# Patient Record
Sex: FEMALE | Race: WHITE | NOT HISPANIC OR LATINO | Employment: FULL TIME | URBAN - METROPOLITAN AREA
[De-identification: names, ages, dates, MRNs, and addresses within clinical notes are randomized per-mention and may not be internally consistent; named-entity substitution may affect disease eponyms.]

---

## 2017-03-22 PROCEDURE — 88304 TISSUE EXAM BY PATHOLOGIST: CPT | Performed by: DERMATOLOGY

## 2017-03-22 PROCEDURE — 88305 TISSUE EXAM BY PATHOLOGIST: CPT | Performed by: DERMATOLOGY

## 2017-03-23 ENCOUNTER — LAB REQUISITION (OUTPATIENT)
Dept: LAB | Facility: HOSPITAL | Age: 57
End: 2017-03-23
Payer: COMMERCIAL

## 2017-03-23 DIAGNOSIS — D48.5 NEOPLASM OF UNCERTAIN BEHAVIOR OF SKIN: ICD-10-CM

## 2017-03-23 DIAGNOSIS — M71.372 OTHER BURSAL CYST, LEFT ANKLE AND FOOT: ICD-10-CM

## 2017-03-30 ENCOUNTER — LAB REQUISITION (OUTPATIENT)
Dept: LAB | Facility: HOSPITAL | Age: 57
End: 2017-03-30
Payer: COMMERCIAL

## 2017-03-30 DIAGNOSIS — C44.329 SQUAMOUS CELL CARCINOMA OF SKIN OF OTHER PARTS OF FACE: ICD-10-CM

## 2017-03-30 PROCEDURE — 88305 TISSUE EXAM BY PATHOLOGIST: CPT | Performed by: DERMATOLOGY

## 2017-03-30 PROCEDURE — 88312 SPECIAL STAINS GROUP 1: CPT | Performed by: DERMATOLOGY

## 2017-04-19 ENCOUNTER — ALLSCRIPTS OFFICE VISIT (OUTPATIENT)
Dept: OTHER | Facility: OTHER | Age: 57
End: 2017-04-19

## 2017-04-19 DIAGNOSIS — E01.0 IODINE-DEFICIENCY RELATED DIFFUSE GOITER: ICD-10-CM

## 2017-04-19 DIAGNOSIS — Z12.31 ENCOUNTER FOR SCREENING MAMMOGRAM FOR MALIGNANT NEOPLASM OF BREAST: ICD-10-CM

## 2017-07-06 ENCOUNTER — APPOINTMENT (OUTPATIENT)
Dept: LAB | Facility: CLINIC | Age: 57
End: 2017-07-06
Payer: COMMERCIAL

## 2017-07-06 ENCOUNTER — TRANSCRIBE ORDERS (OUTPATIENT)
Dept: LAB | Facility: CLINIC | Age: 57
End: 2017-07-06

## 2017-07-06 ENCOUNTER — LAB CONVERSION - ENCOUNTER (OUTPATIENT)
Dept: OTHER | Facility: OTHER | Age: 57
End: 2017-07-06

## 2017-07-06 DIAGNOSIS — Z00.8 HEALTH EXAMINATION IN POPULATION SURVEY: ICD-10-CM

## 2017-07-06 DIAGNOSIS — E78.5 OTHER AND UNSPECIFIED HYPERLIPIDEMIA: Primary | ICD-10-CM

## 2017-07-06 DIAGNOSIS — E78.5 OTHER AND UNSPECIFIED HYPERLIPIDEMIA: ICD-10-CM

## 2017-07-06 DIAGNOSIS — I10 ESSENTIAL (PRIMARY) HYPERTENSION: ICD-10-CM

## 2017-07-06 LAB
ANION GAP SERPL CALCULATED.3IONS-SCNC: 5 MMOL/L (ref 4–13)
BUN SERPL-MCNC: 18 MG/DL (ref 5–25)
CALCIUM SERPL-MCNC: 10.1 MG/DL (ref 8.3–10.1)
CHLORIDE SERPL-SCNC: 102 MMOL/L (ref 100–108)
CHOLEST SERPL-MCNC: 115 MG/DL (ref 50–200)
CO2 SERPL-SCNC: 30 MMOL/L (ref 21–32)
CREAT SERPL-MCNC: 0.81 MG/DL (ref 0.6–1.3)
CREAT UR-MCNC: 80.8 MG/DL
EST. AVERAGE GLUCOSE BLD GHB EST-MCNC: 128 MG/DL
GFR SERPL CREATININE-BSD FRML MDRD: >60 ML/MIN/1.73SQ M
GLUCOSE P FAST SERPL-MCNC: 106 MG/DL (ref 65–99)
HBA1C MFR BLD: 6.1 % (ref 4.2–6.3)
HDLC SERPL-MCNC: 53 MG/DL (ref 40–60)
LDLC SERPL CALC-MCNC: 45 MG/DL (ref 0–100)
MICROALBUMIN UR-MCNC: <5 MG/L (ref 0–20)
MICROALBUMIN/CREAT 24H UR: <6 MG/G CREATININE (ref 0–30)
POTASSIUM SERPL-SCNC: 3.2 MMOL/L (ref 3.5–5.3)
SODIUM SERPL-SCNC: 137 MMOL/L (ref 136–145)
TRIGL SERPL-MCNC: 84 MG/DL

## 2017-07-06 PROCEDURE — 36415 COLL VENOUS BLD VENIPUNCTURE: CPT

## 2017-07-06 PROCEDURE — 82570 ASSAY OF URINE CREATININE: CPT

## 2017-07-06 PROCEDURE — 82043 UR ALBUMIN QUANTITATIVE: CPT

## 2017-07-06 PROCEDURE — 80048 BASIC METABOLIC PNL TOTAL CA: CPT

## 2017-07-06 PROCEDURE — 83036 HEMOGLOBIN GLYCOSYLATED A1C: CPT

## 2017-07-06 PROCEDURE — 80061 LIPID PANEL: CPT

## 2017-07-24 DIAGNOSIS — I10 ESSENTIAL (PRIMARY) HYPERTENSION: ICD-10-CM

## 2017-09-11 ENCOUNTER — APPOINTMENT (OUTPATIENT)
Dept: LAB | Facility: CLINIC | Age: 57
End: 2017-09-11
Payer: COMMERCIAL

## 2017-09-11 ENCOUNTER — TRANSCRIBE ORDERS (OUTPATIENT)
Dept: LAB | Facility: CLINIC | Age: 57
End: 2017-09-11

## 2017-09-11 DIAGNOSIS — I10 ESSENTIAL (PRIMARY) HYPERTENSION: ICD-10-CM

## 2017-09-11 LAB
ANION GAP SERPL CALCULATED.3IONS-SCNC: 8 MMOL/L (ref 4–13)
BUN SERPL-MCNC: 14 MG/DL (ref 5–25)
CALCIUM SERPL-MCNC: 9.3 MG/DL (ref 8.3–10.1)
CHLORIDE SERPL-SCNC: 104 MMOL/L (ref 100–108)
CO2 SERPL-SCNC: 26 MMOL/L (ref 21–32)
CREAT SERPL-MCNC: 0.81 MG/DL (ref 0.6–1.3)
GFR SERPL CREATININE-BSD FRML MDRD: 82 ML/MIN/1.73SQ M
GLUCOSE P FAST SERPL-MCNC: 104 MG/DL (ref 65–99)
POTASSIUM SERPL-SCNC: 3.9 MMOL/L (ref 3.5–5.3)
SODIUM SERPL-SCNC: 138 MMOL/L (ref 136–145)

## 2017-09-11 PROCEDURE — 36415 COLL VENOUS BLD VENIPUNCTURE: CPT

## 2017-09-11 PROCEDURE — 80048 BASIC METABOLIC PNL TOTAL CA: CPT

## 2017-09-13 ENCOUNTER — ALLSCRIPTS OFFICE VISIT (OUTPATIENT)
Dept: OTHER | Facility: OTHER | Age: 57
End: 2017-09-13

## 2017-10-01 ENCOUNTER — OFFICE VISIT (OUTPATIENT)
Dept: URGENT CARE | Facility: MEDICAL CENTER | Age: 57
End: 2017-10-01
Payer: COMMERCIAL

## 2017-10-01 PROCEDURE — S9088 SERVICES PROVIDED IN URGENT: HCPCS

## 2017-10-01 PROCEDURE — 99213 OFFICE O/P EST LOW 20 MIN: CPT

## 2017-10-07 NOTE — PROGRESS NOTES
Assessment  1  Skin rash (782 1) (R21)    Plan  Skin rash    · Cephalexin 500 MG Oral Capsule (Keflex); TAKE 1 CAPSULE 4 TIMES DAILY UNTIL  GONE   · PredniSONE 10 MG Oral Tablet; TAKE 4 TABLETS DAILY FOR 2 DAYS,3 TABLETS  DAILY FOR 2 DAYS, 2 TABLETS DAILY FOR 2 DAYS AND 1 TABLET DAILY FOR 2  DAYS, THEN STOP    Discussion/Summary  Discussion Summary:   1  Rash is consistent with poison ivy dermatitis  I am concerned about the possibility of early infection given redness and warmth on exam therefore I am treating with keflexprednisone as directed with foodBenadryl as directed for itchingup with PCP within 1 week if there is no improvement  to the emergency room with worsening symptoms, worsening rash, fever, increased redness or any new concerns  Medication Side Effects Reviewed: Possible side effects of new medications were reviewed with the patient/guardian today  Understands and agrees with treatment plan: The treatment plan was reviewed with the patient/guardian  The patient/guardian understands and agrees with the treatment plan      Chief Complaint  1  Rash  Chief Complaint Free Text Note Form: Pt with complaints of red, itchy rash on chin for 4 days  Applying caladryl without improvement  History of Present Illness  HPI: The patient is a 62-year-old female who presents today for evaluation of a rash to the left side of her face that started on Wednesday  The patient states that the rash is itchy  She states that it now seems as though it is spreading down the left side of her neck  She has tried applying Caladryl which does offer her some relief for a short amount of time  Patient states that she has been scratching at the rash in has noticed some drainage  She states that she was outside working in the y[place] earlier this week however she is unsure if she came into contact with poison ivy  [facility] Pract[facility] Required Assessment:   Pain Assessment   the patient states they do not have pain   (on a scale of 0 to 10, the patient rates the pain at 0 )    Prefered Language is  english  Primary Language is  english  Review of Systems  Focused-Female:   Constitutional: no fever-and-no chills  Cardiovascular: no chest pain  Respiratory: no shortness of breath  Integumentary: rash-and-itching  ROS Reviewed:   ROS reviewed  Active Problems  1  Accelerated hypertension (401 0) (I10)   2  Cellulitis of chin (682 0) (L03 211)   3  Cellulitis of foot (682 7) (L03 119)   4  Cerebral infarction, unspecified (434 91) (I63 9)   5  Colonoscopy (Fiberoptic) Screening   6  Edema (782 3) (R60 9)   7  Encounter for screening mammogram for breast cancer (V76 12) (Z12 31)   8  Enlarged thyroid (240 9) (E04 9)   9  Essential hypertension (401 9) (I10)   10  Fatigue (780 79) (R53 83)   11  Hyperlipidemia, unspecified (272 4) (E78 5)   12  Lacunar stroke (434 91) (I63 9)   13  Microscopic hematuria (599 72) (R31 29)   14  Pain in ankle joint (719 47) (M25 579)   15  Scalp lesion (709 9) (L98 9)   16  Simple goiter (240 0) (E04 0)    Past Medical History  1  History of Diverticulosis (562 10) (K57 90)   2  History of hemorrhoids (V13 89) (Z87 19)   3  History of hypertension (V12 59) (Z86 79)   4  History of stroke (V12 54) (Z86 73)  Active Problems And Past Medical History Reviewed: The active problems and past medical history were reviewed and updated today  Family History  Mother    1  Family history of Diagnosis unknown  Father    2  Family history of hypertension (V17 49) (Z82 49)   3  Family history of Parkinsonism (V17 2) (Z82 0)  Sister    4  Family history of hypertension (V17 49) (Z82 49)  Family History    5  Family history of cerebrovascular accident (CVA) (V17 1) (Z82 3)   6  Family history of glaucoma (V19 11) (Z83 511)  Family History Reviewed: The family history was reviewed and updated today         Social History   · Always uses seat belt   · Daily caffeine consumption, 4-5 servings a day · Never a smoker   · No drug use   · Social alcohol use (Z78 9)  Social History Reviewed: The social history was reviewed and updated today  The social history was reviewed and is unchanged  Surgical History  1  History of Complete Colonoscopy   2  History of Nose Surgery   3  History of Removal Of Lesion  Surgical History Reviewed: The surgical history was reviewed and updated today  Current Meds   1  Atorvastatin Calcium 40 MG Oral Tablet; TAKE 1 TABLET DAILY  Requested for:   27QJY8885; Last DL:77JXB3251 Ordered   2  Clopidogrel Bisulfate 75 MG Oral Tablet; TAKE 1 TABLET DAILY; Therapy: 16UPK0546 to (Evaluate:28Xxj6009)  Requested for: 08QID7627; Last   Rx:59Zlp0454 Ordered   3  Co Q-10 200 MG Oral Capsule; Daily Recorded   4  D 2000 2000 UNIT Oral Tablet; Take 1 daily Recorded   5  Edarbyclor 40-12 5 MG Oral Tablet; One tablet daily; Therapy: 20WHL7965 to (Evaluate:58Vvs1924)  Requested for: 92Uym3905; Last   Rx:62Wgn2154 Ordered   6  Folic Acid 1 MG Oral Tablet; TAKE 1 TABLET DAILY  Requested for: 34HEO8009; Last   MR:91XOF9526 Ordered   7  Metoprolol Succinate ER 25 MG Oral Tablet Extended Release 24 Hour; Take 1 tablet   twice daily; Therapy: 17Csu5495 to (Evaluate:60Xin8937)  Requested for: 74Oji1701; Last   Rx:09Ogi8748 Ordered   8  Potassium 99 MG Oral Tablet; TAKE 1 TABLET DAILY; Therapy: (Recorded:82Jkr4704) to Recorded   9  Vitamin C 500 MG Oral Capsule; One daily Recorded  Medication List Reviewed: The medication list was reviewed and updated today  Allergies  1  Codeine Derivatives    Vitals  Signs   Recorded: 45LSD9316 02:29PM   Temperature: 97 6 F  Heart Rate: 64  Respiration: 20  Systolic: 478  Diastolic: 72  O2 Saturation: 98  Pain Scale: 0    Physical Exam    Constitutional   General appearance: No acute distress, well appearing and well nourished  Eyes   Conjunctiva and lids: No swelling, erythema or discharge      Pupils and irises: Equal, round and reactive to light     Ears, Nose, Mouth, and Throat   Oropharynx: Normal with no erythema, edema, exudate or lesions  Pulmonary   Respiratory effort: No increased work of breathing or signs of respiratory distress  Auscultation of lungs: Clear to auscultation  Cardiovascular   Auscultation of heart: Normal rate and rhythm, normal S1 and S2, without murmurs  Lymphatic   Palpation of lymph nodes in neck: No lymphadenopathy  Skin   Skin and subcutaneous tissue: Abnormal  -erythematous rash and edema to the left side of the face near the mandible  There are some vesicular lesions with dried weeping  There are some erythematous lesions on the left side of the patient's neck  There is warmth upon palpation of the rash  Neurologic   Sensation: No sensory loss         Signatures   Electronically signed by : Emilee Schneider [facility]; Oct  1 2017  3:08PM EST                       (Author)    Electronically signed by : JENARO Ling ; Oct  6 2017  9:30PM EST                       (Co-author)

## 2017-10-20 NOTE — PROGRESS NOTES
Assessment  1  Accelerated hypertension (401 0) (I10)    Plan  Accelerated hypertension    · Metoprolol Tartrate 25 MG Oral Tablet   Rx By: Tuhsar Cruz; Dispense: 30 Days ; #:60 Tablet; Refill: 0;For: Accelerated hypertension; VEDA = N; Sent To: [address]; Last Updated By: Nelda Khan; 9/13/2017 9:55:41 AM   · Metoprolol Succinate ER 25 MG Oral Tablet Extended Release 24 Hour (Toprol XL); Take 1 tablet twice daily   Rx By: Nelda Khan; Dispense: 90 Days ; #:180 Tablet Extended Release 24 Hour; Refill: 3;For: Accelerated hypertension; VEDA = N; Verified Transmission to [facility]ita Verg[address]; Last Updated By: System, [employer]; 9/13/2017 9:57:28 AM   · From  Edarbyclor 40-12 5 MG Oral Tablet Take 1 tablet daily To Edarbyclor  40-12 5 MG Oral Tablet One tablet daily   Rx By: Nelda Khan; Dispense: 30 Days ; #:30 Tablet; Refill: 6;For: Accelerated hypertension; VEDA = N; Print Rx; Msg to Pharmacy: Patient needs appointment with doctor for more refills   · (1) [place]; Status:Active; Requested for:05Mar2018; Perform:[facility]; WTC:76RUQ2470;FMMVBCR; For:Accelerated hypertension; Ordered By:Bk Burnette;   · (1) MICROALBUMIN CREATININE RATIO, RANDOM URINE; Status:Active; Requested  for:05Mar2018; Perform:[facility]; OXW:08SIX7405;ZLJKMLQ; For:Accelerated hypertension; Ordered By:Bk Burnette;   · Follow-up visit in 6 months Evaluation and Treatment  Follow-up  Status: Hold For -  Scheduling  Requested for: 99Rpq4552   Ordered; For: Accelerated hypertension; Ordered By: Nelda Khan Performed:  Due: 01HFK0739    Discussion/Summary    Accelerated hypertension  her blood pressure significant improved, recently now low with associated dizziness and lightheadedness  At this point time I would like to continue with her Edarbychlor, however will reduce her metoprolol to 25 mg daily, changed to Toprol-XL   If her blood pressure still low within the next several weeks can try to cut this in half  Goal for blood pressure would be systolically between 135 and 130 especially in lieu of her CVA history  If her blood pressure still low or becomes uncontrolled she's give the office a call for further recommendations  Otherwise I like to see her back the office approximately 6 months with repeat laboratory studies including repeat microalbumin / creatinine ratio  The patient was counseled regarding instructions for management,-- impressions  Possible side effects of new medications were reviewed with the patient/guardian today  Reason For Visit  Accelerated hypertension      History of Present Illness  With the pleasure of seeing Edith Shields for nephrology follow-up secondary to previous diagnosis of accelerated hypertension  been doing reasonably well  Denies any recent hospitalizations or illnesses  blood pressure although has been low, associated dizziness lightheadedness  Recorded readings in the low 100s  She denies any falls or syncopal episodes  No active chest pain shortness of breath or lower extremity swelling  Review of Systems    Constitutional: fatigue, but-- no fever  Gastrointestinal: no abdominal pain,-- no nausea,-- no diarrhea-- and-- no vomiting  Respiratory: no shortness of breath  Cardiovascular: no chest pain-- and-- no lower extremity edema  Musculoskeletal: no joint pain  Neurological: lightheadedness-- and-- dizziness  Genitourinary: no dysuria  Current Meds   1  Atorvastatin Calcium 40 MG Oral Tablet; TAKE 1 TABLET DAILY  Requested for:   13FBF1781; Last NX:60YFE1282 Ordered   2  Clopidogrel Bisulfate 75 MG Oral Tablet; TAKE 1 TABLET DAILY; Therapy: 07IUL0449 to (Evaluate:30Oct2017)  Requested for: 69YXF6231; Last   Rx:10Nkh1719 Ordered   3  Co Q-10 200 MG Oral Capsule; Daily Recorded   4  D 2000 2000 UNIT Oral Tablet; Take 1 daily Recorded   5  Edarbyclor 40-12 5 MG Oral Tablet; Take 1 tablet daily;    Therapy: 58UAJ0965 to (Anusha Luke)  Requested for: 45Xwo8733; Last   Rx:30Gtq0454 Ordered   6  Folic Acid 1 MG Oral Tablet; TAKE 1 TABLET DAILY  Requested for: 27HMS6472; Last   TS:72FVF8687 Ordered   7  Metoprolol Tartrate 25 MG Oral Tablet; TAKE 1 TABLET TWICE DAILY  Requested for:   20HGC7073; Last Rx:98Cpb4782 Ordered   8  Potassium 99 MG Oral Tablet; TAKE 1 TABLET DAILY; Therapy: (Recorded:20Sep2016) to Recorded   9  Vitamin C 500 MG Oral Capsule; One daily Recorded    The medication list was reviewed and updated today  Allergies  1  Codeine Derivatives    Vitals  Vital Signs    Recorded: 13Sep2017 09:38AM   Heart Rate 64   Respiration 16   Systolic 799, LUE, Sitting   Diastolic 68, LUE, Sitting   Height 5 ft 4 in   Weight 181 lb 6 08 oz   BMI Calculated 31 13   BSA Calculated 1 89     Physical Exam    Constitutional: General appearance: No acute distress, well appearing and well nourished  JVD:  No JVD present  Pulmonary: Respiratory effort: No increased work of breathing or signs of respiratory distress  -- Auscultation of lungs: Clear to auscultation  Cardiovascular: Auscultation of heart: Normal rate and rhythm, normal S1 and S2, without murmurs  Abdomen: Non-tender, no masses  Extremities: No cyanosis, clubbing or edema  Rash: No rash present     Neurologic: Non Focal      Psychiatric: Orientation to person, place, and time: Normal  -- and-- Mood and affect: Normal        Results/Data  (1) BASIC METABOLIC PROFILE 92MSZ0962 07:20AM Penny Patel Order Number: [id number]     Test Name Result Flag Reference   SODIUM 138 mmol/L  136-145   POTASSIUM 3 9 mmol/L  3 5-5 3   CHLORIDE 104 mmol/L  100-108   CARBON DIOXIDE 26 mmol/L  21-32   ANION GAP (CALC) 8 mmol/L  4-13   BLOOD UREA NITROGEN 14 mg/dL  5-25   CREATININE 0 81 mg/dL  0 60-1 30   Standardized to IDMS reference method   CALCIUM 9 3 mg/dL  8 3-10 1   eGFR 82 ml/min/1 73sq m     [facility] Disease Education Program recommendations are as follows:  GFR calculation is accurate only with a steady state creatinine  Chronic Kidney disease less than 60 ml/min/1 73 sq  meters  Kidney failure less than 15 ml/min/1 73 sq  meters  GLUCOSE FASTING 104 mg/dL H 65-99   Specimen collection should occur prior to Sulfasalazine administration due to the potential for falsely depressed results  Specimen collection should occur prior to Sulfapyridine administration due to the potential for falsely elevated results         Future Appointments    Date/Time Provider Specialty Site   09/19/2017 02:30 PM Wenceslao Rodriguez MD Neurology [facility]     Signatures   Electronically signed by : Chema Wilson DO; Sep 13 2017  9:58AM EST                       (Author)

## 2017-11-09 ENCOUNTER — GENERIC CONVERSION - ENCOUNTER (OUTPATIENT)
Dept: OTHER | Facility: OTHER | Age: 57
End: 2017-11-09

## 2017-11-09 DIAGNOSIS — Z12.31 ENCOUNTER FOR SCREENING MAMMOGRAM FOR MALIGNANT NEOPLASM OF BREAST: ICD-10-CM

## 2017-12-11 DIAGNOSIS — I63.9 CEREBRAL INFARCTION (HCC): ICD-10-CM

## 2017-12-11 DIAGNOSIS — I10 ESSENTIAL (PRIMARY) HYPERTENSION: ICD-10-CM

## 2017-12-11 DIAGNOSIS — R31.29 OTHER MICROSCOPIC HEMATURIA: ICD-10-CM

## 2017-12-11 DIAGNOSIS — B37.2 CANDIDIASIS OF SKIN AND NAIL: ICD-10-CM

## 2017-12-11 DIAGNOSIS — R73.03 PREDIABETES: ICD-10-CM

## 2017-12-11 DIAGNOSIS — E78.5 HYPERLIPIDEMIA: ICD-10-CM

## 2017-12-28 ENCOUNTER — ALLSCRIPTS OFFICE VISIT (OUTPATIENT)
Dept: OTHER | Facility: OTHER | Age: 57
End: 2017-12-28

## 2018-01-04 NOTE — PROGRESS NOTES
Assessment   1  Intertriginous candidiasis (112 3) (B37 2)   2  Prediabetes (790 29) (R73 03)    Plan   Intertriginous candidiasis    · Nystatin 691414 UNIT/GM External Cream; APPLY  AND RUB  IN A THIN FILM TO    AFFECTED AREAS TWICE DAILY  (AM AND PM)  Intertriginous candidiasis, Prediabetes    · (1) CBC/PLT/DIFF; Status:Active; Requested for:82Pjr9788;    · (1) COMPREHENSIVE METABOLIC PANEL; Status:Active; Requested for:05Yoe9114;    · (1) HEMOGLOBIN A1C; Status:Active; Requested for:71Uot6135;   Prediabetes    · A diet low in sugar may help your condition ; Status:Complete;   Done: 35JZS4928    01:30PM   · We want to put you on the DASH diet for 2000 calories ; Status:Complete;   Done:    89IVZ7657 01:30PM    Discussion/Summary      F/u after testing  The patient was counseled regarding diagnostic results,-- instructions for management,-- risk factor reductions,-- patient and family education,-- impressions  Possible side effects of new medications were reviewed with the patient/guardian today  The treatment plan was reviewed with the patient/guardian  The patient/guardian understands and agrees with the treatment plan      Chief Complaint   Pt has c/o rash under both boobs x 3 days  PT stated it is very itchy  No change in soap or lotion  History of Present Illness   HPI: started 2 days ago, tried mupirocin ointment that she had at home, stopped the itch, but otherwise not any better little red bumps if it's hot and I get sweaty, but never like this note-has prediabetes- last labs was BMP in september, and HbA1c in july- 6 1% - and pt states, all I've been doing is eating cookies and candy      Review of Systems        Constitutional: No fever, no chills, feels well, no tiredness, no recent weight gain or loss  Gastrointestinal: no complaints of abdominal pain, no constipation, no nausea or diarrhea, no vomiting, no bloody stools        Genitourinary: no complaints of dysuria, no incontinence, no pelvic pain, no dysmenorrhea, no vaginal discharge or abnormal vaginal bleeding  Integumentary: as noted in HPI  Other Symptoms: endo per HPI, denies polyuria or polydipsia  Active Problems   1  Accelerated hypertension (401 0) (I10)   2  Cerebral infarction, unspecified (434 91) (I63 9)   3  Change in mole (216 9) (L81 9)   4  Colonoscopy (Fiberoptic) Screening   5  Edema (782 3) (R60 9)   6  Encounter for gynecological examination (V72 31) (Z01 419)   7  Encounter for screening mammogram for breast cancer (V76 12) (Z12 31)   8  Enlarged thyroid (240 9) (E04 9)   9  Essential hypertension (401 9) (I10)   10  Fatigue (780 79) (R53 83)   11  Hyperlipidemia, unspecified (272 4) (E78 5)   12  Lacunar stroke (434 91) (I63 9)   13  Microscopic hematuria (599 72) (R31 29)   14  Pain in ankle joint (719 47) (M25 579)   15  Prediabetes (790 29) (R73 03)   16  Scalp lesion (709 9) (L98 9)   17  Simple goiter (240 0) (E04 0)   18  Skin rash (782 1) (R21)    Past Medical History   1  History of Cellulitis of chin (682 0) (L03 211)   2  History of Cellulitis of foot (682 7) (L03 119)   3  History of Diverticulosis (562 10) (K57 90)   4  History of hemorrhoids (V13 89) (Z87 19)   5  History of hypertension (V12 59) (Z86 79)   6  History of stroke (V12 54) (Z86 73)  Active Problems And Past Medical History Reviewed: The active problems and past medical history were reviewed and updated today  Family History   Mother    1  Family history of Diagnosis unknown  Father    2  Family history of hypertension (V17 49) (Z82 49)   3  Family history of Parkinsonism (V17 2) (Z82 0)  Sister    4  Family history of hypertension (V17 49) (Z82 49)  Family History    5  Family history of cerebrovascular accident (CVA) (V17 1) (Z82 3)   6  Family history of glaucoma (V19 11) (Z83 511)  Family History Reviewed: The family history was reviewed and updated today         Social History    · Always uses seat belt   · Daily caffeine consumption, 4-5 servings a day   · Never a smoker   · No drug use   · Social alcohol use (Z78 9)  The social history was reviewed and updated today  The social history was reviewed and is unchanged  Surgical History   1  History of Complete Colonoscopy   2  History of Nose Surgery   3  History of Removal Of Lesion  Surgical History Reviewed: The surgical history was reviewed and updated today  Current Meds    1  Atorvastatin Calcium 40 MG Oral Tablet; TAKE 1 TABLET DAILY  Requested for:     39EVI1596; Last Rx:01Nov2017 Ordered   2  Clopidogrel Bisulfate 75 MG Oral Tablet; TAKE 1 TABLET DAILY; Therapy: 67RUO8987 to (Evaluate:18Wyn3337)  Requested for: 66DZH9544; Last     Rx:01Nov2017 Ordered   3  Co Q-10 200 MG Oral Capsule; Daily Recorded   4  D 2000 2000 UNIT TABS; Take 1 daily Recorded   5  Edarbyclor 40-12 5 MG Oral Tablet; One tablet daily; Therapy: 97IMN7934 to (Evaluate:16Inw0935)  Requested for: 32Hyw9446; Last     Rx:75Hip4288 Ordered   6  Folic Acid 1 MG Oral Tablet; TAKE 1 TABLET DAILY  Requested for: 63EEP9441; Last     Rx:01Nov2017 Ordered   7  Metoprolol Succinate ER 25 MG Oral Tablet Extended Release 24 Hour; Take 1 tablet     twice daily; Therapy: 33Dqm7856 to (Evaluate:53Eok1596)  Requested for: 70Ool8751; Last     Rx:23Wdi6132 Ordered   8  Potassium 99 MG Oral Tablet; TAKE 1 TABLET DAILY; Therapy: (Recorded:80Heu0009) to Recorded   9  Vitamin C 500 MG Oral Capsule; One daily Recorded     The medication list was reviewed and updated today  Allergies   1  Codeine Derivatives    Vitals    Recorded: 97Rgw3903 04:21PM   Temperature 97 7 F   Heart Rate 90   Respiration 16   Systolic 855   Diastolic 82   Height 5 ft 4 in   Weight 187 lb 2 oz   BMI Calculated 32 12   BSA Calculated 1 9   O2 Saturation 99     Physical Exam        Constitutional      General appearance: No acute distress, well appearing and well nourished  -- except BMI        Eyes      Conjunctiva and lids: No swelling, erythema or discharge  Pupils and irises: Equal, round and reactive to light  Pulmonary      Respiratory effort: No increased work of breathing or signs of respiratory distress  Skin      Skin and subcutaneous tissue: Normal without rashes or lesions         Examination of the skin for lesions: Abnormal   Multiple, red papule(s)  in intertriginous areas and on the chest  Red patch(es)  in intertriginous areas and on the chest       Psychiatric      Mood and affect: Normal           Future Appointments      Date/Time Provider Specialty Site   05/08/2018 04:00 PM Zachary Frederick [facility]  64      Signatures    Electronically signed by : Khalida Solorio DO; [place]  3 2018  1:31PM EST                       (Author)

## 2018-01-10 NOTE — RESULT NOTES
Verified Results  (1) CBC/PLT/DIFF 29Apr2016 07:25AM Gulshan Ramos Order Number: [id number]    TW Order Number: [id number]     Test Name Result Flag Reference   WBC COUNT 8 59 Thousand/uL  4 31-10 16   RBC COUNT 4 61 Million/uL  3 81-5 12   HEMOGLOBIN 14 0 g/dL  11 5-15 4   HEMATOCRIT 41 3 %  34 8-46  1   MCV 90 fL  82-98   MCH 30 4 pg  26 8-34 3   MCHC 33 9 g/dL  31 4-37 4   RDW 12 6 %  11 6-15 1   MPV 10 4 fL  8 9-12 7   PLATELET COUNT 939 Thousands/uL  149-390   nRBC AUTOMATED 0 /100 WBCs     NEUTROPHILS RELATIVE PERCENT 56 %  43-75   LYMPHOCYTES RELATIVE PERCENT 35 %  14-44   MONOCYTES RELATIVE PERCENT 6 %  4-12   EOSINOPHILS RELATIVE PERCENT 3 %  0-6   BASOPHILS RELATIVE PERCENT 0 %  0-1   NEUTROPHILS ABSOLUTE COUNT 4 72 Thousands/µL  1 85-7 62   LYMPHOCYTES ABSOLUTE COUNT 2 99 Thousands/µL  0 60-4 47   MONOCYTES ABSOLUTE COUNT 0 55 Thousand/µL  0 17-1 22   EOSINOPHILS ABSOLUTE COUNT 0 29 Thousand/µL  0 00-0 61   BASOPHILS ABSOLUTE COUNT 0 02 Thousands/µL  0 00-0 10     (1) COMPREHENSIVE METABOLIC PANEL [id number] 92:63HL Sammy Sat    Order Number: [id number]    TW Order Number: [id number] Order Number: [id number] Order Number: [id number] Order Number: [id number]  National Kidney Disease Education Program recommendations are as follows:  GFR calculation is accurate only with a steady state creatinine  Chronic Kidney disease less than 60 ml/min/1 73 sq  meters  Kidney failure less than 15 ml/min/1 73 sq  meters  Test Name Result Flag Reference   GLUCOSE,RANDM 96 mg/dL     If the patient is fasting, the ADA then defines impaired fasting glucose as > 100 mg/dL and diabetes as > or equal to 123 mg/dL     SODIUM 140 mmol/L  136-145   POTASSIUM 3 9 mmol/L  3 5-5 3   CHLORIDE 107 mmol/L  100-108   CARBON DIOXIDE 26 mmol/L  21-32   ANION GAP (CALC) 7 mmol/L  4-13   BLOOD UREA NITROGEN 20 mg/dL  5-25   CREATININE 0 64 mg/dL  0 60-1 30   Standardized to IDMS reference method   CALCIUM 8 9 mg/dL  8 3-10 1   BILI, TOTAL 0 40 mg/dL  0 20-1 00   ALK PHOSPHATAS 96 U/L     ALT (SGPT) 38 U/L  12-78   AST(SGOT) 14 U/L  5-45   ALBUMIN 3 7 g/dL  3 5-5 0   TOTAL PROTEIN 6 7 g/dL  6 4-8 2   eGFR Non-African American      >60 0 ml/min/1 73sq m     (1) LIPID PANEL, FASTING 29Apr2016 07:25AM Sushma Hernandez    Order Number: [id number]    TW Order Number: [id number] Order Number: [id number] Order Number: [id number] Order Number: [id number]  Triglyceride:         Normal              <150 mg/dl       Borderline High    150-199 mg/dl       High               200-499 mg/dl       Very High          >499 mg/dl  Cholesterol:         Desirable        <200 mg/dl      Borderline High  200-239 mg/dl      High             >239 mg/dl  HDL Cholesterol:        High    >59 mg/dL      Low     <41 mg/dL  LDL CALCULATED:    This screening LDL is a calculated result  It does not have the accuracy of the Direct Measured LDL in the monitoring of patients with hyperlipidemia and/or statin therapy  Direct Measure LDL (NMB543) must be ordered separately in these patients  Test Name Result Flag Reference   CHOLESTEROL 114 mg/dL     HDL,DIRECT 52 mg/dL  40-60   Specimen collection should occur prior to Metamizole administration due to the potential for falsely depressed results  LDL CHOLESTEROL CALCULATED 42 mg/dL  0-100   TRIGLYCERIDES 100 mg/dL  <=150   Specimen collection should occur prior to N-Acetylcysteine or Metamizole administration due to the potential for falsely depressed results       (1) TSH 29Apr2016 07:25AM Nayeli Faith Order Number: [id number]    TW Order Number: [id number] Order Number: [id number] Order Number: [id number] Order Number: [id number]        The recommended reference ranges for TSH during pregnancy are as follows:  First trimester 0 1 to 2 5 uIU/mL  Second trimester  0 2 to 3 0 uIU/mL  Third trimester 0 3 to 3 0 uIU/m     Test Name Result Flag Reference   TSH 1 640 uIU/mL  0 358-3 740     (1) URINALYSIS w URINE C/S REFLEX (will reflex a microscopy if leukocytes, occult blood, or nitrites are not within normal limits) 29Apr2016 07:25AM Melania Sham Order Number: [id number]     Order Number: [id number]     Test Name Result Flag Reference   COLOR Yellow     CLARITY Clear     PH UA 6 0  4 5-8 0   LEUKOCYTE ESTERASE UA Negative  Negative   NITRITE UA Negative  Negative   PROTEIN UA Negative mg/dl  Negative   GLUCOSE UA Negative mg/dl  Negative   KETONES UA Negative mg/dl  Negative   UROBILINOGEN UA 0 2 E U /dl  0 2, 1 0 E U /dl   BILIRUBIN UA Negative  Negative   BLOOD UA Negative  Negative   SPECIFIC GRAVITY UA 1 022  1 003-1 030     (1) HEMOGLOBIN A1C 29Apr2016 07:25AM Grisel Moon    Order Number: [id number]      5 7-6 4% impaired fasting glucose  >=6 5% diagnosis of diabetes    Falsely low levels are seen in conditions linked to short RBC life span-  hemolytic anemia, and splenomegaly  Falsely elevated levels are seen in situations where there is an increased production of RBC- receipt of erythropoietin or blood transfusions  Adopted from ADA-Clinical Practice Recommendations     Test Name Result Flag Reference   HEMOGLOBIN A1C 5 7 % H 4 0-5 6   EST  AVG   GLUCOSE 117 mg/dl       (1) FOLATE 29Apr2016 07:25AM Melania Sham Order Number: [id number]     Order Number: [id number] Order Number: [id number] Order Number: [id number] Order Number: [id number]     Test Name Result Flag Reference   FOLATE >20 0 ng/mL H 3 1-17 5     (1) THYROXINE 29Apr2016 07:25AM Melania Sham Order Number: [id number]    Performed at:  [address] [place] [address]  [id number]  : Eduardo Guillermo MD, Phone:  [phone]     Test Name Result Flag Reference   T4 TOTAL 7 5 ug/dL  4 5 - 12 0

## 2018-01-12 NOTE — PROGRESS NOTES
Assessment    1  Essential hypertension (401 9) (I10)   2  Scalp lesion (709 9) (L98 9)    Plan  Essential hypertension    · CloNIDine HCl - 0 1 MG Oral Tablet (Catapres)   · CloNIDine HCl - 0 2 MG Oral Tablet; take one tablet by mouth twice daily     Return in one month for blood pressure check     Chief Complaint  Patient presents for an exam to recheck her blood pressure  History of Present Illness  Here for BP check  She is only taking Catapres once daily  She complains of some crusty lesions on the top of her head that may be related to to hair color applications  They have been present for 5-6 months  She also has a dorsal lump on toe five of the right foot  The patient presents for follow-up of essential hypertension  The patient states she has been stable with her blood pressure control since the last visit  She has no comorbid illnesses  She has no significant interval events  Symptoms: The patient is currently asymptomatic  Associated symptoms include no headache, no focal neurologic deficits and no memory loss  Home monitoring: The patient is not checking blood pressure at home  Medications: the patient is adherent with her medication regimen  She denies medication side effects  Medication(s): a diuretic, a beta blocking agent, a calcium channel blocker, an angiotensin receptor blocker and Catapres  Disease Management: the patient is not doing well with her blood pressure goals  Giovanny Terrazas presents with complaints of skin lesions  Associated symptoms include no chills, no fever, no joint pain, no bleeding and no shortness of breath  The patient presents with complaints of gradual onset of constant episodes of moderate bilateral scalp multiple skin lesions, described as crusted, itchy, raised and brown  Episodes started about 6 months ago  Her symptoms are possibly caused by chemical exposure  Symptoms are unchanged        Review of Systems    Constitutional: not feeling poorly and not feeling tired  ENT: no nosebleeds  Cardiovascular: no chest pain and no palpitations  Respiratory: no cough  Gastrointestinal: no abdominal pain and no diarrhea  Musculoskeletal: no arthralgias and no joint stiffness  Integumentary: itching and skin lesion, but as noted in HPI and no skin wound  Neurological: no headache and no dizziness  Active Problems    1  Accelerated essential hypertension (401 0) (I10)   2  Cellulitis of foot (682 7) (L03 119)   3  Colonoscopy (Fiberoptic) Screening   4  Edema (782 3) (R60 9)   5  Encounter for screening mammogram for breast cancer (V76 12) (Z12 31)   6  Essential hypertension (401 9) (I10)   7  Fatigue (780 79) (R53 83)   8  Hyperlipemia (272 4) (E78 5)   9  Lacunar stroke (434 91) (I63 9)   10  Microscopic hematuria (599 72) (R31 2)   11  Pain in ankle joint (719 47) (M25 579)   12  Simple goiter (240 0) (E04 0)   13  Stroke syndrome (434 91) (I63 9)    Past Medical History    1  History of Diverticulosis (562 10) (K57 90)   2  History of hemorrhoids (V13 89) (Z87 19)   3  History of hypertension (V12 59) (Z86 79)   4  History of stroke (V12 54) (Z86 73)    The active problems and past medical history were reviewed and updated today  Surgical History    1  History of Complete Colonoscopy   2  History of Nose Surgery    The surgical history was reviewed and updated today  Family History    1  Family history of Diagnosis unknown    2  Family history of hypertension (V17 49) (Z82 49)   3  Family history of Parkinsonism (V17 2) (Z82 0)    4  Family history of hypertension (V17 49) (Z82 49)    5  Family history of cerebrovascular accident (CVA) (V17 1) (Z82 3)   6  Family history of glaucoma (V19 11) (Z83 511)    The family history was reviewed and updated today  Social History    · Never a smoker   · No drug use   · Social alcohol use (F10 99)  The social history was reviewed and updated today   The social history was reviewed and is unchanged  Current Meds   1  Atorvastatin Calcium 40 MG Oral Tablet; TAKE 1 TABLET DAILY  Requested for:   73VEQ3117; Last Rx:95Xgv6796 Ordered   2  CloNIDine HCl - 0 1 MG Oral Tablet; TAKE 1 TABLET TWICE DAILY; Therapy: 46Gxi8551 to (Evaluate:21Mar2016)  Requested for: 43Gff1559; Last   Rx:25Cdy7060 Ordered   3  Clopidogrel Bisulfate 75 MG Oral Tablet; TAKE 1 TABLET DAILY  Requested for:   11UWL7057; Last Rx:03Eoe1697 Ordered   4  CoQ10 100 MG Oral Capsule; TAKE 1 CAPSULE DAILY; Therapy: (UQAJYYUC:65PUM0792) to Recorded   5  Folic Acid 1 MG Oral Tablet; TAKE 1 TABLET DAILY  Requested for: 24YBW4008; Last   Rx:27Cyb0221 Ordered   6  Losartan Potassium-HCTZ 100-25 MG Oral Tablet; TAKE 1 TABLET DAILY  Requested   for: 27Sbm1417; Last Rx:48Rrh8122 Ordered   7  Metoprolol Tartrate 25 MG Oral Tablet; TAKE 1 TABLET TWICE DAILY  Requested for:   60RZL2516; Last Rx:87Zwq1829 Ordered   8  Vitamin C CAPS; Therapy: (Recorded:72Toy1649) to Recorded   9  Vitamin D TABS; Therapy: (Recorded:67Nmd5141) to Recorded    The medication list was reviewed and updated today  Allergies    1  Codeine Derivatives    Vitals  Vital Signs [Data Includes: Current Encounter]    Recorded: 69XMB8456 04:00PM   Temperature 98 1 F   Heart Rate 71   Systolic 606   Diastolic 94   Height 5 ft 4 in   Weight 176 lb 8 0 oz   BMI Calculated 30 3   BSA Calculated 1 86   O2 Saturation 98     Physical Exam    Constitutional   General appearance: No acute distress, well appearing and well nourished  Head and Face   Head and face: Abnormal   Skin: scalp lesions 2, brown crust(s) top of head  Described as adherent, non-malodorous, non-exudative and non-tender  Eyes   Conjunctiva and lids: No swelling, erythema or discharge  Cardiovascular   Auscultation of heart: Normal rate and rhythm, normal S1 and S2, no murmurs  Carotid pulses: 2+ bilaterally      Examination of extremities for edema and/or varicosities: Normal     Abdomen Abdomen: Non-tender, no masses  Liver and spleen: No hepatomegaly or splenomegaly  Lymphatic   Palpation of lymph nodes in neck: No lymphadenopathy  Skin   Skin and subcutaneous tissue: Abnormal   2 crusty brown irregularly is as seen on photo  1 skin colored nodule  Examination of the skin for lesions: Abnormal   See picture  Examination for skin lesions: Abnormal   Race cystic-like lesion on the top of the little toe on the left        Results/Data         Top of head         Future Appointments    Date/Time Provider Specialty Site   08/16/2016 02:00 PM [facility]   02/29/2016 04:00 PM Rafa Fine DO Family Medicine [id number]     Signatures   Electronically signed by : Rubin Patel DO; Jan 26 2016  5:10PM EST                       (Author)

## 2018-01-12 NOTE — RESULT NOTES
Verified Results  (1) URINALYSIS w URINE C/S REFLEX (will reflex a microscopy if leukocytes, occult blood, or nitrites are not within normal limits) 14XOC5723 02:44PM Danika Stephanie     Test Name Result Flag Reference   CLINICAL REPORT (Report)     Test:        Urine culture  Specimen Source:  Urine, Clean Catch  Specimen Type:   Urine  Specimen Date:   1/14/2016 7:59 AM  Result Date:    1/15/2016 2:44 PM  Result Status:   Final result  Resulting Lab:   [address] [address]            Tel: [phone]                 CULTURE                                       ------------------                                   >100,000 cfu/ml Mixed Contaminants X6

## 2018-01-12 NOTE — RESULT NOTES
Verified Results  (1) BASIC METABOLIC PROFILE 24GUQ1630 09:39AM Alley Gut Order Number: [id number]  TW Order Number: [id number]     Test Name Result Flag Reference   GLUCOSE,RANDM 108 mg/dL     If the patient is fasting, the ADA then defines impaired fasting glucose as > 100 mg/dL and diabetes as > or equal to 123 mg/dL  SODIUM 140 mmol/L  136-145   POTASSIUM 3 7 mmol/L  3 5-5 3   CHLORIDE 106 mmol/L  100-108   CARBON DIOXIDE 30 mmol/L  21-32   ANION GAP (CALC) 4 mmol/L  4-13   BLOOD UREA NITROGEN 18 mg/dL  5-25   CREATININE 0 78 mg/dL  0 60-1 30   Standardized to IDMS reference method   CALCIUM 9 4 mg/dL  8 3-10 1   eGFR Non-African American      >60 0 ml/min/1 73sq m   [facility] Disease Education Program recommendations are as follows:  GFR calculation is accurate only with a steady state creatinine  Chronic Kidney disease less than 60 ml/min/1 73 sq  meters  Kidney failure less than 15 ml/min/1 73 sq  meters         Plan  Essential hypertension    · From  CloNIDine HCl - 0 2 MG Oral Tablet take one tablet by mouth twice  daily To CloNIDine HCl - 0 1 MG Oral Tablet (Catapres) TAKE 1 TABLET TWICE DAILY

## 2018-01-12 NOTE — MISCELLANEOUS
Message   Recorded as Task   Date: 05/16/2016 01:41 PM, Created By: Mendy Holloway   Task Name: Miscellaneous   Assigned To: Mendy Holloway   Regarding Patient: Cody Alexander, Status: Active   CommentZebedee Villafana - 16 May 2016 1:41 PM    TASK CREATED  Caller: [phone] [email or web address] ; Other  Insurance company denied Edarbichloyr stating the patient had to try the Junius Nilesh and the [place] first  Should we supply samples? Bk Burnette - 16 May 2016 3:49 PM    TASK REPLIED TO: Previously Assigned To Bk Burnette  yes please       Patient is aware of above  We scheduled her July follow up and will give more samples at that time  AG      Active Problems   1  Accelerated hypertension (401 0) (I10)  2  Cellulitis of foot (682 7) (L03 119)  3  Cerebral infarction, unspecified (434 91) (I63 9)  4  Colonoscopy (Fiberoptic) Screening  5  Edema (782 3) (R60 9)  6  Encounter for screening mammogram for breast cancer (V76 12) (Z12 31)  7  Essential hypertension (401 9) (I10)  8  Fatigue (780 79) (R53 83)  9  Hyperlipemia (272 4) (E78 5)  10  Lacunar stroke (434 91) (I63 9)  11  Microscopic hematuria (599 72) (R31 2)  12  Pain in ankle joint (719 47) (M25 579)  13  Scalp lesion (709 9) (L98 9)  14  Simple goiter (240 0) (E04 0)    Current Meds  1  Atorvastatin Calcium 40 MG Oral Tablet; TAKE 1 TABLET DAILY  Requested for:   36LKE8253; Last Rx:29Mar2016 Ordered  2  CloNIDine HCl - 0 2 MG Oral Tablet; take one tablet by mouth twice daily; Therapy: 96HDX1037 to (Evaluate:08Xau7298)  Requested for: 28Apr2016; Last   Rx:28Apr2016 Ordered  3  Clopidogrel Bisulfate 75 MG Oral Tablet; TAKE 1 TABLET DAILY  Requested for:   18BOJ7576; Last Rx:23Owg7364 Ordered  4  CoQ10 100 MG Oral Capsule; TAKE 1 CAPSULE DAILY; Therapy: (TICDFPLR:70AWS5388) to Recorded  5  Edarbyclor 40-25 MG Oral Tablet; Take 1 tablet daily; Therapy: 24FDG8071 to (Oleta Necessary)  Requested for: 09BOO0177; Last   Rx:39Xgd6409 Ordered  6   Folic Acid 1 MG Oral Tablet; TAKE 1 TABLET DAILY  Requested for: 74CQU7731; Last   Rx:29Mar2016 Ordered  7  Metoprolol Tartrate 25 MG Oral Tablet; TAKE 1 TABLET TWICE DAILY  Requested for:   56RYI2008; Last Rx:29Mar2016 Ordered  8  Vitamin C CAPS; Therapy: (Recorded:16Sep2014) to Recorded  9  Vitamin D TABS; Therapy: (Recorded:19Oct2015) to Recorded    Allergies   1   Codeine Derivatives    Signatures   Electronically signed by : Qasim Cisse DO; May 17 2016 12:35PM EST                       (Author)

## 2018-01-14 VITALS
OXYGEN SATURATION: 99 % | SYSTOLIC BLOOD PRESSURE: 124 MMHG | WEIGHT: 192 LBS | DIASTOLIC BLOOD PRESSURE: 84 MMHG | HEIGHT: 64 IN | TEMPERATURE: 98.5 F | HEART RATE: 73 BPM | BODY MASS INDEX: 32.78 KG/M2

## 2018-01-14 VITALS
HEIGHT: 64 IN | BODY MASS INDEX: 30.96 KG/M2 | SYSTOLIC BLOOD PRESSURE: 102 MMHG | WEIGHT: 181.38 LBS | HEART RATE: 64 BPM | RESPIRATION RATE: 16 BRPM | DIASTOLIC BLOOD PRESSURE: 68 MMHG

## 2018-01-14 NOTE — RESULT NOTES
Verified Results  (1) BASIC METABOLIC PROFILE 68YTY4530 07:56AM Wandy Mendez     Test Name Result Flag Reference   SODIUM 137 mmol/L  136-145   POTASSIUM 3 2 mmol/L L 3 5-5 3   CHLORIDE 102 mmol/L  100-108   CARBON DIOXIDE 30 mmol/L  21-32   ANION GAP (CALC) 5 mmol/L  4-13   BLOOD UREA NITROGEN 18 mg/dL  5-25   CREATININE 0 81 mg/dL  0 60-1 30   Standardized to IDMS reference method   CALCIUM 10 1 mg/dL  8 3-10 1   eGFR Non-African American      >60 0 ml/min/1 73sq m   [facility] Disease Education Program recommendations are as follows:  GFR calculation is accurate only with a steady state creatinine  Chronic Kidney disease less than 60 ml/min/1 73 sq  meters  Kidney failure less than 15 ml/min/1 73 sq  meters  GLUCOSE FASTING 106 mg/dL H 65-99     (1) MICROALBUMIN CREATININE RATIO, RANDOM URINE 94ZBM7003 07:10AM Wandy Mendez     Test Name Result Flag Reference   MICROALBUMIN/ CREAT R <6 mg/g creatinine  0-30   MICROALBUMIN,URINE <5 0 mg/L  0 0-20 0   CREATININE URINE 80 8 mg/dL         Plan  Accelerated hypertension    · Edarbyclor 40-12 5 MG Oral Tablet; Take 1 tablet daily   · (1) BASIC METABOLIC PROFILE; Status:Active;  Requested for:26Mrn8454;

## 2018-01-15 NOTE — RESULT NOTES
Verified Results  (1) URINALYSIS w URINE C/S REFLEX (will reflex a microscopy if leukocytes, occult blood, or nitrites are not within normal limits) 93JAK6986 02:30PM Ho Garibay     Test Name Result Flag Reference   COLOR Yellow     CLARITY Cloudy     PH UA 6 0  4 5-8 0   LEUKOCYTE ESTERASE UA Moderate A Negative   NITRITE UA Negative  Negative   PROTEIN UA Negative mg/dl  Negative   GLUCOSE UA Negative mg/dl  Negative   KETONES UA Negative mg/dl  Negative   UROBILINOGEN UA 0 2 E U /dl  0 2, 1 0 E U /dl   BILIRUBIN UA Negative  Negative   BLOOD UA Negative  Negative   SPECIFIC GRAVITY UA 1 022  1 003-1 030   BACTERIA Occasional /hpf  None Seen, Occasional   EPITHELIAL CELLS Moderate /hpf A None Seen, Occasional   RBC UA None Seen /hpf  None Seen, 2-4, 0-1, 1-2   WBC UA 4-10 /hpf A None Seen, 2-4

## 2018-01-15 NOTE — RESULT NOTES
Verified Results  VAS RENAL ARTERY COMPLETE BILATERAL 20Jun2016 06:46AM Ila Moura Order Number: [id number]     Test Name Result Flag Reference   VAS RENAL ARTERY COMPLETE BILATERAL (Report)     [facility] REPORT   CLINICAL:   Indications: Bilateral Renal Artery Occlusive disease [I70 1]  Uncontrolled Hypertension for the past 2 years  Currently on Meds that   according to the patient seem to be working  Risk Factors   The patient has history of hypertension and hyperlipidemia  She has no history   of diabetes  FINDINGS:      Unilateral   Impression PSV (cm/s) EDV (cm/s)  RI    Sup-Leonela Ao            132             Celiac     >70%        540     144       Prox  SMA             213     29       Px Inf-Harrison Ao           102     14 0 86    Ds Inf-Harrison Ao           98                Right     Impression PSV (cm/s) EDV (cm/s)  RAR  RI Kidney (cm)    Prox Renal            156     67 1 18              Mid Renal             180     68 1 37              Dist Renal            105     42 0 80              Celiac Artery           29     14    0 53           Kidney     Normal                        11 34       Left      PSV (cm/s) EDV (cm/s)  RAR  RI Kidney (cm)    Prox Renal      133     50 1 01              Mid Renal       146     63 1 11              Celiac Artery     41     18    0 57           Kidney                          11 80             CONCLUSION:   Impression   The abdominal aorta is widely patent and normal caliber  RIGHT RENAL:   No evidence of significant arterial occlusive disease in the main renal artery  Patent renal vein   Adequate parenchymal flow noted with a renovascular resistive index of 0 53  The Kidney measures 11 34cm  LEFT RENAL:   No evidence of significant arterial occlusive disease in the main renal artery  Patent renal vein   Adequate parenchymal flow noted with a renovascular resistive index of 0 57  The Kidney measures 11 8cm     MESENTERIC:   >70% stenosis identified in the Celiac artery  Superior mesenteric artery is   patent        SIGNATURE:   Electronically Signed by: Cheli Menon MD on 2016-06-20 10:14:31 PM

## 2018-01-18 NOTE — MISCELLANEOUS
Message  Return to work or school:   Giovanny Terrazas is under my professional care  She was seen in my office on 7/27/16   She is able to return to work on  7/27/16      Jaxson Shepard was seen in our office and is being treated for a wound on her chin  She is able to work, the wound just has to be covered          Signatures   Electronically signed by : Erick Graf, ; Jul 27 2016  5:49PM EST                       (Author)

## 2018-01-22 VITALS
HEIGHT: 64 IN | OXYGEN SATURATION: 98 % | BODY MASS INDEX: 31.76 KG/M2 | HEART RATE: 78 BPM | SYSTOLIC BLOOD PRESSURE: 120 MMHG | TEMPERATURE: 97.5 F | WEIGHT: 186 LBS | DIASTOLIC BLOOD PRESSURE: 26 MMHG | RESPIRATION RATE: 18 BRPM

## 2018-01-22 VITALS
HEIGHT: 64 IN | RESPIRATION RATE: 16 BRPM | SYSTOLIC BLOOD PRESSURE: 120 MMHG | OXYGEN SATURATION: 99 % | HEART RATE: 90 BPM | DIASTOLIC BLOOD PRESSURE: 82 MMHG | BODY MASS INDEX: 31.95 KG/M2 | WEIGHT: 187.13 LBS | TEMPERATURE: 97.7 F

## 2018-01-23 NOTE — PROGRESS NOTES
Assessment    1  Cellulitis of chin (682 0) (L03 211)    Plan  Cellulitis of chin    · Amoxicillin-Pot Clavulanate 500-125 MG Oral Tablet (Augmentin); TAKE 1 TABLET  Every twelve hours with breakfast and dinner   · Mupirocin 2 % External Ointment; APPLY A SMALL AMOUNT 3 TIMES DAILY AS  DIRECTED   · (1) WOUND CULTURE; Status:Active; Requested for:[id number];     Return in one week or when necessary  Discussion/Summary    The patient will return to the office if she is not improved in 7-10 days  I've authorized that she may return to work as long as she continues using the [address] [place] [address] [employer] and covers the wound  Possible side effects of new medications were reviewed with the patient/guardian today  The treatment plan was reviewed with the patient/guardian  The patient/guardian understands and agrees with the treatment plan      Chief Complaint  Pt presents today with c/o itchy, swollen, red lump on chin  History of Present Illness  HPI: The rash on her left lateral chin It is started a few little bumps 6 days ago  Three days ago the lesion enlarged, started to drain fluid and became pruritic  She denies any fever chills or night sweats  No one in the household has been sick with a respiratory illness or sore throat  Cellulitis: The patient is being seen for an initial evaluation of cellulitis  Symptoms:  swelling and tenderness  The patient is currently experiencing symptoms  Symptoms are located on the left face  The patient describes the pain as itching  Onset was gradual 6 day(s) ago  Onset followed minor skin trauma  The symptoms occur constantly  She describes this as moderate in severity and worsening  No exacerbating factors are noted  No relieving factors are noted  No associated symptoms are reported  The patient is not currently being treated for this problem  Review of Systems    Constitutional: no fever, not feeling poorly, no chills and not feeling tired     Cardiovascular: no chest pain and no palpitations  Respiratory: no cough  Gastrointestinal: no abdominal pain  Musculoskeletal: no arthralgias and no myalgias  Integumentary: skin lesion, but as noted in HPI  Neurological: no headache and no dizziness  Active Problems    1  Accelerated hypertension (401 0) (I10)   2  Cellulitis of foot (682 7) (L03 119)   3  Cerebral infarction, unspecified (434 91) (I63 9)   4  Colonoscopy (Fiberoptic) Screening   5  Edema (782 3) (R60 9)   6  Encounter for screening mammogram for breast cancer (V76 12) (Z12 31)   7  Essential hypertension (401 9) (I10)   8  Fatigue (780 79) (R53 83)   9  Hyperlipemia (272 4) (E78 5)   10  Lacunar stroke (434 91) (I63 9)   11  Microscopic hematuria (599 72) (R31 2)   12  Pain in ankle joint (719 47) (M25 579)   13  Scalp lesion (709 9) (L98 9)   14  Simple goiter (240 0) (E04 0)    Past Medical History    1  History of Diverticulosis (562 10) (K57 90)   2  History of hemorrhoids (V13 89) (Z87 19)   3  History of hypertension (V12 59) (Z86 79)   4  History of stroke (V12 54) (Z86 73)  Active Problems And Past Medical History Reviewed: The active problems and past medical history were reviewed and updated today  Family History  Mother    1  Family history of Diagnosis unknown  Father    2  Family history of hypertension (V17 49) (Z82 49)   3  Family history of Parkinsonism (V17 2) (Z82 0)  Sister    4  Family history of hypertension (V17 49) (Z82 49)  Family History    5  Family history of cerebrovascular accident (CVA) (V17 1) (Z82 3)   6  Family history of glaucoma (V19 11) (Z83 511)  Family History Reviewed: The family history was reviewed and updated today  Social History    · Always uses seat belt   · Daily caffeine consumption, 4-5 servings a day   · Never a smoker   · No drug use   · Social alcohol use (Z78 9)  The social history was reviewed and updated today  The social history was reviewed and is unchanged  Surgical History    1   History of Complete Colonoscopy   2  History of Nose Surgery  Surgical History Reviewed: The surgical history was reviewed and updated today  Current Meds   1  Atorvastatin Calcium 40 MG Oral Tablet; TAKE 1 TABLET DAILY  Requested for:   25RQL5643; Last Rx:29Mar2016 Ordered   2  Clopidogrel Bisulfate 75 MG Oral Tablet; TAKE 1 TABLET DAILY; Therapy: 69BZR4052 to (Last Rx:24Jun2016)  Requested for: 83ZJJ5729 Ordered   3  CoQ10 100 MG Oral Capsule; TAKE 1 CAPSULE DAILY; Therapy: (PMHVCPLX:26DPQ3712) to Recorded   4  Edarbyclor 40-25 MG Oral Tablet; Take 1 tablet daily; Therapy: 50ATI4065 to (Evaluate:79Pgc5100); Last Rx:59Vqt5442 Ordered   5  Edarbyclor 40-25 MG Oral Tablet; Take 1 tablet daily; Therapy: 78HIJ7167 to (Fabian Damon)  Requested for: 28XZS9185; Last   Rx:27Hgc7276 Ordered   6  Folic Acid 1 MG Oral Tablet; TAKE 1 TABLET DAILY  Requested for: 35CZL0675; Last   Rx:29Mar2016 Ordered   7  Metoprolol Tartrate 25 MG Oral Tablet; TAKE 1 TABLET TWICE DAILY  Requested for:   89DBN2265; Last LAILA:20EPW6362 Ordered   8  Vitamin C CAPS; Therapy: (Recorded:75Kzp8068) to Recorded   9  Vitamin D TABS; Therapy: (Recorded:74Ncg3799) to Recorded    The medication list was reviewed and updated today  Allergies    1  Codeine Derivatives    Vitals   Recorded: 76NRL7260 06:93IK   Systolic 009   Diastolic 80   Heart Rate 68   Temperature 98 6 F   O2 Saturation 97   Height 5 ft 4 in   Weight 184 lb    BMI Calculated 31 58   BSA Calculated 1 89     Physical Exam    Constitutional   General appearance: No acute distress, well appearing and well nourished  Head and Face   Head and face: Abnormal   There is a 3 cm irregular oval lesion below the right chin with crusts and honey-colored discharge with impetigo  Palpation of the face and sinuses: No sinus tenderness  Eyes   Conjunctiva and lids: No swelling, erythema or discharge  Pulmonary   Auscultation of lungs: Clear to auscultation      Cardiovascular Auscultation of heart: Normal rate and rhythm, normal S1 and S2, no murmurs  Carotid pulses: 2+ bilaterally  Skin   Examination of the skin for lesions: Abnormal   One abscess(es)  The abscesses range in size from 3 cm to  Described as exudative, tender and Pruritic, but non-fluctuant        Future Appointments    Date/Time Provider Specialty Site   08/16/2016 02:00 PM Alannah Chacon MD Neurology [facility] [address]   09/14/2016 06:15 PM Marilyn Kang DO Family Medicine [email or web address]     Signatures   Electronically signed by : Roscoe Carbajal DO; Jul 28 2016  8:27AM EST                       (Author)

## 2018-03-05 DIAGNOSIS — I10 ESSENTIAL (PRIMARY) HYPERTENSION: ICD-10-CM

## 2018-04-11 ENCOUNTER — TRANSCRIBE ORDERS (OUTPATIENT)
Dept: LAB | Facility: CLINIC | Age: 58
End: 2018-04-11

## 2018-04-11 ENCOUNTER — APPOINTMENT (OUTPATIENT)
Dept: LAB | Facility: CLINIC | Age: 58
End: 2018-04-11
Payer: COMMERCIAL

## 2018-04-11 DIAGNOSIS — R73.03 PREDIABETES: ICD-10-CM

## 2018-04-11 DIAGNOSIS — I10 ESSENTIAL (PRIMARY) HYPERTENSION: ICD-10-CM

## 2018-04-11 DIAGNOSIS — E78.5 HYPERLIPIDEMIA: ICD-10-CM

## 2018-04-11 DIAGNOSIS — R31.29 OTHER MICROSCOPIC HEMATURIA: ICD-10-CM

## 2018-04-11 DIAGNOSIS — I63.9 CEREBRAL INFARCTION (HCC): ICD-10-CM

## 2018-04-11 LAB
ALBUMIN SERPL BCP-MCNC: 3.8 G/DL (ref 3.5–5)
ANION GAP SERPL CALCULATED.3IONS-SCNC: 4 MMOL/L (ref 4–13)
BASOPHILS # BLD AUTO: 0.02 THOUSANDS/ΜL (ref 0–0.1)
BASOPHILS NFR BLD AUTO: 0 % (ref 0–1)
BILIRUB UR QL STRIP: NEGATIVE
BUN SERPL-MCNC: 23 MG/DL (ref 5–25)
CALCIUM ALBUM COR SERPL-MCNC: 10.5 MG/DL (ref 8.3–10.1)
CALCIUM SERPL-MCNC: 10.3 MG/DL
CALCIUM SERPL-MCNC: 10.3 MG/DL (ref 8.3–10.1)
CHLORIDE SERPL-SCNC: 110 MMOL/L (ref 100–108)
CHOLEST SERPL-MCNC: 134 MG/DL (ref 50–200)
CLARITY UR: CLEAR
CO2 SERPL-SCNC: 28 MMOL/L (ref 21–32)
COLOR UR: YELLOW
CREAT SERPL-MCNC: 0.92 MG/DL (ref 0.6–1.3)
CREAT UR-MCNC: 105 MG/DL
EOSINOPHIL # BLD AUTO: 0.31 THOUSAND/ΜL (ref 0–0.61)
EOSINOPHIL NFR BLD AUTO: 4 % (ref 0–6)
ERYTHROCYTE [DISTWIDTH] IN BLOOD BY AUTOMATED COUNT: 12.9 % (ref 11.6–15.1)
EST. AVERAGE GLUCOSE BLD GHB EST-MCNC: 123 MG/DL
GFR SERPL CREATININE-BSD FRML MDRD: 70 ML/MIN/1.73SQ M
GLUCOSE P FAST SERPL-MCNC: 105 MG/DL (ref 65–99)
GLUCOSE UR STRIP-MCNC: NEGATIVE MG/DL
HBA1C MFR BLD: 5.9 % (ref 4.2–6.3)
HCT VFR BLD AUTO: 42.8 % (ref 34.8–46.1)
HDLC SERPL-MCNC: 59 MG/DL (ref 40–60)
HGB BLD-MCNC: 14 G/DL (ref 11.5–15.4)
HGB UR QL STRIP.AUTO: NEGATIVE
KETONES UR STRIP-MCNC: NEGATIVE MG/DL
LDLC SERPL CALC-MCNC: 56 MG/DL (ref 0–100)
LEUKOCYTE ESTERASE UR QL STRIP: NEGATIVE
LYMPHOCYTES # BLD AUTO: 3.11 THOUSANDS/ΜL (ref 0.6–4.47)
LYMPHOCYTES NFR BLD AUTO: 37 % (ref 14–44)
MCH RBC QN AUTO: 29.2 PG (ref 26.8–34.3)
MCHC RBC AUTO-ENTMCNC: 32.7 G/DL (ref 31.4–37.4)
MCV RBC AUTO: 89 FL (ref 82–98)
MICROALBUMIN UR-MCNC: 7.2 MG/L (ref 0–20)
MICROALBUMIN/CREAT 24H UR: 7 MG/G CREATININE (ref 0–30)
MONOCYTES # BLD AUTO: 0.63 THOUSAND/ΜL (ref 0.17–1.22)
MONOCYTES NFR BLD AUTO: 8 % (ref 4–12)
NEUTROPHILS # BLD AUTO: 4.29 THOUSANDS/ΜL (ref 1.85–7.62)
NEUTS SEG NFR BLD AUTO: 51 % (ref 43–75)
NITRITE UR QL STRIP: NEGATIVE
NONHDLC SERPL-MCNC: 75 MG/DL
NRBC BLD AUTO-RTO: 0 /100 WBCS
PH UR STRIP.AUTO: 6 [PH] (ref 4.5–8)
PLATELET # BLD AUTO: 342 THOUSANDS/UL (ref 149–390)
PMV BLD AUTO: 9.9 FL (ref 8.9–12.7)
POTASSIUM SERPL-SCNC: 4.2 MMOL/L (ref 3.5–5.3)
PROT UR STRIP-MCNC: NEGATIVE MG/DL
RBC # BLD AUTO: 4.8 MILLION/UL (ref 3.81–5.12)
SODIUM SERPL-SCNC: 142 MMOL/L (ref 136–145)
SP GR UR STRIP.AUTO: 1.02 (ref 1–1.03)
TRIGL SERPL-MCNC: 94 MG/DL
TSH SERPL DL<=0.05 MIU/L-ACNC: 2.21 UIU/ML (ref 0.36–3.74)
UROBILINOGEN UR QL STRIP.AUTO: 0.2 E.U./DL
WBC # BLD AUTO: 8.38 THOUSAND/UL (ref 4.31–10.16)

## 2018-04-11 PROCEDURE — 82040 ASSAY OF SERUM ALBUMIN: CPT

## 2018-04-11 PROCEDURE — 84443 ASSAY THYROID STIM HORMONE: CPT

## 2018-04-11 PROCEDURE — 82570 ASSAY OF URINE CREATININE: CPT

## 2018-04-11 PROCEDURE — 80048 BASIC METABOLIC PNL TOTAL CA: CPT

## 2018-04-11 PROCEDURE — 83036 HEMOGLOBIN GLYCOSYLATED A1C: CPT

## 2018-04-11 PROCEDURE — 80061 LIPID PANEL: CPT

## 2018-04-11 PROCEDURE — 81003 URINALYSIS AUTO W/O SCOPE: CPT

## 2018-04-11 PROCEDURE — 82043 UR ALBUMIN QUANTITATIVE: CPT

## 2018-04-11 PROCEDURE — 36415 COLL VENOUS BLD VENIPUNCTURE: CPT

## 2018-04-11 PROCEDURE — 85025 COMPLETE CBC W/AUTO DIFF WBC: CPT

## 2018-04-12 ENCOUNTER — APPOINTMENT (OUTPATIENT)
Dept: LAB | Facility: MEDICAL CENTER | Age: 58
End: 2018-04-12
Payer: COMMERCIAL

## 2018-04-12 DIAGNOSIS — E83.52 HYPERCALCEMIA: Primary | ICD-10-CM

## 2018-04-12 DIAGNOSIS — E83.52 HYPERCALCEMIA: ICD-10-CM

## 2018-04-12 LAB
CA-I BLD-SCNC: 1.38 MMOL/L (ref 1.12–1.32)
PTH-INTACT SERPL-MCNC: 128.1 PG/ML (ref 18.4–80.1)

## 2018-04-12 PROCEDURE — 83970 ASSAY OF PARATHORMONE: CPT

## 2018-04-12 PROCEDURE — 36415 COLL VENOUS BLD VENIPUNCTURE: CPT

## 2018-04-12 PROCEDURE — 82330 ASSAY OF CALCIUM: CPT

## 2018-04-13 DIAGNOSIS — R79.89 HIGH SERUM PARATHYROID HORMONE (PTH): ICD-10-CM

## 2018-04-13 DIAGNOSIS — E83.52 HYPERCALCEMIA: Primary | ICD-10-CM

## 2018-04-16 DIAGNOSIS — R79.89 HIGH SERUM PARATHYROID HORMONE (PTH): ICD-10-CM

## 2018-04-16 DIAGNOSIS — E83.52 HYPERCALCEMIA: Primary | ICD-10-CM

## 2018-05-03 ENCOUNTER — OFFICE VISIT (OUTPATIENT)
Dept: NEPHROLOGY | Facility: CLINIC | Age: 58
End: 2018-05-03
Payer: COMMERCIAL

## 2018-05-03 VITALS
DIASTOLIC BLOOD PRESSURE: 90 MMHG | BODY MASS INDEX: 30.9 KG/M2 | HEIGHT: 64 IN | WEIGHT: 181 LBS | HEART RATE: 70 BPM | SYSTOLIC BLOOD PRESSURE: 130 MMHG

## 2018-05-03 DIAGNOSIS — E83.52 HYPERCALCEMIA: ICD-10-CM

## 2018-05-03 DIAGNOSIS — I10 ESSENTIAL HYPERTENSION: Primary | ICD-10-CM

## 2018-05-03 PROCEDURE — 99214 OFFICE O/P EST MOD 30 MIN: CPT | Performed by: INTERNAL MEDICINE

## 2018-05-03 RX ORDER — FOLIC ACID 1 MG/1
1 TABLET ORAL DAILY
COMMUNITY
End: 2018-05-08 | Stop reason: SDUPTHER

## 2018-05-03 RX ORDER — ASCORBIC ACID 500 MG
TABLET ORAL DAILY
COMMUNITY
End: 2020-06-16

## 2018-05-03 RX ORDER — ATORVASTATIN CALCIUM 40 MG/1
1 TABLET, FILM COATED ORAL DAILY
COMMUNITY
End: 2018-05-08 | Stop reason: SDUPTHER

## 2018-05-03 RX ORDER — CLOPIDOGREL BISULFATE 75 MG/1
1 TABLET ORAL DAILY
COMMUNITY
Start: 2016-06-24 | End: 2018-05-08 | Stop reason: SDUPTHER

## 2018-05-03 RX ORDER — METOPROLOL SUCCINATE 25 MG/1
1 TABLET, EXTENDED RELEASE ORAL DAILY
COMMUNITY
Start: 2017-09-13 | End: 2018-05-08 | Stop reason: SDUPTHER

## 2018-05-03 RX ORDER — ATORVASTATIN CALCIUM 80 MG/1
TABLET, FILM COATED ORAL EVERY 24 HOURS
COMMUNITY
Start: 2015-09-30 | End: 2018-05-31

## 2018-05-03 NOTE — PROGRESS NOTES
NEPHROLOGY OUTPATIENT PROGRESS NOTE   Meron Kiran 56 y o  female MRN: [id number]  Reason for visit:  Accelerated hypertension    ASSESSMENT and PLAN:  1  Accelerated hypertension, blood pressure stable with current antihypertensives including a Edarbyclor and metoprolol  2  Suspicion for primary hyperparathyroidism, ionized calcium 1 38, corrected serum calcium 10 5, PTH elevated at 128    · Awaiting Endocrinology evaluation, however again likely primary hyperparathyroidism given elevated calcium and PTH levels  Will discuss with Endocrinology in regards to possible further testing prior to their appointment  · Would continue with current antihypertensive regimen including her thiazide diuretic  Certainly her thiazide diuretic may complicate her Calcium picture however given her history of uncontrolled hypertension I am reluctant to change her medications  Additionally I would not think thiazide diuretics would explain her elevated PTH levels  · Plan to see her approximately 6 months with repeat laboratory studies at that time  SUBJECTIVE / INTERVAL HISTORY:  She has been doing reasonably well  She denies any chest pain shortness of breath or lower extremity swelling  Her blood pressure has been stable according to home blood pressure measurements  Denies any flank pain, denies any passage of kidney stones  Denies any history of osteoporosis or osteopenia  Appetite has been stable  Denies any confusion  Review of Systems      OBJECTIVE:  /90 (BP Location: Left arm, Patient Position: Sitting, Cuff Size: Adult)   Pulse 70   Ht 5' 4" (1 626 m)   Wt 82 1 kg (181 lb)   BMI 31 07 kg/m²     Physical Exam   Constitutional: She is oriented to person, place, and time  No distress  HENT:   Head: Normocephalic  Eyes: Conjunctivae are normal    Neck: Neck supple  Cardiovascular: Normal rate and regular rhythm      Pulmonary/Chest: Effort normal and breath sounds normal    Abdominal: Soft  Bowel sounds are normal    Musculoskeletal: She exhibits no edema  Neurological: She is alert and oriented to person, place, and time  Skin: Skin is warm and dry  Psychiatric: She has a normal mood and affect           Medications:    Current Outpatient Prescriptions:     ascorbic acid (VITAMIN C) 500 mg tablet, Take by mouth daily, Disp: , Rfl:     atorvastatin (LIPITOR) 40 mg tablet, Take 1 tablet by mouth daily, Disp: , Rfl:     atorvastatin (LIPITOR) 80 mg tablet, every 24 hours, Disp: , Rfl:     Azilsartan-Chlorthalidone (EDARBYCLOR) 40-12 5 MG TABS, Take 1 tablet by mouth daily, Disp: , Rfl:     Cholecalciferol (D 2000) 2000 units TABS, Take by mouth daily, Disp: , Rfl:     clopidogrel (PLAVIX) 75 mg tablet, Take 1 tablet by mouth daily, Disp: , Rfl:     CO ENZYME Q-10 PO, take 1 Capsule by Oral route once, Disp: , Rfl:     folic acid (FOLVITE) 1 mg tablet, Take 1 tablet by mouth daily, Disp: , Rfl:     metoprolol succinate (TOPROL-XL) 25 mg 24 hr tablet, Take 1 tablet by mouth daily, Disp: , Rfl:     Potassium 99 MG TABS, Take 1 tablet by mouth daily, Disp: , Rfl:     Laboratory Results:  Results for orders placed or performed in visit on 04/12/18   Calcium, ionized   Result Value Ref Range    Calcium, Ionized 1 38 (H) 1 12 - 1 32 mmol/L   PTH, intact   Result Value Ref Range     1 (H) 18 4 - 80 1 pg/mL

## 2018-05-03 NOTE — PATIENT INSTRUCTIONS
ASSESSMENT and PLAN:  1  Accelerated hypertension, blood pressure stable with current antihypertensives including a Edarbyclor and metoprolol  2  Suspicion for primary hyperparathyroidism, ionized calcium 1 38, corrected serum calcium 10 5, PTH elevated at 128    · Awaiting Endocrinology evaluation, however again likely primary hyperparathyroidism given elevated calcium and PTH levels  Will discuss with Endocrinology in regards to possible further testing prior to their appointment  · Would continue with current antihypertensive regimen including her thiazide diuretic  Certainly her thiazide diuretic may complicate her Calcium picture however given her history of uncontrolled hypertension I am reluctant to change her medications  Additionally I would not think thiazide diuretics would explain her elevated PTH levels  · Plan to see her approximately 6 months with repeat laboratory studies at that time

## 2018-05-03 NOTE — LETTER
May 3, 2018     Alex Terry DO  [address]  [place]  [phone]    Patient: Kt Lewis   YOB: 1960   Date of Visit: 5/3/2018     Dear Dr Juanita Kussmaul      Thank you for referring Kt Lewis to me for evaluation  Below are the relevant portions of my assessment and plan of care  If you have questions, please do not hesitate to call me  I look forward to following Stephanie Viveros along with you           Sincerely,        Dolly Adhikari DO        CC: Cami Duran MD    Progress Notes:

## 2018-05-03 NOTE — LETTER
May 3, 2018     Patient: Jaime Ventura   YOB: 1960   Date of Visit: 5/3/2018       To Whom it May Concern:    Jaime Ventura is under my professional care  She was seen in my office on 5/3/2018  She may return to school on 5/4/2018  If you have any questions or concerns, please don't hesitate to call           Sincerely,          Mariana Silvestre,         CC: No Recipients

## 2018-05-04 DIAGNOSIS — N20.0 NEPHROLITHIASIS: Primary | ICD-10-CM

## 2018-05-04 DIAGNOSIS — I10 ESSENTIAL HYPERTENSION: Primary | ICD-10-CM

## 2018-05-04 DIAGNOSIS — E83.52 HYPERCALCEMIA: Primary | ICD-10-CM

## 2018-05-04 RX ORDER — VALSARTAN 320 MG/1
320 TABLET ORAL DAILY
Qty: 90 TABLET | Refills: 3 | Status: SHIPPED | OUTPATIENT
Start: 2018-05-04 | End: 2019-04-02

## 2018-05-04 RX ORDER — FUROSEMIDE 20 MG/1
20 TABLET ORAL DAILY
Qty: 90 TABLET | Refills: 3 | Status: SHIPPED | OUTPATIENT
Start: 2018-05-04 | End: 2018-05-04

## 2018-05-04 NOTE — PROGRESS NOTES
Case was discussed with Endocrinology given need for 24 urine for calcium, will need to stop thiazide diuretic  Will change current prescription to Diovan 320 milligrams daily as well as with Lasix 20 milligrams daily if needed for blood pressure  Plan repeat BMP in 2-3 weeks to ensure that electrolytes are stable  Continue to monitor blood pressure daily

## 2018-05-08 DIAGNOSIS — I10 ESSENTIAL HYPERTENSION: ICD-10-CM

## 2018-05-08 DIAGNOSIS — E78.5 HYPERLIPIDEMIA, UNSPECIFIED HYPERLIPIDEMIA TYPE: ICD-10-CM

## 2018-05-08 DIAGNOSIS — I63.81 LACUNAR STROKE (HCC): Primary | ICD-10-CM

## 2018-05-10 RX ORDER — METOPROLOL SUCCINATE 25 MG/1
25 TABLET, EXTENDED RELEASE ORAL DAILY
Qty: 30 TABLET | Refills: 0 | Status: SHIPPED | OUTPATIENT
Start: 2018-05-10 | End: 2018-05-31 | Stop reason: SDUPTHER

## 2018-05-10 RX ORDER — FOLIC ACID 1 MG/1
1000 TABLET ORAL DAILY
Qty: 30 TABLET | Refills: 0 | Status: SHIPPED | OUTPATIENT
Start: 2018-05-10 | End: 2018-05-31 | Stop reason: SDUPTHER

## 2018-05-10 RX ORDER — CLOPIDOGREL BISULFATE 75 MG/1
75 TABLET ORAL DAILY
Qty: 30 TABLET | Refills: 0 | Status: SHIPPED | OUTPATIENT
Start: 2018-05-10 | End: 2018-05-31 | Stop reason: SDUPTHER

## 2018-05-10 RX ORDER — ATORVASTATIN CALCIUM 40 MG/1
40 TABLET, FILM COATED ORAL DAILY
Qty: 30 TABLET | Refills: 0 | Status: SHIPPED | OUTPATIENT
Start: 2018-05-10 | End: 2018-05-31 | Stop reason: SDUPTHER

## 2018-05-31 ENCOUNTER — OFFICE VISIT (OUTPATIENT)
Dept: FAMILY MEDICINE CLINIC | Facility: CLINIC | Age: 58
End: 2018-05-31
Payer: COMMERCIAL

## 2018-05-31 VITALS
RESPIRATION RATE: 16 BRPM | BODY MASS INDEX: 32.43 KG/M2 | TEMPERATURE: 97.6 F | WEIGHT: 183 LBS | SYSTOLIC BLOOD PRESSURE: 124 MMHG | OXYGEN SATURATION: 97 % | DIASTOLIC BLOOD PRESSURE: 90 MMHG | HEART RATE: 67 BPM | HEIGHT: 63 IN

## 2018-05-31 DIAGNOSIS — Z11.59 NEED FOR HEPATITIS C SCREENING TEST: ICD-10-CM

## 2018-05-31 DIAGNOSIS — I10 ESSENTIAL HYPERTENSION: ICD-10-CM

## 2018-05-31 DIAGNOSIS — E78.5 HYPERLIPIDEMIA, UNSPECIFIED HYPERLIPIDEMIA TYPE: Primary | ICD-10-CM

## 2018-05-31 DIAGNOSIS — I63.81 LACUNAR STROKE (HCC): ICD-10-CM

## 2018-05-31 PROBLEM — R73.03 PREDIABETES: Status: ACTIVE | Noted: 2017-11-09

## 2018-05-31 PROBLEM — E04.9 ENLARGED THYROID: Status: ACTIVE | Noted: 2017-04-19

## 2018-05-31 PROCEDURE — 99214 OFFICE O/P EST MOD 30 MIN: CPT | Performed by: PHYSICIAN ASSISTANT

## 2018-05-31 PROCEDURE — 3008F BODY MASS INDEX DOCD: CPT | Performed by: PHYSICIAN ASSISTANT

## 2018-05-31 RX ORDER — FOLIC ACID 1 MG/1
1000 TABLET ORAL DAILY
Qty: 90 TABLET | Refills: 1 | Status: SHIPPED | OUTPATIENT
Start: 2018-05-31 | End: 2019-04-12 | Stop reason: SDUPTHER

## 2018-05-31 RX ORDER — CLOPIDOGREL BISULFATE 75 MG/1
75 TABLET ORAL DAILY
Qty: 90 TABLET | Refills: 1 | Status: SHIPPED | OUTPATIENT
Start: 2018-05-31 | End: 2018-12-06 | Stop reason: SDUPTHER

## 2018-05-31 RX ORDER — METOPROLOL SUCCINATE 25 MG/1
25 TABLET, EXTENDED RELEASE ORAL DAILY
Qty: 90 TABLET | Refills: 1 | Status: SHIPPED | OUTPATIENT
Start: 2018-05-31 | End: 2018-12-06 | Stop reason: SDUPTHER

## 2018-05-31 RX ORDER — ATORVASTATIN CALCIUM 40 MG/1
40 TABLET, FILM COATED ORAL DAILY
Qty: 90 TABLET | Refills: 1 | Status: SHIPPED | OUTPATIENT
Start: 2018-05-31 | End: 2018-12-06 | Stop reason: SDUPTHER

## 2018-05-31 NOTE — PROGRESS NOTES
Routine Follow-up    Samantha Weber 56 y o  female   Date:  5/31/2018    Assessment and Plan:    Romel Garcia was seen today for follow-up  Diagnoses and all orders for this visit:    Hyperlipidemia, unspecified hyperlipidemia type  -     atorvastatin (LIPITOR) 40 mg tablet; Take 1 tablet (40 mg total) by mouth daily  - well controlled on recent labs     Lacunar stroke (HCC)  -     clopidogrel (PLAVIX) 75 mg tablet; Take 1 tablet (75 mg total) by mouth daily  -     folic acid (FOLVITE) 1 mg tablet; Take 1 tablet (1,000 mcg total) by mouth daily  - recommend routine follow up with neurology, was supposed to return for yearly visit in Sept 2017     Essential hypertension  -     metoprolol succinate (TOPROL-XL) 25 mg 24 hr tablet; Take 1 tablet (25 mg total) by mouth daily  - follows closely with nephrology for hx of accelerated htn that has been difficult to manage   - she was switched to valsartan but over past 3 weeks, she has noticed her BP raising so she went back to [facility]     Need for hepatitis C screening test  -     Hepatitis C antibody; Future    Other orders  -     Cancel: Mammo screening bilateral w 3d & cad; Future - she still has script from last visit   -     Cancel: Ambulatory referral to Gastroenterology; Future - up to date    HPI:  Chief Complaint   Patient presents with    Follow-up     HPI   Patient I a 57 yo female who presents for routine check up  She feels well, no concerns  She needs medication refills  She follows with nephrology for BP control and will be seeing endocrinology for concern of primary hyperparathyroidism  She has no scheduled mammogram from last visit  She did not go for us of thyroid yet  She was supposed to follow up with neurology in Sept 2017 but never did  She just had colonoscopy which was clean 2-3 years ago and did not have to come back x 10 years  She will be obtaining labs for Dr Livia Echevarria in near future   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 it, feels it is less stressful  ROS: Review of Systems   Constitutional: Negative for chills, fatigue, fever and unexpected weight change  HENT: Negative for congestion, ear pain, hearing loss, nosebleeds, sore throat and trouble swallowing  Eyes: Negative for pain, discharge and visual disturbance  Respiratory: Negative for cough, shortness of breath and wheezing  Cardiovascular: Negative for chest pain, palpitations and leg swelling  Gastrointestinal: Negative for abdominal pain, blood in stool, constipation, diarrhea, nausea and vomiting  Endocrine: Negative for cold intolerance and heat intolerance  Genitourinary: Negative for difficulty urinating, dysuria and hematuria  Musculoskeletal: Negative for arthralgias, gait problem and myalgias  Skin: Negative for color change, rash and wound  Neurological: Negative for dizziness, syncope, weakness, light-headedness and headaches  Hematological: Negative for adenopathy  Does not bruise/bleed easily  Psychiatric/Behavioral: Negative for confusion and sleep disturbance  The patient is not nervous/anxious          Past Medical History:   Diagnosis Date    Diverticulosis     Hemorrhoids     Hypertension     Stroke [facility])      Patient Active Problem List   Diagnosis    Hypercalcemia    Cerebral infarction ([facility] )    Enlarged thyroid    Essential hypertension    Hyperlipidemia, unspecified    Lacunar stroke ([facility] )    Prediabetes    Simple goiter       Past Surgical History:   Procedure Laterality Date    COLONOSCOPY      ONSET 2015    NOSE SURGERY      SKIN LESION EXCISION         Social History     Social History    Marital status: /Civil Union     Spouse name: N/A    Number of children: N/A    Years of education: N/A     Social History Main Topics    Smoking status: Never Smoker    Smokeless tobacco: Never Used    Alcohol use Yes      Comment: SOCIAL    Drug use: No    Sexual activity: Not on file     Other Topics Concern  Not on file     Social History Narrative    ALWAYS SEAT [employer]T    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       Family History   Problem Relation Age of Onset    No Known Problems Mother     Hypertension Father     Parkinsonism Father     Hypertension Sister     Stroke Family     Glaucoma Family        Allergies   Allergen Reactions    Codeine Chest Pain         Current Outpatient Prescriptions:     atorvastatin (LIPITOR) 40 mg tablet, Take 1 tablet (40 mg total) by mouth daily, Disp: 90 tablet, Rfl: 1    azilsartan medoxomil (EDARBI) 40 MG tablet, Take 40 mg by mouth daily, Disp: , Rfl:     Cholecalciferol (D 2000) 2000 units TABS, Take by mouth daily, Disp: , Rfl:     clopidogrel (PLAVIX) 75 mg tablet, Take 1 tablet (75 mg total) by mouth daily, Disp: 90 tablet, Rfl: 1    CO ENZYME Q-10 PO, take 1 Capsule by Oral route once, Disp: , Rfl:     folic acid (FOLVITE) 1 mg tablet, Take 1 tablet (1,000 mcg total) by mouth daily, Disp: 90 tablet, Rfl: 1    metoprolol succinate (TOPROL-XL) 25 mg 24 hr tablet, Take 1 tablet (25 mg total) by mouth daily, Disp: 90 tablet, Rfl: 1    Potassium 99 MG TABS, Take 1 tablet by mouth daily, Disp: , Rfl:     ascorbic acid (VITAMIN C) 500 mg tablet, Take by mouth daily, Disp: , Rfl:     valsartan (DIOVAN) 320 MG tablet, Take 1 tablet (320 mg total) by mouth daily, Disp: 90 tablet, Rfl: 3      Physical Exam:  /90   Pulse 67   Temp 97 6 °F (36 4 °C)   Resp 16   Ht 5' 3 39" (1 61 m)   Wt 83 kg (183 lb)   SpO2 97%   BMI 32 02 kg/m²     Physical Exam   Constitutional: She is oriented to person, place, and time  Vital signs are normal  She appears well-developed and well-nourished  No distress  HENT:   Head: Normocephalic and atraumatic     Right Ear: Tympanic membrane, external ear and ear canal normal    Left Ear: Tympanic membrane, external ear and ear canal normal    Nose: Nose normal    Mouth/Throat: Oropharynx is clear and moist    Eyes: Conjunctivae and lids are normal  Pupils are equal, round, and reactive to light  Neck: Trachea normal and normal range of motion  Neck supple  No thyromegaly present  Cardiovascular: Normal rate, regular rhythm, S1 normal, S2 normal and intact distal pulses  Exam reveals no gallop  No murmur heard  Pulmonary/Chest: Breath sounds normal  No respiratory distress  She has no wheezes  She has no rhonchi  She has no rales  Abdominal: Soft  Normal appearance and bowel sounds are normal  She exhibits no mass  There is no hepatosplenomegaly  There is no tenderness  Musculoskeletal: Normal range of motion  She exhibits no edema or deformity  Lymphadenopathy:     She has no cervical adenopathy  Neurological: She is alert and oriented to person, place, and time  She has normal reflexes  No cranial nerve deficit or sensory deficit  Skin: Skin is warm and dry  No rash noted  No cyanosis  No pallor  Nails show no clubbing  Psychiatric: She has a normal mood and affect   Her behavior is normal  Cognition and memory are normal          Labs:  Lab Results   Component Value Date    WBC 8 38 04/11/2018    HGB 14 0 04/11/2018    HCT 42 8 04/11/2018    MCV 89 04/11/2018     04/11/2018     Lab Results   Component Value Date     04/11/2018    K 4 2 04/11/2018     (H) 04/11/2018    CO2 28 04/11/2018    ANIONGAP 4 04/11/2018    BUN 23 04/11/2018    CREATININE 0 92 04/11/2018    GLUCOSE 108 07/06/2016    GLUF 105 (H) 04/11/2018    CALCIUM 10 3 04/11/2018    CORRECTEDCA 10 5 (H) 04/11/2018    AST 14 04/29/2016    ALT 38 04/29/2016    ALKPHOS 96 04/29/2016    PROT 6 7 04/29/2016    BILITOT 0 40 04/29/2016    EGFR 70 04/11/2018

## 2018-06-20 ENCOUNTER — TRANSCRIBE ORDERS (OUTPATIENT)
Dept: ADMINISTRATIVE | Age: 58
End: 2018-06-20

## 2018-06-20 ENCOUNTER — APPOINTMENT (OUTPATIENT)
Dept: LAB | Age: 58
End: 2018-06-20
Payer: COMMERCIAL

## 2018-06-20 DIAGNOSIS — Z11.59 NEED FOR HEPATITIS C SCREENING TEST: ICD-10-CM

## 2018-06-20 DIAGNOSIS — E83.52 HYPERCALCEMIA: ICD-10-CM

## 2018-06-20 DIAGNOSIS — I10 ESSENTIAL HYPERTENSION: ICD-10-CM

## 2018-06-20 LAB
ALBUMIN SERPL BCP-MCNC: 4 G/DL (ref 3.5–5)
ANION GAP SERPL CALCULATED.3IONS-SCNC: 8 MMOL/L (ref 4–13)
BUN SERPL-MCNC: 17 MG/DL (ref 5–25)
CALCIUM ALBUM COR SERPL-MCNC: 10.3 MG/DL (ref 8.3–10.1)
CALCIUM SERPL-MCNC: 10.3 MG/DL (ref 8.3–10.1)
CHLORIDE SERPL-SCNC: 99 MMOL/L (ref 100–108)
CO2 SERPL-SCNC: 28 MMOL/L (ref 21–32)
CREAT SERPL-MCNC: 0.75 MG/DL (ref 0.6–1.3)
CREAT UR-MCNC: 74.6 MG/DL
GFR SERPL CREATININE-BSD FRML MDRD: 89 ML/MIN/1.73SQ M
GLUCOSE SERPL-MCNC: 84 MG/DL (ref 65–140)
MICROALBUMIN UR-MCNC: <5 MG/L (ref 0–20)
MICROALBUMIN/CREAT 24H UR: <7 MG/G CREATININE (ref 0–30)
PHOSPHATE SERPL-MCNC: 2.7 MG/DL (ref 2.7–4.5)
POTASSIUM SERPL-SCNC: 3.4 MMOL/L (ref 3.5–5.3)
PTH-INTACT SERPL-MCNC: 129 PG/ML (ref 18.4–80.1)
SODIUM SERPL-SCNC: 135 MMOL/L (ref 136–145)

## 2018-06-20 PROCEDURE — 80069 RENAL FUNCTION PANEL: CPT

## 2018-06-20 PROCEDURE — 36415 COLL VENOUS BLD VENIPUNCTURE: CPT

## 2018-06-20 PROCEDURE — 82570 ASSAY OF URINE CREATININE: CPT

## 2018-06-20 PROCEDURE — 83970 ASSAY OF PARATHORMONE: CPT

## 2018-06-20 PROCEDURE — 86803 HEPATITIS C AB TEST: CPT

## 2018-06-20 PROCEDURE — 82043 UR ALBUMIN QUANTITATIVE: CPT

## 2018-06-21 LAB — HCV AB SER QL: NORMAL

## 2018-06-25 DIAGNOSIS — E21.0 PRIMARY HYPERPARATHYROIDISM (HCC): Primary | ICD-10-CM

## 2018-06-25 DIAGNOSIS — E83.52 HYPERCALCEMIA: ICD-10-CM

## 2018-09-04 ENCOUNTER — TELEPHONE (OUTPATIENT)
Dept: NEPHROLOGY | Facility: CLINIC | Age: 58
End: 2018-09-04

## 2018-09-04 NOTE — TELEPHONE ENCOUNTER
----- Message from Birdia Rubinstein, DO sent at 9/4/2018  1:15 PM EDT -----  I believe she is taking Edarbychlor, can you please confirm with patient   ----- Message -----  From: Xochilt Palma MA  Sent: 8/30/2018   3:58 PM  To:  Birdia Rubinstein, DO    We received a letter to inform us the the Valsartan patient is taking has been recalled,

## 2018-10-01 DIAGNOSIS — I10 ESSENTIAL HYPERTENSION: Primary | ICD-10-CM

## 2018-10-02 RX ORDER — AZILSARTAN KAMEDOXOMIL AND CHLORTHALIDONE 40; 12.5 MG/1; MG/1
TABLET ORAL
Qty: 30 TABLET | Refills: 5 | Status: SHIPPED | OUTPATIENT
Start: 2018-10-02 | End: 2018-10-03 | Stop reason: SDUPTHER

## 2018-10-03 ENCOUNTER — TELEPHONE (OUTPATIENT)
Dept: NEPHROLOGY | Facility: CLINIC | Age: 58
End: 2018-10-03

## 2018-10-03 ENCOUNTER — HOSPITAL ENCOUNTER (OUTPATIENT)
Dept: RADIOLOGY | Facility: HOSPITAL | Age: 58
Discharge: HOME | End: 2018-10-03
Attending: INTERNAL MEDICINE
Payer: COMMERCIAL

## 2018-10-03 DIAGNOSIS — E21.0 PRIMARY HYPERPARATHYROIDISM (HCC): ICD-10-CM

## 2018-10-03 DIAGNOSIS — I10 ESSENTIAL HYPERTENSION: ICD-10-CM

## 2018-10-03 DIAGNOSIS — E21.0 PRIMARY HYPERPARATHYROIDISM (HCC): Primary | ICD-10-CM

## 2018-10-03 DIAGNOSIS — E83.52 HYPERCALCEMIA: ICD-10-CM

## 2018-10-03 PROCEDURE — A9500 TC99M SESTAMIBI: HCPCS

## 2018-10-03 PROCEDURE — 78071 PARATHYRD PLANAR W/WO SUBTRJ: CPT

## 2018-10-03 NOTE — TELEPHONE ENCOUNTER
Reviewed nuclear medicine scan  Unfortunately abnormality noted in the left thyroid although question parathyroid  Recommending ultrasound  Discussed with patient  Order placed

## 2018-10-05 ENCOUNTER — HOSPITAL ENCOUNTER (OUTPATIENT)
Dept: RADIOLOGY | Age: 58
Discharge: HOME | End: 2018-10-05
Payer: COMMERCIAL

## 2018-10-05 DIAGNOSIS — E21.0 PRIMARY HYPERPARATHYROIDISM (HCC): ICD-10-CM

## 2018-10-05 PROCEDURE — 76536 US EXAM OF HEAD AND NECK: CPT

## 2018-10-09 ENCOUNTER — TELEPHONE (OUTPATIENT)
Dept: NEPHROLOGY | Facility: CLINIC | Age: 58
End: 2018-10-09

## 2018-10-09 DIAGNOSIS — E21.0 PRIMARY HYPERPARATHYROIDISM (HCC): Primary | ICD-10-CM

## 2018-10-09 NOTE — PROGRESS NOTES
Referral to Endocrinology for the suspicion of primary hyperparathyroidism, nuclear medicine scan as well as ultrasound of the neck shows possible left lower lobe nodule  Would like endocrinology evaluation prior to any potential referral to surgery

## 2018-10-09 NOTE — TELEPHONE ENCOUNTER
Patient called because she received a call from Dr Katey Talamantes regarding her recent ultrasound result and would like a call back from Dr Katey Talamantes

## 2018-11-05 ENCOUNTER — OFFICE VISIT (OUTPATIENT)
Dept: NEPHROLOGY | Facility: CLINIC | Age: 58
End: 2018-11-05
Payer: COMMERCIAL

## 2018-11-05 VITALS
HEART RATE: 70 BPM | HEIGHT: 63 IN | SYSTOLIC BLOOD PRESSURE: 130 MMHG | WEIGHT: 190.2 LBS | DIASTOLIC BLOOD PRESSURE: 84 MMHG | BODY MASS INDEX: 33.7 KG/M2

## 2018-11-05 DIAGNOSIS — I63.81 LACUNAR STROKE (HCC): ICD-10-CM

## 2018-11-05 DIAGNOSIS — E21.0 PRIMARY HYPERPARATHYROIDISM (HCC): Primary | ICD-10-CM

## 2018-11-05 DIAGNOSIS — E83.52 HYPERCALCEMIA: ICD-10-CM

## 2018-11-05 DIAGNOSIS — I10 ESSENTIAL HYPERTENSION: ICD-10-CM

## 2018-11-05 PROCEDURE — 99213 OFFICE O/P EST LOW 20 MIN: CPT | Performed by: INTERNAL MEDICINE

## 2018-11-05 NOTE — PROGRESS NOTES
NEPHROLOGY OUTPATIENT PROGRESS NOTE   Akosua Dhaliwal 66 y o  female MRN: [id number]  Reason for visit:  Hypertension and hypercalcemia    ASSESSMENT and PLAN:  1  Hypertension, overall stable continue with current regimen including angiotensin receptor blocker and chlorthalidone  2  Hypercalcemia, suspecting primary hyperparathyroidism given elevated PTH and radiological findings of possible parathyroid adenoma  3  History of CVA    · Referral to Endocrinology for further evaluation  · Unfortunately we were not able to stop hydrochlorothiazide/chlorthalidone as her blood pressure became significantly elevated  In lieu of her stroke with continue with current regimen - currently with stable blood pressures  · Await endocrinology evaluation  · Follow-up in 6 months with repeat laboratory studies at that time  SUBJECTIVE / INTERVAL HISTORY:  She has been doing well  She denies any recent hospitalizations or illnesses  Denies any headaches or lightheadedness  Denies any chest pain shortness of breath  Denies any kidney stones  No reports of lower extremity swelling      Review of Systems      OBJECTIVE:  /84 (BP Location: Left arm, Patient Position: Sitting, Cuff Size: Standard)   Pulse 70   Ht 5' 3" (1 6 m)   Wt 86 3 kg (190 lb 3 2 oz)   BMI 33 69 kg/m²   Vitals:    11/05/18 0937   Weight: 86 3 kg (190 lb 3 2 oz)       Physical Exam   Constitutional: She is oriented to person, place, and time  No distress  HENT:   Head: Normocephalic  Eyes: No scleral icterus  Neck: Neck supple  Cardiovascular: Normal rate and regular rhythm  Pulmonary/Chest: Breath sounds normal    Abdominal: Soft  She exhibits no distension  Musculoskeletal: She exhibits no edema  Neurological: She is alert and oriented to person, place, and time  Skin: Skin is warm and dry  Psychiatric: She has a normal mood and affect           Medications:    Current Outpatient Prescriptions:     ascorbic acid (VITAMIN C) 500 mg tablet, Take by mouth daily, Disp: , Rfl:     atorvastatin (LIPITOR) 40 mg tablet, Take 1 tablet (40 mg total) by mouth daily, Disp: 90 tablet, Rfl: 1    azilsartan medoxomil (EDARBI) 40 MG tablet, Take 40 mg by mouth daily, Disp: , Rfl:     Azilsartan-Chlorthalidone (EDARBYCLOR) 40-12 5 MG TABS, Take 1 tablet by mouth daily, Disp: 90 tablet, Rfl: 3    Cholecalciferol (D 2000) 2000 units TABS, Take by mouth daily, Disp: , Rfl:     clopidogrel (PLAVIX) 75 mg tablet, Take 1 tablet (75 mg total) by mouth daily, Disp: 90 tablet, Rfl: 1    CO ENZYME Q-10 PO, take 1 Capsule by Oral route once, Disp: , Rfl:     folic acid (FOLVITE) 1 mg tablet, Take 1 tablet (1,000 mcg total) by mouth daily, Disp: 90 tablet, Rfl: 1    metoprolol succinate (TOPROL-XL) 25 mg 24 hr tablet, Take 1 tablet (25 mg total) by mouth daily, Disp: 90 tablet, Rfl: 1    Potassium 99 MG TABS, Take 1 tablet by mouth daily, Disp: , Rfl:     valsartan (DIOVAN) 320 MG tablet, Take 1 tablet (320 mg total) by mouth daily (Patient not taking: Reported on 11/5/2018 ), Disp: 90 tablet, Rfl: 3    Laboratory Results:  Results for orders placed or performed in visit on 06/20/18   Renal function panel   Result Value Ref Range    Albumin 4 0 3 5 - 5 0 g/dL    Calcium 10 3 (H) 8 3 - 10 1 mg/dL    Corrected Calcium 10 3 (H) 8 3 - 10 1 mg/dL    Phosphorus 2 7 2 7 - 4 5 mg/dL    Glucose 84 65 - 140 mg/dL    BUN 17 5 - 25 mg/dL    Creatinine 0 75 0 60 - 1 30 mg/dL    Sodium 135 (L) 136 - 145 mmol/L    Potassium 3 4 (L) 3 5 - 5 3 mmol/L    Chloride 99 (L) 100 - 108 mmol/L    CO2 28 21 - 32 mmol/L    ANION GAP 8 4 - 13 mmol/L    eGFR 89 ml/min/1 73sq m   PTH, intact   Result Value Ref Range     0 (H) 18 4 - 80 1 pg/mL   Microalbumin / creatinine urine ratio   Result Value Ref Range    Creatinine, Ur 74 6 mg/dL    Microalbum  ,U,Random <5 0 0 0 - 20 0 mg/L    Microalb Creat Ratio <7 0 - 30 mg/g creatinine   Hepatitis C antibody   Result Value Ref Range    Hepatitis C Ab Non-reactive Non-reactive

## 2018-11-05 NOTE — PATIENT INSTRUCTIONS
ASSESSMENT and PLAN:  1  Hypertension, overall stable continue with current regimen including angiotensin receptor blocker and chlorthalidone  2  Hypercalcemia, suspecting primary hyperparathyroidism given elevated PTH in radiological findings of possible parathyroid adenoma  3  History of CVA    · Referral to Endocrinology for further evaluation  · Unfortunately we were not able to stop hydrochlorothiazide/chlorthalidone as her blood pressure became significantly elevated  In lieu of her stroke with continue with current regimen with stable blood pressures  · Await endocrinology evaluation  · Follow-up in 6 months with repeat laboratory studies at that time

## 2018-11-05 NOTE — LETTER
November 5, 2018     Ela Kassie, [address] [facility]  [address]    Patient: Walter Hoffman   YOB: 1960   Date of Visit: 11/5/2018     Dear Dr Callie English Recipients      Thank you for referring Walter Hoffman to me for evaluation  Below are the relevant portions of my assessment and plan of care  If you have questions, please do not hesitate to call me  I look forward to following Landy Nicolas along with you  Sincerely,        De Patel, DO        CC: No Recipients    Progress Notes:    ASSESSMENT and PLAN:  1  Hypertension, overall stable continue with current regimen including angiotensin receptor blocker and chlorthalidone  2  Hypercalcemia, suspecting primary hyperparathyroidism given elevated PTH and radiological findings of possible parathyroid adenoma  3  History of CVA    · Referral to Endocrinology for further evaluation  · Unfortunately we were not able to stop hydrochlorothiazide/chlorthalidone as her blood pressure became significantly elevated  In lieu of her stroke with continue with current regimen with stable blood pressures  · Await endocrinology evaluation  · Follow-up in 6 months with repeat laboratory studies at that time

## 2018-12-06 ENCOUNTER — OFFICE VISIT (OUTPATIENT)
Dept: FAMILY MEDICINE CLINIC | Facility: CLINIC | Age: 58
End: 2018-12-06
Payer: COMMERCIAL

## 2018-12-06 VITALS
WEIGHT: 189 LBS | OXYGEN SATURATION: 97 % | DIASTOLIC BLOOD PRESSURE: 84 MMHG | BODY MASS INDEX: 33.49 KG/M2 | HEIGHT: 63 IN | TEMPERATURE: 98.1 F | HEART RATE: 75 BPM | SYSTOLIC BLOOD PRESSURE: 120 MMHG

## 2018-12-06 DIAGNOSIS — M25.562 CHRONIC PAIN OF LEFT KNEE: ICD-10-CM

## 2018-12-06 DIAGNOSIS — I63.81 LACUNAR STROKE (HCC): ICD-10-CM

## 2018-12-06 DIAGNOSIS — I10 ESSENTIAL HYPERTENSION: ICD-10-CM

## 2018-12-06 DIAGNOSIS — G89.29 CHRONIC PAIN OF RIGHT KNEE: ICD-10-CM

## 2018-12-06 DIAGNOSIS — Z12.39 BREAST CANCER SCREENING: ICD-10-CM

## 2018-12-06 DIAGNOSIS — E78.5 HYPERLIPIDEMIA, UNSPECIFIED HYPERLIPIDEMIA TYPE: Primary | ICD-10-CM

## 2018-12-06 DIAGNOSIS — M23.8X9 KNEE CREPITUS, UNSPECIFIED LATERALITY: ICD-10-CM

## 2018-12-06 DIAGNOSIS — G89.29 CHRONIC PAIN OF LEFT KNEE: ICD-10-CM

## 2018-12-06 DIAGNOSIS — M25.561 CHRONIC PAIN OF RIGHT KNEE: ICD-10-CM

## 2018-12-06 DIAGNOSIS — S76.319S HAMSTRING STRAIN, UNSPECIFIED LATERALITY, SEQUELA: ICD-10-CM

## 2018-12-06 DIAGNOSIS — R25.2 MUSCLE CRAMP: ICD-10-CM

## 2018-12-06 PROCEDURE — 99214 OFFICE O/P EST MOD 30 MIN: CPT | Performed by: PHYSICIAN ASSISTANT

## 2018-12-06 RX ORDER — CLOPIDOGREL BISULFATE 75 MG/1
75 TABLET ORAL DAILY
Qty: 90 TABLET | Refills: 1 | Status: SHIPPED | OUTPATIENT
Start: 2018-12-06 | End: 2019-06-10 | Stop reason: SDUPTHER

## 2018-12-06 RX ORDER — METOPROLOL SUCCINATE 25 MG/1
25 TABLET, EXTENDED RELEASE ORAL DAILY
Qty: 90 TABLET | Refills: 1 | Status: SHIPPED | OUTPATIENT
Start: 2018-12-06 | End: 2019-07-23 | Stop reason: SDUPTHER

## 2018-12-06 RX ORDER — ATORVASTATIN CALCIUM 40 MG/1
40 TABLET, FILM COATED ORAL DAILY
Qty: 90 TABLET | Refills: 1 | Status: SHIPPED | OUTPATIENT
Start: 2018-12-06 | End: 2019-07-23 | Stop reason: SDUPTHER

## 2018-12-06 NOTE — PATIENT INSTRUCTIONS
Hamstring Exercises   AMBULATORY CARE:   Hamstring exercises  help strengthen and stretch the muscles that support your lower back, hips, and knee  This decreases pain, improves movement, and decreases your risk of future injury  Contact your healthcare provider if:   · You have sharp or worsening pain during exercise or at rest     · You have questions or concern about your condition, care, or exercise program   Exercise safely:   · Move slowly and smoothly  Avoid fast or jerky motions  This will help prevent another injury  · Breathe normally  Do not hold your breath  It is important to breathe in and out so you do not tense up during exercise  Tension could prevent your muscles from stretching  · Do the exercises and stretches on both legs  Do this so the muscles on both legs remain strong and flexible  · Stop if you feel sharp pain or an increase in pain  Stop the exercise and contact your healthcare provider if you have these symptoms  It is normal to feel some discomfort, such as a dull ache, during exercise  Regular exercise will help decrease your discomfort over time  · Warm up before you stretch and exercise  This will help prevent an injury  Walk or ride a stationary bike for 5 to 10 minutes  How to perform stretching exercises:  Ask your healthcare provider how often to do these stretches:  · Hamstring stretch with a towel:  Lie on your back on the floor  Bend both legs so your feet rest flat  Lift one leg off the floor and loop a towel around your foot  Grasp the ends of the towel and slowly straighten your lifted leg  Use the towel to gently pull your leg toward you until you feel the stretch  Keep your leg straight and your foot flexed toward your body  Hold for 30 seconds  Use a longer towel if needed  · Sitting hamstring stretch:  Sit on the floor with both legs straight in front of you  Do not point your toes or flex your feet   Place your palms on the floor and slide your hands forward until you feel the stretch  Keep your back straight and do not lock your knees  Hold the stretch for 30 seconds  · Standing hamstring stretch:  Stand with your feet hips distance apart  Place one leg so it rests on a firm surface, such as a table or chair  Keep your toes pointing up  Slide both hands down the outside of your leg until you feel a stretch  Keep your chest lifted and your back straight  Hold for 30 seconds  · Sitting wide-leg stretch:  Sit on the floor and extend your legs as wide as possible  Keep your legs straight and lean over one leg  Slide your hands forward until you feel a stretch  Keep your chest lifted and your back straight  Hold for 30 seconds  How to perform strengthening exercises:  Always do strengthening exercises after you stretch  As you get stronger your healthcare provider may tell you to you add weights or more repetitions to your strengthening exercises  · Hamstring curls:  Place your hand on a wall or the back of a chair for balance  Place the weight in one of your legs  Lift the other leg and raise your heel toward your buttocks  Hold for 5 seconds  Slowly lower your leg until it is a few inches off the floor  Do 3 sets of 10  Repeat on other side  · Straight leg raise:  Lie on the floor with your face down  Rest your forehead on your folded arms  Keep your body in a straight line  Keep your hip bones on the floor, and tighten the butt and thigh muscles of your injured leg  Keep one leg straight and raise it toward the ceiling as high as you can  Hold for 5 seconds  Slowly return to the starting position  Do 3 sets of 10  Repeat on other side  · Half squats:  Stand with your feet shoulder distance apart  Rest your hands on the front of your thighs or reach them out in front of you  You may hold on to the back of a chair or wall for balance   Keep your chest lifted and lower your hips about 10 inches, as if you are going to sit  Make sure your weight is in your heels and hold for 5 seconds  Keep your weight in your heels and slowly stand  Do 3 sets of 10  Follow up with your healthcare provider as directed:  Write down your questions so you remember to ask them during your visits  © 2017 [employer] Information is for End User's use only and may not be sold, redistributed or otherwise used for commercial purposes  All illustrations and images included in CareNotes® are the copyrighted property of [employer]  or [employer] Ke[employer]  The above information is an  only  It is not intended as medical advice for individual conditions or treatments  Talk to your doctor, nurse or pharmacist before following any medical regimen to see if it is safe and effective for you

## 2018-12-11 ENCOUNTER — TRANSCRIBE ORDERS (OUTPATIENT)
Dept: ADMINISTRATIVE | Age: 58
End: 2018-12-11

## 2018-12-11 ENCOUNTER — APPOINTMENT (OUTPATIENT)
Dept: RADIOLOGY | Age: 58
End: 2018-12-11
Payer: COMMERCIAL

## 2018-12-11 DIAGNOSIS — G89.29 CHRONIC PAIN OF LEFT KNEE: ICD-10-CM

## 2018-12-11 DIAGNOSIS — G89.29 CHRONIC PAIN OF RIGHT KNEE: ICD-10-CM

## 2018-12-11 DIAGNOSIS — M25.561 CHRONIC PAIN OF RIGHT KNEE: ICD-10-CM

## 2018-12-11 DIAGNOSIS — M25.562 CHRONIC PAIN OF LEFT KNEE: ICD-10-CM

## 2018-12-11 PROCEDURE — 73562 X-RAY EXAM OF KNEE 3: CPT

## 2018-12-11 NOTE — PROGRESS NOTES
Routine Follow-up    Manuel Aleman 66 y o  female   Date:  12/6/2018      Assessment and Plan:    Slick Mata was seen today for follow-up  Diagnoses and all orders for this visit:    Hyperlipidemia, unspecified hyperlipidemia type  -     atorvastatin (LIPITOR) 40 mg tablet; Take 1 tablet (40 mg total) by mouth daily  -     CK (with reflex to MB); Future    Lacunar stroke  -     clopidogrel (PLAVIX) 75 mg tablet; Take 1 tablet (75 mg total) by mouth daily    Essential hypertension  -     metoprolol succinate (TOPROL-XL) 25 mg 24 hr tablet; Take 1 tablet (25 mg total) by mouth daily    Hamstring strain, unspecified laterality, sequela  -     Ambulatory referral to Physical Therapy; Future    Chronic pain of right knee  -     XR knee 3 vw right non injury; Future  -     XR knee 3 vw left non injury; Future  -     Ambulatory referral to Physical Therapy; Future    Chronic pain of left knee  -     XR knee 3 vw right non injury; Future  -     XR knee 3 vw left non injury; Future  -     Ambulatory referral to Physical Therapy; Future    Knee crepitus, unspecified laterality  -     Ambulatory referral to Physical Therapy; Future    Breast cancer screening  -     Mammo screening bilateral w cad; Future    Muscle cramp  -     CK (with reflex to MB); Future; will check 2/2 to statin use but given hx of stroke will be cautious to stop   - hydration, stretching             HPI:  Chief Complaint   Patient presents with    Follow-up     leg cramps in both thighs but mainly in right     HPI   Patient is a 65 yo female who presents for routine follow up  She feels well and no change since last appt  Her BP is well controlled  She continues to follow with nephrology and was given referral to endocrinology for hypercalcemia, ?primary hyperparathyroidism  The only complaint she has is leg cramps along her hamstrings, mostly at night  It gets better as she moves around during the day  No accident or injury   She also reports chronic pain in knees  ROS: Review of Systems   Constitutional: Negative for chills, fatigue, fever and unexpected weight change  HENT: Negative for congestion, ear pain, hearing loss, nosebleeds, sore throat and trouble swallowing  Eyes: Negative for pain, discharge and visual disturbance  Respiratory: Negative for cough, shortness of breath and wheezing  Cardiovascular: Negative for chest pain, palpitations and leg swelling  Gastrointestinal: Negative for abdominal pain, blood in stool, constipation, diarrhea, nausea and vomiting  Endocrine: Negative for cold intolerance and heat intolerance  Genitourinary: Negative for difficulty urinating, dysuria and hematuria  Musculoskeletal: Positive for arthralgias and myalgias  Negative for gait problem  Skin: Negative for color change, rash and wound  Neurological: Negative for dizziness, syncope, weakness, light-headedness and headaches  Hematological: Negative for adenopathy  Does not bruise/bleed easily  Psychiatric/Behavioral: Negative for confusion and sleep disturbance  The patient is not nervous/anxious          Past Medical History:   Diagnosis Date    Diverticulosis     Hemorrhoids     Hypertension     Stroke [facility])      Patient Active Problem List   Diagnosis    Hypercalcemia    [facility]erebral infarction ([facility] )    Enlarged thyroid    Essential hypertension    Hyperlipidemia, unspecified    Lacunar stroke    Prediabetes    Simple goiter    Primary hyperparathyroidism ([facility] )       Past Surgical History:   Procedure Laterality Date    COLONOSCOPY      ONSET 2015    NOSE SURGERY      SKIN LESION EXCISION         Social History     Social History    Marital status: /Civil Union     Spouse name: N/A    Number of children: N/A    Years of education: N/A     Social History Main Topics    Smoking status: Never Smoker    Smokeless tobacco: Never Used    Alcohol use Yes      Comment: SOCIAL    Drug use: No    Sexual activity: Not Asked     Other Topics Concern    None     Social History Narrative    ALWAYS [employer]    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       Family History   Problem Relation Age of Onset    No Known Problems Mother     Hypertension Father     Parkinsonism Father     Hypertension Sister     Stroke Family     Glaucoma Family     Hypertension Family        Allergies   Allergen Reactions    Codeine Chest Pain         Current Outpatient Prescriptions:     ascorbic acid (VITAMIN C) 500 mg tablet, Take by mouth daily, Disp: , Rfl:     atorvastatin (LIPITOR) 40 mg tablet, Take 1 tablet (40 mg total) by mouth daily, Disp: 90 tablet, Rfl: 1    Azilsartan-Chlorthalidone (EDARBYCLOR) 40-12 5 MG TABS, Take 1 tablet by mouth daily, Disp: 90 tablet, Rfl: 3    Cholecalciferol (D 2000) 2000 units TABS, Take by mouth daily, Disp: , Rfl:     clopidogrel (PLAVIX) 75 mg tablet, Take 1 tablet (75 mg total) by mouth daily, Disp: 90 tablet, Rfl: 1    CO ENZYME Q-10 PO, take 1 Capsule by Oral route once, Disp: , Rfl:     folic acid (FOLVITE) 1 mg tablet, Take 1 tablet (1,000 mcg total) by mouth daily, Disp: 90 tablet, Rfl: 1    metoprolol succinate (TOPROL-XL) 25 mg 24 hr tablet, Take 1 tablet (25 mg total) by mouth daily, Disp: 90 tablet, Rfl: 1    Potassium 99 MG TABS, Take 1 tablet by mouth daily, Disp: , Rfl:     azilsartan medoxomil (EDARBI) 40 MG tablet, Take 40 mg by mouth daily, Disp: , Rfl:     valsartan (DIOVAN) 320 MG tablet, Take 1 tablet (320 mg total) by mouth daily (Patient not taking: Reported on 11/5/2018 ), Disp: 90 tablet, Rfl: 3      Physical Exam:  /84 (BP Location: Left arm, Patient Position: Sitting, Cuff Size: Standard)   Pulse 75   Temp 98 1 °F (36 7 °C) (Tympanic)   Ht 5' 3" (1 6 m)   Wt 85 7 kg (189 lb)   SpO2 97%   BMI 33 48 kg/m²     Physical Exam   Constitutional: She is oriented to person, place, and time   Vital signs are normal  She appears well-developed and well-nourished  No distress  HENT:   Head: Normocephalic and atraumatic  Right Ear: Tympanic membrane, external ear and ear canal normal    Left Ear: Tympanic membrane, external ear and ear canal normal    Nose: Nose normal    Mouth/Throat: Oropharynx is clear and moist    Eyes: Pupils are equal, round, and reactive to light  Conjunctivae and lids are normal    Neck: Trachea normal and normal range of motion  Neck supple  No thyromegaly present  Cardiovascular: Normal rate, regular rhythm, S1 normal, S2 normal and intact distal pulses  Exam reveals no gallop  No murmur heard  Pulmonary/Chest: Breath sounds normal  No respiratory distress  She has no wheezes  She has no rhonchi  She has no rales  Abdominal: Soft  Normal appearance and bowel sounds are normal  She exhibits no mass  There is no hepatosplenomegaly  There is no tenderness  Musculoskeletal: Normal range of motion  She exhibits no edema or deformity  Pulling and stretching sensation along post thigh with passive hip flexion and extended leg, negative straight leg raise   Crepitus with flexion of b/l knees     Lymphadenopathy:     She has no cervical adenopathy  Neurological: She is alert and oriented to person, place, and time  She has normal reflexes  No cranial nerve deficit or sensory deficit  Skin: Skin is warm and dry  No rash noted  No cyanosis  No pallor  Nails show no clubbing  Psychiatric: She has a normal mood and affect   Her behavior is normal  Cognition and memory are normal          Labs:  Lab Results   Component Value Date    WBC 8 38 04/11/2018    HGB 14 0 04/11/2018    HCT 42 8 04/11/2018    MCV 89 04/11/2018     04/11/2018     Lab Results   Component Value Date     12/16/2015    K 3 4 (L) 06/20/2018    CL 99 (L) 06/20/2018    CO2 28 06/20/2018    ANIONGAP 6 12/16/2015    BUN 17 06/20/2018    CREATININE 0 75 06/20/2018    GLUCOSE 94 12/16/2015    GLUF 105 (H) 04/11/2018    CALCIUM 10 3 (H) 06/20/2018 CORRECTEDCA 10 3 (H) 06/20/2018    AST 14 04/29/2016    ALT 38 04/29/2016    ALKPHOS 96 04/29/2016    PROT 6 5 12/16/2015    BILITOT 0 44 12/16/2015    EGFR 89 06/20/2018

## 2019-03-21 ENCOUNTER — TELEPHONE (OUTPATIENT)
Dept: NEPHROLOGY | Facility: CLINIC | Age: 59
End: 2019-03-21

## 2019-04-02 ENCOUNTER — OFFICE VISIT (OUTPATIENT)
Dept: ENDOCRINOLOGY | Facility: CLINIC | Age: 59
End: 2019-04-02
Payer: COMMERCIAL

## 2019-04-02 VITALS
HEIGHT: 63 IN | SYSTOLIC BLOOD PRESSURE: 130 MMHG | DIASTOLIC BLOOD PRESSURE: 82 MMHG | BODY MASS INDEX: 32.53 KG/M2 | HEART RATE: 81 BPM | WEIGHT: 183.6 LBS

## 2019-04-02 DIAGNOSIS — Z78.0 POSTMENOPAUSAL: ICD-10-CM

## 2019-04-02 DIAGNOSIS — E21.0 PRIMARY HYPERPARATHYROIDISM (HCC): Primary | ICD-10-CM

## 2019-04-02 DIAGNOSIS — E04.1 THYROID NODULE: ICD-10-CM

## 2019-04-02 DIAGNOSIS — E55.9 VITAMIN D DEFICIENCY: ICD-10-CM

## 2019-04-02 DIAGNOSIS — Z13.820 SCREENING FOR OSTEOPOROSIS: ICD-10-CM

## 2019-04-02 DIAGNOSIS — E83.52 HYPERCALCEMIA: ICD-10-CM

## 2019-04-02 PROCEDURE — 99244 OFF/OP CNSLTJ NEW/EST MOD 40: CPT | Performed by: INTERNAL MEDICINE

## 2019-04-07 ENCOUNTER — LAB (OUTPATIENT)
Dept: LAB | Age: 59
End: 2019-04-07
Payer: COMMERCIAL

## 2019-04-07 ENCOUNTER — TRANSCRIBE ORDERS (OUTPATIENT)
Dept: ADMINISTRATIVE | Age: 59
End: 2019-04-07

## 2019-04-07 DIAGNOSIS — E78.5 HYPERLIPIDEMIA, UNSPECIFIED HYPERLIPIDEMIA TYPE: ICD-10-CM

## 2019-04-07 DIAGNOSIS — E83.52 HYPERCALCEMIA: ICD-10-CM

## 2019-04-07 DIAGNOSIS — E21.0 PRIMARY HYPERPARATHYROIDISM (HCC): ICD-10-CM

## 2019-04-07 DIAGNOSIS — I63.81 LACUNAR STROKE (HCC): ICD-10-CM

## 2019-04-07 DIAGNOSIS — I10 ESSENTIAL HYPERTENSION: ICD-10-CM

## 2019-04-07 DIAGNOSIS — R25.2 MUSCLE CRAMP: ICD-10-CM

## 2019-04-07 DIAGNOSIS — E55.9 VITAMIN D DEFICIENCY: ICD-10-CM

## 2019-04-07 LAB
25(OH)D3 SERPL-MCNC: 26.9 NG/ML (ref 30–100)
ALBUMIN SERPL BCP-MCNC: 3.7 G/DL (ref 3.5–5)
ANION GAP SERPL CALCULATED.3IONS-SCNC: 5 MMOL/L (ref 4–13)
BUN SERPL-MCNC: 16 MG/DL (ref 5–25)
CALCIUM 24H UR-MCNC: 98.83 MG/24 HRS (ref 42–353)
CALCIUM SERPL-MCNC: 9.5 MG/DL (ref 8.3–10.1)
CHLORIDE SERPL-SCNC: 105 MMOL/L (ref 100–108)
CK SERPL-CCNC: 67 U/L (ref 26–192)
CO2 SERPL-SCNC: 27 MMOL/L (ref 21–32)
CREAT 24H UR-MRATE: 1.2 G/24HR (ref 0.6–1.8)
CREAT SERPL-MCNC: 0.79 MG/DL (ref 0.6–1.3)
CREAT UR-MCNC: 77.2 MG/DL
GFR SERPL CREATININE-BSD FRML MDRD: 84 ML/MIN/1.73SQ M
GLUCOSE P FAST SERPL-MCNC: 100 MG/DL (ref 65–99)
MICROALBUMIN UR-MCNC: 5.8 MG/L (ref 0–20)
MICROALBUMIN/CREAT 24H UR: 8 MG/G CREATININE (ref 0–30)
PHOSPHATE SERPL-MCNC: 2.4 MG/DL (ref 2.7–4.5)
POTASSIUM SERPL-SCNC: 3.6 MMOL/L (ref 3.5–5.3)
PTH-INTACT SERPL-MCNC: 128.8 PG/ML (ref 18.4–80.1)
SODIUM SERPL-SCNC: 137 MMOL/L (ref 136–145)
SPECIMEN VOL UR: 1475 ML
SPECIMEN VOL UR: 1475 ML

## 2019-04-07 PROCEDURE — 82340 ASSAY OF CALCIUM IN URINE: CPT

## 2019-04-07 PROCEDURE — 84165 PROTEIN E-PHORESIS SERUM: CPT

## 2019-04-07 PROCEDURE — 82570 ASSAY OF URINE CREATININE: CPT

## 2019-04-07 PROCEDURE — 82550 ASSAY OF CK (CPK): CPT

## 2019-04-07 PROCEDURE — 82043 UR ALBUMIN QUANTITATIVE: CPT

## 2019-04-07 PROCEDURE — 82306 VITAMIN D 25 HYDROXY: CPT

## 2019-04-07 PROCEDURE — 36415 COLL VENOUS BLD VENIPUNCTURE: CPT

## 2019-04-07 PROCEDURE — 84165 PROTEIN E-PHORESIS SERUM: CPT | Performed by: PATHOLOGY

## 2019-04-07 PROCEDURE — 80069 RENAL FUNCTION PANEL: CPT

## 2019-04-07 PROCEDURE — 83970 ASSAY OF PARATHORMONE: CPT

## 2019-04-09 LAB
ALBUMIN SERPL ELPH-MCNC: 4.09 G/DL (ref 3.5–5)
ALBUMIN SERPL ELPH-MCNC: 60.1 % (ref 52–65)
ALPHA1 GLOB SERPL ELPH-MCNC: 0.29 G/DL (ref 0.1–0.4)
ALPHA1 GLOB SERPL ELPH-MCNC: 4.3 % (ref 2.5–5)
ALPHA2 GLOB SERPL ELPH-MCNC: 0.78 G/DL (ref 0.4–1.2)
ALPHA2 GLOB SERPL ELPH-MCNC: 11.4 % (ref 7–13)
BETA GLOB ABNORMAL SERPL ELPH-MCNC: 0.41 G/DL (ref 0.4–0.8)
BETA1 GLOB SERPL ELPH-MCNC: 6.1 % (ref 5–13)
BETA2 GLOB SERPL ELPH-MCNC: 5.1 % (ref 2–8)
BETA2+GAMMA GLOB SERPL ELPH-MCNC: 0.35 G/DL (ref 0.2–0.5)
GAMMA GLOB ABNORMAL SERPL ELPH-MCNC: 0.88 G/DL (ref 0.5–1.6)
GAMMA GLOB SERPL ELPH-MCNC: 13 % (ref 12–22)
IGG/ALB SER: 1.51 {RATIO} (ref 1.1–1.8)
PROT PATTERN SERPL ELPH-IMP: NORMAL
PROT SERPL-MCNC: 6.8 G/DL (ref 6.4–8.2)

## 2019-04-10 ENCOUNTER — HOSPITAL ENCOUNTER (OUTPATIENT)
Dept: BONE DENSITY | Facility: MEDICAL CENTER | Age: 59
Discharge: HOME | End: 2019-04-10
Payer: COMMERCIAL

## 2019-04-10 DIAGNOSIS — Z78.0 POSTMENOPAUSAL: ICD-10-CM

## 2019-04-10 DIAGNOSIS — Z13.820 SCREENING FOR OSTEOPOROSIS: ICD-10-CM

## 2019-04-10 DIAGNOSIS — E21.0 PRIMARY HYPERPARATHYROIDISM (HCC): ICD-10-CM

## 2019-04-10 PROCEDURE — 77080 DXA BONE DENSITY AXIAL: CPT

## 2019-04-12 DIAGNOSIS — I63.81 LACUNAR STROKE (HCC): ICD-10-CM

## 2019-04-12 RX ORDER — FOLIC ACID 1 MG/1
1000 TABLET ORAL DAILY
Qty: 90 TABLET | Refills: 1 | Status: SHIPPED | OUTPATIENT
Start: 2019-04-12 | End: 2019-12-11 | Stop reason: SDUPTHER

## 2019-04-29 ENCOUNTER — TELEPHONE (OUTPATIENT)
Dept: FAMILY MEDICINE CLINIC | Facility: CLINIC | Age: 59
End: 2019-04-29

## 2019-05-07 ENCOUNTER — TELEPHONE (OUTPATIENT)
Dept: NEPHROLOGY | Facility: CLINIC | Age: 59
End: 2019-05-07

## 2019-05-08 ENCOUNTER — OFFICE VISIT (OUTPATIENT)
Dept: NEPHROLOGY | Facility: CLINIC | Age: 59
End: 2019-05-08
Payer: COMMERCIAL

## 2019-05-08 VITALS
SYSTOLIC BLOOD PRESSURE: 141 MMHG | HEIGHT: 64 IN | BODY MASS INDEX: 32.1 KG/M2 | DIASTOLIC BLOOD PRESSURE: 67 MMHG | HEART RATE: 69 BPM | WEIGHT: 188 LBS

## 2019-05-08 DIAGNOSIS — I10 ESSENTIAL HYPERTENSION: ICD-10-CM

## 2019-05-08 PROCEDURE — 99213 OFFICE O/P EST LOW 20 MIN: CPT | Performed by: INTERNAL MEDICINE

## 2019-05-15 ENCOUNTER — TELEPHONE (OUTPATIENT)
Dept: ENDOCRINOLOGY | Facility: CLINIC | Age: 59
End: 2019-05-15

## 2019-06-10 DIAGNOSIS — I63.81 LACUNAR STROKE (HCC): ICD-10-CM

## 2019-06-10 RX ORDER — CLOPIDOGREL BISULFATE 75 MG/1
75 TABLET ORAL DAILY
Qty: 90 TABLET | Refills: 1 | Status: SHIPPED | OUTPATIENT
Start: 2019-06-10 | End: 2019-12-10 | Stop reason: SDUPTHER

## 2019-06-20 ENCOUNTER — OFFICE VISIT (OUTPATIENT)
Dept: FAMILY MEDICINE CLINIC | Facility: CLINIC | Age: 59
End: 2019-06-20
Payer: COMMERCIAL

## 2019-06-20 VITALS
WEIGHT: 184 LBS | SYSTOLIC BLOOD PRESSURE: 126 MMHG | RESPIRATION RATE: 17 BRPM | DIASTOLIC BLOOD PRESSURE: 64 MMHG | HEIGHT: 64 IN | HEART RATE: 71 BPM | TEMPERATURE: 98.2 F | OXYGEN SATURATION: 98 % | BODY MASS INDEX: 31.41 KG/M2

## 2019-06-20 DIAGNOSIS — E21.0 PRIMARY HYPERPARATHYROIDISM (HCC): ICD-10-CM

## 2019-06-20 DIAGNOSIS — M79.604 PAIN IN BOTH LOWER EXTREMITIES: ICD-10-CM

## 2019-06-20 DIAGNOSIS — M54.50 LUMBAR PAIN: ICD-10-CM

## 2019-06-20 DIAGNOSIS — I10 ESSENTIAL HYPERTENSION: ICD-10-CM

## 2019-06-20 DIAGNOSIS — R73.03 PREDIABETES: ICD-10-CM

## 2019-06-20 DIAGNOSIS — E04.9 ENLARGED THYROID: ICD-10-CM

## 2019-06-20 DIAGNOSIS — Z86.73 HISTORY OF LACUNAR CEREBROVASCULAR ACCIDENT (CVA): Primary | ICD-10-CM

## 2019-06-20 DIAGNOSIS — M79.605 PAIN IN BOTH LOWER EXTREMITIES: ICD-10-CM

## 2019-06-20 DIAGNOSIS — E78.5 HYPERLIPIDEMIA, UNSPECIFIED HYPERLIPIDEMIA TYPE: ICD-10-CM

## 2019-06-20 PROBLEM — I63.532 CEREBRAL INFARCTION DUE TO STENOSIS OF LEFT POSTERIOR CEREBRAL ARTERY (HCC): Status: ACTIVE | Noted: 2019-06-20

## 2019-06-20 PROBLEM — I63.532 CEREBRAL INFARCTION DUE TO STENOSIS OF LEFT POSTERIOR CEREBRAL ARTERY (HCC): Status: RESOLVED | Noted: 2019-06-20 | Resolved: 2019-06-20

## 2019-06-20 PROCEDURE — 99214 OFFICE O/P EST MOD 30 MIN: CPT | Performed by: FAMILY MEDICINE

## 2019-06-20 PROCEDURE — 3008F BODY MASS INDEX DOCD: CPT | Performed by: FAMILY MEDICINE

## 2019-06-20 PROCEDURE — 3078F DIAST BP <80 MM HG: CPT | Performed by: FAMILY MEDICINE

## 2019-06-20 PROCEDURE — 3074F SYST BP LT 130 MM HG: CPT | Performed by: FAMILY MEDICINE

## 2019-06-20 RX ORDER — CYCLOBENZAPRINE HCL 10 MG
10 TABLET ORAL
Qty: 30 TABLET | Refills: 0 | Status: SHIPPED | OUTPATIENT
Start: 2019-06-20 | End: 2020-01-29

## 2019-06-20 RX ORDER — MELOXICAM 15 MG/1
15 TABLET ORAL DAILY
Qty: 15 TABLET | Refills: 0 | Status: SHIPPED | OUTPATIENT
Start: 2019-06-20 | End: 2020-01-29

## 2019-06-24 ENCOUNTER — APPOINTMENT (OUTPATIENT)
Dept: RADIOLOGY | Age: 59
End: 2019-06-24
Payer: COMMERCIAL

## 2019-06-24 DIAGNOSIS — M79.605 PAIN IN BOTH LOWER EXTREMITIES: ICD-10-CM

## 2019-06-24 DIAGNOSIS — M79.604 PAIN IN BOTH LOWER EXTREMITIES: ICD-10-CM

## 2019-06-24 DIAGNOSIS — M54.50 LUMBAR PAIN: ICD-10-CM

## 2019-06-24 PROCEDURE — 72110 X-RAY EXAM L-2 SPINE 4/>VWS: CPT

## 2019-07-01 DIAGNOSIS — M79.605 PAIN IN BOTH LOWER EXTREMITIES: Primary | ICD-10-CM

## 2019-07-01 DIAGNOSIS — M54.50 LUMBAR PAIN: ICD-10-CM

## 2019-07-01 DIAGNOSIS — M79.604 PAIN IN BOTH LOWER EXTREMITIES: Primary | ICD-10-CM

## 2019-07-01 DIAGNOSIS — Z86.73 HISTORY OF LACUNAR CEREBROVASCULAR ACCIDENT (CVA): ICD-10-CM

## 2019-07-10 ENCOUNTER — EVALUATION (OUTPATIENT)
Dept: PHYSICAL THERAPY | Age: 59
End: 2019-07-10
Payer: COMMERCIAL

## 2019-07-10 VITALS — DIASTOLIC BLOOD PRESSURE: 102 MMHG | HEART RATE: 68 BPM | SYSTOLIC BLOOD PRESSURE: 135 MMHG

## 2019-07-10 DIAGNOSIS — M54.41 CHRONIC BILATERAL LOW BACK PAIN WITH BILATERAL SCIATICA: Primary | ICD-10-CM

## 2019-07-10 DIAGNOSIS — M54.42 CHRONIC BILATERAL LOW BACK PAIN WITH BILATERAL SCIATICA: Primary | ICD-10-CM

## 2019-07-10 DIAGNOSIS — G89.29 CHRONIC BILATERAL LOW BACK PAIN WITH BILATERAL SCIATICA: Primary | ICD-10-CM

## 2019-07-10 PROCEDURE — 97162 PT EVAL MOD COMPLEX 30 MIN: CPT

## 2019-07-10 NOTE — PROGRESS NOTES
PT Evaluation     Today's date: 7/10/2019  Patient name: Jaime Ventura  : 1960  MRN: [id number]  Referring provider: June Mtz DO  Dx:   Encounter Diagnosis     ICD-10-CM    1  Chronic bilateral low back pain with bilateral sciatica M54 42     M54 41     G89 29                   Assessment  Assessment details: Patient is a 64 y o  Female reporting to PT with complaints of chronic LBP with B/L sciatica  She presents with limited lumbar AROM, decreased LE strength (R > L), and impaired ability to perform ADL's  No red flags present  Patient denies B/B dysfunction or saddle paresthesias  Reflexes and sensation were normal and intact  Testing for lumbar radiculopathy was mixed as patient's peripheral sx's were reproduced with R SLR  No movement preference observed  At this time, patient would benefit from skilled PT to address the impairments listed  Impairments: abnormal or restricted ROM, abnormal movement, activity intolerance, impaired physical strength, lacks appropriate home exercise program, pain with function, poor posture  and poor body mechanics    Symptom irritability: moderateBarriers to therapy: None  Understanding of Dx/Px/POC: good   Prognosis: good    Goals  STG's: 4 Weeks  1 ) Patient will be independent with HEP   2 ) Patient will be able to roll in bed without difficulty  3 ) Patient will tolerate standing for >15-20 mins with <3/10 pain  4 ) Patient will exhibit 50% improvement in lumbar AROM  LTG's: 8 Weeks  1 ) Patient will exhibit 5/5 LE strength in all planes  2 ) Patient will be able to walk 3-4 city blocks without discomfort  3 ) Patient will tolerate sitting at work for >1 hour with <3/10 pain  4 ) Patient will exceed predicted FOTO score  Plan  Plan details: Patient was provided with an HEP  They were educated regarding repetitions, resistance, and proper technique  Patient demonstrated understanding verbally       Patient would benefit from: skilled speech therapy  Planned therapy interventions: joint mobilization, manual therapy, patient education, strengthening, stretching, therapeutic activities, neuromuscular re-education, therapeutic exercise, balance, home exercise program, functional ROM exercises, flexibility and body mechanics training  Frequency: 2x week  Duration in weeks: 8  Treatment plan discussed with: patient        Subjective Evaluation    History of Present Illness  Mechanism of injury: Patient is a 64 y o  Female reporting to PT with complaints of chronic LBP w/ B/L sciatica  Onset of sx's several years ago, but recent worsening over the past few weeks  No hx of trauma or other incidents resulting in LBP  She was recently seen by PCP who ordered x-rays and referred her to PT  Sx's include dull/achey sensation B/L extending across the back  Sx's may also extend down either leg depending on the day  Denies N/T down either leg, but present within both feet  Patient also denies B/B dysfunction or saddle paresthesias  Radicular sx's are made worse with lumbar extension and reaching overhead  They extend within the posterior aspect of the thigh to the knee  Functional Limitations  Walking: significant discomfort with walking 2-3 city blocks  Standing: discomfort with standing >10-15 mins  Sleeping: mild discomfort, but able to sleep through the night   Stairs: significant discomfort; ascending > descending; reciprocal pattern  Sitting: unable to sit for prolonged periods of time    Lifting: unable to lift 10-15 lbs of groceries  Employment: desk job with prolonged sitting    Patient consented to manual therapy and physical examination  Demonstrated understanding verbally                     Recurrent probem    Quality of life: good    Pain  Current pain ratin  At best pain ratin  At worst pain rating: 10  Location: B/L LBP  Quality: burning, discomfort, dull ache, sharp, throbbing and tight  Aggravating factors: sitting, standing, walking and stair climbing      Diagnostic Tests  X-ray: abnormal  Patient Goals  Patient goals for therapy: decreased pain, increased motion, improved balance and increased strength          Objective     Concurrent Complaints  Negative for night pain, disturbed sleep, bladder dysfunction, bowel dysfunction, saddle (S4) numbness, cardiac problem, kidney problem, gallbladder problem, stomach problem, ulcer, appendix problem, spleen problem, pancreas problem, history of cancer, history of trauma and infection    Palpation     Additional Palpation Details  Mild point tenderness with palpation of piriformis muscle B/L    Denies point tenderness with palpation of lumbar paraspinals, spinous processes, and PSIS B/L    Neurological Testing     Sensation     Lumbar   Left   Intact: light touch    Right   Intact: light touch    Reflexes   Left   Patellar (L4): normal (2+)  Achilles (S1): normal (2+)    Right   Patellar (L4): normal (2+)  Achilles (S1): normal (2+)    Additional Neurological Details  Patient exhibits mild reduction strength throughout myotomal pattern of RLE    Active Range of Motion     Lumbar   Flexion: 95 degrees  with pain  Extension: 10 degrees  with pain  Left lateral flexion:  Restriction level: moderate  Right lateral flexion:  with pain Restriction level: moderate  Left rotation:  Restriction level: moderate  Right rotation:  Restriction level: moderate    Additional Active Range of Motion Details  SLR: 60 / 60 degrees    Strength/Myotome Testing     Left Hip   Planes of Motion   Flexion: 4+  Extension: 4-  Abduction: 3+    Right Hip   Planes of Motion   Flexion: 4  Extension: 3+  Abduction: 3+    Left Knee   Flexion: 5  Extension: 5    Right Knee   Flexion: 4+  Extension: 4+    Left Ankle/Foot   Dorsiflexion: 5  Great toe flexion: 5    Right Ankle/Foot   Dorsiflexion: 5  Great toe flexion: 5    Tests     Lumbar     Left   Negative crossed SLR, passive SLR and slump test      Right   Positive slump test  Negative crossed SLR and passive SLR  Left Pelvic Girdle/Sacrum   Negative: thigh thrust      Right Pelvic Girdle/Sacrum   Negative: thigh thrust      Left Hip   Negative INDIO and FADIR  Right Hip   Negative INDIO and FADIR         Flowsheet Rows      Most Recent Value   PT/OT G-Codes   Current Score  40   Projected Score  56             Precautions: HTN, pmhx of CVA      Manual  7/10  IE                                                                                 Exercise Diary  7/10  IE            Nustep             LTR             SKTC             DKTC             H/S Stretch             Piriformis Stretch             Bridges             TB Clamshells             SLR x 3             TA Holds             Standing H' Abd/Ext             PB Wall Squats             TB Palloff Press              Leg Press                                                                                               Modalities

## 2019-07-15 ENCOUNTER — OFFICE VISIT (OUTPATIENT)
Dept: PHYSICAL THERAPY | Age: 59
End: 2019-07-15
Payer: COMMERCIAL

## 2019-07-15 DIAGNOSIS — M54.42 CHRONIC BILATERAL LOW BACK PAIN WITH BILATERAL SCIATICA: Primary | ICD-10-CM

## 2019-07-15 DIAGNOSIS — G89.29 CHRONIC BILATERAL LOW BACK PAIN WITH BILATERAL SCIATICA: Primary | ICD-10-CM

## 2019-07-15 DIAGNOSIS — M54.41 CHRONIC BILATERAL LOW BACK PAIN WITH BILATERAL SCIATICA: Primary | ICD-10-CM

## 2019-07-15 PROCEDURE — 97110 THERAPEUTIC EXERCISES: CPT

## 2019-07-15 NOTE — PROGRESS NOTES
Daily Note     Today's date: 7/15/2019  Patient name: Gita Tovar  : 1960  MRN: [id number]  Referring provider: Alannah Ma DO  Dx:   Encounter Diagnosis     ICD-10-CM    1  Chronic bilateral low back pain with bilateral sciatica M54 42     M54 41     G89 29                   Subjective: Patient presents feeling fairly well overall  0/10 pain noted  She experienced sx's down the posterior aspect of her R thigh over the weekend  Sx's have since resolved  Offers no new complaints  Objective: See treatment diary below      Assessment: Patient tolerated session fairly well  She experienced mild discomfort in the R buttock area with [facility] and Mis Descuentos, but was otherwise able to perform all other exercises with minimal difficulty  Patient demonstrated fatigue post treatment, exhibited good technique with therapeutic exercises and would benefit from continued PT      Plan: Continue per plan of care  Precautions: HTN, pmhx of CVA      Manual  7/10  IE                                                                                 Exercise Diary  7/10  IE 7/15           Nustep  L4  10'           LTR  20x5"           SKTC  10x10" B           DKTC  P! H/S Stretch  3x30" B           Piriformis Stretch  3x30" B           Bridges  P! TB Clamshells  Green  20x EA  B           SLR x 3  20x EA  B           TA Holds  20x5"           Standing H' Abd/Ext  20x EA   B           PB Wall Squats             TB Palloff Press              Leg Press                                                                                               Modalities

## 2019-07-17 ENCOUNTER — OFFICE VISIT (OUTPATIENT)
Dept: PHYSICAL THERAPY | Age: 59
End: 2019-07-17
Payer: COMMERCIAL

## 2019-07-17 DIAGNOSIS — M54.42 CHRONIC BILATERAL LOW BACK PAIN WITH BILATERAL SCIATICA: Primary | ICD-10-CM

## 2019-07-17 DIAGNOSIS — M54.41 CHRONIC BILATERAL LOW BACK PAIN WITH BILATERAL SCIATICA: Primary | ICD-10-CM

## 2019-07-17 DIAGNOSIS — G89.29 CHRONIC BILATERAL LOW BACK PAIN WITH BILATERAL SCIATICA: Primary | ICD-10-CM

## 2019-07-17 PROCEDURE — 97110 THERAPEUTIC EXERCISES: CPT

## 2019-07-17 NOTE — PROGRESS NOTES
Daily Note     Today's date: 2019  Patient name: Karyn Rodriguez  : 1960  MRN: [id number]  Referring provider: Ammy Pelaez DO  Dx:   Encounter Diagnosis     ICD-10-CM    1  Chronic bilateral low back pain with bilateral sciatica M54 42     M54 41     G89 29                   Subjective: Patient presents feeling fairly well overall  Mild LE muscle soreness following previous tx session, but sx's have since resolved  Offers no new complaints  Objective: See treatment diary below      Assessment: Patient tolerated additional LE and core stability PRE's well without significant discomfort  Continues to experience muscle tightness in the RLE  Relieved with stretching  Patient demonstrated fatigue post treatment, exhibited good technique with therapeutic exercises and would benefit from continued PT      Plan: Continue per plan of care  Precautions: HTN, pmhx of CVA      Manual  7/10  IE                                                                                  Exercise Diary  7/10  IE 7/15 7/17          Nustep  L4  10' L4  10'          LTR  20x5" 20x5"          SKTC  10x10" B 10x10" B          DKTC  P! nt          H/S Stretch  3x30" B 3x30" B          Piriformis Stretch  3x30" B 3x30" B          Bridges  P! nt          Supine   TB Clamshells  Green  20x B Green  20x B          SLR x 3  20x EA  B 20x EA  B          TA Holds  20x5" 20x5"           Standing H' Abd/Ext  20x EA  B 20x EA   B          PB Rollout   10x10"          PB Wall Squats   20x          TB Rotational Palloff Press    Yellow  15x5" B          Leg Press                                                                                               Modalities

## 2019-07-22 ENCOUNTER — APPOINTMENT (OUTPATIENT)
Dept: PHYSICAL THERAPY | Age: 59
End: 2019-07-22
Payer: COMMERCIAL

## 2019-07-23 ENCOUNTER — OFFICE VISIT (OUTPATIENT)
Dept: FAMILY MEDICINE CLINIC | Facility: CLINIC | Age: 59
End: 2019-07-23
Payer: COMMERCIAL

## 2019-07-23 VITALS
RESPIRATION RATE: 17 BRPM | TEMPERATURE: 98.1 F | BODY MASS INDEX: 31.58 KG/M2 | DIASTOLIC BLOOD PRESSURE: 90 MMHG | OXYGEN SATURATION: 96 % | WEIGHT: 184 LBS | SYSTOLIC BLOOD PRESSURE: 122 MMHG | HEART RATE: 73 BPM

## 2019-07-23 DIAGNOSIS — I10 ESSENTIAL HYPERTENSION: ICD-10-CM

## 2019-07-23 DIAGNOSIS — E78.5 HYPERLIPIDEMIA, UNSPECIFIED HYPERLIPIDEMIA TYPE: ICD-10-CM

## 2019-07-23 DIAGNOSIS — M54.50 LUMBAR PAIN: Primary | ICD-10-CM

## 2019-07-23 PROCEDURE — 99213 OFFICE O/P EST LOW 20 MIN: CPT | Performed by: FAMILY MEDICINE

## 2019-07-23 RX ORDER — METOPROLOL SUCCINATE 25 MG/1
25 TABLET, EXTENDED RELEASE ORAL DAILY
Qty: 90 TABLET | Refills: 1 | Status: SHIPPED | OUTPATIENT
Start: 2019-07-23 | End: 2020-01-17 | Stop reason: SDUPTHER

## 2019-07-23 RX ORDER — ATORVASTATIN CALCIUM 40 MG/1
40 TABLET, FILM COATED ORAL DAILY
Qty: 90 TABLET | Refills: 1 | Status: SHIPPED | OUTPATIENT
Start: 2019-07-23 | End: 2020-01-17 | Stop reason: SDUPTHER

## 2019-07-23 NOTE — PROGRESS NOTES
Assessment/Plan:       Diagnoses and all orders for this visit:    Lumbar pain  Comments:  monitor with remainder of PT sessions and if not resolved/nearly resolved by end, then refer for spine eval and MRI    Hyperlipidemia, unspecified hyperlipidemia type  -     atorvastatin (LIPITOR) 40 mg tablet; Take 1 tablet (40 mg total) by mouth daily    Essential hypertension  -     metoprolol succinate (TOPROL-XL) 25 mg 24 hr tablet; Take 1 tablet (25 mg total) by mouth daily          Subjective:   Chief Complaint   Patient presents with    Follow-up        Patient ID: Cb Marti is a 64 y o  female  Short-interval f/u due to last visit was here for acute problem leg pains; xray ordered and obtained, showed degen changes  She did set up PT, has sessions scheduled until first week in august  States, "it's loosening up"  Also would like med refills for her chronic conditions        The following portions of the patient's history were reviewed and updated as appropriate: allergies, current medications, past family history, past medical history, past social history, past surgical history and problem list     Review of Systems   Constitutional: Negative  Musculoskeletal:        Per hpi   Neurological: Negative for tremors, weakness and numbness  Objective:      /90   Pulse 73   Temp 98 1 °F (36 7 °C)   Resp 17   Wt 83 5 kg (184 lb)   SpO2 96%   BMI 31 58 kg/m²          Physical Exam   Constitutional: She is oriented to person, place, and time  She appears well-developed  She is cooperative  Non-toxic appearance  She does not have a sickly appearance  She does not appear ill  No distress  Neurological: She is alert and oriented to person, place, and time  She has normal strength and normal reflexes  She displays no atrophy and no tremor  No sensory deficit  She exhibits normal muscle tone  Gait normal    Nursing note and vitals reviewed

## 2019-07-24 ENCOUNTER — OFFICE VISIT (OUTPATIENT)
Dept: PHYSICAL THERAPY | Age: 59
End: 2019-07-24
Payer: COMMERCIAL

## 2019-07-24 DIAGNOSIS — M54.41 CHRONIC BILATERAL LOW BACK PAIN WITH BILATERAL SCIATICA: Primary | ICD-10-CM

## 2019-07-24 DIAGNOSIS — M54.42 CHRONIC BILATERAL LOW BACK PAIN WITH BILATERAL SCIATICA: Primary | ICD-10-CM

## 2019-07-24 DIAGNOSIS — G89.29 CHRONIC BILATERAL LOW BACK PAIN WITH BILATERAL SCIATICA: Primary | ICD-10-CM

## 2019-07-24 PROCEDURE — 97110 THERAPEUTIC EXERCISES: CPT | Performed by: SPECIALIST/TECHNOLOGIST

## 2019-07-24 NOTE — PROGRESS NOTES
Daily Note     Today's date: 2019  Patient name: Samantha Weber  : 1960  MRN: [id number]  Referring provider: Xavier Winter DO  Dx:   Encounter Diagnosis     ICD-10-CM    1  Chronic bilateral low back pain with bilateral sciatica M54 42     M54 41     G89 29                   Subjective: Pt reports intermittent L LE pain, pt reports she is also trying to take the stairs more at work  Objective: See treatment diary below    Assessment: Pt able to increase repetitions with therex this visit without significant discomfort or excessive muscular fatigue  Tolerated treatment well  Patient demonstrated fatigue post treatment, exhibited good technique with therapeutic exercises and would benefit from continued PT    Plan: Continue per plan of care  Progress treatment as tolerated         Precautions: HTN, pmhx of CVA      Manual  7/10  IE                                                                                  Exercise Diary  7/10  IE 7/15 7/17 7/24         Nustep  L4  10' L4  10' L4 10'         LTR  20x5" 20x5" 20x5"         SKTC  10x10" B 10x10" B 10x10"         DKTC  P! nt          H/S Stretch  3x30" B 3x30" B 3x30"         Piriformis Stretch  3x30" B 3x30" B 3x30"         Bridges  P! nt          Supine   TB Clamshells  Green  20x B Green  20x B Green 30x         SLR x 3  20x EA  B 20x EA  B 30x EA B         TA Holds  20x5" 20x5"  20x5"         Standing H' Abd/Ext  20x EA  B 20x EA  B 30x EA B         PB Rollout   10x10" 10x10"         PB Wall Squats   20x 20x         TB Rotational Palloff Press    Yellow  15x5" B Yellow 15x5"          Leg Press                                                                                               Modalities

## 2019-07-29 ENCOUNTER — APPOINTMENT (OUTPATIENT)
Dept: PHYSICAL THERAPY | Age: 59
End: 2019-07-29
Payer: COMMERCIAL

## 2019-07-31 ENCOUNTER — OFFICE VISIT (OUTPATIENT)
Dept: PHYSICAL THERAPY | Age: 59
End: 2019-07-31
Payer: COMMERCIAL

## 2019-07-31 DIAGNOSIS — M54.41 CHRONIC BILATERAL LOW BACK PAIN WITH BILATERAL SCIATICA: Primary | ICD-10-CM

## 2019-07-31 DIAGNOSIS — G89.29 CHRONIC BILATERAL LOW BACK PAIN WITH BILATERAL SCIATICA: Primary | ICD-10-CM

## 2019-07-31 DIAGNOSIS — M54.42 CHRONIC BILATERAL LOW BACK PAIN WITH BILATERAL SCIATICA: Primary | ICD-10-CM

## 2019-07-31 PROCEDURE — 97140 MANUAL THERAPY 1/> REGIONS: CPT

## 2019-07-31 PROCEDURE — 97110 THERAPEUTIC EXERCISES: CPT

## 2019-07-31 PROCEDURE — 97112 NEUROMUSCULAR REEDUCATION: CPT

## 2019-08-01 ENCOUNTER — APPOINTMENT (OUTPATIENT)
Dept: PHYSICAL THERAPY | Age: 59
End: 2019-08-01
Payer: COMMERCIAL

## 2019-08-05 ENCOUNTER — OFFICE VISIT (OUTPATIENT)
Dept: PHYSICAL THERAPY | Age: 59
End: 2019-08-05
Payer: COMMERCIAL

## 2019-08-05 DIAGNOSIS — M54.42 CHRONIC BILATERAL LOW BACK PAIN WITH BILATERAL SCIATICA: Primary | ICD-10-CM

## 2019-08-05 DIAGNOSIS — M54.41 CHRONIC BILATERAL LOW BACK PAIN WITH BILATERAL SCIATICA: Primary | ICD-10-CM

## 2019-08-05 DIAGNOSIS — G89.29 CHRONIC BILATERAL LOW BACK PAIN WITH BILATERAL SCIATICA: Primary | ICD-10-CM

## 2019-08-05 PROCEDURE — 97110 THERAPEUTIC EXERCISES: CPT | Performed by: SPECIALIST/TECHNOLOGIST

## 2019-08-05 PROCEDURE — 97140 MANUAL THERAPY 1/> REGIONS: CPT | Performed by: SPECIALIST/TECHNOLOGIST

## 2019-08-05 PROCEDURE — 97112 NEUROMUSCULAR REEDUCATION: CPT | Performed by: SPECIALIST/TECHNOLOGIST

## 2019-08-05 NOTE — PROGRESS NOTES
Daily Note     Today's date: 2019  Patient name: Kt Lewis  : 1960  MRN: [id number]  Referring provider: Joselin Burger DO  Dx:   Encounter Diagnosis     ICD-10-CM    1  Chronic bilateral low back pain with bilateral sciatica M54 42     M54 41     G89 29                   Subjective: Pt reports she irritated her back doing house chores ie changing bed sheets that require bending and reaching  Objective: See treatment diary below    Assessment: Pt able to increase resistance with therex this visit while maintaining good TA activation for core stability  Tolerated treatment well  Patient demonstrated fatigue post treatment, exhibited good technique with therapeutic exercises and would benefit from continued PT to improve LBP and sciatic symptoms  Pt educated about the importance of maintaining core stabilization with bending activities to protect lumbar spine  Plan: Continue per plan of care  Progress treatment as tolerated         Precautions: HTN, pmhx of CVA      Manual  7/10  IE            end of treatment Ham string stretch  8 min 8 min                                                                  Exercise Diary  7/10  IE 7/15 7/17 7/24 7/31 8/5       Nustep  L4  10' L4  10' L4 10' L4 10' L4 10'       LTR  20x5" 20x5" 20x5" 20x5" 20x5"       SKTC  10x10" B 10x10" B 10x10" 10x10" 10x10"       DKTC  P! nt          H/S Stretch  3x30" B 3x30" B 3x30" 3x 30" -       Piriformis Stretch  3x30" B 3x30" B 3x30" 3x30" 3x30"       Bridges  P! nt          Supine   TB Clamshells  Green  20x B Green  20x B Green 30x Green 30x Blue 30x       SLR x 3  20x EA  B 20x EA  B 30x EA B 30x EA B 30x 2#       TA Holds  20x5" 20x5"  20x5" 20x5" 20x5"       Standing H' Abd/Ext  20x EA  B 20x EA  B 30x EA B 30x EA B 30x EA B       PB Rollout   10x10" 10x10" 10x10" 10x 10"       PB Wall Squats   20x 20x 20x 20x       TB Rotational Palloff Press    Yellow  15x5" B Yellow 15x5"  Yellow 15x5"  Red 15x5"       Leg Press                                                                                               Modalities

## 2019-08-07 ENCOUNTER — OFFICE VISIT (OUTPATIENT)
Dept: PHYSICAL THERAPY | Age: 59
End: 2019-08-07
Payer: COMMERCIAL

## 2019-08-07 DIAGNOSIS — M54.41 CHRONIC BILATERAL LOW BACK PAIN WITH BILATERAL SCIATICA: Primary | ICD-10-CM

## 2019-08-07 DIAGNOSIS — G89.29 CHRONIC BILATERAL LOW BACK PAIN WITH BILATERAL SCIATICA: Primary | ICD-10-CM

## 2019-08-07 DIAGNOSIS — M54.42 CHRONIC BILATERAL LOW BACK PAIN WITH BILATERAL SCIATICA: Primary | ICD-10-CM

## 2019-08-07 PROCEDURE — 97110 THERAPEUTIC EXERCISES: CPT | Performed by: SPECIALIST/TECHNOLOGIST

## 2019-08-07 PROCEDURE — 97112 NEUROMUSCULAR REEDUCATION: CPT | Performed by: SPECIALIST/TECHNOLOGIST

## 2019-08-07 NOTE — PROGRESS NOTES
Daily Note     Today's date: 2019  Patient name: Tray Villeda  : 1960  MRN: [id number]  Referring provider: Sharath Mcgraw DO  Dx:   Encounter Diagnosis     ICD-10-CM    1  Chronic bilateral low back pain with bilateral sciatica M54 42     M54 41     G89 29                   Subjective: Pt reports B LE soreness this visit, pt cannot differentiate if discomfort is muscular or nerve related  Objective: See treatment diary below    Assessment: Current interventions remain appropriate to improve core and pelvic strength and stability  Pt demonstrates mild muscular fatigue post treatment  Pt denies exacerbation in B LE soreness beyond general muscle fatigue related to exercise this visit  Tolerated treatment well  Patient would benefit from continued PT  Plan: Continue per plan of care  Potential discharge next visit       Precautions: HTN, pmhx of CVA      Manual  7/10  IE           end of treatment Ham string stretch  8 min 8 min -                                                                 Exercise Diary  7/10  IE 7/15 7/17 7/24 7/31 8/5 8/7      Nustep  L4  10' L4  10' L4 10' L4 10' L4 10' L4 10'      LTR  20x5" 20x5" 20x5" 20x5" 20x5" 20x5"      SKTC  10x10" B 10x10" B 10x10" 10x10" 10x10" 10x10"      DKTC  P! nt          H/S Stretch  3x30" B 3x30" B 3x30" 3x 30" - 3x30"      Piriformis Stretch  3x30" B 3x30" B 3x30" 3x30" 3x30" 3x30"      Bridges  P! nt          Supine   TB Clamshells  Green  20x B Green  20x B Green 30x Green 30x Blue 30x Blue 30x      SLR x 3  20x EA  B 20x EA  B 30x EA B 30x EA B 30x 2# 30x 2#      TA Holds  20x5" 20x5"  20x5" 20x5" 20x5" 30x      Standing H' Abd/Ext  20x EA  B 20x EA  B 30x EA B 30x EA B 30x EA B 30x ea b 2#      PB Rollout   10x10" 10x10" 10x10" 10x 10" 10x 10"      PB Wall Squats   20x 20x 20x 20x 20x      TB Rotational Palloff Press    Yellow  15x5" B Yellow 15x5"  Yellow 15x5"  Red 15x5" Red 15x5"      Leg Press       40# 20x Modalities

## 2019-08-12 ENCOUNTER — OFFICE VISIT (OUTPATIENT)
Dept: PHYSICAL THERAPY | Age: 59
End: 2019-08-12
Payer: COMMERCIAL

## 2019-08-12 DIAGNOSIS — M54.41 CHRONIC BILATERAL LOW BACK PAIN WITH BILATERAL SCIATICA: Primary | ICD-10-CM

## 2019-08-12 DIAGNOSIS — G89.29 CHRONIC BILATERAL LOW BACK PAIN WITH BILATERAL SCIATICA: Primary | ICD-10-CM

## 2019-08-12 DIAGNOSIS — M54.42 CHRONIC BILATERAL LOW BACK PAIN WITH BILATERAL SCIATICA: Primary | ICD-10-CM

## 2019-08-12 PROCEDURE — 97110 THERAPEUTIC EXERCISES: CPT | Performed by: SPECIALIST/TECHNOLOGIST

## 2019-08-12 PROCEDURE — 97112 NEUROMUSCULAR REEDUCATION: CPT | Performed by: SPECIALIST/TECHNOLOGIST

## 2019-08-12 NOTE — PROGRESS NOTES
Daily Note     Today's date: 2019  Patient name: rPosper Fairbanks  : 1960  MRN: [id number]  Referring provider: David Chandra DO  Dx:   Encounter Diagnosis     ICD-10-CM    1  Chronic bilateral low back pain with bilateral sciatica M54 42     M54 41     G89 29                   Subjective: Pt reports "today is a bad day", was very active over the weekend, bilateral sciatic symptoms are present today, pt expresses frustration in lack of concrete diagnosis- expresses interest in returning to PCP for further diagnosis and updated plan of care  Objective: See treatment diary below    Assessment: Pt lacking consistent progress, B sciatic symptoms lacked consistent improvement since starting PT  PT Darron Carter discussed pt concerns with her directly- in agreement to seek further diagnosis and treatment options  Plan: Discharge, pt to return to referring PCP to seek further treatment/diagnositc options       Precautions: HTN, pmhx of CVA      Manual  7/10  IE           end of treatment Ham string stretch  8 min 8 min -                                                                 Exercise Diary  7/10  IE 7/15 7/17 7/24 7/31 8/5 8/7 8/12     Nustep  L4  10' L4  10' L4 10' L4 10' L4 10' L4 10' L4 10'     LTR  20x5" 20x5" 20x5" 20x5" 20x5" 20x5" 20x5"     SKTC  10x10" B 10x10" B 10x10" 10x10" 10x10" 10x10" 10x10"     DKTC  P! nt          H/S Stretch  3x30" B 3x30" B 3x30" 3x 30" - 3x30" 3x30"     Piriformis Stretch  3x30" B 3x30" B 3x30" 3x30" 3x30" 3x30" 3x30"     Bridges  P! nt          Supine   TB Clamshells  Green  20x B Green  20x B Green 30x Green 30x Blue 30x Blue 30x Blue 30x     SLR x 3  20x EA  B 20x EA  B 30x EA B 30x EA B 30x 2# 30x 2# 30x 2#      TA Holds  20x5" 20x5"  20x5" 20x5" 20x5" 30x 30x     Standing H' Abd/Ext  20x EA  B 20x EA  B 30x EA B 30x EA B 30x EA B 30x ea b 2# 30x ea b 2#     PB Rollout   10x10" 10x10" 10x10" 10x 10" 10x 10" 10x10"     PB Wall Squats   20x 20x 20x 20x 20x 20x     TB Rotational Palloff Press    Yellow  15x5" B Yellow 15x5"  Yellow 15x5"  Red 15x5" Red 15x5" Red 15x5"      Leg Press       40# 20x 40# 20x                                                                                       Modalities

## 2019-09-17 ENCOUNTER — OFFICE VISIT (OUTPATIENT)
Dept: FAMILY MEDICINE CLINIC | Facility: CLINIC | Age: 59
End: 2019-09-17
Payer: COMMERCIAL

## 2019-09-17 VITALS
BODY MASS INDEX: 30.38 KG/M2 | SYSTOLIC BLOOD PRESSURE: 120 MMHG | WEIGHT: 177 LBS | TEMPERATURE: 97.4 F | RESPIRATION RATE: 17 BRPM | OXYGEN SATURATION: 95 % | DIASTOLIC BLOOD PRESSURE: 70 MMHG | HEART RATE: 62 BPM

## 2019-09-17 DIAGNOSIS — L03.032 PARONYCHIA OF GREAT TOE, LEFT: Primary | ICD-10-CM

## 2019-09-17 PROCEDURE — 99213 OFFICE O/P EST LOW 20 MIN: CPT | Performed by: FAMILY MEDICINE

## 2019-09-17 RX ORDER — CEPHALEXIN 500 MG/1
500 CAPSULE ORAL EVERY 6 HOURS SCHEDULED
Qty: 28 CAPSULE | Refills: 0 | Status: SHIPPED | OUTPATIENT
Start: 2019-09-17 | End: 2019-09-24

## 2019-09-17 NOTE — PROGRESS NOTES
Assessment/Plan:         Diagnoses and all orders for this visit:    Paronychia of great toe, left  -     cephalexin (KEFLEX) 500 mg capsule; Take 1 capsule (500 mg total) by mouth every 6 (six) hours for 7 days          Subjective:   Chief Complaint   Patient presents with    Toe Injury      X 1 WEEK - LEFT GREATER TOE        Patient ID: Eliot Willett is a 64 y o  female  Same day sick appt  About a week left great toe draining pus, pain, swelling, redness  Applying neosporin  No prior similar episodes        The following portions of the patient's history were reviewed and updated as appropriate: allergies, current medications, past family history, past medical history, past social history, past surgical history and problem list     Review of Systems   Constitutional: Negative  Skin: Negative for pallor and rash  Per hpi   Hematological: Negative  Objective:      /70   Pulse 62   Temp (!) 97 4 °F (36 3 °C)   Resp 17   Wt 80 3 kg (177 lb)   SpO2 95%   BMI 30 38 kg/m²          Physical Exam   Constitutional: She appears well-developed  She is cooperative  Non-toxic appearance  She does not have a sickly appearance  She does not appear ill  No distress  Neurological: She is alert  Skin: Skin is warm and dry  Capillary refill takes less than 2 seconds  She is not diaphoretic  No pallor  Nursing note and vitals reviewed

## 2019-09-22 ENCOUNTER — OFFICE VISIT (OUTPATIENT)
Dept: URGENT CARE | Age: 59
End: 2019-09-22
Payer: COMMERCIAL

## 2019-09-22 VITALS
RESPIRATION RATE: 18 BRPM | SYSTOLIC BLOOD PRESSURE: 107 MMHG | OXYGEN SATURATION: 98 % | TEMPERATURE: 99.4 F | DIASTOLIC BLOOD PRESSURE: 75 MMHG | HEART RATE: 78 BPM

## 2019-09-22 DIAGNOSIS — Z51.89 VISIT FOR WOUND CHECK: Primary | ICD-10-CM

## 2019-09-22 PROCEDURE — 99212 OFFICE O/P EST SF 10 MIN: CPT | Performed by: PHYSICIAN ASSISTANT

## 2019-09-22 PROCEDURE — S9088 SERVICES PROVIDED IN URGENT: HCPCS | Performed by: PHYSICIAN ASSISTANT

## 2019-09-22 RX ORDER — SULFAMETHOXAZOLE AND TRIMETHOPRIM 800; 160 MG/1; MG/1
1 TABLET ORAL EVERY 12 HOURS SCHEDULED
Qty: 14 TABLET | Refills: 0 | Status: SHIPPED | OUTPATIENT
Start: 2019-09-22 | End: 2019-09-29

## 2019-09-22 NOTE — PATIENT INSTRUCTIONS
Continue taking an Keflex as directed  Start taking and Bactrim as directed in finish  Motrin Tylenol as needed for aches and pains   Follow up with PCP in 3-5 days  Proceed to  ER if symptoms worsen  Paronychia   WHAT YOU NEED TO KNOW:   Paronychia is an infection of your nail fold caused by bacteria or a fungus  The nail fold is the skin around your nail  Paronychia may happen suddenly and last for 6 weeks or longer  You may have paronychia on more than 1 finger or toe  DISCHARGE INSTRUCTIONS:   Medicines:   · Td vaccine  is a booster shot used to help prevent tetanus and diphtheria  The Td booster may be given to adolescents and adults every 10 years or for certain wounds and injuries  · Antibiotics: This medicine will help fight or prevent an infection  It may be given as a pill, cream, or ointment  · Steroids: This medicine will help decrease inflammation  It may be given as a pill, cream, or ointment  · Antifungal medicine: This medicine helps kill fungus that may be causing your infection  It may be given as a cream or ointment  · NSAIDs:  These medicines decrease pain and swelling  NSAIDs are available without a doctor's order  Ask your healthcare provider which medicine is right for you  Ask how much to take and when to take it  Take as directed  NSAIDs can cause stomach bleeding and kidney problems if not taken correctly  · Take your medicine as directed  Contact your healthcare provider if you think your medicine is not helping or if you have side effects  Tell him of her if you are allergic to any medicine  Keep a list of the medicines, vitamins, and herbs you take  Include the amounts, and when and why you take them  Bring the list or the pill bottles to follow-up visits  Carry your medicine list with you in case of an emergency  Follow up with your healthcare provider as directed:  Write down your questions so you remember to ask them during your visits     Self-care:   · Soak your nail:  Soak your nail in a mixture of equal parts vinegar and water 3 or 4 times each day  This will help decrease inflammation  · Apply a warm compress:  Soak a washcloth in warm water and place it on your nail  This will help decrease inflammation  · Elevate:  Raise your nail above the level of your heart as often as you can  This will help decrease swelling and pain  Prop your nail on pillows or blankets to keep it elevated comfortably  · Use lotion:  Apply lotion after you wash your hands  This will prevent your skin from becoming too dry  Prevent paronychia:   · Avoid chemicals and allergens that may harm your skin and nails  This includes soaps, laundry detergents, and nail products  · Keep your nails clean and dry  Avoid soaking your nails in water  Use cotton-lined rubber gloves or wear 2 rubber gloves if you work with food or water  The gloves will help protect your nail folds  · Keep your nails short  Do not bite your nails, pick at your hangnails, suck your fingers, or wear fake nails  Bring your own nail tools when you go to the nail salon  Contact your healthcare provider if:   · Your nail becomes loose, deformed, or falls off  · You have a large abscess on your nail  · You have questions or concerns about your condition or care  Return to the emergency department if:   · You have severe nail pain  · The inflammation spreads to your hand or arm  © 2017 [employer] Information is for End User's use only and may not be sold, redistributed or otherwise used for commercial purposes  All illustrations and images included in CareNotes® are the copyrighted property of [employer]  or Tanner Keita  The above information is an  only  It is not intended as medical advice for individual conditions or treatments  Talk to your doctor, nurse or pharmacist before following any medical regimen to see if it is safe and effective for you

## 2019-09-22 NOTE — PROGRESS NOTES
[facility]        NAME: Samantha Weber is a 64 y o  female  : 1960    MRN: [id number]  DATE: 2019  TIME: 11:16 AM    Assessment and Plan   Visit for wound check [Z51 89]  1  Visit for wound check  sulfamethoxazole-trimethoprim (BACTRIM DS) 800-160 mg per tablet         Patient Instructions     Continue taking an Keflex as directed  Start taking and Bactrim as directed in finish  Motrin Tylenol as needed for aches and pains   Follow up with PCP in 3-5 days  Proceed to  ER if symptoms worsen  Chief Complaint     Chief Complaint   Patient presents with    Wound Infection     left hallux infection; taking keflex 5 day         History of Present Illness       51-year-old female presents for a wound check of left great toe  Was seen several days ago prescribed Keflex for paronychia of the great toe  Has been taking antibiotic as directed however toe continues to be painful red swollen she thinks there is some redness starting to come up her foot  Denies any fevers or chills  No nausea vomiting  Wound Check   She was originally treated 3 to 5 days ago  Previous treatment included oral antibiotics  Wound drainage status: Period went  The redness has worsened  The swelling has not changed  The pain has not changed  She has no difficulty moving the affected extremity or digit  Review of Systems   Review of Systems   Constitutional: Negative  Respiratory: Negative  Musculoskeletal: Positive for arthralgias (Toe pain)  Skin: Positive for wound  Neurological: Negative            Current Medications       Current Outpatient Medications:     ascorbic acid (VITAMIN C) 500 mg tablet, Take by mouth daily, Disp: , Rfl:     atorvastatin (LIPITOR) 40 mg tablet, Take 1 tablet (40 mg total) by mouth daily, Disp: 90 tablet, Rfl: 1    Azilsartan-Chlorthalidone (EDARBYCLOR) 40-12 5 MG TABS, Take 1 tablet by mouth daily, Disp: 90 tablet, Rfl: 3    cephalexin (KEFLEX) 500 mg capsule, Take 1 capsule (500 mg total) by mouth every 6 (six) hours for 7 days, Disp: 28 capsule, Rfl: 0    Cholecalciferol (D 2000) 2000 units TABS, Take by mouth daily, Disp: , Rfl:     clopidogrel (PLAVIX) 75 mg tablet, Take 1 tablet (75 mg total) by mouth daily, Disp: 90 tablet, Rfl: 1    CO ENZYME Q-10 PO, take 1 Capsule by Oral route once, Disp: , Rfl:     cyclobenzaprine (FLEXERIL) 10 mg tablet, Take 1 tablet (10 mg total) by mouth daily at bedtime as needed for muscle spasms (Patient not taking: Reported on 7/23/2019), Disp: 30 tablet, Rfl: 0    folic acid (FOLVITE) 1 mg tablet, Take 1 tablet (1,000 mcg total) by mouth daily, Disp: 90 tablet, Rfl: 1    meloxicam (MOBIC) 15 mg tablet, Take 1 tablet (15 mg total) by mouth daily (Patient not taking: Reported on 7/23/2019), Disp: 15 tablet, Rfl: 0    metoprolol succinate (TOPROL-XL) 25 mg 24 hr tablet, Take 1 tablet (25 mg total) by mouth daily, Disp: 90 tablet, Rfl: 1    Potassium 99 MG TABS, Take 1 tablet by mouth daily, Disp: , Rfl:     sulfamethoxazole-trimethoprim (BACTRIM DS) 800-160 mg per tablet, Take 1 tablet by mouth every 12 (twelve) hours for 7 days, Disp: 14 tablet, Rfl: 0    Current Allergies     Allergies as of 09/22/2019 - Reviewed 09/22/2019   Allergen Reaction Noted    Codeine Chest Pain 07/02/2014            The following portions of the patient's history were reviewed and updated as appropriate: allergies, current medications, past family history, past medical history, past social history, past surgical history and problem list      Past Medical History:   Diagnosis Date    Diverticulosis     Hemorrhoids     Hypertension     Stroke [facility])        Past Surgical History:   Procedure Laterality Date    COLONOSCOPY      ONSET 2015    NOSE SURGERY      SKIN LESION EXCISION         Family History   Problem Relation Age of Onset    No Known Problems Mother     Hypertension Father     Parkinsonism Father     Hypertension Sister  Stroke Family     Glaucoma Family     Hypertension Family          Medications have been verified  Objective   /75   Pulse 78   Temp 99 4 °F (37 4 °C)   Resp 18   SpO2 98%        Physical Exam     Physical Exam   Constitutional: She is oriented to person, place, and time  She appears well-developed and well-nourished  No distress  HENT:   Head: Normocephalic and atraumatic  Right Ear: External ear normal    Left Ear: External ear normal    Nose: Nose normal    Mouth/Throat: Oropharynx is clear and moist  No oropharyngeal exudate  Eyes: Conjunctivae are normal  Right eye exhibits no discharge  Left eye exhibits no discharge  Neck: Normal range of motion  Neck supple  Cardiovascular: Intact distal pulses  Pulmonary/Chest: Effort normal  No respiratory distress  Musculoskeletal: Normal range of motion  Left foot: There is tenderness and swelling  There is normal range of motion, no bony tenderness, normal capillary refill, no crepitus, no deformity and no laceration  Feet:    Neurological: She is alert and oriented to person, place, and time  Skin: Skin is warm and dry  Psychiatric: She has a normal mood and affect  Nursing note and vitals reviewed  Patient reports she does have a history of MRSA infections in the past   Will continue patient to take Keflex and will add on Bactrim as well  Informed patient to follow up with PCP if not getting any better  If starts feeling worse and getting fevers to go to the ER

## 2019-09-30 ENCOUNTER — APPOINTMENT (EMERGENCY)
Dept: RADIOLOGY | Facility: HOSPITAL | Age: 59
End: 2019-09-30
Payer: COMMERCIAL

## 2019-09-30 ENCOUNTER — OFFICE VISIT (OUTPATIENT)
Dept: URGENT CARE | Age: 59
End: 2019-09-30
Payer: COMMERCIAL

## 2019-09-30 ENCOUNTER — HOSPITAL ENCOUNTER (EMERGENCY)
Facility: HOSPITAL | Age: 59
Discharge: HOME | End: 2019-09-30
Attending: EMERGENCY MEDICINE | Admitting: EMERGENCY MEDICINE
Payer: COMMERCIAL

## 2019-09-30 VITALS
DIASTOLIC BLOOD PRESSURE: 64 MMHG | WEIGHT: 177 LBS | HEART RATE: 65 BPM | OXYGEN SATURATION: 98 % | RESPIRATION RATE: 16 BRPM | TEMPERATURE: 97.4 F | SYSTOLIC BLOOD PRESSURE: 100 MMHG | BODY MASS INDEX: 30.22 KG/M2 | HEIGHT: 64 IN

## 2019-09-30 VITALS
BODY MASS INDEX: 30.22 KG/M2 | HEIGHT: 64 IN | TEMPERATURE: 98.5 F | HEART RATE: 62 BPM | RESPIRATION RATE: 18 BRPM | OXYGEN SATURATION: 95 % | WEIGHT: 177.03 LBS | SYSTOLIC BLOOD PRESSURE: 97 MMHG | DIASTOLIC BLOOD PRESSURE: 70 MMHG

## 2019-09-30 DIAGNOSIS — L08.9 TOE INFECTION: Primary | ICD-10-CM

## 2019-09-30 DIAGNOSIS — M79.675 GREAT TOE PAIN, LEFT: Primary | ICD-10-CM

## 2019-09-30 PROCEDURE — 99213 OFFICE O/P EST LOW 20 MIN: CPT | Performed by: PHYSICIAN ASSISTANT

## 2019-09-30 PROCEDURE — 99284 EMERGENCY DEPT VISIT MOD MDM: CPT | Performed by: EMERGENCY MEDICINE

## 2019-09-30 PROCEDURE — S9088 SERVICES PROVIDED IN URGENT: HCPCS | Performed by: PHYSICIAN ASSISTANT

## 2019-09-30 PROCEDURE — 73660 X-RAY EXAM OF TOE(S): CPT

## 2019-09-30 PROCEDURE — 99283 EMERGENCY DEPT VISIT LOW MDM: CPT

## 2019-09-30 NOTE — PROGRESS NOTES
[phone]        NAME: Shelley Schaffer is a 64 y o  female  : 1960    MRN: [id number]  DATE: 2019  TIME: 12:48 PM    Assessment and Plan   Toe infection [L08 9]  1  Toe infection  Transfer to other facility     Failing outpatient treatment on Keflex and Bactrim, circumferential erythema, she notes some streaking up her foot that she thinks slightly is improving but no improvement to the left great toe  Warmth drainage  Denies fevers or chills    Patient Instructions     Patient would like to go via private vehicle to [facility] ER  Please go to the [facility] now for further evaluation and treatment- hospital address verified with the patient  Patient agreed to go immediately to the ED  Chief Complaint     Chief Complaint   Patient presents with    Nail Problem     Pt complaining of an infection to her L great toe  She was seen by her PCP 2 5weeks ago and given Keflex  On  she went to a care now for continuing infection and was given Bactrim  She's concerned because he toe still seems infected  History of Present Illness       70-year-old female presents with left great toe infection  States he initially saw her PCP was put on Keflex  States it got worse and she started having streaking up her foot, she was then placed on Bactrim  States she completed the Keflex and the Bactrim yesterday  States the streaking up her foot may have improved slightly but still there per patient  She states redness is circumferential or on her left great toe, warmth, swelling, pain and drainage  She does not feel is gone any better with the antibiotics  Denies any fever or chills  States she has also been taking Tylenol and doing warm soaks with no relief  Denies any history of diabetes  Review of Systems   Review of Systems   Constitutional: Negative for chills, fatigue and fever     Respiratory: Negative for shortness of breath  Cardiovascular: Negative for chest pain  Musculoskeletal: Positive for arthralgias and joint swelling  Negative for back pain and neck pain  Skin: Positive for rash and wound  Neurological: Negative for dizziness, weakness, numbness and headaches  All other systems reviewed and are negative          Current Medications       Current Outpatient Medications:     ascorbic acid (VITAMIN C) 500 mg tablet, Take by mouth daily, Disp: , Rfl:     atorvastatin (LIPITOR) 40 mg tablet, Take 1 tablet (40 mg total) by mouth daily, Disp: 90 tablet, Rfl: 1    Azilsartan-Chlorthalidone (EDARBYCLOR) 40-12 5 MG TABS, Take 1 tablet by mouth daily, Disp: 90 tablet, Rfl: 3    Cholecalciferol (D 2000) 2000 units TABS, Take by mouth daily, Disp: , Rfl:     clopidogrel (PLAVIX) 75 mg tablet, Take 1 tablet (75 mg total) by mouth daily, Disp: 90 tablet, Rfl: 1    CO ENZYME Q-10 PO, take 1 Capsule by Oral route once, Disp: , Rfl:     folic acid (FOLVITE) 1 mg tablet, Take 1 tablet (1,000 mcg total) by mouth daily, Disp: 90 tablet, Rfl: 1    metoprolol succinate (TOPROL-XL) 25 mg 24 hr tablet, Take 1 tablet (25 mg total) by mouth daily, Disp: 90 tablet, Rfl: 1    Potassium 99 MG TABS, Take 1 tablet by mouth daily, Disp: , Rfl:     cyclobenzaprine (FLEXERIL) 10 mg tablet, Take 1 tablet (10 mg total) by mouth daily at bedtime as needed for muscle spasms (Patient not taking: Reported on 7/23/2019), Disp: 30 tablet, Rfl: 0    meloxicam (MOBIC) 15 mg tablet, Take 1 tablet (15 mg total) by mouth daily (Patient not taking: Reported on 7/23/2019), Disp: 15 tablet, Rfl: 0    Current Allergies     Allergies as of 09/30/2019 - Reviewed 09/30/2019   Allergen Reaction Noted    Codeine Chest Pain 07/02/2014            The following portions of the patient's history were reviewed and updated as appropriate: allergies, current medications, past family history, past medical history, past social history, past surgical history and problem list      Past Medical History:   Diagnosis Date    Diverticulosis     Hemorrhoids     Hypertension     Stroke [facility])        Past Surgical History:   Procedure Laterality Date    COLONOSCOPY      ONSET 2015    NOSE SURGERY      SKIN LESION EXCISION         Family History   Problem Relation Age of Onset    No Known Problems Mother     Hypertension Father     Parkinsonism Father     Hypertension Sister     Stroke Family     Glaucoma Family     Hypertension Family          Medications have been verified  Objective   /64 (BP Location: Left arm, Patient Position: Sitting)   Pulse 65   Temp (!) 97 4 °F (36 3 °C) (Temporal)   Resp 16   Ht 5' 4" (1 626 m)   Wt 80 3 kg (177 lb)   SpO2 98%   BMI 30 38 kg/m²        Physical Exam     Physical Exam   Constitutional: She is oriented to person, place, and time  She appears well-developed and well-nourished  No distress  Cardiovascular: Normal rate, regular rhythm and normal heart sounds  Pulmonary/Chest: Effort normal and breath sounds normal    Musculoskeletal:        Feet:    Neurological: She is alert and oriented to person, place, and time  She has normal reflexes  No sensory deficit  Skin: Skin is warm and dry  Capillary refill takes less than 2 seconds  There is erythema  Psychiatric: She has a normal mood and affect  Her behavior is normal    Nursing note and vitals reviewed

## 2019-09-30 NOTE — ED PROVIDER NOTES
History  Chief Complaint   Patient presents with    Toe Swelling     Patient presents to the E R  with left great toe pain and swelling  53-year-old female presents to emergency department for pain and redness of left great toe  Patient says that 2 weeks ago she was bit by an insect and the toe was red and itchy, and then she subsequently developed worsening redness with streaking up her foot and worsening pain  She was treated with a week of Keflex, without improvement of symptoms, and then a week of Bactrim, which moderately improved symptoms  Patient went to urgent care today because the toe is still mildly erythematous  Says that toe has been intermittently swollen  Denies fever, joint pain, warmth, drainage, any other symptoms or complaints  Prior to Admission Medications   Prescriptions Last Dose Informant Patient Reported? Taking?    Azilsartan-Chlorthalidone (EDARBYCLOR) 40-12 5 MG TABS 9/29/2019 at Unknown time  No Yes   Sig: Take 1 tablet by mouth daily   CO ENZYME Q-10 PO 9/30/2019 at Unknown time Self Yes Yes   Sig: take 1 Capsule by Oral route once   Cholecalciferol (D 2000) 2000 units TABS 9/30/2019 at Unknown time Self Yes Yes   Sig: Take by mouth daily   Potassium 99 MG TABS 9/30/2019 at Unknown time Self Yes Yes   Sig: Take 1 tablet by mouth daily   ascorbic acid (VITAMIN C) 500 mg tablet 9/30/2019 at Unknown time Self Yes Yes   Sig: Take by mouth daily   atorvastatin (LIPITOR) 40 mg tablet 9/29/2019 at Unknown time  No Yes   Sig: Take 1 tablet (40 mg total) by mouth daily   clopidogrel (PLAVIX) 75 mg tablet 9/30/2019 at Unknown time  No Yes   Sig: Take 1 tablet (75 mg total) by mouth daily   cyclobenzaprine (FLEXERIL) 10 mg tablet   No No   Sig: Take 1 tablet (10 mg total) by mouth daily at bedtime as needed for muscle spasms   Patient not taking: Reported on 8/56/1849   folic acid (FOLVITE) 1 mg tablet 9/30/2019 at Unknown time Self No Yes   Sig: Take 1 tablet (1,000 mcg total) by mouth daily   meloxicam (MOBIC) 15 mg tablet   No No   Sig: Take 1 tablet (15 mg total) by mouth daily   Patient not taking: Reported on 7/23/2019   metoprolol succinate (TOPROL-XL) 25 mg 24 hr tablet 9/30/2019 at Unknown time  No Yes   Sig: Take 1 tablet (25 mg total) by mouth daily   sulfamethoxazole-trimethoprim (BACTRIM DS) 800-160 mg per tablet   No No   Sig: Take 1 tablet by mouth every 12 (twelve) hours for 7 days      Facility-Administered Medications: None       Past Medical History:   Diagnosis Date    Diverticulosis     Hemorrhoids     Hypertension     Stroke [facility])        Past Surgical History:   Procedure Laterality Date    COLONOSCOPY      ONSET 2015    NOSE SURGERY      SKIN LESION EXCISION         Family History   Problem Relation Age of Onset    No Known Problems Mother     Hypertension Father     Parkinsonism Father     Hypertension Sister     Stroke Family     Glaucoma Family     Hypertension Family      I have reviewed and agree with the history as documented  Social History     Tobacco Use    Smoking status: Never Smoker    Smokeless tobacco: Never Used   Substance Use Topics    Alcohol use: Yes     Comment: SOCIAL    Drug use: No        Review of Systems   Constitutional: Negative  Negative for chills and fever  HENT: Negative  Negative for rhinorrhea  Eyes: Negative  Respiratory: Negative  Negative for cough and shortness of breath  Cardiovascular: Negative  Negative for chest pain and leg swelling  Gastrointestinal: Negative  Negative for abdominal pain, diarrhea, nausea and vomiting  Genitourinary: Negative  Negative for dysuria, flank pain and frequency  Musculoskeletal: Negative  Negative for back pain and neck pain  Skin: Positive for color change  Negative for rash  Neurological: Negative  Negative for light-headedness and headaches  All other systems reviewed and are negative        Physical Exam  ED Triage Vitals [09/30/19 1402] Temperature Pulse Respirations Blood Pressure SpO2   98 5 °F (36 9 °C) 80 18 115/74 97 %      Temp Source Heart Rate Source Patient Position - Orthostatic VS BP Location FiO2 (%)   Oral Monitor Lying Right arm --      Pain Score       No Pain             Orthostatic Vital Signs  Vitals:    09/30/19 1402 09/30/19 1500 09/30/19 1600   BP: 115/74 103/66 97/70   Pulse: 80 68 62   Patient Position - Orthostatic VS: Lying Lying Lying       Physical Exam   Constitutional: She is oriented to person, place, and time  She appears well-developed and well-nourished  No distress  HENT:   Head: Normocephalic and atraumatic  Mouth/Throat: Oropharynx is clear and moist    Eyes: EOM are normal    Neck: Normal range of motion  Neck supple  Cardiovascular: Normal rate, regular rhythm, normal heart sounds and intact distal pulses  Exam reveals no gallop and no friction rub  No murmur heard  Pulmonary/Chest: Effort normal and breath sounds normal  No respiratory distress  She has no wheezes  She has no rales  Abdominal: Soft  There is no tenderness  There is no rebound and no guarding  Musculoskeletal: She exhibits no edema or tenderness  Neurological: She is alert and oriented to person, place, and time  No cranial nerve deficit  Clear fluent speech   Skin: Skin is warm and dry  Capillary refill takes less than 2 seconds  Left great toe has mild erythema distally with peeling skin  Mildly tender to palpation  No warmth  No drainage  No edema  No crepitus  No fluctuance  No induration  Full range of motion without pain  Psychiatric: She has a normal mood and affect  Nursing note and vitals reviewed  ED Medications  Medications - No data to display    Diagnostic Studies  Results Reviewed     None                 XR toe left great min 2 views   Final Result by Kashif Valles MD (09/30 1616)      No acute osseous abnormality  No osseous erosion to suggest osteomyelitis    If there is continued clinical concern, an MRI or bone scan could be performed for further evaluation  Workstation performed: [id number]               Procedures  Procedures        ED Course                               MDM  Number of Diagnoses or Management Options  Great toe pain, left:   Diagnosis management comments: 16-year-old female presents to emergency department for pain and redness of left great toe after multiple courses of antibiotics     Does not appear cellulitic on exam   X-ray is negative  Will have patient follow up with Podiatry  Strict ED return precautions provided should symptoms worsen and patient can otherwise follow up outpatient  Patient expresses an understanding and agreement with the plan and remains in good condition for discharge  Disposition  Final diagnoses:   Great toe pain, left     Time reflects when diagnosis was documented in both MDM as applicable and the Disposition within this note     Time User Action Codes Description Comment    9/30/2019  2:28 PM Minor, Kyleigh Add [L53 9] Erythema     9/30/2019  2:28 PM Minor, Kyleigh Remove [L53 9] Erythema     9/30/2019  2:29 PM Minor, Kyleigh Add [M79 675] Great toe pain, left       ED Disposition     ED Disposition Condition Date/Time Comment    Discharge Stable Mon Sep 30, 2019  4:12  [address] discharge to home/self care              Follow-up Information     Follow up With Specialties Details Why Contact Info Additional [address], [address] [facility] Emergency Medicine Go to  If symptoms worsen [address]  [phone]  ED, [address], [facility] 108    [facility] Podiatry Call in 1 day To make an appointment [address] [facility] [phone] 5046 [facility], 5500 [facility] 71          Patient's Medications   Discharge Prescriptions    No medications on file     No discharge procedures on file  ED Provider  Attending physically available and evaluated Partha Garibay I managed the patient along with the ED Attending      Electronically Signed by         Margarete Barthel, MD  09/30/19 [id number]

## 2019-09-30 NOTE — PATIENT INSTRUCTIONS
Patient would like to go via private vehicle to [facility] ER  Please go to the [facility] Emergency Department now for further evaluation and treatment- hospital address verified with the patient  Patient agreed to go immediately to the ED

## 2019-10-01 ENCOUNTER — OFFICE VISIT (OUTPATIENT)
Dept: PODIATRY | Facility: CLINIC | Age: 59
End: 2019-10-01
Payer: COMMERCIAL

## 2019-10-01 VITALS
HEART RATE: 75 BPM | SYSTOLIC BLOOD PRESSURE: 139 MMHG | HEIGHT: 64 IN | WEIGHT: 176 LBS | DIASTOLIC BLOOD PRESSURE: 73 MMHG | BODY MASS INDEX: 30.05 KG/M2

## 2019-10-01 DIAGNOSIS — M79.675 PAIN IN TOE OF LEFT FOOT: ICD-10-CM

## 2019-10-01 DIAGNOSIS — M20.11 VALGUS DEFORMITY OF BOTH GREAT TOES: ICD-10-CM

## 2019-10-01 DIAGNOSIS — M20.12 VALGUS DEFORMITY OF BOTH GREAT TOES: ICD-10-CM

## 2019-10-01 DIAGNOSIS — L60.0 INGROWN TOENAIL: Primary | ICD-10-CM

## 2019-10-01 PROCEDURE — 99202 OFFICE O/P NEW SF 15 MIN: CPT | Performed by: PODIATRIST

## 2019-10-01 PROCEDURE — 11730 AVULSION NAIL PLATE SIMPLE 1: CPT | Performed by: PODIATRIST

## 2019-10-01 RX ORDER — LIDOCAINE HYDROCHLORIDE 10 MG/ML
1 INJECTION, SOLUTION INFILTRATION; PERINEURAL ONCE
Status: COMPLETED | OUTPATIENT
Start: 2019-10-01 | End: 2019-10-01

## 2019-10-01 RX ORDER — LIDOCAINE HYDROCHLORIDE AND EPINEPHRINE 10; 10 MG/ML; UG/ML
1 INJECTION, SOLUTION INFILTRATION; PERINEURAL ONCE
Status: COMPLETED | OUTPATIENT
Start: 2019-10-01 | End: 2019-10-01

## 2019-10-01 RX ADMIN — LIDOCAINE HYDROCHLORIDE 1 ML: 10 INJECTION, SOLUTION INFILTRATION; PERINEURAL at 14:18

## 2019-10-01 RX ADMIN — LIDOCAINE HYDROCHLORIDE AND EPINEPHRINE 1 ML: 10; 10 INJECTION, SOLUTION INFILTRATION; PERINEURAL at 14:18

## 2019-10-01 NOTE — PROGRESS NOTES
Assessment/Plan:    Explained to patient that she is dealing with an infected ingrown toenail along the medial nail border of the left great toe  Discussed treatment options recommending partial avulsion and drainage of infection  The patient is aware that the ingrown nail will return in approximately 4-6 months with this procedure  Due to the loose nature of the toenail, it is possible that could toenail could fall off over the next few days  A partial matrixectomy may be needed in the future  Verbal consent was given  Procedure as follows: Anesthesia via 2 cc of a one-to-one mixture of 1 percent lidocaine with epinephrine and 1 percent xylocaine plain  A Betadine prep was performed  The medial nail border of the left great toe was avulsed to the eponychium  Purulent drainage was expressed and granulation tissue were excised  A bacitracin dressing was applied  The patient is to soak in warm water b i d  Followed by a Neosporin dressing tomorrow  Patient is rescheduled in 1 week  Patient also has bilateral bunions  They are painful  Treatment for the bunions will be discussed in future visits  Nail removal  Date/Time: 10/1/2019 2:25 PM  Performed by: Keshav Charles DPM  Authorized by: Keshav Charles DPM     Patient location:  Clinic  Other Assisting Provider: No    Universal protocol:     Procedure explained and questions answered to patient or proxy's satisfaction: yes      Relevant documents present and verified: yes      Patient identity confirmed:  Verbally with patient  Location:     Foot:  L big toe  Pre-procedure details:     Skin preparation:  Betadine  Anesthesia (see MAR for exact dosages):      Anesthesia method:  Local infiltration    Local anesthetic:  Lidocaine 1% WITH epi and lidocaine 1% w/o epi  Nail Removal:     Nail removed:  Partial    Nail side:  Medial    Nail bed sutured: no      Removed nail replaced and anchored: no    Trephination:     Subungual hematoma drained: no Ingrown nail:     Nail matrix removed or ablated:  None  Post-procedure details:     Dressing:  4x4 sterile gauze    Patient tolerance of procedure: Tolerated well, no immediate complications        No problem-specific Assessment & Plan notes found for this encounter  Diagnoses and all orders for this visit:    Ingrown toenail  -     lidocaine (XYLOCAINE) 1 % injection 1 mL  -     lidocaine-epinephrine (XYLOCAINE/EPINEPHRINE) 1 %-1:100,000 injection 1 mL  -     Nail removal    Valgus deformity of both great toes    Pain in toe of left foot          Subjective:      Patient ID: Damaris Paul is a 64 y o  female  HPI    Patient, a 21-year-old female presents with a painful infected ingrown toenail affecting the medial nail border of the left great toe  The nail has been so affected for approximately 2 weeks  Patient went to an urgent care was placed on oral antibiotics and had x-rays  Her x-rays were read as positive for arthritis at the 1st metatarsophalangeal joint  This is the 1st time that the toenail has been symptomatic  The following portions of the patient's history were reviewed and updated as appropriate: allergies, current medications, past family history, past medical history, past social history, past surgical history and problem list     Review of Systems   Cardiovascular:        History of stroke; hypertension   Gastrointestinal: Negative  Musculoskeletal: Positive for gait problem  Hematological: Bruises/bleeds easily  Objective:      /73   Pulse 75   Ht 5' 4" (1 626 m)   Wt 79 8 kg (176 lb)   BMI 30 21 kg/m²          Physical Exam   Cardiovascular: Regular rhythm and intact distal pulses  Musculoskeletal: She exhibits deformity  Hallux valgus deformity bilateral with left more severe than right  Neurological: No sensory deficit  She exhibits normal muscle tone  Skin: There is erythema  Left great toe is erythematous    Medial nail border painful with pressure with slight drainage    The left great toenail is loose and brittle

## 2019-10-03 NOTE — ED ATTENDING ATTESTATION
9/30/2019  I, Zoey Lu MD, saw and evaluated the patient  I have discussed the patient with the resident/non-physician practitioner and agree with the resident's/non-physician practitioner's findings, Plan of Care, and MDM as documented in the resident's/non-physician practitioner's note, except where noted  All available labs and Radiology studies were reviewed  I was present for key portions of any procedure(s) performed by the resident/non-physician practitioner and I was immediately available to provide assistance  At this point I agree with the current assessment done in the Emergency Department  I have conducted an independent evaluation of this patient a history and physical is as follows:    Patient presents to the ED with the complaint of a 2 week history of mild erythema to the left great toe tip  Pt reports completing a course of both Keflex and Bactrim without resolution  The tip of the toe is sensitive to touch but not painful at rest  No systemic symptoms  PE: Alert, nad, mild erythema to tip of left great toe without warmth, induration or fluctuance   Remainder of the foot normal     ED Course         Critical Care Time  Procedures

## 2019-10-08 ENCOUNTER — OFFICE VISIT (OUTPATIENT)
Dept: PODIATRY | Facility: CLINIC | Age: 59
End: 2019-10-08

## 2019-10-08 VITALS
HEART RATE: 60 BPM | HEIGHT: 64 IN | BODY MASS INDEX: 30.42 KG/M2 | SYSTOLIC BLOOD PRESSURE: 101 MMHG | DIASTOLIC BLOOD PRESSURE: 76 MMHG | WEIGHT: 178.2 LBS

## 2019-10-08 DIAGNOSIS — L60.0 INGROWN TOENAIL: Primary | ICD-10-CM

## 2019-10-08 PROCEDURE — 99024 POSTOP FOLLOW-UP VISIT: CPT | Performed by: PODIATRIST

## 2019-10-08 NOTE — PROGRESS NOTES
Patient presents 1 week post ingrown nail removal medial nail border left great toe  Surgical site healing uneventfully  Patient may discontinue soaks and Neosporin  She was told to contact me should she have pain when the nail recurs  Patient has large bunion deformities both feet  She is interested in surgical correction necks year  She will contact us when she is interested and then she will have x-rays

## 2019-11-06 DIAGNOSIS — I10 ESSENTIAL HYPERTENSION: ICD-10-CM

## 2019-11-06 RX ORDER — AZILSARTAN KAMEDOXOMIL AND CHLORTHALIDONE 40; 12.5 MG/1; MG/1
TABLET ORAL
Qty: 30 TABLET | Refills: 2 | Status: SHIPPED | OUTPATIENT
Start: 2019-11-06 | End: 2020-02-06

## 2019-11-18 ENCOUNTER — OFFICE VISIT (OUTPATIENT)
Dept: FAMILY MEDICINE CLINIC | Facility: CLINIC | Age: 59
End: 2019-11-18
Payer: COMMERCIAL

## 2019-11-18 VITALS
HEART RATE: 83 BPM | RESPIRATION RATE: 17 BRPM | DIASTOLIC BLOOD PRESSURE: 74 MMHG | HEIGHT: 64 IN | TEMPERATURE: 99 F | SYSTOLIC BLOOD PRESSURE: 108 MMHG | WEIGHT: 169 LBS | BODY MASS INDEX: 28.85 KG/M2 | OXYGEN SATURATION: 97 %

## 2019-11-18 DIAGNOSIS — Z53.20 HIV SCREENING DECLINED: ICD-10-CM

## 2019-11-18 DIAGNOSIS — M79.604 PAIN IN BOTH LOWER EXTREMITIES: ICD-10-CM

## 2019-11-18 DIAGNOSIS — I10 ESSENTIAL HYPERTENSION: Primary | ICD-10-CM

## 2019-11-18 DIAGNOSIS — E21.0 PRIMARY HYPERPARATHYROIDISM (HCC): ICD-10-CM

## 2019-11-18 DIAGNOSIS — M79.605 PAIN IN BOTH LOWER EXTREMITIES: ICD-10-CM

## 2019-11-18 DIAGNOSIS — Z12.4 SCREENING FOR CERVICAL CANCER: ICD-10-CM

## 2019-11-18 DIAGNOSIS — Z12.39 BREAST CANCER SCREENING: ICD-10-CM

## 2019-11-18 PROBLEM — M54.50 LUMBAR PAIN: Status: RESOLVED | Noted: 2019-06-20 | Resolved: 2019-11-18

## 2019-11-18 PROCEDURE — 99214 OFFICE O/P EST MOD 30 MIN: CPT | Performed by: FAMILY MEDICINE

## 2019-11-18 NOTE — PROGRESS NOTES
Assessment/Plan:         Diagnoses and all orders for this visit:    Essential hypertension  Comments:  controlled, cpm  Orders:  -     Lipid panel; Future  -     Comprehensive metabolic panel; Future    Pain in both lower extremities  -     GURJIT Screen w/ Reflex to Titer/Pattern; Future  -     C-reactive protein; Future  -     RF Screen w/ Reflex to Titer; Future  -     Ambulatory referral to Orthopedic Surgery; Future    Primary hyperparathyroidism ([facility] [address] )  Comments:  consider possibility extremity pain related  Orders:  -     Ambulatory referral to Orthopedic Surgery; Future    Breast cancer screening  -     Mammo screening bilateral w 3d & cad; Future    Screening for cervical cancer  -     Ambulatory referral to Gynecology; Future    HIV screening declined    Other orders  -     Cancel: HIV 1/2 AG-AB combo; Future          Subjective:   Chief Complaint   Patient presents with    Follow-up        Patient ID: Waleska Walker is a 64 y o  female  Routine f//u appt  c/o "still have it in my legs"  She completed PT, states, "was good when I was doing it, going, but it didn't last  When I wear anything flat or barefoot, it's worse"  No more lumbar pain, "really more the back of my legs, going down"  Due for f/u with her endocrinologist  Nephrologist released her to prn f/u  Declines HIV screening "no need"      The following portions of the patient's history were reviewed and updated as appropriate: allergies, current medications, past family history, past medical history, past social history, past surgical history and problem list     Review of Systems   Constitutional: Negative  HENT: Negative for mouth sores and nosebleeds  Eyes: Negative  Respiratory: Negative  Cardiovascular: Negative  Gastrointestinal: Negative  Endocrine: Negative  Genitourinary: Negative for decreased urine volume, difficulty urinating, dysuria, flank pain, frequency and hematuria  Skin: Negative  Neurological: Negative  Hematological: Negative  Objective:      /74 (BP Location: Left arm, Patient Position: Sitting, Cuff Size: Adult)   Pulse 83   Temp 99 °F (37 2 °C) (Tympanic)   Resp 17   Ht 5' 3 75" (1 619 m)   Wt 76 7 kg (169 lb)   SpO2 97%   BMI 29 24 kg/m²          Physical Exam   Constitutional: She is oriented to person, place, and time  She appears well-developed  She is cooperative  Non-toxic appearance  She does not have a sickly appearance  She does not appear ill  No distress  HENT:   Head: Normocephalic and atraumatic  Mouth/Throat: Uvula is midline, oropharynx is clear and moist and mucous membranes are normal    Eyes: Pupils are equal, round, and reactive to light  Conjunctivae and lids are normal    Neck: Trachea normal  Neck supple  No JVD present  No thyroid mass and no thyromegaly present  Cardiovascular: Normal rate, regular rhythm, normal heart sounds and normal pulses  Pulmonary/Chest: Effort normal and breath sounds normal    Abdominal: Soft  Bowel sounds are normal  She exhibits no distension and no mass  There is no hepatosplenomegaly  There is no tenderness  Musculoskeletal:   No deformity, no point tenderness   Lymphadenopathy:     She has no cervical adenopathy  Right: No supraclavicular adenopathy present  Left: No supraclavicular adenopathy present  Neurological: She is alert and oriented to person, place, and time  She has normal reflexes  Gait normal    Skin: Skin is warm and dry  Capillary refill takes less than 2 seconds  She is not diaphoretic  No cyanosis  No pallor  Psychiatric: She has a normal mood and affect  Her behavior is normal    Nursing note and vitals reviewed

## 2019-12-02 ENCOUNTER — TELEPHONE (OUTPATIENT)
Dept: OBGYN CLINIC | Facility: MEDICAL CENTER | Age: 59
End: 2019-12-02

## 2019-12-02 ENCOUNTER — CONSULT (OUTPATIENT)
Dept: OBGYN CLINIC | Facility: MEDICAL CENTER | Age: 59
End: 2019-12-02
Payer: COMMERCIAL

## 2019-12-02 VITALS
BODY MASS INDEX: 29.09 KG/M2 | DIASTOLIC BLOOD PRESSURE: 76 MMHG | WEIGHT: 170.4 LBS | HEIGHT: 64 IN | SYSTOLIC BLOOD PRESSURE: 121 MMHG | HEART RATE: 77 BPM

## 2019-12-02 DIAGNOSIS — M79.605 PAIN IN BOTH LOWER EXTREMITIES: ICD-10-CM

## 2019-12-02 DIAGNOSIS — M54.31 BILATERAL SCIATICA: ICD-10-CM

## 2019-12-02 DIAGNOSIS — M54.32 BILATERAL SCIATICA: ICD-10-CM

## 2019-12-02 DIAGNOSIS — M51.36 DDD (DEGENERATIVE DISC DISEASE), LUMBAR: Primary | ICD-10-CM

## 2019-12-02 DIAGNOSIS — E21.0 PRIMARY HYPERPARATHYROIDISM (HCC): ICD-10-CM

## 2019-12-02 DIAGNOSIS — M79.604 PAIN IN BOTH LOWER EXTREMITIES: ICD-10-CM

## 2019-12-02 PROCEDURE — 99243 OFF/OP CNSLTJ NEW/EST LOW 30: CPT | Performed by: ORTHOPAEDIC SURGERY

## 2019-12-02 RX ORDER — METHYLPREDNISOLONE 4 MG/1
TABLET ORAL
Status: CANCELLED | OUTPATIENT
Start: 2019-12-02

## 2019-12-02 RX ORDER — METHYLPREDNISOLONE 4 MG/1
TABLET ORAL
Qty: 1 TABLET | Refills: 0 | Status: SHIPPED | OUTPATIENT
Start: 2019-12-02 | End: 2020-01-29

## 2019-12-02 NOTE — LETTER
December 2, 2019     [facility], [address] [facility]  [place] [facility]  [id number]    Patient: Vianey Gallagher   YOB: 1960   Date of Visit: 12/2/2019       Dear Dr Angella Lutz: Thank you for referring Vianey Gallagher to me for evaluation  Below are my notes for this consultation  If you have questions, please do not hesitate to call me  I look forward to following your patient along with you  Sincerely,        Ross Snyder DO        CC: No Recipients  Ross Snyder DO  12/2/2019  8:01 PM  Sign at close encounter   Assessment/Plan     1  DDD (degenerative disc disease), lumbar    2  Pain in both lower extremities    3  Primary hyperparathyroidism ([address] )    4  Bilateral sciatica      Orders Placed This Encounter   Procedures    MRI lumbar spine wo contrast    Ambulatory referral to Pain Management     -Medrol Dosepak was sent to her pharmacy  -MRI of the lumbar spine was ordered  -Follow-up with pain management after MRI is completed  They may consider injections for back and/or her GT bursitis    Return if symptoms worsen or fail to improve  I answered all of the patient's questions during the visit and provided education of the patient's condition during the visit  The patient verbalized understanding of the information given and agrees with the plan  This note was dictated using [employer] software  It may contain errors including improperly dictated words  Please contact physician directly for any questions  History of Present Illness   Chief complaint:   Chief Complaint   Patient presents with    Left Leg - Pain    Right Leg - Pain       HPI: Vianey Gallagher is a 64 y o  female that c/o bilateral posterior thigh and left lateral hip pain  She was referred by her PCP, Dr Angella Lutz  She began having low back pain with radiation to both posterior thighs for about a year and a half without any injury or trauma   In regards to treatment she has completed outpatient physical therapy with tempororay relief, tried muscle relaxers and pain medication with minimal to no relief  She notes low back pain with prolonged sitting, standing or walking  This radiates down the back of the legs with associated numbness in her feet  She denies any history of diabetes    She also mentions lateral right hip pain and a heaviness in both legs with walking  She denies any change in bowel or bladder    There is no pain present at night  She comes to day with previous lumbar xrays revealing DDD  She was told she is unable to take NSAIDs due to her blood pressure medication  ROS:    See HPI for musculoskeletal review     All other systems reviewed are negative     Historical Information   Past Medical History:   Diagnosis Date    Diverticulosis     Hemorrhoids     Hypertension     Stroke [facility])      Past Surgical History:   Procedure Laterality Date    COLONOSCOPY      ONSET 2015    NOSE SURGERY      SKIN LESION EXCISION       Social History   Social History     Substance and Sexual Activity   Alcohol Use Yes    Comment: SOCIAL     Social History     Substance and Sexual Activity   Drug Use No     Social History     Tobacco Use   Smoking Status Never Smoker   Smokeless Tobacco Never Used     Family History:   Family History   Problem Relation Age of Onset    No Known Problems Mother     Hypertension Father    Charlyne Jaksch Parkinsonism Father     Hypertension Sister     Stroke Family     Glaucoma Family     Hypertension Family        Current Outpatient Medications on File Prior to Visit   Medication Sig Dispense Refill    ascorbic acid (VITAMIN C) 500 mg tablet Take by mouth daily      atorvastatin (LIPITOR) 40 mg tablet Take 1 tablet (40 mg total) by mouth daily 90 tablet 1    Cholecalciferol (D 2000) 2000 units TABS Take by mouth daily      clopidogrel (PLAVIX) 75 mg tablet Take 1 tablet (75 mg total) by mouth daily 90 tablet 1    CO ENZYME Q-10 PO take 1 Capsule by Oral route once      cyclobenzaprine (FLEXERIL) 10 mg tablet Take 1 tablet (10 mg total) by mouth daily at bedtime as needed for muscle spasms (Patient not taking: Reported on 7/23/2019) 30 tablet 0    EDARBYCLOR 40-12 5 MG TABS TAKE ONE TABLET BY MOUTH ONCE DAILY 30 tablet 2    folic acid (FOLVITE) 1 mg tablet Take 1 tablet (1,000 mcg total) by mouth daily 90 tablet 1    meloxicam (MOBIC) 15 mg tablet Take 1 tablet (15 mg total) by mouth daily (Patient not taking: Reported on 7/23/2019) 15 tablet 0    metoprolol succinate (TOPROL-XL) 25 mg 24 hr tablet Take 1 tablet (25 mg total) by mouth daily 90 tablet 1    Potassium 99 MG TABS Take 1 tablet by mouth daily       No current facility-administered medications on file prior to visit  Allergies   Allergen Reactions    Codeine Chest Pain       Objective   Vitals: Blood pressure 121/76, pulse 77, height 5' 4" (1 626 m), weight 77 3 kg (170 lb 6 4 oz)  ,Body mass index is 29 25 kg/m²  PE:  AAOx 3  WDWN  Hearing intact, no drainage from eyes  Regular rate  no audible wheezing  no abdominal distension  LE compartments soft, skin intact    bilateral hip:   No dislocation/deformity  Neg  Stinchfield  ROM: Right- 0-110 flexion, 0-40 IR, 0-45 ER            Left-0-110 flexion, 0-45 IR, 0-50 ER  Neg  Sabra Test (left) Pos  Sabra (right)  Neg  Impingement test (left) Pos  Impingement (right)  Mild TTP over greater trochanter (left) TTP over greater trochanter (right)  Abduction: 5/5  Neg  Lela's test  No TTP over SIJ    bilateralLE:    RLE:  EHL/AT/GS/quads/hamstrings/iliopsoas 5/5, sensation grossly intact L4, L5, S1, palpable pedal pulse  LLE:  EHL/AT/GS/quads/hamstrings/iliopsoas 5/5, sensation grossly intact L4, L5, S1, palpable pedal pulse    Back:    No TTP over lumbar spinous processes, paraspinal musculature  SLR: Neg       Imaging Studies: I have personally reviewed pertinent films in PACS with a Radiologist   Lumbar spine x-rays:  Loss of lordosis with multilevel degenerative changes    T12-L1 and L1-L2 mild DDD        Scribe Attestation    I,:   Eliot Caraballo MA am acting as a scribe while in the presence of the attending physician :        I,:   Xavier Mcginnis DO personally performed the services described in this documentation    as scribed in my presence :

## 2019-12-02 NOTE — TELEPHONE ENCOUNTER
Dr West  #: [phone]             Patient called in stating she was seen today and dr was going to send medication to [facility] but they did not received anything yet   Please advise, thank you

## 2019-12-02 NOTE — PROGRESS NOTES
Assessment/Plan     1  DDD (degenerative disc disease), lumbar    2  Pain in both lower extremities    3  Primary hyperparathyroidism ([address] )    4  Bilateral sciatica      Orders Placed This Encounter   Procedures    MRI lumbar spine wo contrast    Ambulatory referral to Pain Management     -Medrol Dosepak was sent to her pharmacy  -MRI of the lumbar spine was ordered  -Follow-up with pain management after MRI is completed  They may consider injections for back and/or her GT bursitis    Return if symptoms worsen or fail to improve  I answered all of the patient's questions during the visit and provided education of the patient's condition during the visit  The patient verbalized understanding of the information given and agrees with the plan  This note was dictated using [email or web address] software  It may contain errors including improperly dictated words  Please contact physician directly for any questions  History of Present Illness   Chief complaint:   Chief Complaint   Patient presents with    Left Leg - Pain    Right Leg - Pain       HPI: Diane Chowdhury is a 64 y o  female that c/o bilateral posterior thigh and left lateral hip pain  She was referred by her PCP, Dr Barb Simmons  She began having low back pain with radiation to both posterior thighs for about a year and a half without any injury or trauma  In regards to treatment she has completed outpatient physical therapy with tempororay relief, tried muscle relaxers and pain medication with minimal to no relief  She notes low back pain with prolonged sitting, standing or walking  This radiates down the back of the legs with associated numbness in her feet  She denies any history of diabetes    She also mentions lateral right hip pain and a heaviness in both legs with walking  She denies any change in bowel or bladder    There is no pain present at night  She comes to day with previous lumbar xrays revealing DDD    She was told she is unable to take NSAIDs due to her blood pressure medication  ROS:    See HPI for musculoskeletal review     All other systems reviewed are negative     Historical Information   Past Medical History:   Diagnosis Date    Diverticulosis     Hemorrhoids     Hypertension     Stroke [facility])      Past Surgical History:   Procedure Laterality Date    COLONOSCOPY      ONSET 2015    NOSE SURGERY      SKIN LESION EXCISION       Social History   Social History     Substance and Sexual Activity   Alcohol Use Yes    Comment: SOCIAL     Social History     Substance and Sexual Activity   Drug Use No     Social History     Tobacco Use   Smoking Status Never Smoker   Smokeless Tobacco Never Used     Family History:   Family History   Problem Relation Age of Onset    No Known Problems Mother     Hypertension Father    Stephen Moose Parkinsonism Father     Hypertension Sister     Stroke Family     Glaucoma Family     Hypertension Family        Current Outpatient Medications on File Prior to Visit   Medication Sig Dispense Refill    ascorbic acid (VITAMIN C) 500 mg tablet Take by mouth daily      atorvastatin (LIPITOR) 40 mg tablet Take 1 tablet (40 mg total) by mouth daily 90 tablet 1    Cholecalciferol (D 2000) 2000 units TABS Take by mouth daily      clopidogrel (PLAVIX) 75 mg tablet Take 1 tablet (75 mg total) by mouth daily 90 tablet 1    CO ENZYME Q-10 PO take 1 Capsule by Oral route once      cyclobenzaprine (FLEXERIL) 10 mg tablet Take 1 tablet (10 mg total) by mouth daily at bedtime as needed for muscle spasms (Patient not taking: Reported on 7/23/2019) 30 tablet 0    EDARBYCLOR 40-12 5 MG TABS TAKE ONE TABLET BY MOUTH ONCE DAILY 30 tablet 2    folic acid (FOLVITE) 1 mg tablet Take 1 tablet (1,000 mcg total) by mouth daily 90 tablet 1    meloxicam (MOBIC) 15 mg tablet Take 1 tablet (15 mg total) by mouth daily (Patient not taking: Reported on 7/23/2019) 15 tablet 0    metoprolol succinate (TOPROL-XL) 25 mg 24 hr tablet Take 1 tablet (25 mg total) by mouth daily 90 tablet 1    Potassium 99 MG TABS Take 1 tablet by mouth daily       No current facility-administered medications on file prior to visit  Allergies   Allergen Reactions    Codeine Chest Pain       Objective   Vitals: Blood pressure 121/76, pulse 77, height 5' 4" (1 626 m), weight 77 3 kg (170 lb 6 4 oz)  ,Body mass index is 29 25 kg/m²  PE:  AAOx 3  WDWN  Hearing intact, no drainage from eyes  Regular rate  no audible wheezing  no abdominal distension  LE compartments soft, skin intact    bilateral hip:   No dislocation/deformity  Neg  Stinchfield  ROM: Right- 0-110 flexion, 0-40 IR, 0-45 ER            Left-0-110 flexion, 0-45 IR, 0-50 ER  Neg  Sabra Test (left) Pos  Sabra (right)  Neg  Impingement test (left) Pos  Impingement (right)  Mild TTP over greater trochanter (left) TTP over greater trochanter (right)  Abduction: 5/5  Neg  Lela's test  No TTP over SIJ    bilateralLE:    RLE:  EHL/AT/GS/quads/hamstrings/iliopsoas 5/5, sensation grossly intact L4, L5, S1, palpable pedal pulse  LLE:  EHL/AT/GS/quads/hamstrings/iliopsoas 5/5, sensation grossly intact L4, L5, S1, palpable pedal pulse    Back:    No TTP over lumbar spinous processes, paraspinal musculature  SLR: Neg  Imaging Studies: I have personally reviewed pertinent films in PACS with a Radiologist   Lumbar spine x-rays:  Loss of lordosis with multilevel degenerative changes    T12-L1 and L1-L2 mild DDD        Scribe Attestation    I,:   Trenton Keenan MA am acting as a scribe while in the presence of the attending physician :        I,:   Itzel Monterroso DO personally performed the services described in this documentation    as scribed in my presence :

## 2019-12-10 DIAGNOSIS — I63.81 LACUNAR STROKE (HCC): ICD-10-CM

## 2019-12-10 NOTE — TELEPHONE ENCOUNTER
Message left requesting a refill of Folic Acid and Clopidogrel  Pharmacy is [facility] in [place]

## 2019-12-11 DIAGNOSIS — I63.81 LACUNAR STROKE (HCC): ICD-10-CM

## 2019-12-11 RX ORDER — FOLIC ACID 1 MG/1
1000 TABLET ORAL DAILY
Qty: 90 TABLET | Refills: 0 | Status: SHIPPED | OUTPATIENT
Start: 2019-12-11 | End: 2020-05-20 | Stop reason: SDUPTHER

## 2019-12-12 RX ORDER — FOLIC ACID 1 MG/1
1000 TABLET ORAL DAILY
Qty: 90 TABLET | Refills: 1 | OUTPATIENT
Start: 2019-12-12

## 2019-12-12 RX ORDER — CLOPIDOGREL BISULFATE 75 MG/1
75 TABLET ORAL DAILY
Qty: 90 TABLET | Refills: 1 | Status: SHIPPED | OUTPATIENT
Start: 2019-12-12 | End: 2020-06-30 | Stop reason: SDUPTHER

## 2019-12-12 RX ORDER — CLOPIDOGREL BISULFATE 75 MG/1
TABLET ORAL
Qty: 90 TABLET | Refills: 0 | OUTPATIENT
Start: 2019-12-12

## 2019-12-17 ENCOUNTER — HOSPITAL ENCOUNTER (OUTPATIENT)
Dept: RADIOLOGY | Facility: HOSPITAL | Age: 59
Discharge: HOME | End: 2019-12-17
Attending: ORTHOPAEDIC SURGERY
Payer: COMMERCIAL

## 2019-12-17 DIAGNOSIS — M51.36 DDD (DEGENERATIVE DISC DISEASE), LUMBAR: ICD-10-CM

## 2019-12-17 PROCEDURE — 72148 MRI LUMBAR SPINE W/O DYE: CPT

## 2019-12-26 ENCOUNTER — TELEPHONE (OUTPATIENT)
Dept: PAIN MEDICINE | Facility: CLINIC | Age: 59
End: 2019-12-26

## 2020-01-02 ENCOUNTER — CONSULT (OUTPATIENT)
Dept: PAIN MEDICINE | Facility: CLINIC | Age: 60
End: 2020-01-02
Payer: COMMERCIAL

## 2020-01-02 VITALS
SYSTOLIC BLOOD PRESSURE: 110 MMHG | HEIGHT: 64 IN | HEART RATE: 72 BPM | WEIGHT: 171 LBS | DIASTOLIC BLOOD PRESSURE: 80 MMHG | BODY MASS INDEX: 29.19 KG/M2

## 2020-01-02 DIAGNOSIS — M47.816 LUMBAR SPONDYLOSIS: ICD-10-CM

## 2020-01-02 DIAGNOSIS — M51.26 LUMBAR DISC HERNIATION: Primary | ICD-10-CM

## 2020-01-02 DIAGNOSIS — M51.36 DDD (DEGENERATIVE DISC DISEASE), LUMBAR: ICD-10-CM

## 2020-01-02 DIAGNOSIS — M54.40 BILATERAL LOW BACK PAIN WITH SCIATICA, SCIATICA LATERALITY UNSPECIFIED, UNSPECIFIED CHRONICITY: ICD-10-CM

## 2020-01-02 DIAGNOSIS — M54.16 LUMBAR RADICULOPATHY: ICD-10-CM

## 2020-01-02 DIAGNOSIS — M48.061 SPINAL STENOSIS OF LUMBAR REGION, UNSPECIFIED WHETHER NEUROGENIC CLAUDICATION PRESENT: ICD-10-CM

## 2020-01-02 PROBLEM — M51.369 DDD (DEGENERATIVE DISC DISEASE), LUMBAR: Status: ACTIVE | Noted: 2020-01-02

## 2020-01-02 PROBLEM — M54.31 BILATERAL SCIATICA: Status: ACTIVE | Noted: 2020-01-02

## 2020-01-02 PROBLEM — M54.32 BILATERAL SCIATICA: Status: ACTIVE | Noted: 2020-01-02

## 2020-01-02 PROCEDURE — 99244 OFF/OP CNSLTJ NEW/EST MOD 40: CPT | Performed by: ANESTHESIOLOGY

## 2020-01-02 NOTE — PROGRESS NOTES
Assessment  1  Lumbar disc herniation    2  DDD (degenerative disc disease), lumbar    3  Spinal stenosis of lumbar region, unspecified whether neurogenic claudication present    4  Lumbar spondylosis    5  Lumbar radiculopathy    6  Bilateral low back pain with sciatica, sciatica laterality unspecified, unspecified chronicity        Plan  31-year-old female referred by Dr Broderick Saldana, presenting for initial consultation regarding a 2 year history of lumbosacral back pain with radiculopathy in the L5-S1 distribution of bilateral lower extremities without any trauma or inciting event  MRI of the lumbar spine reveals degenerative disc disease and spondylosis with a disc herniation at L4-5 causing significant central and bilateral foraminal stenosis  The patient has done physical therapy with moderate improvement in her symptoms  She is unable to take NSAIDs secondary to being on Plavix  She did have a favorable response to oral steroids  She was prescribed a short course of meloxicam which was ineffective  She was prescribed cyclobenzaprine, however did not start this medication  Patient's symptoms are consistent with lumbar radiculopathy, fortunately reflexes and strength are preserved  Radicular symptoms stemming from disc herniation L4-5 resulting in central and foraminal stenosis  Low back pain does have a myofascial and likely facet mediated component as well  No evidence of sacroiliitis or trochanteric bursitis on physical exam today  1  I did offer the patient bilateral L4 TFESI to reduce the inflammatory component of her pain, however the patient would like to think about this  She was provided literature regarding epidural steroid injections to review  2  I did offer the patient a trial of gabapentin, however she wished to hold off on medications at this time  3  The patient will continue with her home exercise program  4  Will avoid NSAIDs secondary to being on Plavix  5   I will follow up the patient in 4 weeks      My impressions and treatment recommendations were discussed in detail with the patient who verbalized understanding and had no further questions  Discharge instructions were provided  I personally saw and examined the patient and I agree with the above discussed plan of care  No orders of the defined types were placed in this encounter  No orders of the defined types were placed in this encounter  History of Present Illness    Partha Gariaby is a 64 y o  female referred by Dr Ivet Timmons, presenting for initial consultation regarding a 2 year history of lumbosacral back pain with radiation into the posterolateral aspects of bilateral lower extremities without any trauma or inciting event  She does have associated numbness and subjective weakness of her legs  She denies any bladder or bowel incontinence or saddle anesthesia  MRI of the lumbar spine reveals degenerative disc disease and spondylosis with a disc herniation at L4-5 causing significant central and bilateral foraminal stenosis  The patient has done physical therapy with moderate improvement in her symptoms  She is unable to take NSAIDs secondary to being on Plavix  She did have a favorable response to oral steroids  She was prescribed a short course of meloxicam which was ineffective  She was prescribed cyclobenzaprine, however did not start this medication  The patient rates her pain 8/10 on the pain is nearly constant  The pain is worse in the evening  The pain is described as shooting, numbness, and sharp  The pain is increased with lying down, standing, bending, sitting, walking, exercise, coughing, and sneezing  She does get some relief with physical therapy  I have personally reviewed and/or updated the patient's past medical history, past surgical history, family history, social history, current medications, allergies, and vital signs today       Other than as stated above, the patient denies any interval changes in medications, medical condition, mental condition, symptoms, or allergies since the last office visit  Review of Systems   Constitutional: Negative for fever and unexpected weight change  HENT: Negative for trouble swallowing  Eyes: Negative for visual disturbance  Respiratory: Negative for shortness of breath and wheezing  Cardiovascular: Negative for chest pain and palpitations  Gastrointestinal: Negative for constipation, diarrhea, nausea and vomiting  Endocrine: Negative for cold intolerance, heat intolerance and polydipsia  Genitourinary: Negative for difficulty urinating and frequency  Musculoskeletal: Positive for myalgias  Negative for arthralgias, gait problem and joint swelling  Skin: Negative for rash  Neurological: Positive for numbness  Negative for dizziness, seizures, syncope, weakness and headaches  Hematological: Does not bruise/bleed easily  Psychiatric/Behavioral: Negative for dysphoric mood  All other systems reviewed and are negative        Patient Active Problem List   Diagnosis    Hypercalcemia    Enlarged thyroid    Essential hypertension    Hyperlipidemia, unspecified    Prediabetes    Simple goiter    Primary hyperparathyroidism ([facility] [address] )    History of lacunar cerebrovascular accident (CVA)    Pain in both lower extremities       Past Medical History:   Diagnosis Date    Diverticulosis     Hemorrhoids     Hypertension     Stroke [facility])        Past Surgical History:   Procedure Laterality Date    COLONOSCOPY      ONSET 2015    NOSE SURGERY      SKIN LESION EXCISION         Family History   Problem Relation Age of Onset    No Known Problems Mother     Hypertension Father     Parkinsonism Father     Hypertension Sister     Stroke Family     Glaucoma Family     Hypertension Family        Social History     Occupational History    Not on file   Tobacco Use    Smoking status: Never Smoker    Smokeless tobacco: Never Used   Substance and Sexual Activity    Alcohol use: Yes     Comment: SOCIAL    Drug use: No    Sexual activity: Yes     Partners: Male     Birth control/protection: None       Current Outpatient Medications on File Prior to Visit   Medication Sig    atorvastatin (LIPITOR) 40 mg tablet Take 1 tablet (40 mg total) by mouth daily    Cholecalciferol (D 2000) 2000 units TABS Take by mouth daily    clopidogrel (PLAVIX) 75 mg tablet Take 1 tablet (75 mg total) by mouth daily    CO ENZYME Q-10 PO take 1 Capsule by Oral route once    EDARBYCLOR 40-12 5 MG TABS TAKE ONE TABLET BY MOUTH ONCE DAILY    folic acid (FOLVITE) 1 mg tablet Take 1 tablet (1,000 mcg total) by mouth daily    metoprolol succinate (TOPROL-XL) 25 mg 24 hr tablet Take 1 tablet (25 mg total) by mouth daily    Potassium 99 MG TABS Take 1 tablet by mouth daily    ascorbic acid (VITAMIN C) 500 mg tablet Take by mouth daily    cyclobenzaprine (FLEXERIL) 10 mg tablet Take 1 tablet (10 mg total) by mouth daily at bedtime as needed for muscle spasms (Patient not taking: Reported on 7/23/2019)    meloxicam (MOBIC) 15 mg tablet Take 1 tablet (15 mg total) by mouth daily (Patient not taking: Reported on 7/23/2019)    methylPREDNISolone 4 MG tablet therapy pack Use as directed on package (Patient not taking: Reported on 1/2/2020)     No current facility-administered medications on file prior to visit  Allergies   Allergen Reactions    Codeine Chest Pain       Physical Exam    Ht 5' 4" (1 626 m)   Wt 77 6 kg (171 lb)   BMI 29 35 kg/m²     Constitutional: normal, well developed, well nourished, alert, in no distress and non-toxic and no overt pain behavior    Eyes: anicteric  HEENT: grossly intact  Neck: supple, symmetric, trachea midline and no masses   Pulmonary:even and unlabored  Cardiovascular:No edema or pitting edema present  Skin:Normal without rashes or lesions and well hydrated  Psychiatric:Mood and affect appropriate  Neurologic:Cranial Nerves II-XII grossly intact  Musculoskeletal:normal gait  Bilateral lumbar paraspinals tender to palpation ropy in texture from L2-L5  Bilateral SI joints minimally tender to palpation  Bilateral patellar and Achilles reflexes were 2/4 and symmetrical   No clonus was noted bilaterally  Bilateral lower extremity strength 5/5 in all muscle groups  Sensation intact to light touch in L3 through S1 dermatomes bilaterally  Positive straight leg raise bilaterally  Negative Chacorta's test bilaterally  Imaging        PACS Images      Show images for MRI lumbar spine wo contrast   Study Result     MRI LUMBAR SPINE WITHOUT CONTRAST     INDICATION: M51 36: Other intervertebral disc degeneration, lumbar region  low back pain with radiation to both posterior thighs for about a year and a half without any injury or trauma     COMPARISON:  6/24/2019     TECHNIQUE:  Sagittal T1, sagittal T2, sagittal inversion recovery, axial T1 and axial T2, coronal T2     IMAGE QUALITY:  Diagnostic     FINDINGS:     VERTEBRAL BODIES:  There are 5 lumbar type vertebral bodies  Trace levoscoliosis mid lumbar spine  Normal lordosis  Scattered degenerative endplate changes  No focally suspicious marrow lesions  No bone marrow edema or compression abnormality  Small hemangioma in the L2 vertebra noted      SACRUM:  Normal signal within the sacrum  No evidence of insufficiency or stress fracture      DISTAL CORD AND CONUS:  Normal size and signal within the distal cord and conus  The conus medullaris terminates at the superior endplate of L2      PARASPINAL SOFT TISSUES:  Paraspinal soft tissues are unremarkable      LOWER THORACIC DISC SPACES:  Normal disc height and signal   No disc herniation, canal stenosis or foraminal narrowing      LUMBAR DISC SPACES:     L1-L2:  Normal      L2-L3:  Tiny left neural foraminal disc herniation, protrusion type  Minimal left neural foraminal narrowing    Central canal right neural foramen patent  Mild facet hypertrophy      L3-L4:  Normal      L4-L5:  There is bilateral facet hypertrophy  There is a central/right paracentral disc herniation, protrusion type  There is moderate right neural foraminal narrowing  Moderate to severe central canal narrowing  Moderate left neural foraminal   narrowing      L5-S1:  There is a diffuse disk bulge  No significant central canal or neural foraminal narrowing  Bilateral facet hypertrophy noted      IMPRESSION:     Right neural foraminal disc herniation L4-5         Workstation performed: [id number]         PACS Images      Show images for XR spine lumbar minimum 4 views non injury   Study Result     LUMBAR SPINE     INDICATION:   M79 604: Pain in right leg  M79 605: Pain in left leg  M54 5: Low back pain    Worsening bilateral lower extremity pain      COMPARISON:  None     VIEWS:  XR SPINE LUMBAR MINIMUM 4 VIEWS NON INJURY        FINDINGS:     There is no evidence of acute fracture or destructive osseous lesion      Alignment is unremarkable      Mild degenerative disc disease throughout the lumbar spine as evidenced by anterior osteophyte formations      The pedicles appear intact      Soft tissues are unremarkable      IMPRESSION:     No acute osseous abnormality        Degenerative changes as described         Workstation performed: [id number]

## 2020-01-13 ENCOUNTER — TELEPHONE (OUTPATIENT)
Dept: NEPHROLOGY | Facility: CLINIC | Age: 60
End: 2020-01-13

## 2020-01-13 NOTE — TELEPHONE ENCOUNTER
Left message for patient to call the office to schedule FU appointment with Dr Louisa Armstrong in the [facility] for May 2020

## 2020-01-17 DIAGNOSIS — E78.5 HYPERLIPIDEMIA, UNSPECIFIED HYPERLIPIDEMIA TYPE: ICD-10-CM

## 2020-01-17 DIAGNOSIS — I10 ESSENTIAL HYPERTENSION: ICD-10-CM

## 2020-01-20 ENCOUNTER — APPOINTMENT (OUTPATIENT)
Dept: LAB | Facility: CLINIC | Age: 60
End: 2020-01-20
Payer: COMMERCIAL

## 2020-01-20 DIAGNOSIS — M79.604 PAIN IN BOTH LOWER EXTREMITIES: ICD-10-CM

## 2020-01-20 DIAGNOSIS — I10 ESSENTIAL HYPERTENSION: ICD-10-CM

## 2020-01-20 DIAGNOSIS — M79.605 PAIN IN BOTH LOWER EXTREMITIES: ICD-10-CM

## 2020-01-20 LAB
ALBUMIN SERPL BCP-MCNC: 3.9 G/DL (ref 3.5–5)
ALBUMIN SERPL BCP-MCNC: 3.9 G/DL (ref 3.5–5)
ALP SERPL-CCNC: 102 U/L (ref 46–116)
ALT SERPL W P-5'-P-CCNC: 44 U/L (ref 12–78)
ANION GAP SERPL CALCULATED.3IONS-SCNC: 2 MMOL/L (ref 4–13)
ANION GAP SERPL CALCULATED.3IONS-SCNC: 3 MMOL/L (ref 4–13)
AST SERPL W P-5'-P-CCNC: 16 U/L (ref 5–45)
BILIRUB SERPL-MCNC: 0.33 MG/DL (ref 0.2–1)
BUN SERPL-MCNC: 15 MG/DL (ref 5–25)
BUN SERPL-MCNC: 15 MG/DL (ref 5–25)
CALCIUM ALBUM COR SERPL-MCNC: 10.5 MG/DL (ref 8.3–10.1)
CALCIUM ALBUM COR SERPL-MCNC: 10.7 MG/DL (ref 8.3–10.1)
CALCIUM SERPL-MCNC: 10.4 MG/DL (ref 8.3–10.1)
CALCIUM SERPL-MCNC: 10.6 MG/DL (ref 8.3–10.1)
CHLORIDE SERPL-SCNC: 108 MMOL/L (ref 100–108)
CHLORIDE SERPL-SCNC: 109 MMOL/L (ref 100–108)
CHOLEST SERPL-MCNC: 130 MG/DL (ref 50–200)
CO2 SERPL-SCNC: 28 MMOL/L (ref 21–32)
CO2 SERPL-SCNC: 28 MMOL/L (ref 21–32)
CREAT SERPL-MCNC: 0.69 MG/DL (ref 0.6–1.3)
CREAT SERPL-MCNC: 0.71 MG/DL (ref 0.6–1.3)
CREAT UR-MCNC: 50.4 MG/DL
CRP SERPL QL: 4.2 MG/L
GFR SERPL CREATININE-BSD FRML MDRD: 95 ML/MIN/1.73SQ M
GFR SERPL CREATININE-BSD FRML MDRD: 97 ML/MIN/1.73SQ M
GLUCOSE P FAST SERPL-MCNC: 95 MG/DL (ref 65–99)
GLUCOSE P FAST SERPL-MCNC: 95 MG/DL (ref 65–99)
HDLC SERPL-MCNC: 65 MG/DL
LDLC SERPL CALC-MCNC: 53 MG/DL (ref 0–100)
MICROALBUMIN UR-MCNC: <5 MG/L (ref 0–20)
MICROALBUMIN/CREAT 24H UR: <10 MG/G CREATININE (ref 0–30)
NONHDLC SERPL-MCNC: 65 MG/DL
PHOSPHATE SERPL-MCNC: 2.6 MG/DL (ref 2.7–4.5)
POTASSIUM SERPL-SCNC: 3.3 MMOL/L (ref 3.5–5.3)
POTASSIUM SERPL-SCNC: 3.4 MMOL/L (ref 3.5–5.3)
PROT SERPL-MCNC: 7.3 G/DL (ref 6.4–8.2)
SODIUM SERPL-SCNC: 138 MMOL/L (ref 136–145)
SODIUM SERPL-SCNC: 140 MMOL/L (ref 136–145)
TRIGL SERPL-MCNC: 59 MG/DL

## 2020-01-20 PROCEDURE — 82570 ASSAY OF URINE CREATININE: CPT

## 2020-01-20 PROCEDURE — 36415 COLL VENOUS BLD VENIPUNCTURE: CPT

## 2020-01-20 PROCEDURE — 82043 UR ALBUMIN QUANTITATIVE: CPT

## 2020-01-20 PROCEDURE — 80061 LIPID PANEL: CPT

## 2020-01-20 PROCEDURE — 80069 RENAL FUNCTION PANEL: CPT

## 2020-01-20 PROCEDURE — 86038 ANTINUCLEAR ANTIBODIES: CPT

## 2020-01-20 PROCEDURE — 80053 COMPREHEN METABOLIC PANEL: CPT

## 2020-01-20 PROCEDURE — 86430 RHEUMATOID FACTOR TEST QUAL: CPT

## 2020-01-20 PROCEDURE — 86140 C-REACTIVE PROTEIN: CPT

## 2020-01-20 RX ORDER — ATORVASTATIN CALCIUM 40 MG/1
40 TABLET, FILM COATED ORAL DAILY
Qty: 90 TABLET | Refills: 1 | Status: SHIPPED | OUTPATIENT
Start: 2020-01-20 | End: 2020-07-23 | Stop reason: SDUPTHER

## 2020-01-20 RX ORDER — METOPROLOL SUCCINATE 25 MG/1
25 TABLET, EXTENDED RELEASE ORAL DAILY
Qty: 90 TABLET | Refills: 1 | Status: SHIPPED | OUTPATIENT
Start: 2020-01-20 | End: 2020-07-23 | Stop reason: SDUPTHER

## 2020-01-21 LAB — RHEUMATOID FACT SER QL LA: NEGATIVE

## 2020-01-22 LAB — RYE IGE QN: NEGATIVE

## 2020-01-29 ENCOUNTER — TELEPHONE (OUTPATIENT)
Dept: RADIOLOGY | Facility: CLINIC | Age: 60
End: 2020-01-29

## 2020-01-29 ENCOUNTER — OFFICE VISIT (OUTPATIENT)
Dept: PAIN MEDICINE | Facility: CLINIC | Age: 60
End: 2020-01-29
Payer: COMMERCIAL

## 2020-01-29 VITALS
DIASTOLIC BLOOD PRESSURE: 78 MMHG | SYSTOLIC BLOOD PRESSURE: 114 MMHG | BODY MASS INDEX: 29.02 KG/M2 | HEART RATE: 75 BPM | WEIGHT: 170 LBS | HEIGHT: 64 IN

## 2020-01-29 DIAGNOSIS — M54.16 LUMBAR RADICULOPATHY: Primary | ICD-10-CM

## 2020-01-29 DIAGNOSIS — M48.061 SPINAL STENOSIS OF LUMBAR REGION, UNSPECIFIED WHETHER NEUROGENIC CLAUDICATION PRESENT: ICD-10-CM

## 2020-01-29 PROCEDURE — 99214 OFFICE O/P EST MOD 30 MIN: CPT | Performed by: NURSE PRACTITIONER

## 2020-01-29 RX ORDER — GABAPENTIN 300 MG/1
CAPSULE ORAL
Qty: 90 CAPSULE | Refills: 0 | Status: SHIPPED | OUTPATIENT
Start: 2020-01-29 | End: 2020-03-12 | Stop reason: SDUPTHER

## 2020-01-29 NOTE — TELEPHONE ENCOUNTER
Faxed anti coag hold form to Dr Cristi Andrea (PCP)  Requested Medication to be held: [phone]  Fax: [phone] (COULD NOT OBTAIN DIRECT FAX#)  Phone:  [phone]  Procedure to be scheduled: B/L L4 TFESI      *please keep this task in clinical pool until we get response back   (if forwarding this task to another office or physician, please keep clinical pool on the recipient list for tracking purposes)

## 2020-01-29 NOTE — PATIENT INSTRUCTIONS
Gabapentin (By mouth)   Gabapentin (fahad-a-PEN-tin)  Treats seizures and pain caused by shingles  Brand Name(s): ACTIVE-PAC with Gabapentin, Convenience Vninie, Cyclo/Chepe 10/300 Pack, FusePaq Fanatrex, Chepe-V, Gralise, [address] Pack, Neurontin, SmartRx Chepe Kit   There may be other brand names for this medicine  When This Medicine Should Not Be Used: This medicine is not right for everyone  Do not use it if you had an allergic reaction to gabapentin  How to Use This Medicine:   Capsule, Liquid, Tablet  · Take your medicine as directed  Your dose may need to be changed several times to find what works best for you  If you have epilepsy, do not allow more than 12 hours to pass between doses  · Capsule: Swallow the capsule whole with plenty of water  Do not open, crush, or chew it  · Gralise® tablet: Swallow the tablet whole   Do not crush, break, or chew it  · Neurontin® tablet: If you break a tablet into 2 pieces, use the second half as your next dose  If you don't use it within 28 days, throw it away  · Measure the oral liquid medicine with a marked measuring spoon, oral syringe, or medicine cup  · This medicine should come with a Medication Guide  Ask your pharmacist for a copy if you do not have one  · Missed dose: Take a dose as soon as you remember  If it is almost time for your next dose, wait until then and take a regular dose  Do not take extra medicine to make up for a missed dose  · Store the medicine in a closed container at room temperature, away from heat, moisture, and direct light  Store the Neurontin® oral liquid in the refrigerator  Do not freeze  Drugs and Foods to Avoid:   Ask your doctor or pharmacist before using any other medicine, including over-the-counter medicines, vitamins, and herbal products  · Some medicines can affect how gabapentin works   Tell your doctor if you also use any of the following:   ¨ Hydrocodone  ¨ Morphine  · If you take an antacid, wait at least 2 hours before you take gabapentin  · Tell your doctor if you use anything else that makes you sleepy  Some examples are allergy medicine, narcotic pain medicine, and alcohol  Warnings While Using This Medicine:   · Tell your doctor if you are pregnant or breastfeeding, or if you have kidney problems or are receiving dialysis  Tell your doctor if you have a history of depression or mental health problems  · This medicine may increase depression or thoughts of suicide  Tell your doctor right away if you start to feel more depressed or think about hurting yourself  · This medicine may cause a serious allergic reaction called multiorgan hypersensitivity, which can damage organs and be life-threatening  · Do not stop using this medicine suddenly  Your doctor will need to slowly decrease your dose before you stop it completely  If you take this medicine to prevent seizures, your seizures may return or occur more often if you stop this medicine suddenly  · This medicine may make you dizzy or drowsy  Do not drive or do anything else that could be dangerous until you know how this medicine affects you  · Tell any doctor or dentist who treats you that you are using this medicine  This medicine may affect certain medical test results  · Your doctor will check your progress and the effects of this medicine at regular visits  Keep all appointments  · Keep all medicine out of the reach of children  Never share your medicine with anyone    Possible Side Effects While Using This Medicine:   Call your doctor right away if you notice any of these side effects:  · Allergic reaction: Itching or hives, swelling in your face or hands, swelling or tingling in your mouth or throat, chest tightness, trouble breathing  · Behavior problems, aggression, restlessness, trouble concentrating, moodiness (especially in children)  · Blistering, peeling, red skin rash  · Change in how much or how often you urinate, bloody or cloudy urine,  · Chest pain, fast heartbeat, trouble breathing  · Dark urine or pale stools, nausea, vomiting, loss of appetite, stomach pain, yellow skin or eyes  · Fever, rash, swollen or tender glands in the neck, armpit, or groin  · Problems with coordination, shakiness, unsteadiness  · Rapid weight gain, swelling in your hands, ankles, or feet  · Unusual moods or behaviors, thoughts of hurting yourself, feeling depressed  If you notice these less serious side effects, talk with your doctor:   · Dizziness, drowsiness, sleepiness, tiredness  If you notice other side effects that you think are caused by this medicine, tell your doctor  Call your doctor for medical advice about side effects  You may report side effects to FDA at 0-933-FDA-3345  © 2017 [address] [employer] Information is for End User's use only and may not be sold, redistributed or otherwise used for commercial purposes  The above information is an  only  It is not intended as medical advice for individual conditions or treatments  Talk to your doctor, nurse or pharmacist before following any medical regimen to see if it is safe and effective for you  Epidural Steroid Injection   AMBULATORY CARE:   What you need to know about an epidural steroid injection (YELITZA):  An YELITZA is a procedure to inject steroid medicine into the epidural space  The epidural space is between your spinal cord and vertebrae  Steroids reduce inflammation and fluid buildup in your spine that may be causing pain  You may be given pain medicine along with the steroids  How to prepare for an YELITZA:  Your healthcare provider will talk to you about how to prepare for your procedure  He will tell you what medicines to take or not take on the day of your procedure  You may need to stop taking blood thinners or other medicines several days before your procedure  You may need to adjust any diabetes medicine you take on the day of your procedure   Steroid medicine can increase your blood sugar level  What will happen during an YELITZA:   · You will be given medicine to numb the procedure area  You will be awake for the procedure, but you will not feel pain  You may also be given medicine to help you relax during the procedure  Contrast liquid will be used to help your healthcare provider see the area better  Tell the healthcare provider if you have ever had an allergic reaction to contrast liquid  · Your healthcare provider may place the needle into your neck area, middle of your back, or tailbone area  He may inject the medicine next to the nerves that are causing your pain  He may instead inject the medicine into a larger area of the epidural space  This helps the medicine spread to more nerves  Your healthcare provider will use a fluoroscope to help guide the needle to the right place  A fluoroscope is a type of x-ray  After the procedure, a bandage will be placed over the injection site to prevent infection  Risks of an YELITZA:  You may have temporary or permanent nerve damage or paralysis  You may have bleeding or develop a serious infection, such as meningitis (swelling of the brain coverings)  An abscess may also develop  You may need surgery to fix the abscess  You may have a seizure, anxiety, or trouble sleeping  If you are a man, you may have temporary erectile dysfunction (not able to have an erection)  Care for your wound as directed: You may remove the bandage before you go to bed the day of your procedure  You may take a shower, but do not take a bath for at least 24 hours  Self-care:   · Do not drive,  use machines, or do strenuous activity for 24 hours after your procedure or as directed  · Continue other treatments  as directed  Steroid injections alone will not control your pain  The injections are meant to be used with other treatments, such as physical therapy  Seek care immediately if:   · Blood soaks through your bandage      · Your wound is red, swollen, or draining pus  · You have a fever or chills, severe back pain, and the procedure area is sensitive to the touch  · You have a seizure  · You have trouble moving your legs  · You have weakness or numbness in your legs  · You cannot control when you urinate or have a bowel movement  Contact your healthcare provider if:   · You have nausea or are vomiting  · Your face or neck is red for a few days and you feel warm  · You have more pain than you had before the procedure  · You have swelling in your hands or feet  · You have questions or concerns about your condition or care  Follow up with your healthcare provider as directed:  Write down your questions so you remember to ask them during your visits  © 2017 [employer] Information is for End User's use only and may not be sold, redistributed or otherwise used for commercial purposes  All illustrations and images included in CareNotes® are the copyrighted property of [employer]  or [employer] Ke[employer]  The above information is an  only  It is not intended as medical advice for individual conditions or treatments  Talk to your doctor, nurse or pharmacist before following any medical regimen to see if it is safe and effective for you

## 2020-01-29 NOTE — PROGRESS NOTES
Assessment:  1  Lumbar radiculopathy    2  Spinal stenosis of lumbar region, unspecified whether neurogenic claudication present        Plan:  1  I will schedule the patient for bilateral L4 TFESI with Dr Francisco Javier Richardson to address the inflammatory component of the patient's pain  We will request clearance to hold her Plavix from her PCP  Complete risks and benefits including bleeding, infection, tissue reaction, nerve injury and allergic reaction were discussed  The patient was agreeable and verbalized an understanding  2  I will initiate the patient on gabapentin 300 mg q h s  And titrate up to t i d  Dosing for her neuropathic complaints  Patient was educated on medications most common side effects which include dizziness, drowsiness, and swelling of the hands and feet  Patient was instructed to call the office with any adverse medication side effects  The patient is also aware that if they would like to come off of the medication, they need to let us know as suddenly stopping the medication puts them at risk to have a seizure  The patient was agreeable and verbalized an understanding  3  I will avoid NSAIDs secondary to anticoagulation  4  Patient will continue with her home exercise program  5  The patient will follow-up in 4 week for medication prescription refill and reevaluation  The patient was advised to contact the office should their symptoms worsen in the interim  The patient was agreeable and verbalized an understanding  M*Modal software was used to dictate this note  It may contain errors with dictating incorrect words or incorrect spelling  Please contact the provider directly with any questions  History of Present Illness:     The patient is a 64 y o  female last seen on 1/2/20 who presents for a follow up office visit in regards to chronic lumbosacral back pain that radiates into the posterior lateral aspect of the bilateral lower extremities with associated numbness and subjective weakness secondary to lumbar degenerative disc disease, lumbar spondylosis and stenosis  The patient denies bowel or bladder incontinence or saddle anesthesia  MRI of the lumbar spine reveals degenerative disc disease and spondylosis with a disc herniation at L4-5 causing significant central and bilateral foraminal stenosis  She is unable to take NSAIDs secondary to anticoagulation  She has had a favorable response to oral steroids in the past   She has not yet tried any membrane stabilizers  The patient currently rates her pain an 8/10 on the numeric pain rating scale  She states her pain is constant nature most bothersome in the evening and at night  She characterizes the pain as sharp, cramping, shooting and numbness  I have personally reviewed and/or updated the patient's past medical history, past surgical history, family history, social history, current medications, allergies, and vital signs today  Review of Systems:    Review of Systems   Respiratory: Negative for shortness of breath  Cardiovascular: Negative for chest pain  Gastrointestinal: Negative for constipation, diarrhea, nausea and vomiting  Musculoskeletal: Positive for gait problem  Negative for arthralgias, joint swelling and myalgias  Skin: Negative for rash  Neurological: Negative for dizziness, seizures and weakness  All other systems reviewed and are negative          Past Medical History:   Diagnosis Date    Diverticulosis     Hemorrhoids     Hypertension     [facility]roke [facility])        Past Surgical History:   Procedure Laterality Date    COLONOSCOPY      ONSET 2015    NOSE SURGERY      SKIN LESION EXCISION         Family History   Problem Relation Age of Onset    No Known Problems Mother     Hypertension Father     Parkinsonism Father     Hypertension Sister     Stroke Family     Glaucoma Family     Hypertension Family        Social History     Occupational History    Not on file   Tobacco Use    Smoking status: Never Smoker    Smokeless tobacco: Never Used   Substance and Sexual Activity    Alcohol use: Yes     Comment: SOCIAL    Drug use: No    Sexual activity: Yes     Partners: Male     Birth control/protection: None         Current Outpatient Medications:     ascorbic acid (VITAMIN C) 500 mg tablet, Take by mouth daily, Disp: , Rfl:     atorvastatin (LIPITOR) 40 mg tablet, Take 1 tablet (40 mg total) by mouth daily, Disp: 90 tablet, Rfl: 1    Cholecalciferol (D 2000) 2000 units TABS, Take by mouth daily, Disp: , Rfl:     clopidogrel (PLAVIX) 75 mg tablet, Take 1 tablet (75 mg total) by mouth daily, Disp: 90 tablet, Rfl: 1    CO ENZYME Q-10 PO, take 1 Capsule by Oral route once, Disp: , Rfl:     EDARBYCLOR 40-12 5 MG TABS, TAKE ONE TABLET BY MOUTH ONCE DAILY, Disp: 30 tablet, Rfl: 2    folic acid (FOLVITE) 1 mg tablet, Take 1 tablet (1,000 mcg total) by mouth daily, Disp: 90 tablet, Rfl: 0    metoprolol succinate (TOPROL-XL) 25 mg 24 hr tablet, Take 1 tablet (25 mg total) by mouth daily, Disp: 90 tablet, Rfl: 1    Potassium 99 MG TABS, Take 1 tablet by mouth daily, Disp: , Rfl:     gabapentin (NEURONTIN) 300 mg capsule, Take 1 PO HS x 5 days, then 1 PO BID x 5 days, then 1 PO TID, Disp: 90 capsule, Rfl: 0    Allergies   Allergen Reactions    Codeine Chest Pain       Physical Exam:    /78   Pulse 75   Ht 5' 4" (1 626 m)   Wt 77 1 kg (170 lb)   BMI 29 18 kg/m²     Constitutional:normal, well developed, well nourished, alert, in no distress and non-toxic and no overt pain behavior  Eyes:anicteric  HEENT:grossly intact  Neck:supple, symmetric, trachea midline and no masses   Pulmonary:even and unlabored  Cardiovascular:No edema or pitting edema present  Skin:Normal without rashes or lesions and well hydrated  Psychiatric:Mood and affect appropriate  Neurologic:Cranial Nerves II-XII grossly intact  Musculoskeletal:normal gait    Positive seated straight leg raise bilaterally      Imaging  FL spine and pain procedure    (Results Pending)     MRI LUMBAR SPINE WITHOUT CONTRAST     INDICATION: M51 36: Other intervertebral disc degeneration, lumbar region  low back pain with radiation to both posterior thighs for about a year and a half without any injury or trauma     COMPARISON:  6/24/2019     TECHNIQUE:  Sagittal T1, sagittal T2, sagittal inversion recovery, axial T1 and axial T2, coronal T2     IMAGE QUALITY:  Diagnostic     FINDINGS:     VERTEBRAL BODIES:  There are 5 lumbar type vertebral bodies  Trace levoscoliosis mid lumbar spine  Normal lordosis  Scattered degenerative endplate changes  No focally suspicious marrow lesions  No bone marrow edema or compression abnormality  Small hemangioma in the L2 vertebra noted      SACRUM:  Normal signal within the sacrum  No evidence of insufficiency or stress fracture      DISTAL CORD AND CONUS:  Normal size and signal within the distal cord and conus  The conus medullaris terminates at the superior endplate of L2      PARASPINAL SOFT TISSUES:  Paraspinal soft tissues are unremarkable      LOWER THORACIC DISC SPACES:  Normal disc height and signal   No disc herniation, canal stenosis or foraminal narrowing      LUMBAR DISC SPACES:     L1-L2:  Normal      L2-L3:  Tiny left neural foraminal disc herniation, protrusion type  Minimal left neural foraminal narrowing  Central canal right neural foramen patent  Mild facet hypertrophy      L3-L4:  Normal      L4-L5:  There is bilateral facet hypertrophy  There is a central/right paracentral disc herniation, protrusion type  There is moderate right neural foraminal narrowing  Moderate to severe central canal narrowing  Moderate left neural foraminal   narrowing      L5-S1:  There is a diffuse disk bulge  No significant central canal or neural foraminal narrowing  Bilateral facet hypertrophy noted      IMPRESSION:     Right neural foraminal disc herniation L4-5      Orders Placed This Encounter Procedures    FL spine and pain procedure

## 2020-02-03 ENCOUNTER — OFFICE VISIT (OUTPATIENT)
Dept: ENDOCRINOLOGY | Facility: CLINIC | Age: 60
End: 2020-02-03
Payer: COMMERCIAL

## 2020-02-03 VITALS
SYSTOLIC BLOOD PRESSURE: 122 MMHG | BODY MASS INDEX: 29.47 KG/M2 | WEIGHT: 172.6 LBS | HEIGHT: 64 IN | HEART RATE: 70 BPM | DIASTOLIC BLOOD PRESSURE: 70 MMHG

## 2020-02-03 DIAGNOSIS — E55.9 VITAMIN D DEFICIENCY: ICD-10-CM

## 2020-02-03 DIAGNOSIS — E83.52 HYPERCALCEMIA: ICD-10-CM

## 2020-02-03 DIAGNOSIS — E21.0 PRIMARY HYPERPARATHYROIDISM (HCC): Primary | ICD-10-CM

## 2020-02-03 PROCEDURE — 99214 OFFICE O/P EST MOD 30 MIN: CPT | Performed by: INTERNAL MEDICINE

## 2020-02-03 PROCEDURE — 3078F DIAST BP <80 MM HG: CPT | Performed by: INTERNAL MEDICINE

## 2020-02-03 PROCEDURE — 1036F TOBACCO NON-USER: CPT | Performed by: INTERNAL MEDICINE

## 2020-02-03 PROCEDURE — 3074F SYST BP LT 130 MM HG: CPT | Performed by: INTERNAL MEDICINE

## 2020-02-03 NOTE — PROGRESS NOTES
Irene Cano 64 y o  female MRN: [id number]    Encounter: [id number]      Assessment/Plan     Assessment: This is a 64y o -year-old female with hypercalcemia, hyperparathyroidism, low phosphorus    Plan:    Diagnoses and all orders for this visit:    Primary hyperparathyroidism ([facility] [address] )  Review of records shows patient has elevated calcium along with elevated PTH, with normal vitamin-D level, suggestive of primary hyperparathyroidism, which is also evident by suspicion of parathyroid adenoma on parathyroid scan as well as on thyroid ultrasound (left lower pole of thyroid )  Discussed with patient that will repeat 24 hour urine calcium and creatinine, as previously her calcium was normal   Also discussed that she does meet the criteria for primary hyperparathyroidism, based on elevated PTH, elevated calcium and low phosphorus, however she does not meet surgical criteria, however in future if her calcium starts to go above 11 0, will have to refer her for surgery  Discussed the finding of suspicious parathyroid adenoma on scan as well as ultrasound, patient wishes to go for parathyroid adenoma surgery now and does not want to wait  Will refer her to Surgical Oncology for removal of parathyroid adenoma  -     Calcium, urine, 24 hour Lab Collect; Future  -     Creatinine, urine, 24 hour; Future  -     Ambulatory referral to Surgical Oncology; Future  -     Vitamin D 25 hydroxy; Future  -     PTH, intact- Lab Collect; Future  -     Basic metabolic panel; Future  -     PTH, intact- Lab Collect; Future    Hypercalcemia  Her calcium is 10 7  Discussed about keeping well hydrated  Also discussed to avoid calcium supplementation    -     Vitamin D 25 hydroxy; Future  -     PTH, intact- Lab Collect; Future    Vitamin D deficiency  Continue current dose of vitamin-D supplementation, 2000 International Units daily  -     Vitamin D 25 hydroxy;  Future        CC:   Primary hyperparathyroidism, hypercalcemia, parathyroid adenoma    History of Present Illness     HPI:    Marek Mae dizzy 11year-old woman with past medical history of hyperparathyroidism, hypercalcemia, thyroid/parathyroid adenoma is here for follow-up    She does not have any history of kidney stones previously  No history of fracture bones  She is currently taking vitamin-D supplementation 2000 International Units daily  She does not take calcium supplementation  She denies family history of elevated calcium or elevated PTH    No history of family history of thyroid cancer, no history of external radiation to head/neck or chest  No recent iodine loading in form of medications, erbs or Kelp supplements    Thyroid ultrasound showed 0 9 x 0 3 x 0 4 cm nodule posterior to the left lower lobe of thyroid gland  This appears external to the thyroid gland is likely correspond to the abnormality identified on parathyroid scan  Her most recent calcium is 10 7, corrected calcium is 10 7 (normal range is 8 3-10 1)  Her GFR is 95  She also has history of low phosphorus  Her SPEP was negative previously  She always have elevated PTH above 100, 128 in April 2019  Previously 24 hour urine calcium was 98 with adequate collection    DEXA scan which was done recently on April 2019, showed lumbar spine T-score +1 9, left total hip T-score 0 3, left femoral neck T-score -0 3, suggestive of normal bone density which is considered at low risk of fracture    Parathyroid scan , 10/2018   FINDINGS:     Immediate planar images demonstrate homogeneous symmetric uptake of the radiotracer by the thyroid  Delayed planar images demonstrate normal washout of the radiotracer from the thyroid       Delayed SPECT images demonstrate slight asymmetric prominence of the left lower thyroid pole as compared to the right  It is uncertain if this represents normal thyroid tissue versus possible parathyroid adenoma    Otherwise no persistent nodular foci   of activity visualized      IMPRESSION:     1  Best seen on the delayed SPECT images, there is slight asymmetric prominence of the left lower thyroid pole  Recommend thyroid ultrasound to differentiate thyroid tissue from possible parathyroid adenoma        Review of Systems   Constitutional: Negative for activity change, diaphoresis, fatigue, fever and unexpected weight change  HENT: Negative  Eyes: Negative for visual disturbance  Respiratory: Negative for cough, chest tightness and shortness of breath  Cardiovascular: Negative for chest pain, palpitations and leg swelling  Gastrointestinal: Negative for abdominal pain, blood in stool, constipation, diarrhea, nausea and vomiting  Endocrine: Negative for cold intolerance, heat intolerance, polydipsia, polyphagia and polyuria  Genitourinary: Negative for dysuria, enuresis, frequency and urgency  Musculoskeletal: Negative for arthralgias and myalgias  Skin: Negative for pallor, rash and wound  Allergic/Immunologic: Negative  Neurological: Negative for dizziness, tremors, weakness and numbness  Hematological: Negative  Psychiatric/Behavioral: Negative          Historical Information   Past Medical History:   Diagnosis Date    Diverticulosis     Hemorrhoids     Hypertension     Stroke [facility])      Past Surgical History:   Procedure Laterality Date    COLONOSCOPY      ONSET 2015    NOSE SURGERY      SKIN LESION EXCISION       Social History   Social History     Substance and Sexual Activity   Alcohol Use Yes    Comment: SOCIAL     Social History     Substance and Sexual Activity   Drug Use No     Social History     Tobacco Use   Smoking Status Never Smoker   Smokeless Tobacco Never Used     Family History:   Family History   Problem Relation Age of Onset    No Known Problems Mother     Hypertension Father     Parkinsonism Father     Hypertension Sister     Stroke Family     Glaucoma Family     Hypertension Family        Meds/Allergies Current Outpatient Medications   Medication Sig Dispense Refill    ascorbic acid (VITAMIN C) 500 mg tablet Take by mouth daily      atorvastatin (LIPITOR) 40 mg tablet Take 1 tablet (40 mg total) by mouth daily 90 tablet 1    Cholecalciferol (D 2000) 2000 units TABS Take by mouth daily      clopidogrel (PLAVIX) 75 mg tablet Take 1 tablet (75 mg total) by mouth daily 90 tablet 1    CO ENZYME Q-10 PO take 1 Capsule by Oral route once      EDARBYCLOR 40-12 5 MG TABS TAKE ONE TABLET BY MOUTH ONCE DAILY 30 tablet 2    folic acid (FOLVITE) 1 mg tablet Take 1 tablet (1,000 mcg total) by mouth daily 90 tablet 0    gabapentin (NEURONTIN) 300 mg capsule Take 1 PO HS x 5 days, then 1 PO BID x 5 days, then 1 PO TID 90 capsule 0    metoprolol succinate (TOPROL-XL) 25 mg 24 hr tablet Take 1 tablet (25 mg total) by mouth daily 90 tablet 1    Potassium 99 MG TABS Take 1 tablet by mouth daily       No current facility-administered medications for this visit  Allergies   Allergen Reactions    Codeine Chest Pain       Objective   Vitals: Blood pressure 122/70, pulse 70, height 5' 4" (1 626 m), weight 78 3 kg (172 lb 9 6 oz)  Physical Exam   Constitutional: She is oriented to person, place, and time  She appears well-developed and well-nourished  No distress  HENT:   Head: Normocephalic and atraumatic  Eyes: Conjunctivae and EOM are normal  Right eye exhibits no discharge  Left eye exhibits no discharge  Neck: Normal range of motion  Neck supple  No thyromegaly present  Cardiovascular: Normal rate, regular rhythm and normal heart sounds  No murmur heard  Pulmonary/Chest: Effort normal and breath sounds normal  No respiratory distress  She has no wheezes  Abdominal: Soft  Bowel sounds are normal    Musculoskeletal: Normal range of motion  She exhibits no edema, tenderness or deformity  Neurological: She is alert and oriented to person, place, and time  She has normal reflexes   She displays normal reflexes  Skin: Skin is warm and dry  She is not diaphoretic  Psychiatric: She has a normal mood and affect  Her behavior is normal    Vitals reviewed  The history was obtained from the review of the chart, patient  Lab Results:        Imaging Studies:   Results for orders placed during the hospital encounter of 10/05/18   US thyroid    Impression 1  Subcentimeter right-sided thyroid nodule as described above  2   Subcentimeter nodule posterior to the left lower pole, likely corresponding to the finding seen on recent nuclear medicine parathyroid scan  If clinically indicated, consider CT scan of the neck utilizing parathyroid imaging protocol  No nodule meets current ACR criteria for requiring biopsy or followup ultrasounds  Reference: ACR Thyroid Imaging, Reporting and Data System (TI-RADS): White Paper of the [employer]  J AM Anthony Radiol [id number];[id number]  (additional recommendations based on American Thyroid Association 2015 guidelines )      Workstation performed: [id number]         I have personally reviewed pertinent reports  Portions of the record may have been created with voice recognition software  Occasional wrong word or "sound a like" substitutions may have occurred due to the inherent limitations of voice recognition software  Read the chart carefully and recognize, using context, where substitutions have occurred

## 2020-02-04 ENCOUNTER — TELEPHONE (OUTPATIENT)
Dept: HEMATOLOGY ONCOLOGY | Facility: CLINIC | Age: 60
End: 2020-02-04

## 2020-02-04 NOTE — TELEPHONE ENCOUNTER
New Patient Encounter    New Patient Intake Form   Patient Details:  Braydon Franklin  1960  [id number]    Background Information:  [address] starts by opening a telephone encounter and gathering the following information   Who is calling to schedule? If not self, relationship to patient? Self   Referring Provider Dr Stephon Cheatham   What is the diagnosis? Primary hyperparathyroidism   Is this diagnosis confirmed Yes   Is there a confirmed diagnosis from a biopsy/tissue reviewed by pathology? No   Is there any prior history of Cancer? Yes   If yes, please explain N/A   When was the diagnosis? Summer 2019   Is patient aware of diagnosis? Yes   Reason for visit? NP DX   Have you had any testing done? If so: when, where? Yes   Are records in [facility]? yes   Was the patient told to bring a disk? no   Scheduling Information:   Preferred [place]:  [place]     Requesting Specific Provider? Dr Dillard Founds   Are there any dates/time the patient cannot be seen? Any      Miscellaneous:   After completing the above information, please route to Financial Counselor and the appropriate Nurse Navigator for review

## 2020-02-06 DIAGNOSIS — I10 ESSENTIAL HYPERTENSION: ICD-10-CM

## 2020-02-06 PROBLEM — R79.89 HIGH SERUM PARATHYROID HORMONE (PTH): Status: ACTIVE | Noted: 2020-02-06

## 2020-02-06 RX ORDER — AZILSARTAN KAMEDOXOMIL AND CHLORTHALIDONE 40; 12.5 MG/1; MG/1
TABLET ORAL
Qty: 30 TABLET | Refills: 6 | Status: SHIPPED | OUTPATIENT
Start: 2020-02-06 | End: 2020-09-01

## 2020-02-07 ENCOUNTER — CONSULT (OUTPATIENT)
Dept: SURGICAL ONCOLOGY | Facility: CLINIC | Age: 60
End: 2020-02-07
Payer: COMMERCIAL

## 2020-02-07 VITALS
RESPIRATION RATE: 16 BRPM | WEIGHT: 172 LBS | DIASTOLIC BLOOD PRESSURE: 70 MMHG | BODY MASS INDEX: 29.37 KG/M2 | SYSTOLIC BLOOD PRESSURE: 110 MMHG | HEART RATE: 99 BPM | TEMPERATURE: 98.9 F | HEIGHT: 64 IN

## 2020-02-07 DIAGNOSIS — E21.0 PRIMARY HYPERPARATHYROIDISM (HCC): Primary | ICD-10-CM

## 2020-02-07 DIAGNOSIS — E83.52 HYPERCALCEMIA: ICD-10-CM

## 2020-02-07 DIAGNOSIS — R79.89 HIGH SERUM PARATHYROID HORMONE (PTH): ICD-10-CM

## 2020-02-07 PROCEDURE — 99243 OFF/OP CNSLTJ NEW/EST LOW 30: CPT | Performed by: SURGERY

## 2020-02-07 NOTE — PROGRESS NOTES
Surgical Oncology Follow Up       [address] [facility] SURGICAL ONCOLOGY [place]  [facility] [address]    Radha Castro  1960  [id number]  [facility] SURGICAL ONCOLOGY [place]  [place] [address]    Chief Complaint   Patient presents with    Consult    Hyperparathyroidism       Assessment/Plan:    No problem-specific Assessment & Plan notes found for this encounter  Diagnoses and all orders for this visit:    Primary hyperparathyroidism [facility])  -     Ambulatory referral to Surgical Oncology  -     CT parathyroid study w wo contrast; Future    Hypercalcemia    High serum parathyroid hormone (PTH)        Advance Care Planning/Advance Directives:  Discussed medical power of  and disease status, cancer treatment plans and/or cancer treatment goals with the patient  No history exists  History of Present Illness:  Patient is a 61-year-old woman who over last couple of years has had fatigue and turned to be elevated calcium levels  This led to further workup, including a check of her PTH levels which were found to be elevated  Two years ago, she underwent sestamibi scan which was not definitive  Her symptoms persisted she has therefore been referred for evaluation and treatment  She complains primarily of fatigue, though she does also report forgetfulness, and generalized lack of energy, which she attributed to getting older  She has no personal or family history of endocrine malignancies  Review of Systems   Constitutional: Positive for fatigue  HENT: Negative  Eyes: Negative  Respiratory: Negative  Cardiovascular: Negative  Gastrointestinal: Negative  Negative for abdominal pain  Endocrine: Negative  Genitourinary: Negative  Musculoskeletal: Positive for arthralgias and myalgias  Skin: Negative  Allergic/Immunologic: Negative      Neurological: Negative  Hematological: Negative  Psychiatric/Behavioral: Positive for decreased concentration  Self-injury:  lastpth           Patient Active Problem List   Diagnosis    Hypercalcemia    Enlarged thyroid    Essential hypertension    Hyperlipidemia, unspecified    Prediabetes    Simple goiter    Primary hyperparathyroidism ([address] )    History of lacunar cerebrovascular accident (CVA)    Pain in both lower extremities    Bilateral low back pain with sciatica    DDD (degenerative disc disease), lumbar    Bilateral sciatica    Lumbar disc herniation    Spinal stenosis of lumbar region    Lumbar spondylosis    Lumbar radiculopathy    High serum parathyroid hormone (PTH)     Past Medical History:   Diagnosis Date    Diverticulosis     Hemorrhoids     Hypertension     Stroke [facility])      Past Surgical History:   Procedure Laterality Date    COLONOSCOPY      ONSET 2015    NOSE SURGERY      SKIN LESION EXCISION       Family History   Problem Relation Age of Onset    No Known Problems Mother     Hypertension Father     Parkinsonism Father     Hypertension Sister     Stroke Family     Glaucoma Family     Hypertension Family      Social History     Socioeconomic History    Marital status: /Civil Union     Spouse name: Not on file    Number of children: Not on file    Years of education: Not on file    Highest education level: Not on file   Occupational History    Not on file   Social Needs    Financial resource strain: Not on file    Food insecurity:     Worry: Not on file     Inability: Not on file    Transportation needs:     Medical: No     Non-medical: No   Tobacco Use    Smoking status: Never Smoker    Smokeless tobacco: Never Used   Substance and Sexual Activity    Alcohol use: Yes     Comment: SOCIAL    Drug use: No    Sexual activity: Yes     Partners: Male     Birth control/protection: None   Lifestyle    Physical activity:     Days per week: 0 days     Minutes per session: 0 min    Stress: Not on file   Relationships    Social connections:     Talks on phone: Not on file     Gets together: Not on file     Attends Judaism service: Not on file     Active member of club or organization: Not on file     Attends meetings of clubs or organizations: Not on file     Relationship status: Not on file    Intimate partner violence:     Fear of current or ex partner: Not on file     Emotionally abused: Not on file     Physically abused: Not on file     Forced sexual activity: Not on file   Other Topics Concern    Not on file   Social History Narrative    ALWAYS SEAT BELT    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       Current Outpatient Medications:     ascorbic acid (VITAMIN C) 500 mg tablet, Take by mouth daily, Disp: , Rfl:     atorvastatin (LIPITOR) 40 mg tablet, Take 1 tablet (40 mg total) by mouth daily, Disp: 90 tablet, Rfl: 1    Cholecalciferol (D 2000) 2000 units TABS, Take by mouth daily, Disp: , Rfl:     clopidogrel (PLAVIX) 75 mg tablet, Take 1 tablet (75 mg total) by mouth daily, Disp: 90 tablet, Rfl: 1    CO ENZYME Q-10 PO, take 1 Capsule by Oral route once, Disp: , Rfl:     EDARBYCLOR 40-12 5 MG TABS, TAKE ONE TABLET BY MOUTH ONCE DAILY, Disp: 30 tablet, Rfl: 6    folic acid (FOLVITE) 1 mg tablet, Take 1 tablet (1,000 mcg total) by mouth daily, Disp: 90 tablet, Rfl: 0    gabapentin (NEURONTIN) 300 mg capsule, Take 1 PO HS x 5 days, then 1 PO BID x 5 days, then 1 PO TID, Disp: 90 capsule, Rfl: 0    metoprolol succinate (TOPROL-XL) 25 mg 24 hr tablet, Take 1 tablet (25 mg total) by mouth daily, Disp: 90 tablet, Rfl: 1    Potassium 99 MG TABS, Take 1 tablet by mouth daily, Disp: , Rfl:   Allergies   Allergen Reactions    Codeine Chest Pain     Vitals:    02/07/20 1409   BP: 110/70   Pulse: 99   Resp: 16   Temp: 98 9 °F (37 2 °C)       Physical Exam   Constitutional: She is oriented to person, place, and time   She appears well-developed and well-nourished  HENT:   Head: Normocephalic and atraumatic  Right Ear: External ear normal    Left Ear: External ear normal    Eyes: Pupils are equal, round, and reactive to light  EOM are normal    Neck: Normal range of motion  Neck supple  No tracheal deviation present  No thyromegaly present  Cardiovascular: Normal rate, regular rhythm and normal heart sounds  Pulmonary/Chest: Effort normal and breath sounds normal    Abdominal: Soft  Bowel sounds are normal    Musculoskeletal: Normal range of motion  Lymphadenopathy:     She has no cervical adenopathy  Neurological: She is alert and oriented to person, place, and time  Skin: Skin is warm and dry  Psychiatric: She has a normal mood and affect  Her behavior is normal  Judgment and thought content normal        Pathology:  none    Labs:  CBC, Coags, BMP, Mg, Phos   Lab Results   Component Value Date     8 (H) 04/07/2019    CALCIUM 10 4 (H) 01/20/2020    PHOS 2 6 (L) 01/20/2020         Imaging    PARATHYROID SCAN     INDICATION:  E21 0: Primary hyperparathyroidism  E83 52: Hypercalcemia     COMPARISON:  None      TECHNIQUE:   Following the intravenous administration of 26 4 mCi Tc-99m Cardiolite, anterior and bilateral anterior oblique projection images of the neck and mediastinum were obtained at approximately 10 minutes post injection followed at 2 hours post   injection by static anterior and bilateral oblique projections as well as SPECT images in coronal, sagittal and axial projections       FINDINGS:     Immediate planar images demonstrate homogeneous symmetric uptake of the radiotracer by the thyroid  Delayed planar images demonstrate normal washout of the radiotracer from the thyroid       Delayed SPECT images demonstrate slight asymmetric prominence of the left lower thyroid pole as compared to the right  It is uncertain if this represents normal thyroid tissue versus possible parathyroid adenoma    Otherwise no persistent nodular foci   of activity visualized      IMPRESSION:     1  Best seen on the delayed SPECT images, there is slight asymmetric prominence of the left lower thyroid pole  Recommend thyroid ultrasound to differentiate thyroid tissue from possible parathyroid adenoma      No results found  I reviewed the above laboratory and imaging data  Discussion/Summary:  65-year-old woman, suspected primary hyperparathyroidism  She had a sestamibi scan 2 years ago which was non definitive  We will therefore CT scan with parathyroid protocol for further workup  Follow-up here once results available for review  Further management will depend on scan results, the likely surgical intervention planned

## 2020-02-07 NOTE — LETTER
February 7, 2020     [address] [place] [address]    Patient: Waleska Walker   YOB: 1960   Date of Visit: 2/7/2020       Dear Dr April Acevedo: Thank you for referring Waleska Walker to me for evaluation  Below are my notes for this consultation  If you have questions, please do not hesitate to call me  I look forward to following your patient along with you  Sincerely,        Tate Mcallitser MD        CC: Eliza Campos, DO Nova Collazo PA-C Ruel First, MD  2/7/2020  3:04 PM  Sign at close encounter               Surgical Oncology Follow Up       [address] [address]    Waleska Walker  1960  [id number]  [facility] SURGICAL ONCOLOGY [address] [address]    Chief Complaint   Patient presents with    Consult    Hyperparathyroidism       Assessment/Plan:    No problem-specific Assessment & Plan notes found for this encounter  Diagnoses and all orders for this visit:    Primary hyperparathyroidism [facility])  -     Ambulatory referral to Surgical Oncology  -     CT parathyroid study w wo contrast; Future    Hypercalcemia    High serum parathyroid hormone (PTH)        Advance Care Planning/Advance Directives:  Discussed medical power of  and disease status, cancer treatment plans and/or cancer treatment goals with the patient  No history exists  History of Present Illness:  Patient is a 51-year-old woman who over last couple of years has had fatigue and turned to be elevated calcium levels  This led to further workup, including a check of her PTH levels which were found to be elevated  Two years ago, she underwent sestamibi scan which was not definitive    Her symptoms persisted she has therefore been referred for evaluation and treatment  She complains primarily of fatigue, though she does also report forgetfulness, and generalized lack of energy, which she attributed to getting older  She has no personal or family history of endocrine malignancies  Review of Systems   Constitutional: Positive for fatigue  HENT: Negative  Eyes: Negative  Respiratory: Negative  Cardiovascular: Negative  Gastrointestinal: Negative  Negative for abdominal pain  Endocrine: Negative  Genitourinary: Negative  Musculoskeletal: Positive for arthralgias and myalgias  Skin: Negative  Allergic/Immunologic: Negative  Neurological: Negative  Hematological: Negative  Psychiatric/Behavioral: Positive for decreased concentration  Self-injury:  lastpth           Patient Active Problem List   Diagnosis    Hypercalcemia    Enlarged thyroid    Essential hypertension    Hyperlipidemia, unspecified    Prediabetes    Simple goiter    Primary hyperparathyroidism ([address] )    History of lacunar cerebrovascular accident (CVA)    Pain in both lower extremities    Bilateral low back pain with sciatica    DDD (degenerative disc disease), lumbar    Bilateral sciatica    Lumbar disc herniation    Spinal stenosis of lumbar region    Lumbar spondylosis    Lumbar radiculopathy    High serum parathyroid hormone (PTH)     Past Medical History:   Diagnosis Date    Diverticulosis     Hemorrhoids     Hypertension     Stroke [facility])      Past Surgical History:   Procedure Laterality Date    COLONOSCOPY      ONSET 2015    NOSE SURGERY      SKIN LESION EXCISION       Family History   Problem Relation Age of Onset    No Known Problems Mother     Hypertension Father     Parkinsonism Father     Hypertension Sister     Stroke Family     Glaucoma Family     Hypertension Family      Social History     Socioeconomic History    Marital status: /Civil Union     Spouse name: Not on file    Number of children: Not on file   Ysabel Linder Years of education: Not on file    Highest education level: Not on file   Occupational History    Not on file   Social Needs    Financial resource strain: Not on file    Food insecurity:     Worry: Not on file     Inability: Not on file    Transportation needs:     Medical: No     Non-medical: No   Tobacco Use    Smoking status: Never Smoker    Smokeless tobacco: Never Used   Substance and Sexual Activity    Alcohol use: Yes     Comment: SOCIAL    Drug use: No    Sexual activity: Yes     Partners: Male     Birth control/protection: None   Lifestyle    Physical activity:     Days per week: 0 days     Minutes per session: 0 min    Stress: Not on file   Relationships    Social connections:     Talks on phone: Not on file     Gets together: Not on file     Attends Bahai service: Not on file     Active member of club or organization: Not on file     Attends meetings of clubs or organizations: Not on file     Relationship status: Not on file    Intimate partner violence:     Fear of current or ex partner: Not on file     Emotionally abused: Not on file     Physically abused: Not on file     Forced sexual activity: Not on file   Other Topics Concern    Not on file   Social History Narrative    ALWAYS SEAT BELT    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       Current Outpatient Medications:     ascorbic acid (VITAMIN C) 500 mg tablet, Take by mouth daily, Disp: , Rfl:     atorvastatin (LIPITOR) 40 mg tablet, Take 1 tablet (40 mg total) by mouth daily, Disp: 90 tablet, Rfl: 1    Cholecalciferol (D 2000) 2000 units TABS, Take by mouth daily, Disp: , Rfl:     clopidogrel (PLAVIX) 75 mg tablet, Take 1 tablet (75 mg total) by mouth daily, Disp: 90 tablet, Rfl: 1    CO ENZYME Q-10 PO, take 1 Capsule by Oral route once, Disp: , Rfl:     EDARBYCLOR 40-12 5 MG TABS, TAKE ONE TABLET BY MOUTH ONCE DAILY, Disp: 30 tablet, Rfl: 6    folic acid (FOLVITE) 1 mg tablet, Take 1 tablet (1,000 mcg total) by mouth daily, Disp: 90 tablet, Rfl: 0    gabapentin (NEURONTIN) 300 mg capsule, Take 1 PO HS x 5 days, then 1 PO BID x 5 days, then 1 PO TID, Disp: 90 capsule, Rfl: 0    metoprolol succinate (TOPROL-XL) 25 mg 24 hr tablet, Take 1 tablet (25 mg total) by mouth daily, Disp: 90 tablet, Rfl: 1    Potassium 99 MG TABS, Take 1 tablet by mouth daily, Disp: , Rfl:   Allergies   Allergen Reactions    Codeine Chest Pain     Vitals:    02/07/20 1409   BP: 110/70   Pulse: 99   Resp: 16   Temp: 98 9 °F (37 2 °C)       Physical Exam   Constitutional: She is oriented to person, place, and time  She appears well-developed and well-nourished  HENT:   Head: Normocephalic and atraumatic  Right Ear: External ear normal    Left Ear: External ear normal    Eyes: Pupils are equal, round, and reactive to light  EOM are normal    Neck: Normal range of motion  Neck supple  No tracheal deviation present  No thyromegaly present  Cardiovascular: Normal rate, regular rhythm and normal heart sounds  Pulmonary/Chest: Effort normal and breath sounds normal    Abdominal: Soft  Bowel sounds are normal    Musculoskeletal: Normal range of motion  Lymphadenopathy:     She has no cervical adenopathy  Neurological: She is alert and oriented to person, place, and time  Skin: Skin is warm and dry  Psychiatric: She has a normal mood and affect   Her behavior is normal  Judgment and thought content normal        Pathology:  none    Labs:  CBC, Coags, BMP, Mg, Phos   Lab Results   Component Value Date     8 (H) 04/07/2019    CALCIUM 10 4 (H) 01/20/2020    PHOS 2 6 (L) 01/20/2020         Imaging    PARATHYROID SCAN     INDICATION:  E21 0: Primary hyperparathyroidism  E83 52: Hypercalcemia     COMPARISON:  None      TECHNIQUE:   Following the intravenous administration of 26 4 mCi Tc-99m Cardiolite, anterior and bilateral anterior oblique projection images of the neck and mediastinum were obtained at approximately 10 minutes post injection followed at 2 hours post   injection by static anterior and bilateral oblique projections as well as SPECT images in coronal, sagittal and axial projections       FINDINGS:     Immediate planar images demonstrate homogeneous symmetric uptake of the radiotracer by the thyroid  Delayed planar images demonstrate normal washout of the radiotracer from the thyroid       Delayed SPECT images demonstrate slight asymmetric prominence of the left lower thyroid pole as compared to the right  It is uncertain if this represents normal thyroid tissue versus possible parathyroid adenoma  Otherwise no persistent nodular foci   of activity visualized      IMPRESSION:     1  Best seen on the delayed SPECT images, there is slight asymmetric prominence of the left lower thyroid pole  Recommend thyroid ultrasound to differentiate thyroid tissue from possible parathyroid adenoma      No results found  I reviewed the above laboratory and imaging data  Discussion/Summary:  51-year-old woman, suspected primary hyperparathyroidism  She had a sestamibi scan 2 years ago which was non definitive  We will therefore CT scan with parathyroid protocol for further workup  Follow-up here once results available for review  Further management will depend on scan results, the likely surgical intervention planned

## 2020-02-10 ENCOUNTER — APPOINTMENT (OUTPATIENT)
Dept: LAB | Facility: CLINIC | Age: 60
End: 2020-02-10
Payer: COMMERCIAL

## 2020-02-10 DIAGNOSIS — E21.0 PRIMARY HYPERPARATHYROIDISM (HCC): ICD-10-CM

## 2020-02-10 DIAGNOSIS — E83.52 HYPERCALCEMIA: ICD-10-CM

## 2020-02-10 LAB
25(OH)D3 SERPL-MCNC: 39.6 NG/ML (ref 30–100)
CALCIUM 24H UR-MCNC: 119 MG/24 HRS (ref 42–353)
CREAT 24H UR-MRATE: 1 G/24HR (ref 0.6–1.8)
PTH-INTACT SERPL-MCNC: 120.9 PG/ML (ref 18.4–80.1)
SPECIMEN VOL UR: 1700 ML
SPECIMEN VOL UR: 1700 ML

## 2020-02-10 PROCEDURE — 82570 ASSAY OF URINE CREATININE: CPT

## 2020-02-10 PROCEDURE — 36415 COLL VENOUS BLD VENIPUNCTURE: CPT

## 2020-02-10 PROCEDURE — 82340 ASSAY OF CALCIUM IN URINE: CPT

## 2020-02-10 PROCEDURE — 82306 VITAMIN D 25 HYDROXY: CPT

## 2020-02-10 PROCEDURE — 83970 ASSAY OF PARATHORMONE: CPT

## 2020-02-12 ENCOUNTER — TELEPHONE (OUTPATIENT)
Dept: ENDOCRINOLOGY | Facility: CLINIC | Age: 60
End: 2020-02-12

## 2020-02-13 ENCOUNTER — HOSPITAL ENCOUNTER (OUTPATIENT)
Dept: RADIOLOGY | Facility: HOSPITAL | Age: 60
Discharge: HOME | End: 2020-02-13
Attending: SURGERY
Payer: COMMERCIAL

## 2020-02-13 DIAGNOSIS — E21.0 PRIMARY HYPERPARATHYROIDISM (HCC): ICD-10-CM

## 2020-02-13 PROCEDURE — 70492 CT SFT TSUE NCK W/O & W/DYE: CPT

## 2020-02-13 RX ADMIN — IOHEXOL 85 ML: 350 INJECTION, SOLUTION INTRAVENOUS at 18:54

## 2020-02-19 NOTE — TELEPHONE ENCOUNTER
Received anti-coag hold form dated 1/30 ok'ing Plavix hold  Scheduled pt for 3/10 15:40  Pt instructed to hold Plavix 3/3-3/10  Reviewed pre-procedure instructions: pt will need a , NPO one hour prior, wear loose fitting clothing and call back if they get sick or started on any abx  Pt verbalized understanding

## 2020-02-23 DIAGNOSIS — E87.6 HYPOKALEMIA: Primary | ICD-10-CM

## 2020-02-24 ENCOUNTER — OFFICE VISIT (OUTPATIENT)
Dept: SURGICAL ONCOLOGY | Facility: CLINIC | Age: 60
End: 2020-02-24
Payer: COMMERCIAL

## 2020-02-24 ENCOUNTER — TELEPHONE (OUTPATIENT)
Dept: FAMILY MEDICINE CLINIC | Facility: CLINIC | Age: 60
End: 2020-02-24

## 2020-02-24 VITALS
DIASTOLIC BLOOD PRESSURE: 74 MMHG | RESPIRATION RATE: 16 BRPM | HEIGHT: 64 IN | HEART RATE: 64 BPM | SYSTOLIC BLOOD PRESSURE: 110 MMHG | WEIGHT: 172 LBS | BODY MASS INDEX: 29.37 KG/M2

## 2020-02-24 DIAGNOSIS — E83.52 HYPERCALCEMIA: ICD-10-CM

## 2020-02-24 DIAGNOSIS — R79.89 HIGH SERUM PARATHYROID HORMONE (PTH): ICD-10-CM

## 2020-02-24 DIAGNOSIS — E21.0 PRIMARY HYPERPARATHYROIDISM (HCC): Primary | ICD-10-CM

## 2020-02-24 PROCEDURE — 1036F TOBACCO NON-USER: CPT | Performed by: SURGERY

## 2020-02-24 PROCEDURE — 99214 OFFICE O/P EST MOD 30 MIN: CPT | Performed by: SURGERY

## 2020-02-24 PROCEDURE — 3008F BODY MASS INDEX DOCD: CPT | Performed by: SURGERY

## 2020-02-24 PROCEDURE — 3078F DIAST BP <80 MM HG: CPT | Performed by: SURGERY

## 2020-02-24 PROCEDURE — 3074F SYST BP LT 130 MM HG: CPT | Performed by: SURGERY

## 2020-02-24 RX ORDER — TRAMADOL HYDROCHLORIDE 50 MG/1
50 TABLET ORAL EVERY 6 HOURS PRN
Qty: 10 TABLET | Refills: 0 | Status: SHIPPED | OUTPATIENT
Start: 2020-02-24 | End: 2020-07-06 | Stop reason: HOSPADM

## 2020-02-24 NOTE — LETTER
February 24, 2020     Markus Henry, [address] [facility] [address]    Patient: Aziza Schmid   YOB: 1960   Date of Visit: 2/24/2020       Dear Dr Uriah Michelle: Thank you for referring Aziza Schmid to me for evaluation  Below are my notes for this consultation  If you have questions, please do not hesitate to call me  I look forward to following your patient along with you  Sincerely,        Ania Andrews MD        CC: MD Katerine Mulligan DO Priya D Rana, PA-C Marrian Rei, MD  2/24/2020  8:46 AM  Sign at close encounter     Surgical Oncology Follow Up       [address] [facility] [place]  [address] [facility] [id number] [phone]    Aziza Schmid  1960  [id number]  [facility] SURGICAL ONCOLOGY Tasha Crawford  [address] [facility] [address] [facility]  [phone]    Chief Complaint   Patient presents with    Follow-up     F/U after CT parathyroid scan  Assessment/Plan:    No problem-specific Assessment & Plan notes found for this encounter  Diagnoses and all orders for this visit:    Primary hyperparathyroidism ([facility] [address] )    High serum parathyroid hormone (PTH)    Hypercalcemia        Advance Care Planning/Advance Directives:  Discussed disease status, cancer treatment plans and/or cancer treatment goals with the patient  No history exists  History of Present Illness:  Patient is a 72-year-old woman being worked up for primary hyperparathyroidism  She underwent CT scan and is now here to discuss results and make appropriate treatment plans   -Interval History:  No issues or complaints since her last visit/consult  Review of Systems:  Review of Systems   Constitutional: Positive for fatigue  HENT: Negative  Eyes: Negative  Respiratory: Negative  Cardiovascular: Negative  Gastrointestinal: Negative  Endocrine: Negative  Genitourinary: Negative  Musculoskeletal: Negative  Skin: Negative  Allergic/Immunologic: Negative  Neurological: Negative  Hematological: Negative  Psychiatric/Behavioral: Negative          Patient Active Problem List   Diagnosis    Hypercalcemia    Enlarged thyroid    Essential hypertension    Hyperlipidemia, unspecified    Prediabetes    Simple goiter    Primary hyperparathyroidism ([address] )    History of lacunar cerebrovascular accident (CVA)    Pain in both lower extremities    Bilateral low back pain with sciatica    DDD (degenerative disc disease), lumbar    Bilateral sciatica    Lumbar disc herniation    Spinal stenosis of lumbar region    Lumbar spondylosis    Lumbar radiculopathy    High serum parathyroid hormone (PTH)     Past Medical History:   Diagnosis Date    Diverticulosis     Hemorrhoids     Hypertension     Stroke [facility])      Past Surgical History:   Procedure Laterality Date    COLONOSCOPY      ONSET 2015    NOSE SURGERY      SKIN LESION EXCISION       Family History   Problem Relation Age of Onset    No Known Problems Mother     Hypertension Father     Parkinsonism Father     Hypertension Sister     Stroke Family     Glaucoma Family     Hypertension Family      Social History     Socioeconomic History    Marital status: /Civil Union     Spouse name: Not on file    Number of children: Not on file    Years of education: Not on file    Highest education level: Not on file   Occupational History    Not on file   Social Needs    Financial resource strain: Not on file    Food insecurity:     Worry: Not on file     Inability: Not on file    Transportation needs:     Medical: No     Non-medical: No   Tobacco Use    Smoking status: Never Smoker    Smokeless tobacco: Never Used   Substance and Sexual Activity    Alcohol use: Yes     Comment: SOCIAL    Drug use: No    Sexual activity: Yes Partners: Male     Birth control/protection: None   Lifestyle    Physical activity:     Days per week: 0 days     Minutes per session: 0 min    Stress: Not on file   Relationships    Social connections:     Talks on phone: Not on file     Gets together: Not on file     Attends Nondenominational service: Not on file     Active member of club or organization: Not on file     Attends meetings of clubs or organizations: Not on file     Relationship status: Not on file    Intimate partner violence:     Fear of current or ex partner: Not on file     Emotionally abused: Not on file     Physically abused: Not on file     Forced sexual activity: Not on file   Other Topics Concern    Not on file   Social History Narrative    ALWAYS SEAT BELT    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       Current Outpatient Medications:     ascorbic acid (VITAMIN C) 500 mg tablet, Take by mouth daily, Disp: , Rfl:     atorvastatin (LIPITOR) 40 mg tablet, Take 1 tablet (40 mg total) by mouth daily, Disp: 90 tablet, Rfl: 1    Cholecalciferol (D 2000) 2000 units TABS, Take by mouth daily, Disp: , Rfl:     clopidogrel (PLAVIX) 75 mg tablet, Take 1 tablet (75 mg total) by mouth daily, Disp: 90 tablet, Rfl: 1    CO ENZYME Q-10 PO, take 1 Capsule by Oral route once, Disp: , Rfl:     EDARBYCLOR 40-12 5 MG TABS, TAKE ONE TABLET BY MOUTH ONCE DAILY, Disp: 30 tablet, Rfl: 6    folic acid (FOLVITE) 1 mg tablet, Take 1 tablet (1,000 mcg total) by mouth daily, Disp: 90 tablet, Rfl: 0    gabapentin (NEURONTIN) 300 mg capsule, Take 1 PO HS x 5 days, then 1 PO BID x 5 days, then 1 PO TID, Disp: 90 capsule, Rfl: 0    metoprolol succinate (TOPROL-XL) 25 mg 24 hr tablet, Take 1 tablet (25 mg total) by mouth daily, Disp: 90 tablet, Rfl: 1    Potassium 99 MG TABS, Take 1 tablet by mouth daily, Disp: , Rfl:   Allergies   Allergen Reactions    Codeine Chest Pain     Vitals:    02/24/20 0817   BP: 110/74   Pulse: 64   Resp: 16       Physical Exam Constitutional: She is oriented to person, place, and time  She appears well-developed and well-nourished  HENT:   Head: Normocephalic and atraumatic  Right Ear: External ear normal    Left Ear: External ear normal    Eyes: Pupils are equal, round, and reactive to light  EOM are normal    Neck: Normal range of motion  Neck supple  Cardiovascular: Normal rate and regular rhythm  Pulmonary/Chest: Effort normal and breath sounds normal    Abdominal: Bowel sounds are normal    Musculoskeletal: Normal range of motion  Neurological: She is alert and oriented to person, place, and time  Skin: Skin is warm and dry  Psychiatric: She has a normal mood and affect  Her behavior is normal  Judgment and thought content normal          Results:  Labs:   CBC, Coags, BMP, Mg, Phos   Lab Results   Component Value Date     9 (H) 02/10/2020    CALCIUM 10 4 (H) 01/20/2020    PHOS 2 6 (L) 01/20/2020       Imaging  Ct Parathyroid Study W Wo Contrast    Result Date: 2/14/2020  Narrative: CT  NECK  WITHOUT AND WITH CONTRAST FROM STANLEY TO SKULL BASE INDICATION: Hyperparathyroidism  Negative sestamibi  Ultrasound demonstrating a lesion inferior to the left lower pole  COMPARISON:  None  TECHNIQUE:This examination, like all CT scans performed in the [facility], was performed utilizing techniques to minimize radiation dose exposure, including the use of iterative reconstruction and automated exposure control  Both noncontrast, contrast, and post contrast delayed images were performed according to standard parathyroid protocol (4D technique) Coronal and sagittal reconstructions were performed  3D reconstructions were performed on an independent workstation, and are supplied for review  85 ml of Omnipaque 350 was injected intravenously without immediate consequence   FINDINGS: SERIAL NONCONTRAST, POST CONTRAST AND DELAYS: There is a 6 2 x 3 4 x 2 8 lesion sitting directly inferior to the lower pole of the right thyroid lobe  This lesion meets the CT enhancement criteria suspicious for parathyroid adenoma  The lesion is best seen on series 4 images 170 through 175  Additionally, a lesion measuring 4 7 x 4 3 x 7 6 mm is sitting closely applied to the posterior inferior aspect of the left thyroid lobe  This lesion meets the CT enhancement criteria typical for parathyroid adenoma, but could represent an exophytic thyroid nodule, however, by ultrasound and CT it does appear to be separate from the thyroid gland  This lesion is also best seen on series 4 images 170 through 175  VASCULAR STRUCTURES: Normal enhancement of the cervical vasculature is demonstrated with no carotid stenosis by NASCET criteria  VISUALIZED PARANASAL SINUSES: Normal  NASAL CAVITY AND NASOPHARYNX: Normal  SUPRAHYOID NECK: Normal oropharynx, oral cavity, parapharyngeal and retropharyngeal spaces  INFRAHYOID NECK: Normal oropharynx, oral cavity, parapharyngeal and retropharyngeal spaces  THYROID GLAND: There is a 7 5 x 4 7 x 5 5 mm lesion in the midportion of the right thyroid lobe which enhances very brightly on initial images, and then equalizes with the remainder of the thyroid lobe  This corresponds to a lesion seen by ultrasound  LYMPH NODES: No pathologic or enlarged adenopathy  MEDIASTINUM: Unremarkable  BONY STRUCTURES Normal  LUNG APICES: Unremarkable  Impression: 1   4 7 x 4 3 x 7 6 mm lesion sitting closely applied to the posterior aspect of the most inferior portion of the left thyroid lobe  This lesion meets the CT enhancement criteria typical for parathyroid adenoma, and was also identified by ultrasound  Although the lesion could represent an exophytic thyroid nodule, it does appear to be separate from the thyroid both by ultrasound and CT  2   6 2 x 3 4 x 2 8 mm lesion sitting inferior to the right thyroid lobe    This lesion also meets the CT enhancement criteria suspicious for parathyroid adenoma, however, the level of enhancement is not as intense as that seen on the left  That being said, both lesions could represent parathyroid adenoma  3   Brightly enhancing right thyroid nodule corresponds to a lesion seen by ultrasound previously  Workstation performed: [id number]     I reviewed the above laboratory and imaging data  Discussion/Summary:  53-year-old woman with primary hyperparathyroidism  She has what appeared to be 2 targets on CT scan  The left side appears to be the more likely adenoma, though there is some question about 1 on the right as well  With therefore plan on left parathyroidectomy, possible 4 gland exploration with intraoperative PTH monitoring  Risks of surgery including infection, bleeding, need for possible additional surgery, discussed with patient  She understands the plan and wishes to proceed as outlined

## 2020-02-24 NOTE — TELEPHONE ENCOUNTER
Pt is just coming from another doctors office- she is getting more BW done by Mateusz Diallo MD  So she is going to combine the two labs scripts

## 2020-02-24 NOTE — TELEPHONE ENCOUNTER
----- Message from Ramya Schwab DO sent at 2/23/2020  4:39 PM EST -----  Please let pt know that her bloodwork should be rechecked in next week or so- potassium was a little low on labs she did before endocrinologist visit last month- I printed order

## 2020-02-24 NOTE — PROGRESS NOTES
Surgical Oncology Follow Up       [address] [facility] [place]  [address] [facility] [place]  [facility] [address]  [phone]    Margaret Romero  1960  [id number]  [address] [facility] SURG[facility] Mary Gallardo  [address] [place] [address] [phone]    Chief Complaint   Patient presents with    Follow-up     F/U after CT parathyroid scan  Assessment/Plan:    No problem-specific Assessment & Plan notes found for this encounter  Diagnoses and all orders for this visit:    Primary hyperparathyroidism ([address] )    High serum parathyroid hormone (PTH)    Hypercalcemia        Advance Care Planning/Advance Directives:  Discussed disease status, cancer treatment plans and/or cancer treatment goals with the patient  No history exists  History of Present Illness:  Patient is a 53-year-old woman being worked up for primary hyperparathyroidism  She underwent CT scan and is now here to discuss results and make appropriate treatment plans   -Interval History:  No issues or complaints since her last visit/consult  Review of Systems:  Review of Systems   Constitutional: Positive for fatigue  HENT: Negative  Eyes: Negative  Respiratory: Negative  Cardiovascular: Negative  Gastrointestinal: Negative  Endocrine: Negative  Genitourinary: Negative  Musculoskeletal: Negative  Skin: Negative  Allergic/Immunologic: Negative  Neurological: Negative  Hematological: Negative  Psychiatric/Behavioral: Negative          Patient Active Problem List   Diagnosis    Hypercalcemia    Enlarged thyroid    Essential hypertension    Hyperlipidemia, unspecified    Prediabetes    Simple goiter    Primary hyperparathyroidism ([address] )    History of lacunar cerebrovascular accident (CVA)    Pain in both lower extremities    Bilateral low back pain with sciatica    DDD (degenerative disc disease), lumbar    Bilateral sciatica    Lumbar disc herniation    Spinal stenosis of lumbar region    Lumbar spondylosis    Lumbar radiculopathy    High serum parathyroid hormone (PTH)     Past Medical History:   Diagnosis Date    Diverticulosis     Hemorrhoids     Hypertension     Stroke [facility])      Past Surgical History:   Procedure Laterality Date    COLONOSCOPY      ONSET 2015    NOSE SURGERY      SKIN LESION EXCISION       Family History   Problem Relation Age of Onset    No Known Problems Mother     Hypertension Father     Parkinsonism Father     Hypertension Sister     Stroke Family     Glaucoma Family     Hypertension Family      Social History     Socioeconomic History    Marital status: /Civil Union     Spouse name: Not on file    Number of children: Not on file    Years of education: Not on file    Highest education level: Not on file   Occupational History    Not on file   Social Needs    Financial resource strain: Not on file    Food insecurity:     Worry: Not on file     Inability: Not on file    Transportation needs:     Medical: No     Non-medical: No   Tobacco Use    Smoking status: Never Smoker    Smokeless tobacco: Never Used   Substance and Sexual Activity    Alcohol use: Yes     Comment: SOCIAL    Drug use: No    Sexual activity: Yes     Partners: Male     Birth control/protection: None   Lifestyle    Physical activity:     Days per week: 0 days     Minutes per session: 0 min    Stress: Not on file   Relationships    Social connections:     Talks on phone: Not on file     Gets together: Not on file     Attends [employer] service: Not on file     Active member of club or organization: Not on file     Attends meetings of clubs or organizations: Not on file     Relationship status: Not on file    Intimate partner violence:     Fear of current or ex partner: Not on file     Emotionally abused: Not on file     Physically abused: Not on file     Forced sexual activity: Not on file   Other Topics Concern    Not on file   Social History Narrative    ALWAYS SEAT BELT    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       Current Outpatient Medications:     ascorbic acid (VITAMIN C) 500 mg tablet, Take by mouth daily, Disp: , Rfl:     atorvastatin (LIPITOR) 40 mg tablet, Take 1 tablet (40 mg total) by mouth daily, Disp: 90 tablet, Rfl: 1    Cholecalciferol (D 2000) 2000 units TABS, Take by mouth daily, Disp: , Rfl:     clopidogrel (PLAVIX) 75 mg tablet, Take 1 tablet (75 mg total) by mouth daily, Disp: 90 tablet, Rfl: 1    CO ENZYME Q-10 PO, take 1 Capsule by Oral route once, Disp: , Rfl:     EDARBYCLOR 40-12 5 MG TABS, TAKE ONE TABLET BY MOUTH ONCE DAILY, Disp: 30 tablet, Rfl: 6    folic acid (FOLVITE) 1 mg tablet, Take 1 tablet (1,000 mcg total) by mouth daily, Disp: 90 tablet, Rfl: 0    gabapentin (NEURONTIN) 300 mg capsule, Take 1 PO HS x 5 days, then 1 PO BID x 5 days, then 1 PO TID, Disp: 90 capsule, Rfl: 0    metoprolol succinate (TOPROL-XL) 25 mg 24 hr tablet, Take 1 tablet (25 mg total) by mouth daily, Disp: 90 tablet, Rfl: 1    Potassium 99 MG TABS, Take 1 tablet by mouth daily, Disp: , Rfl:   Allergies   Allergen Reactions    Codeine Chest Pain     Vitals:    02/24/20 0817   BP: 110/74   Pulse: 64   Resp: 16       Physical Exam   Constitutional: She is oriented to person, place, and time  She appears well-developed and well-nourished  HENT:   Head: Normocephalic and atraumatic  Right Ear: External ear normal    Left Ear: External ear normal    Eyes: Pupils are equal, round, and reactive to light  EOM are normal    Neck: Normal range of motion  Neck supple  Cardiovascular: Normal rate and regular rhythm  Pulmonary/Chest: Effort normal and breath sounds normal    Abdominal: Bowel sounds are normal    Musculoskeletal: Normal range of motion  Neurological: She is alert and oriented to person, place, and time  Skin: Skin is warm and dry  Psychiatric: She has a normal mood and affect  Her behavior is normal  Judgment and thought content normal          Results:  Labs:   CBC, Coags, BMP, Mg, Phos   Lab Results   Component Value Date     9 (H) 02/10/2020    CALCIUM 10 4 (H) 01/20/2020    PHOS 2 6 (L) 01/20/2020       Imaging  Ct Parathyroid Study W Wo Contrast    Result Date: 2/14/2020  Narrative: CT  NECK  WITHOUT AND WITH CONTRAST FROM STANLEY TO SKULL BASE INDICATION: Hyperparathyroidism  Negative sestamibi  Ultrasound demonstrating a lesion inferior to the left lower pole  COMPARISON:  None  TECHNIQUE:This examination, like all CT scans performed in the [facility], was performed utilizing techniques to minimize radiation dose exposure, including the use of iterative reconstruction and automated exposure control  Both noncontrast, contrast, and post contrast delayed images were performed according to standard parathyroid protocol (4D technique) Coronal and sagittal reconstructions were performed  3D reconstructions were performed on an independent workstation, and are supplied for review  85 ml of Omnipaque 350 was injected intravenously without immediate consequence  FINDINGS: SERIAL NONCONTRAST, POST CONTRAST AND DELAYS: There is a 6 2 x 3 4 x 2 8 lesion sitting directly inferior to the lower pole of the right thyroid lobe  This lesion meets the CT enhancement criteria suspicious for parathyroid adenoma  The lesion is best seen on series 4 images 170 through 175  Additionally, a lesion measuring 4 7 x 4 3 x 7 6 mm is sitting closely applied to the posterior inferior aspect of the left thyroid lobe  This lesion meets the CT enhancement criteria typical for parathyroid adenoma, but could represent an exophytic thyroid nodule, however, by ultrasound and CT it does appear to be separate from the thyroid gland  This lesion is also best seen on series 4 images 170 through 175   VASCULAR STRUCTURES: Normal enhancement of the cervical vasculature is demonstrated with no carotid stenosis by NASCET criteria  VISUALIZED PARANASAL SINUSES: Normal  NASAL CAVITY AND NASOPHARYNX: Normal  SUPRAHYOID NECK: Normal oropharynx, oral cavity, parapharyngeal and retropharyngeal spaces  INFRAHYOID NECK: Normal oropharynx, oral cavity, parapharyngeal and retropharyngeal spaces  THYROID GLAND: There is a 7 5 x 4 7 x 5 5 mm lesion in the midportion of the right thyroid lobe which enhances very brightly on initial images, and then equalizes with the remainder of the thyroid lobe  This corresponds to a lesion seen by ultrasound  LYMPH NODES: No pathologic or enlarged adenopathy  MEDIASTINUM: Unremarkable  BONY STRUCTURES Normal  LUNG APICES: Unremarkable  Impression: 1   4 7 x 4 3 x 7 6 mm lesion sitting closely applied to the posterior aspect of the most inferior portion of the left thyroid lobe  This lesion meets the CT enhancement criteria typical for parathyroid adenoma, and was also identified by ultrasound  Although the lesion could represent an exophytic thyroid nodule, it does appear to be separate from the thyroid both by ultrasound and CT  2   6 2 x 3 4 x 2 8 mm lesion sitting inferior to the right thyroid lobe  This lesion also meets the CT enhancement criteria suspicious for parathyroid adenoma, however, the level of enhancement is not as intense as that seen on the left  That being said, both lesions could represent parathyroid adenoma  3   Brightly enhancing right thyroid nodule corresponds to a lesion seen by ultrasound previously  Workstation performed: [id number]     I reviewed the above laboratory and imaging data  Discussion/Summary:  60-year-old woman with primary hyperparathyroidism  She has what appeared to be 2 targets on CT scan  The left side appears to be the more likely adenoma, though there is some question about 1 on the right as well    With therefore plan on left parathyroidectomy, possible 4 gland exploration with intraoperative PTH monitoring  Risks of surgery including infection, bleeding, need for possible additional surgery, discussed with patient  She understands the plan and wishes to proceed as outlined

## 2020-02-24 NOTE — PATIENT INSTRUCTIONS
Pre-Surgery Instructions:   Medication Instructions    ascorbic acid (VITAMIN C) 500 mg tablet Stop taking 1 days prior to surgery    atorvastatin (LIPITOR) 40 mg tablet Stop taking 1 days prior to surgery    Cholecalciferol (D 2000) 2000 units TABS Stop taking 1 days prior to surgery    clopidogrel (PLAVIX) 75 mg tablet Refer to Dr Ekta Louise Q-10 PO Stop taking 1 days prior to surgery    EDARBYCLOR 40-12 5 MG TABS Stop taking 1 days prior to surgery    folic acid (FOLVITE) 1 mg tablet Stop taking 1 days prior to surgery    gabapentin (NEURONTIN) 300 mg capsule Stop taking 1 days prior to surgery    metoprolol succinate (TOPROL-XL) 25 mg 24 hr tablet Take morning of surgery    Potassium 99 MG TABS Stop taking 1 days prior to surgery         Parathyroidectomy   WHAT YOU NEED TO KNOW:   A parathyroidectomy is surgery to remove part or all of your parathyroid glands  The parathyroid is made of 4 small glands that usually sit near the thyroid gland  The parathyroid glands make a hormone that controls the amount of calcium in your blood  DISCHARGE INSTRUCTIONS:   Medicines: You may need any of the following:  · NSAIDs  may decrease swelling and pain  This medicine can be bought with or without a doctor's order  This medicine can cause stomach bleeding or kidney problems in certain people  If you take blood thinner medicine, always ask your healthcare provider if NSAIDs are safe for you  Always read the medicine label and follow the directions on it before using this medicine  · Acetaminophen  decreases pain  It is available without a doctor's order  Ask how much to take and how often to take it  Follow directions  Acetaminophen can cause liver damage if not taken correctly  · Take your medicine as directed  Contact your healthcare provider if you think your medicine is not helping or if you have side effects  Tell him or her if you are allergic to any medicine   Keep a list of the medicines, vitamins, and herbs you take  Include the amounts, and when and why you take them  Bring the list or the pill bottles to follow-up visits  Carry your medicine list with you in case of an emergency  Follow up with your healthcare provider or surgeon as directed: You will need to return to have tests, your incision checked, and your drain or stitches removed  You may be referred to an endocrinologist  Write down your questions so you remember to ask them during your visits  Wound care:  Care for your wound as directed  You may need to carefully wash the wound with soap and water  Dry the area and put on new, clean bandages as directed  Change your bandages when they get wet or dirty  Check your drain when you change the bandages  Do not pull the drain out  Ask for more information about how to care for your drain  Rest as needed:  Slowly start to do more each day  Return to your daily activities as directed  Take supplements as directed: You may need to take calcium medicine to keep your blood calcium level normal  It may also help prevent and treat bone loss  Your healthcare provider may also tell you to take vitamin D to help your body absorb the calcium  Contact your healthcare provider or surgeon if:   · You have a fever  · Your wound is red, swollen, or draining pus  · You have pain in your neck area that does not go away, or gets worse even after you take your pain medicine  · You have chills, a cough, or feel weak and achy  · You have nausea or are vomiting  · You have questions or concerns about your condition or care  Seek care immediately or call 911 if:   · You cough up blood  · You feel lightheaded, short of breath, and have chest pain  · Your arm or leg feels warm, tender, and painful  It may look swollen and red  · You have trouble swallowing or talking, or you lose your voice  · You feel anxious, frightened, and uneasy      · You have the following symptoms of low blood calcium:     ¨ Confusion    ¨ Fatigue    ¨ Muscle spasms or muscle tightening    ¨ Numbness or tingling around your face, hands, or feet    ¨ A seizure  © 2017 [employer] Information is for End User's use only and may not be sold, redistributed or otherwise used for commercial purposes  All illustrations and images included in CareNotes® are the copyrighted property of [employer]  or [employer]anner Ke[employer]  The above information is an  only  It is not intended as medical advice for individual conditions or treatments  Talk to your doctor, nurse or pharmacist before following any medical regimen to see if it is safe and effective for you

## 2020-02-25 ENCOUNTER — HOSPITAL ENCOUNTER (OUTPATIENT)
Facility: HOSPITAL | Age: 60
Setting detail: OUTPATIENT SURGERY
End: 2020-02-25
Attending: SURGERY | Admitting: SURGERY
Payer: COMMERCIAL

## 2020-03-04 NOTE — TELEPHONE ENCOUNTER
Left message for patient to call the office to schedule FU appointment with Dr Robbi Kat in the [facility] for May 2020

## 2020-03-07 ENCOUNTER — TRANSCRIBE ORDERS (OUTPATIENT)
Dept: ADMINISTRATIVE | Age: 60
End: 2020-03-07

## 2020-03-07 ENCOUNTER — APPOINTMENT (OUTPATIENT)
Dept: LAB | Age: 60
End: 2020-03-07
Payer: COMMERCIAL

## 2020-03-07 ENCOUNTER — LAB (OUTPATIENT)
Dept: LAB | Age: 60
End: 2020-03-07
Payer: COMMERCIAL

## 2020-03-07 ENCOUNTER — APPOINTMENT (OUTPATIENT)
Dept: RADIOLOGY | Age: 60
End: 2020-03-07
Payer: COMMERCIAL

## 2020-03-07 DIAGNOSIS — Z00.8 HEALTH EXAMINATION IN POPULATION SURVEY: ICD-10-CM

## 2020-03-07 DIAGNOSIS — Z00.8 HEALTH EXAMINATION IN POPULATION SURVEY: Primary | ICD-10-CM

## 2020-03-07 DIAGNOSIS — E21.0 PRIMARY HYPERPARATHYROIDISM (HCC): ICD-10-CM

## 2020-03-07 LAB
ALBUMIN SERPL BCP-MCNC: 3.8 G/DL (ref 3.5–5)
ALP SERPL-CCNC: 104 U/L (ref 46–116)
ALT SERPL W P-5'-P-CCNC: 27 U/L (ref 12–78)
ANION GAP SERPL CALCULATED.3IONS-SCNC: 6 MMOL/L (ref 4–13)
AST SERPL W P-5'-P-CCNC: 15 U/L (ref 5–45)
BASOPHILS # BLD AUTO: 0.06 THOUSANDS/ΜL (ref 0–0.1)
BASOPHILS NFR BLD AUTO: 1 % (ref 0–1)
BILIRUB SERPL-MCNC: 0.47 MG/DL (ref 0.2–1)
BUN SERPL-MCNC: 15 MG/DL (ref 5–25)
CALCIUM ALBUM COR SERPL-MCNC: 10.6 MG/DL (ref 8.3–10.1)
CALCIUM SERPL-MCNC: 10.4 MG/DL (ref 8.3–10.1)
CHLORIDE SERPL-SCNC: 106 MMOL/L (ref 100–108)
CHOLEST SERPL-MCNC: 122 MG/DL (ref 50–200)
CO2 SERPL-SCNC: 29 MMOL/L (ref 21–32)
CREAT SERPL-MCNC: 0.64 MG/DL (ref 0.6–1.3)
EOSINOPHIL # BLD AUTO: 0.25 THOUSAND/ΜL (ref 0–0.61)
EOSINOPHIL NFR BLD AUTO: 4 % (ref 0–6)
ERYTHROCYTE [DISTWIDTH] IN BLOOD BY AUTOMATED COUNT: 12.2 % (ref 11.6–15.1)
EST. AVERAGE GLUCOSE BLD GHB EST-MCNC: 120 MG/DL
GFR SERPL CREATININE-BSD FRML MDRD: 99 ML/MIN/1.73SQ M
GLUCOSE SERPL-MCNC: 87 MG/DL (ref 65–140)
HBA1C MFR BLD: 5.8 %
HCT VFR BLD AUTO: 42.8 % (ref 34.8–46.1)
HDLC SERPL-MCNC: 57 MG/DL
HGB BLD-MCNC: 14.3 G/DL (ref 11.5–15.4)
IMM GRANULOCYTES # BLD AUTO: 0.01 THOUSAND/UL (ref 0–0.2)
IMM GRANULOCYTES NFR BLD AUTO: 0 % (ref 0–2)
LDLC SERPL CALC-MCNC: 50 MG/DL (ref 0–100)
LYMPHOCYTES # BLD AUTO: 2.13 THOUSANDS/ΜL (ref 0.6–4.47)
LYMPHOCYTES NFR BLD AUTO: 34 % (ref 14–44)
MCH RBC QN AUTO: 29.9 PG (ref 26.8–34.3)
MCHC RBC AUTO-ENTMCNC: 33.4 G/DL (ref 31.4–37.4)
MCV RBC AUTO: 89 FL (ref 82–98)
MONOCYTES # BLD AUTO: 0.48 THOUSAND/ΜL (ref 0.17–1.22)
MONOCYTES NFR BLD AUTO: 8 % (ref 4–12)
NEUTROPHILS # BLD AUTO: 3.4 THOUSANDS/ΜL (ref 1.85–7.62)
NEUTS SEG NFR BLD AUTO: 53 % (ref 43–75)
NONHDLC SERPL-MCNC: 65 MG/DL
NRBC BLD AUTO-RTO: 0 /100 WBCS
PLATELET # BLD AUTO: 316 THOUSANDS/UL (ref 149–390)
PMV BLD AUTO: 9.3 FL (ref 8.9–12.7)
POTASSIUM SERPL-SCNC: 4 MMOL/L (ref 3.5–5.3)
PROT SERPL-MCNC: 7 G/DL (ref 6.4–8.2)
RBC # BLD AUTO: 4.79 MILLION/UL (ref 3.81–5.12)
SODIUM SERPL-SCNC: 141 MMOL/L (ref 136–145)
TRIGL SERPL-MCNC: 75 MG/DL
WBC # BLD AUTO: 6.33 THOUSAND/UL (ref 4.31–10.16)

## 2020-03-07 PROCEDURE — 80053 COMPREHEN METABOLIC PANEL: CPT

## 2020-03-07 PROCEDURE — 85025 COMPLETE CBC W/AUTO DIFF WBC: CPT

## 2020-03-07 PROCEDURE — 80061 LIPID PANEL: CPT

## 2020-03-07 PROCEDURE — 93005 ELECTROCARDIOGRAM TRACING: CPT

## 2020-03-07 PROCEDURE — 36415 COLL VENOUS BLD VENIPUNCTURE: CPT

## 2020-03-07 PROCEDURE — 83036 HEMOGLOBIN GLYCOSYLATED A1C: CPT

## 2020-03-07 PROCEDURE — 71046 X-RAY EXAM CHEST 2 VIEWS: CPT

## 2020-03-09 LAB
ATRIAL RATE: 71 BPM
P AXIS: 64 DEGREES
PR INTERVAL: 220 MS
QRS AXIS: 45 DEGREES
QRSD INTERVAL: 86 MS
QT INTERVAL: 366 MS
QTC INTERVAL: 397 MS
T WAVE AXIS: 49 DEGREES
VENTRICULAR RATE: 71 BPM

## 2020-03-09 PROCEDURE — 93010 ELECTROCARDIOGRAM REPORT: CPT | Performed by: INTERNAL MEDICINE

## 2020-03-10 ENCOUNTER — HOSPITAL ENCOUNTER (OUTPATIENT)
Dept: RADIOLOGY | Facility: CLINIC | Age: 60
Discharge: HOME | End: 2020-03-10
Payer: COMMERCIAL

## 2020-03-10 VITALS
SYSTOLIC BLOOD PRESSURE: 113 MMHG | TEMPERATURE: 98.6 F | HEART RATE: 62 BPM | OXYGEN SATURATION: 96 % | DIASTOLIC BLOOD PRESSURE: 78 MMHG | RESPIRATION RATE: 20 BRPM

## 2020-03-10 DIAGNOSIS — M54.16 LUMBAR RADICULOPATHY: ICD-10-CM

## 2020-03-10 DIAGNOSIS — M48.061 SPINAL STENOSIS OF LUMBAR REGION, UNSPECIFIED WHETHER NEUROGENIC CLAUDICATION PRESENT: ICD-10-CM

## 2020-03-10 PROCEDURE — 64483 NJX AA&/STRD TFRM EPI L/S 1: CPT | Performed by: ANESTHESIOLOGY

## 2020-03-10 RX ORDER — PAPAVERINE HCL 150 MG
20 CAPSULE, EXTENDED RELEASE ORAL ONCE
Status: COMPLETED | OUTPATIENT
Start: 2020-03-10 | End: 2020-03-10

## 2020-03-10 RX ORDER — LIDOCAINE HYDROCHLORIDE 10 MG/ML
5 INJECTION, SOLUTION EPIDURAL; INFILTRATION; INTRACAUDAL; PERINEURAL ONCE
Status: COMPLETED | OUTPATIENT
Start: 2020-03-10 | End: 2020-03-10

## 2020-03-10 RX ADMIN — LIDOCAINE HYDROCHLORIDE 2 ML: 20 INJECTION, SOLUTION EPIDURAL; INFILTRATION; INTRACAUDAL; PERINEURAL at 15:34

## 2020-03-10 RX ADMIN — IOHEXOL 2 ML: 300 INJECTION, SOLUTION INTRAVENOUS at 15:32

## 2020-03-10 RX ADMIN — DEXAMETHASONE SODIUM PHOSPHATE 15 MG: 10 INJECTION, SOLUTION INTRAMUSCULAR; INTRAVENOUS at 15:34

## 2020-03-10 RX ADMIN — LIDOCAINE HYDROCHLORIDE 4 ML: 10 INJECTION, SOLUTION EPIDURAL; INFILTRATION; INTRACAUDAL; PERINEURAL at 15:29

## 2020-03-10 NOTE — DISCHARGE INSTRUCTIONS
Epidural Steroid Injection   WHAT YOU NEED TO KNOW:   An epidural steroid injection (YELITZA) is a procedure to inject steroid medicine into the epidural space  The epidural space is between your spinal cord and vertebrae  Steroids reduce inflammation and fluid buildup in your spine that may be causing pain  You may be given pain medicine along with the steroids  ACTIVITY  · Do not drive or operate machinery today  · No strenuous activity today - bending, lifting, etc   · You may resume normal activites starting tomorrow - start slowly and as tolerated  · You may shower today, but no tub baths or hot tubs  · You may have numbness for several hours from the local anesthetic  Please use caution and common sense, especially with weight-bearing activities  CARE OF THE INJECTION SITE  · If you have soreness or pain, apply ice to the area today (20 minutes on/20 minutes off)  · Starting tomorrow, you may use warm, moist heat or ice if needed  · You may have an increase or change in your discomfort for 36-48 hours after your treatment  · Apply ice and continue with any pain medication you have been prescribed  · Notify the [facility] if you have any of the following: redness, drainage, swelling, headache, stiff neck or fever above 100°F     SPECIAL INSTRUCTIONS  · Our office will contact you in approximately 7 days for a progress report  MEDICATIONS  · Continue to take all routine medications  · Our office may have instructed you to hold some medications  If you have a problem specifically related to your procedure, please call our office at [phone]  Problems not related to your procedure should be directed to your primary care physician

## 2020-03-10 NOTE — H&P
History of Present Illness: The patient is a 64 y o  female who presents with complaints of low back and leg pain      Patient Active Problem List   Diagnosis    Hypercalcemia    Enlarged thyroid    Essential hypertension    Hyperlipidemia, unspecified    Prediabetes    Simple goiter    Primary hyperparathyroidism ([address] )    History of lacunar cerebrovascular accident (CVA)    Pain in both lower extremities    Bilateral low back pain with sciatica    DDD (degenerative disc disease), lumbar    Bilateral sciatica    Lumbar disc herniation    Spinal stenosis of lumbar region    Lumbar spondylosis    Lumbar radiculopathy    High serum parathyroid hormone (PTH)       Past Medical History:   Diagnosis Date    Diverticulosis     Hemorrhoids     Hypertension     Stroke [facility])        Past Surgical History:   Procedure Laterality Date    COLONOSCOPY      ONSET 2015    NOSE SURGERY      SKIN BIOPSY      SKIN LESION EXCISION           Current Outpatient Medications:     ascorbic acid (VITAMIN C) 500 mg tablet, Take by mouth daily, Disp: , Rfl:     atorvastatin (LIPITOR) 40 mg tablet, Take 1 tablet (40 mg total) by mouth daily, Disp: 90 tablet, Rfl: 1    Cholecalciferol (D 2000) 2000 units TABS, Take by mouth daily, Disp: , Rfl:     clopidogrel (PLAVIX) 75 mg tablet, Take 1 tablet (75 mg total) by mouth daily, Disp: 90 tablet, Rfl: 1    CO ENZYME Q-10 PO, daily , Disp: , Rfl:     EDARBYCLOR 40-12 5 MG TABS, TAKE ONE TABLET BY MOUTH ONCE DAILY, Disp: 30 tablet, Rfl: 6    folic acid (FOLVITE) 1 mg tablet, Take 1 tablet (1,000 mcg total) by mouth daily, Disp: 90 tablet, Rfl: 0    gabapentin (NEURONTIN) 300 mg capsule, Take 1 PO HS x 5 days, then 1 PO BID x 5 days, then 1 PO TID (Patient taking differently: Take 300 mg by mouth daily at bedtime Take 1 PO HS x 5 days, then 1 PO BID x 5 days, then 1 PO TID), Disp: 90 capsule, Rfl: 0    metoprolol succinate (TOPROL-XL) 25 mg 24 hr tablet, Take 1 tablet (25 mg total) by mouth daily, Disp: 90 tablet, Rfl: 1    Potassium 99 MG TABS, Take 1 tablet by mouth daily, Disp: , Rfl:     traMADol (ULTRAM) 50 mg tablet, Take 1 tablet (50 mg total) by mouth every 6 (six) hours as needed for moderate pain, Disp: 10 tablet, Rfl: 0    Allergies   Allergen Reactions    Codeine Chest Pain       Physical Exam:   Vitals:    03/10/20 1508   BP: 113/76   Pulse: 66   Resp: 18   Temp: 98 6 °F (37 °C)   SpO2: 96%     General: Awake, Alert, Oriented x 3, Mood and affect appropriate  Respiratory: Respirations even and unlabored  Cardiovascular: Peripheral pulses intact; no edema  Musculoskeletal Exam:  Bilateral lumbar paraspinals tender to palpation  ASA Score: 3    Patient/Chart Verification  Patient ID Verified: Verbal  ID Band Applied: No  Consents Confirmed: Procedural  H&P( within 30 days) Verified: To be obtained in the Pre-Procedure area  Interval H&P(within 24 hr) Complete (required for Outpatients and Surgery Admit only): To be obtained in the Pre-Procedure area  Allergies Reviewed: Yes  Anticoag/NSAID held?: Yes(stopped Plavix on 3/3)  Currently on antibiotics?: No  Pre-op Lab/Test Results Available: N/A  Pregnancy Lab Collected: N/A comment    Assessment:   1  Lumbar radiculopathy    2   Spinal stenosis of lumbar region, unspecified whether neurogenic claudication present        Plan: B/L L4 TFESI

## 2020-03-12 ENCOUNTER — TELEPHONE (OUTPATIENT)
Dept: FAMILY MEDICINE CLINIC | Facility: CLINIC | Age: 60
End: 2020-03-12

## 2020-03-12 ENCOUNTER — TELEPHONE (OUTPATIENT)
Dept: SURGICAL ONCOLOGY | Facility: CLINIC | Age: 60
End: 2020-03-12

## 2020-03-12 ENCOUNTER — TELEPHONE (OUTPATIENT)
Dept: PAIN MEDICINE | Facility: MEDICAL CENTER | Age: 60
End: 2020-03-12

## 2020-03-12 DIAGNOSIS — M54.16 LUMBAR RADICULOPATHY: ICD-10-CM

## 2020-03-12 DIAGNOSIS — M48.061 SPINAL STENOSIS OF LUMBAR REGION, UNSPECIFIED WHETHER NEUROGENIC CLAUDICATION PRESENT: ICD-10-CM

## 2020-03-12 RX ORDER — GABAPENTIN 300 MG/1
300 CAPSULE ORAL 3 TIMES DAILY
Qty: 90 CAPSULE | Refills: 0 | Status: SHIPPED | OUTPATIENT
Start: 2020-03-12 | End: 2020-06-05 | Stop reason: HOSPADM

## 2020-03-12 NOTE — TELEPHONE ENCOUNTER
Edith of Dr Raffi Encarnacion called stating the patient is having a procedure on 3/26/2020 and needs guidance regarding plavix  She stated the patient is concerned that she is off plavix the first week of March and is concerned that she will be off them again at the end of the month  Edith took out fax number and is sending form regarding plavix instructions  Thank you

## 2020-03-17 ENCOUNTER — TELEPHONE (OUTPATIENT)
Dept: SURGICAL ONCOLOGY | Facility: CLINIC | Age: 60
End: 2020-03-17

## 2020-03-17 ENCOUNTER — TELEPHONE (OUTPATIENT)
Dept: PAIN MEDICINE | Facility: CLINIC | Age: 60
End: 2020-03-17

## 2020-04-09 ENCOUNTER — OFFICE VISIT (OUTPATIENT)
Dept: URGENT CARE | Age: 60
End: 2020-04-09
Payer: COMMERCIAL

## 2020-04-09 VITALS
RESPIRATION RATE: 18 BRPM | OXYGEN SATURATION: 98 % | TEMPERATURE: 98 F | HEART RATE: 67 BPM | DIASTOLIC BLOOD PRESSURE: 74 MMHG | SYSTOLIC BLOOD PRESSURE: 143 MMHG

## 2020-04-09 DIAGNOSIS — B35.1 FUNGAL TOENAIL INFECTION: Primary | ICD-10-CM

## 2020-04-09 PROCEDURE — G0382 LEV 3 HOSP TYPE B ED VISIT: HCPCS | Performed by: NURSE PRACTITIONER

## 2020-04-09 RX ORDER — CLINDAMYCIN HYDROCHLORIDE 300 MG/1
300 CAPSULE ORAL 3 TIMES DAILY
Qty: 21 CAPSULE | Refills: 0 | Status: SHIPPED | OUTPATIENT
Start: 2020-04-09 | End: 2020-04-16

## 2020-04-13 ENCOUNTER — TELEPHONE (OUTPATIENT)
Dept: SURGICAL ONCOLOGY | Facility: CLINIC | Age: 60
End: 2020-04-13

## 2020-04-23 ENCOUNTER — TELEPHONE (OUTPATIENT)
Dept: NEPHROLOGY | Facility: CLINIC | Age: 60
End: 2020-04-23

## 2020-05-04 ENCOUNTER — TELEPHONE (OUTPATIENT)
Dept: NEPHROLOGY | Facility: CLINIC | Age: 60
End: 2020-05-04

## 2020-05-05 ENCOUNTER — TELEMEDICINE (OUTPATIENT)
Dept: NEPHROLOGY | Facility: CLINIC | Age: 60
End: 2020-05-05
Payer: COMMERCIAL

## 2020-05-05 VITALS — SYSTOLIC BLOOD PRESSURE: 120 MMHG | DIASTOLIC BLOOD PRESSURE: 74 MMHG

## 2020-05-05 DIAGNOSIS — I10 ESSENTIAL HYPERTENSION: Primary | ICD-10-CM

## 2020-05-05 DIAGNOSIS — E21.0 PRIMARY HYPERPARATHYROIDISM (HCC): ICD-10-CM

## 2020-05-05 PROCEDURE — 99214 OFFICE O/P EST MOD 30 MIN: CPT | Performed by: INTERNAL MEDICINE

## 2020-05-06 ENCOUNTER — TELEPHONE (OUTPATIENT)
Dept: SURGICAL ONCOLOGY | Facility: CLINIC | Age: 60
End: 2020-05-06

## 2020-05-08 ENCOUNTER — PREP FOR PROCEDURE (OUTPATIENT)
Dept: SURGICAL ONCOLOGY | Facility: CLINIC | Age: 60
End: 2020-05-08

## 2020-05-08 DIAGNOSIS — E21.3 HYPERPARATHYROIDISM (HCC): Primary | ICD-10-CM

## 2020-05-15 ENCOUNTER — TELEPHONE (OUTPATIENT)
Dept: PAIN MEDICINE | Facility: CLINIC | Age: 60
End: 2020-05-15

## 2020-05-20 ENCOUNTER — TELEMEDICINE (OUTPATIENT)
Dept: FAMILY MEDICINE CLINIC | Facility: CLINIC | Age: 60
End: 2020-05-20
Payer: COMMERCIAL

## 2020-05-20 DIAGNOSIS — R73.03 PREDIABETES: ICD-10-CM

## 2020-05-20 DIAGNOSIS — E21.0 PRIMARY HYPERPARATHYROIDISM (HCC): ICD-10-CM

## 2020-05-20 DIAGNOSIS — I10 ESSENTIAL HYPERTENSION: Primary | ICD-10-CM

## 2020-05-20 DIAGNOSIS — I63.81 LACUNAR STROKE (HCC): ICD-10-CM

## 2020-05-20 PROCEDURE — 99441 PR PHYS/QHP TELEPHONE EVALUATION 5-10 MIN: CPT | Performed by: FAMILY MEDICINE

## 2020-05-20 RX ORDER — FOLIC ACID 1 MG/1
1000 TABLET ORAL DAILY
Qty: 90 TABLET | Refills: 0 | Status: SHIPPED | OUTPATIENT
Start: 2020-05-20 | End: 2020-08-24 | Stop reason: SDUPTHER

## 2020-05-27 VITALS — SYSTOLIC BLOOD PRESSURE: 120 MMHG | DIASTOLIC BLOOD PRESSURE: 74 MMHG

## 2020-06-03 ENCOUNTER — TELEPHONE (OUTPATIENT)
Dept: SURGICAL ONCOLOGY | Facility: CLINIC | Age: 60
End: 2020-06-03

## 2020-06-05 ENCOUNTER — OFFICE VISIT (OUTPATIENT)
Dept: SURGICAL ONCOLOGY | Facility: CLINIC | Age: 60
End: 2020-06-05
Payer: COMMERCIAL

## 2020-06-05 ENCOUNTER — APPOINTMENT (OUTPATIENT)
Dept: LAB | Facility: MEDICAL CENTER | Age: 60
End: 2020-06-05
Payer: COMMERCIAL

## 2020-06-05 VITALS
DIASTOLIC BLOOD PRESSURE: 80 MMHG | TEMPERATURE: 96.9 F | HEIGHT: 64 IN | WEIGHT: 172 LBS | HEART RATE: 64 BPM | BODY MASS INDEX: 29.37 KG/M2 | RESPIRATION RATE: 16 BRPM | SYSTOLIC BLOOD PRESSURE: 102 MMHG

## 2020-06-05 DIAGNOSIS — E83.52 HYPERCALCEMIA: ICD-10-CM

## 2020-06-05 DIAGNOSIS — E21.0 PRIMARY HYPERPARATHYROIDISM (HCC): Primary | ICD-10-CM

## 2020-06-05 DIAGNOSIS — R79.89 HIGH SERUM PARATHYROID HORMONE (PTH): ICD-10-CM

## 2020-06-05 DIAGNOSIS — E21.3 HYPERPARATHYROIDISM (HCC): ICD-10-CM

## 2020-06-05 LAB
ALBUMIN SERPL BCP-MCNC: 4 G/DL (ref 3.5–5)
ALP SERPL-CCNC: 92 U/L (ref 46–116)
ALT SERPL W P-5'-P-CCNC: 41 U/L (ref 12–78)
ANION GAP SERPL CALCULATED.3IONS-SCNC: 5 MMOL/L (ref 4–13)
AST SERPL W P-5'-P-CCNC: 18 U/L (ref 5–45)
BASOPHILS # BLD AUTO: 0.05 THOUSANDS/ΜL (ref 0–0.1)
BASOPHILS NFR BLD AUTO: 1 % (ref 0–1)
BILIRUB SERPL-MCNC: 0.38 MG/DL (ref 0.2–1)
BUN SERPL-MCNC: 21 MG/DL (ref 5–25)
CALCIUM ALBUM COR SERPL-MCNC: 10.6 MG/DL (ref 8.3–10.1)
CALCIUM SERPL-MCNC: 10.6 MG/DL (ref 8.3–10.1)
CHLORIDE SERPL-SCNC: 104 MMOL/L (ref 100–108)
CO2 SERPL-SCNC: 28 MMOL/L (ref 21–32)
CREAT SERPL-MCNC: 0.7 MG/DL (ref 0.6–1.3)
EOSINOPHIL # BLD AUTO: 0.25 THOUSAND/ΜL (ref 0–0.61)
EOSINOPHIL NFR BLD AUTO: 3 % (ref 0–6)
ERYTHROCYTE [DISTWIDTH] IN BLOOD BY AUTOMATED COUNT: 12.5 % (ref 11.6–15.1)
GFR SERPL CREATININE-BSD FRML MDRD: 96 ML/MIN/1.73SQ M
GLUCOSE P FAST SERPL-MCNC: 92 MG/DL (ref 65–99)
HCT VFR BLD AUTO: 44.1 % (ref 34.8–46.1)
HGB BLD-MCNC: 14.3 G/DL (ref 11.5–15.4)
IMM GRANULOCYTES # BLD AUTO: 0.03 THOUSAND/UL (ref 0–0.2)
IMM GRANULOCYTES NFR BLD AUTO: 0 % (ref 0–2)
LYMPHOCYTES # BLD AUTO: 3.2 THOUSANDS/ΜL (ref 0.6–4.47)
LYMPHOCYTES NFR BLD AUTO: 38 % (ref 14–44)
MCH RBC QN AUTO: 29.4 PG (ref 26.8–34.3)
MCHC RBC AUTO-ENTMCNC: 32.4 G/DL (ref 31.4–37.4)
MCV RBC AUTO: 91 FL (ref 82–98)
MONOCYTES # BLD AUTO: 0.65 THOUSAND/ΜL (ref 0.17–1.22)
MONOCYTES NFR BLD AUTO: 8 % (ref 4–12)
NEUTROPHILS # BLD AUTO: 4.33 THOUSANDS/ΜL (ref 1.85–7.62)
NEUTS SEG NFR BLD AUTO: 50 % (ref 43–75)
NRBC BLD AUTO-RTO: 0 /100 WBCS
PLATELET # BLD AUTO: 326 THOUSANDS/UL (ref 149–390)
PMV BLD AUTO: 9.5 FL (ref 8.9–12.7)
POTASSIUM SERPL-SCNC: 4.1 MMOL/L (ref 3.5–5.3)
PROT SERPL-MCNC: 7.5 G/DL (ref 6.4–8.2)
RBC # BLD AUTO: 4.87 MILLION/UL (ref 3.81–5.12)
SODIUM SERPL-SCNC: 137 MMOL/L (ref 136–145)
WBC # BLD AUTO: 8.51 THOUSAND/UL (ref 4.31–10.16)

## 2020-06-05 PROCEDURE — 85025 COMPLETE CBC W/AUTO DIFF WBC: CPT

## 2020-06-05 PROCEDURE — 99213 OFFICE O/P EST LOW 20 MIN: CPT | Performed by: SURGERY

## 2020-06-05 PROCEDURE — 3008F BODY MASS INDEX DOCD: CPT | Performed by: SURGERY

## 2020-06-05 PROCEDURE — 3074F SYST BP LT 130 MM HG: CPT | Performed by: SURGERY

## 2020-06-05 PROCEDURE — 3079F DIAST BP 80-89 MM HG: CPT | Performed by: SURGERY

## 2020-06-05 PROCEDURE — 1036F TOBACCO NON-USER: CPT | Performed by: SURGERY

## 2020-06-05 PROCEDURE — 80053 COMPREHEN METABOLIC PANEL: CPT

## 2020-06-05 PROCEDURE — 36415 COLL VENOUS BLD VENIPUNCTURE: CPT

## 2020-06-10 ENCOUNTER — TELEPHONE (OUTPATIENT)
Dept: RADIOLOGY | Facility: CLINIC | Age: 60
End: 2020-06-10

## 2020-06-11 ENCOUNTER — DOCUMENTATION (OUTPATIENT)
Dept: URGENT CARE | Age: 60
End: 2020-06-11

## 2020-06-11 ENCOUNTER — APPOINTMENT (OUTPATIENT)
Dept: URGENT CARE | Age: 60
End: 2020-06-11
Payer: COMMERCIAL

## 2020-06-11 DIAGNOSIS — E21.3 HYPERPARATHYROIDISM (HCC): ICD-10-CM

## 2020-06-11 PROCEDURE — U0003 INFECTIOUS AGENT DETECTION BY NUCLEIC ACID (DNA OR RNA); SEVERE ACUTE RESPIRATORY SYNDROME CORONAVIRUS 2 (SARS-COV-2) (CORONAVIRUS DISEASE [COVID-19]), AMPLIFIED PROBE TECHNIQUE, MAKING USE OF HIGH THROUGHPUT TECHNOLOGIES AS DESCRIBED BY CMS-2020-01-R: HCPCS | Performed by: PHYSICIAN ASSISTANT

## 2020-06-13 LAB — SARS-COV-2 RNA SPEC QL NAA+PROBE: NOT DETECTED

## 2020-06-17 ENCOUNTER — ANESTHESIA EVENT (OUTPATIENT)
Dept: PERIOP | Facility: HOSPITAL | Age: 60
End: 2020-06-17
Payer: COMMERCIAL

## 2020-06-18 ENCOUNTER — ANESTHESIA (OUTPATIENT)
Dept: PERIOP | Facility: HOSPITAL | Age: 60
End: 2020-06-18
Payer: COMMERCIAL

## 2020-06-18 ENCOUNTER — HOSPITAL ENCOUNTER (OUTPATIENT)
Facility: HOSPITAL | Age: 60
Setting detail: OUTPATIENT SURGERY
Discharge: HOME | End: 2020-06-18
Attending: SURGERY | Admitting: SURGERY
Payer: COMMERCIAL

## 2020-06-18 VITALS
OXYGEN SATURATION: 96 % | HEART RATE: 90 BPM | DIASTOLIC BLOOD PRESSURE: 75 MMHG | BODY MASS INDEX: 29.37 KG/M2 | WEIGHT: 172 LBS | TEMPERATURE: 99.1 F | SYSTOLIC BLOOD PRESSURE: 119 MMHG | HEIGHT: 64 IN | RESPIRATION RATE: 18 BRPM

## 2020-06-18 DIAGNOSIS — E21.3 HYPERPARATHYROIDISM (HCC): ICD-10-CM

## 2020-06-18 LAB
CALCIUM SERPL-MCNC: 10 MG/DL (ref 8.3–10.1)
CALCIUM SERPL-MCNC: 9.6 MG/DL (ref 8.3–10.1)
CALCIUM SERPL-MCNC: 9.9 MG/DL (ref 8.3–10.1)
PTH-INTACT SERPL-MCNC: 143.6 PG/ML (ref 18.4–80.1)
PTH-INTACT SERPL-MCNC: 20.8 PG/ML (ref 18.4–80.1)
PTH-INTACT SERPL-MCNC: 37.9 PG/ML (ref 18.4–80.1)
PTH-INTACT SERPL-MCNC: 68.9 PG/ML (ref 18.4–80.1)
PTH-INTACT SERPL-MCNC: 75.1 PG/ML (ref 18.4–80.1)
PTH-INTACT SERPL-MCNC: 75.7 PG/ML (ref 18.4–80.1)
PTH-INTACT SERPL-MCNC: 76 PG/ML (ref 18.4–80.1)
PTH-INTACT SERPL-MCNC: 8.8 PG/ML (ref 18.4–80.1)
PTH-INTACT SERPL-MCNC: 88 PG/ML (ref 18.4–80.1)
PTH-INTACT SERPL-MCNC: 94.4 PG/ML (ref 18.4–80.1)
PTH-INTACT SERPL-MCNC: 97.1 PG/ML (ref 18.4–80.1)
PTH-INTACT SERPL-MCNC: <6.3 PG/ML (ref 18.4–80.1)
PTH-INTACT SERPL-MCNC: <6.3 PG/ML (ref 18.4–80.1)

## 2020-06-18 PROCEDURE — 88305 TISSUE EXAM BY PATHOLOGIST: CPT | Performed by: PATHOLOGY

## 2020-06-18 PROCEDURE — 83970 ASSAY OF PARATHORMONE: CPT | Performed by: PHYSICIAN ASSISTANT

## 2020-06-18 PROCEDURE — 60500 EXPLORE PARATHYROID GLANDS: CPT | Performed by: PHYSICIAN ASSISTANT

## 2020-06-18 PROCEDURE — 82310 ASSAY OF CALCIUM: CPT | Performed by: SURGERY

## 2020-06-18 PROCEDURE — 99024 POSTOP FOLLOW-UP VISIT: CPT | Performed by: SURGERY

## 2020-06-18 PROCEDURE — 83970 ASSAY OF PARATHORMONE: CPT | Performed by: SURGERY

## 2020-06-18 PROCEDURE — 88331 PATH CONSLTJ SURG 1 BLK 1SPC: CPT | Performed by: PATHOLOGY

## 2020-06-18 PROCEDURE — NC001 PR NO CHARGE: Performed by: PHYSICIAN ASSISTANT

## 2020-06-18 PROCEDURE — 82310 ASSAY OF CALCIUM: CPT | Performed by: PHYSICIAN ASSISTANT

## 2020-06-18 PROCEDURE — 60500 EXPLORE PARATHYROID GLANDS: CPT | Performed by: SURGERY

## 2020-06-18 RX ORDER — DIPHENHYDRAMINE HYDROCHLORIDE 50 MG/ML
12.5 INJECTION INTRAMUSCULAR; INTRAVENOUS ONCE AS NEEDED
Status: DISCONTINUED | OUTPATIENT
Start: 2020-06-18 | End: 2020-06-18 | Stop reason: HOSPADM

## 2020-06-18 RX ORDER — SODIUM CHLORIDE, SODIUM LACTATE, POTASSIUM CHLORIDE, CALCIUM CHLORIDE 600; 310; 30; 20 MG/100ML; MG/100ML; MG/100ML; MG/100ML
INJECTION, SOLUTION INTRAVENOUS CONTINUOUS PRN
Status: DISCONTINUED | OUTPATIENT
Start: 2020-06-18 | End: 2020-06-18 | Stop reason: SURG

## 2020-06-18 RX ORDER — TRAMADOL HYDROCHLORIDE 50 MG/1
50 TABLET ORAL EVERY 6 HOURS PRN
Status: DISCONTINUED | OUTPATIENT
Start: 2020-06-18 | End: 2020-06-18 | Stop reason: HOSPADM

## 2020-06-18 RX ORDER — BUPIVACAINE HYDROCHLORIDE 2.5 MG/ML
INJECTION, SOLUTION EPIDURAL; INFILTRATION; INTRACAUDAL AS NEEDED
Status: DISCONTINUED | OUTPATIENT
Start: 2020-06-18 | End: 2020-06-18 | Stop reason: HOSPADM

## 2020-06-18 RX ORDER — HYDROMORPHONE HCL/PF 1 MG/ML
0.2 SYRINGE (ML) INJECTION
Status: DISCONTINUED | OUTPATIENT
Start: 2020-06-18 | End: 2020-06-18 | Stop reason: HOSPADM

## 2020-06-18 RX ORDER — PROPOFOL 10 MG/ML
INJECTION, EMULSION INTRAVENOUS AS NEEDED
Status: DISCONTINUED | OUTPATIENT
Start: 2020-06-18 | End: 2020-06-18 | Stop reason: SURG

## 2020-06-18 RX ORDER — ROCURONIUM BROMIDE 10 MG/ML
INJECTION, SOLUTION INTRAVENOUS AS NEEDED
Status: DISCONTINUED | OUTPATIENT
Start: 2020-06-18 | End: 2020-06-18 | Stop reason: SURG

## 2020-06-18 RX ORDER — ONDANSETRON 2 MG/ML
INJECTION INTRAMUSCULAR; INTRAVENOUS AS NEEDED
Status: DISCONTINUED | OUTPATIENT
Start: 2020-06-18 | End: 2020-06-18 | Stop reason: SURG

## 2020-06-18 RX ORDER — ONDANSETRON 2 MG/ML
4 INJECTION INTRAMUSCULAR; INTRAVENOUS ONCE AS NEEDED
Status: DISCONTINUED | OUTPATIENT
Start: 2020-06-18 | End: 2020-06-18 | Stop reason: HOSPADM

## 2020-06-18 RX ORDER — FENTANYL CITRATE/PF 50 MCG/ML
12.5 SYRINGE (ML) INJECTION
Status: DISCONTINUED | OUTPATIENT
Start: 2020-06-18 | End: 2020-06-18 | Stop reason: HOSPADM

## 2020-06-18 RX ORDER — LIDOCAINE HYDROCHLORIDE 10 MG/ML
INJECTION, SOLUTION EPIDURAL; INFILTRATION; INTRACAUDAL; PERINEURAL AS NEEDED
Status: DISCONTINUED | OUTPATIENT
Start: 2020-06-18 | End: 2020-06-18 | Stop reason: SURG

## 2020-06-18 RX ORDER — METOPROLOL TARTRATE 5 MG/5ML
INJECTION INTRAVENOUS AS NEEDED
Status: DISCONTINUED | OUTPATIENT
Start: 2020-06-18 | End: 2020-06-18 | Stop reason: SURG

## 2020-06-18 RX ORDER — FENTANYL CITRATE 50 UG/ML
INJECTION, SOLUTION INTRAMUSCULAR; INTRAVENOUS AS NEEDED
Status: DISCONTINUED | OUTPATIENT
Start: 2020-06-18 | End: 2020-06-18 | Stop reason: SURG

## 2020-06-18 RX ORDER — MIDAZOLAM HYDROCHLORIDE 2 MG/2ML
INJECTION, SOLUTION INTRAMUSCULAR; INTRAVENOUS AS NEEDED
Status: DISCONTINUED | OUTPATIENT
Start: 2020-06-18 | End: 2020-06-18 | Stop reason: SURG

## 2020-06-18 RX ORDER — DEXAMETHASONE SODIUM PHOSPHATE 10 MG/ML
INJECTION, SOLUTION INTRAMUSCULAR; INTRAVENOUS AS NEEDED
Status: DISCONTINUED | OUTPATIENT
Start: 2020-06-18 | End: 2020-06-18 | Stop reason: SURG

## 2020-06-18 RX ORDER — GLYCOPYRROLATE 0.2 MG/ML
INJECTION INTRAMUSCULAR; INTRAVENOUS AS NEEDED
Status: DISCONTINUED | OUTPATIENT
Start: 2020-06-18 | End: 2020-06-18 | Stop reason: SURG

## 2020-06-18 RX ORDER — ACETAMINOPHEN 325 MG/1
650 TABLET ORAL EVERY 6 HOURS PRN
Status: DISCONTINUED | OUTPATIENT
Start: 2020-06-18 | End: 2020-06-18 | Stop reason: HOSPADM

## 2020-06-18 RX ORDER — NEOSTIGMINE METHYLSULFATE 1 MG/ML
INJECTION INTRAVENOUS AS NEEDED
Status: DISCONTINUED | OUTPATIENT
Start: 2020-06-18 | End: 2020-06-18 | Stop reason: SURG

## 2020-06-18 RX ORDER — EPHEDRINE SULFATE 50 MG/ML
INJECTION INTRAVENOUS AS NEEDED
Status: DISCONTINUED | OUTPATIENT
Start: 2020-06-18 | End: 2020-06-18 | Stop reason: SURG

## 2020-06-18 RX ADMIN — GLYCOPYRROLATE 0.6 MG: 0.2 INJECTION, SOLUTION INTRAMUSCULAR; INTRAVENOUS at 10:46

## 2020-06-18 RX ADMIN — FENTANYL CITRATE 12.5 MCG: 50 INJECTION INTRAMUSCULAR; INTRAVENOUS at 12:16

## 2020-06-18 RX ADMIN — METOPROLOL TARTRATE 2.5 MG: 5 INJECTION, SOLUTION INTRAVENOUS at 09:30

## 2020-06-18 RX ADMIN — METOPROLOL TARTRATE 2.5 MG: 5 INJECTION, SOLUTION INTRAVENOUS at 08:56

## 2020-06-18 RX ADMIN — NEOSTIGMINE METHYLSULFATE 3 MG: 1 INJECTION, SOLUTION INTRAVENOUS at 10:46

## 2020-06-18 RX ADMIN — ONDANSETRON 4 MG: 2 INJECTION INTRAMUSCULAR; INTRAVENOUS at 10:46

## 2020-06-18 RX ADMIN — FENTANYL CITRATE 12.5 MCG: 50 INJECTION INTRAMUSCULAR; INTRAVENOUS at 12:10

## 2020-06-18 RX ADMIN — PROPOFOL 150 MG: 10 INJECTION, EMULSION INTRAVENOUS at 08:22

## 2020-06-18 RX ADMIN — MIDAZOLAM 2 MG: 1 INJECTION INTRAMUSCULAR; INTRAVENOUS at 08:12

## 2020-06-18 RX ADMIN — FENTANYL CITRATE 12.5 MCG: 50 INJECTION INTRAMUSCULAR; INTRAVENOUS at 12:05

## 2020-06-18 RX ADMIN — EPHEDRINE SULFATE 15 MG: 50 INJECTION, SOLUTION INTRAVENOUS at 10:21

## 2020-06-18 RX ADMIN — FENTANYL CITRATE 100 MCG: 50 INJECTION, SOLUTION INTRAMUSCULAR; INTRAVENOUS at 08:22

## 2020-06-18 RX ADMIN — SODIUM CHLORIDE, SODIUM LACTATE, POTASSIUM CHLORIDE, AND CALCIUM CHLORIDE: .6; .31; .03; .02 INJECTION, SOLUTION INTRAVENOUS at 10:47

## 2020-06-18 RX ADMIN — SODIUM CHLORIDE, SODIUM LACTATE, POTASSIUM CHLORIDE, AND CALCIUM CHLORIDE: .6; .31; .03; .02 INJECTION, SOLUTION INTRAVENOUS at 07:44

## 2020-06-18 RX ADMIN — ROCURONIUM BROMIDE 50 MG: 10 INJECTION, SOLUTION INTRAVENOUS at 08:23

## 2020-06-18 RX ADMIN — LIDOCAINE HYDROCHLORIDE 100 MG: 10 INJECTION, SOLUTION EPIDURAL; INFILTRATION; INTRACAUDAL; PERINEURAL at 08:22

## 2020-06-18 RX ADMIN — TRAMADOL HYDROCHLORIDE 50 MG: 50 TABLET, FILM COATED ORAL at 15:28

## 2020-06-18 RX ADMIN — GLYCOPYRROLATE 0.2 MG: 0.2 INJECTION, SOLUTION INTRAMUSCULAR; INTRAVENOUS at 08:12

## 2020-06-18 RX ADMIN — FENTANYL CITRATE 12.5 MCG: 50 INJECTION INTRAMUSCULAR; INTRAVENOUS at 12:13

## 2020-06-18 RX ADMIN — DEXAMETHASONE SODIUM PHOSPHATE 10 MG: 10 INJECTION, SOLUTION INTRAMUSCULAR; INTRAVENOUS at 08:32

## 2020-06-22 ENCOUNTER — APPOINTMENT (OUTPATIENT)
Dept: LAB | Age: 60
End: 2020-06-22
Payer: COMMERCIAL

## 2020-06-22 ENCOUNTER — TELEPHONE (OUTPATIENT)
Dept: SURGICAL ONCOLOGY | Facility: CLINIC | Age: 60
End: 2020-06-22

## 2020-06-22 DIAGNOSIS — E21.0 PRIMARY HYPERPARATHYROIDISM (HCC): Primary | ICD-10-CM

## 2020-06-22 DIAGNOSIS — E21.0 PRIMARY HYPERPARATHYROIDISM (HCC): ICD-10-CM

## 2020-06-22 LAB — CALCIUM SERPL-MCNC: 9.6 MG/DL (ref 8.3–10.1)

## 2020-06-22 PROCEDURE — 82310 ASSAY OF CALCIUM: CPT

## 2020-06-22 PROCEDURE — 36415 COLL VENOUS BLD VENIPUNCTURE: CPT

## 2020-06-23 ENCOUNTER — HOSPITAL ENCOUNTER (OUTPATIENT)
Facility: HOSPITAL | Age: 60
Setting detail: OBSERVATION
Discharge: HOME | End: 2020-06-25
Attending: EMERGENCY MEDICINE | Admitting: SURGERY
Payer: COMMERCIAL

## 2020-06-23 DIAGNOSIS — E83.51 HYPOCALCEMIA: Primary | ICD-10-CM

## 2020-06-23 DIAGNOSIS — R06.4 HYPERVENTILATION: ICD-10-CM

## 2020-06-23 DIAGNOSIS — E20.9 HYPOPARATHYROIDISM (HCC): ICD-10-CM

## 2020-06-23 LAB
ALBUMIN SERPL BCP-MCNC: 3.8 G/DL (ref 3.5–5)
ALP SERPL-CCNC: 104 U/L (ref 46–116)
ALT SERPL W P-5'-P-CCNC: 39 U/L (ref 12–78)
ANION GAP SERPL CALCULATED.3IONS-SCNC: 6 MMOL/L (ref 4–13)
AST SERPL W P-5'-P-CCNC: 20 U/L (ref 5–45)
BASE EX.OXY STD BLDV CALC-SCNC: 67.6 % (ref 60–80)
BASE EXCESS BLDV CALC-SCNC: -0.1 MMOL/L
BASOPHILS # BLD AUTO: 0.03 THOUSANDS/ΜL (ref 0–0.1)
BASOPHILS NFR BLD AUTO: 0 % (ref 0–1)
BILIRUB SERPL-MCNC: 0.82 MG/DL (ref 0.2–1)
BUN SERPL-MCNC: 20 MG/DL (ref 5–25)
CA-I BLD-SCNC: 1.01 MMOL/L (ref 1.12–1.32)
CA-I BLD-SCNC: 1.18 MMOL/L (ref 1.12–1.32)
CALCIUM SERPL-MCNC: 8.6 MG/DL (ref 8.3–10.1)
CALCIUM SERPL-MCNC: 9.2 MG/DL (ref 8.3–10.1)
CHLORIDE SERPL-SCNC: 105 MMOL/L (ref 100–108)
CO2 SERPL-SCNC: 28 MMOL/L (ref 21–32)
CREAT SERPL-MCNC: 0.75 MG/DL (ref 0.6–1.3)
EOSINOPHIL # BLD AUTO: 0.27 THOUSAND/ΜL (ref 0–0.61)
EOSINOPHIL NFR BLD AUTO: 3 % (ref 0–6)
ERYTHROCYTE [DISTWIDTH] IN BLOOD BY AUTOMATED COUNT: 12.3 % (ref 11.6–15.1)
GFR SERPL CREATININE-BSD FRML MDRD: 88 ML/MIN/1.73SQ M
GLUCOSE SERPL-MCNC: 96 MG/DL (ref 65–140)
HCO3 BLDV-SCNC: 25.1 MMOL/L (ref 24–30)
HCT VFR BLD AUTO: 42.3 % (ref 34.8–46.1)
HGB BLD-MCNC: 13.7 G/DL (ref 11.5–15.4)
IMM GRANULOCYTES # BLD AUTO: 0.02 THOUSAND/UL (ref 0–0.2)
IMM GRANULOCYTES NFR BLD AUTO: 0 % (ref 0–2)
LYMPHOCYTES # BLD AUTO: 2.23 THOUSANDS/ΜL (ref 0.6–4.47)
LYMPHOCYTES NFR BLD AUTO: 27 % (ref 14–44)
MAGNESIUM SERPL-MCNC: 1.5 MG/DL (ref 1.6–2.6)
MAGNESIUM SERPL-MCNC: 2.6 MG/DL (ref 1.6–2.6)
MCH RBC QN AUTO: 29.3 PG (ref 26.8–34.3)
MCHC RBC AUTO-ENTMCNC: 32.4 G/DL (ref 31.4–37.4)
MCV RBC AUTO: 91 FL (ref 82–98)
MONOCYTES # BLD AUTO: 0.67 THOUSAND/ΜL (ref 0.17–1.22)
MONOCYTES NFR BLD AUTO: 8 % (ref 4–12)
NEUTROPHILS # BLD AUTO: 5.04 THOUSANDS/ΜL (ref 1.85–7.62)
NEUTS SEG NFR BLD AUTO: 62 % (ref 43–75)
NRBC BLD AUTO-RTO: 0 /100 WBCS
O2 CT BLDV-SCNC: 13.9 ML/DL
PCO2 BLDV: 42.9 MM HG (ref 42–50)
PH BLDV: 7.38 [PH] (ref 7.3–7.4)
PHOSPHATE SERPL-MCNC: 4 MG/DL (ref 2.7–4.5)
PLATELET # BLD AUTO: 294 THOUSANDS/UL (ref 149–390)
PLATELET # BLD AUTO: 333 THOUSANDS/UL (ref 149–390)
PMV BLD AUTO: 9.2 FL (ref 8.9–12.7)
PMV BLD AUTO: 9.3 FL (ref 8.9–12.7)
PO2 BLDV: 38.8 MM HG (ref 35–45)
POTASSIUM SERPL-SCNC: 4 MMOL/L (ref 3.5–5.3)
PROT SERPL-MCNC: 7.6 G/DL (ref 6.4–8.2)
PTH-INTACT SERPL-MCNC: 6.3 PG/ML (ref 18.4–80.1)
RBC # BLD AUTO: 4.67 MILLION/UL (ref 3.81–5.12)
SODIUM SERPL-SCNC: 139 MMOL/L (ref 136–145)
WBC # BLD AUTO: 8.26 THOUSAND/UL (ref 4.31–10.16)

## 2020-06-23 PROCEDURE — 93005 ELECTROCARDIOGRAM TRACING: CPT

## 2020-06-23 PROCEDURE — 82330 ASSAY OF CALCIUM: CPT | Performed by: EMERGENCY MEDICINE

## 2020-06-23 PROCEDURE — 82805 BLOOD GASES W/O2 SATURATION: CPT | Performed by: EMERGENCY MEDICINE

## 2020-06-23 PROCEDURE — 84100 ASSAY OF PHOSPHORUS: CPT | Performed by: EMERGENCY MEDICINE

## 2020-06-23 PROCEDURE — 82310 ASSAY OF CALCIUM: CPT | Performed by: SURGERY

## 2020-06-23 PROCEDURE — 83735 ASSAY OF MAGNESIUM: CPT | Performed by: SURGERY

## 2020-06-23 PROCEDURE — 96375 TX/PRO/DX INJ NEW DRUG ADDON: CPT

## 2020-06-23 PROCEDURE — 85025 COMPLETE CBC W/AUTO DIFF WBC: CPT | Performed by: EMERGENCY MEDICINE

## 2020-06-23 PROCEDURE — 99024 POSTOP FOLLOW-UP VISIT: CPT | Performed by: PHYSICIAN ASSISTANT

## 2020-06-23 PROCEDURE — 82330 ASSAY OF CALCIUM: CPT | Performed by: SURGERY

## 2020-06-23 PROCEDURE — 80053 COMPREHEN METABOLIC PANEL: CPT | Performed by: EMERGENCY MEDICINE

## 2020-06-23 PROCEDURE — 96365 THER/PROPH/DIAG IV INF INIT: CPT

## 2020-06-23 PROCEDURE — 99285 EMERGENCY DEPT VISIT HI MDM: CPT | Performed by: EMERGENCY MEDICINE

## 2020-06-23 PROCEDURE — 99284 EMERGENCY DEPT VISIT MOD MDM: CPT

## 2020-06-23 PROCEDURE — 96360 HYDRATION IV INFUSION INIT: CPT

## 2020-06-23 PROCEDURE — 85049 AUTOMATED PLATELET COUNT: CPT | Performed by: SURGERY

## 2020-06-23 PROCEDURE — 83970 ASSAY OF PARATHORMONE: CPT

## 2020-06-23 PROCEDURE — 83735 ASSAY OF MAGNESIUM: CPT | Performed by: EMERGENCY MEDICINE

## 2020-06-23 PROCEDURE — 36415 COLL VENOUS BLD VENIPUNCTURE: CPT | Performed by: EMERGENCY MEDICINE

## 2020-06-23 RX ORDER — MAGNESIUM SULFATE 1 G/100ML
1 INJECTION INTRAVENOUS ONCE
Status: DISCONTINUED | OUTPATIENT
Start: 2020-06-23 | End: 2020-06-23

## 2020-06-23 RX ORDER — CALCIUM GLUCONATE 20 MG/ML
2 INJECTION, SOLUTION INTRAVENOUS ONCE
Status: DISCONTINUED | OUTPATIENT
Start: 2020-06-23 | End: 2020-06-25 | Stop reason: HOSPADM

## 2020-06-23 RX ORDER — HEPARIN SODIUM 5000 [USP'U]/ML
5000 INJECTION, SOLUTION INTRAVENOUS; SUBCUTANEOUS EVERY 8 HOURS SCHEDULED
Status: DISCONTINUED | OUTPATIENT
Start: 2020-06-23 | End: 2020-06-25 | Stop reason: HOSPADM

## 2020-06-23 RX ORDER — TRAMADOL HYDROCHLORIDE 50 MG/1
50 TABLET ORAL EVERY 6 HOURS PRN
Status: DISCONTINUED | OUTPATIENT
Start: 2020-06-23 | End: 2020-06-25 | Stop reason: HOSPADM

## 2020-06-23 RX ORDER — ATORVASTATIN CALCIUM 40 MG/1
40 TABLET, FILM COATED ORAL DAILY
Status: DISCONTINUED | OUTPATIENT
Start: 2020-06-23 | End: 2020-06-25 | Stop reason: HOSPADM

## 2020-06-23 RX ORDER — MAGNESIUM SULFATE HEPTAHYDRATE 40 MG/ML
4 INJECTION, SOLUTION INTRAVENOUS ONCE
Status: COMPLETED | OUTPATIENT
Start: 2020-06-23 | End: 2020-06-23

## 2020-06-23 RX ORDER — METOPROLOL SUCCINATE 25 MG/1
25 TABLET, EXTENDED RELEASE ORAL DAILY
Status: DISCONTINUED | OUTPATIENT
Start: 2020-06-23 | End: 2020-06-25 | Stop reason: HOSPADM

## 2020-06-23 RX ORDER — CLOPIDOGREL BISULFATE 75 MG/1
75 TABLET ORAL DAILY
Status: DISCONTINUED | OUTPATIENT
Start: 2020-06-23 | End: 2020-06-25 | Stop reason: HOSPADM

## 2020-06-23 RX ORDER — LORAZEPAM 2 MG/ML
1 INJECTION INTRAMUSCULAR ONCE
Status: COMPLETED | OUTPATIENT
Start: 2020-06-23 | End: 2020-06-23

## 2020-06-23 RX ORDER — SODIUM CHLORIDE, SODIUM LACTATE, POTASSIUM CHLORIDE, CALCIUM CHLORIDE 600; 310; 30; 20 MG/100ML; MG/100ML; MG/100ML; MG/100ML
100 INJECTION, SOLUTION INTRAVENOUS CONTINUOUS
Status: DISCONTINUED | OUTPATIENT
Start: 2020-06-23 | End: 2020-06-24

## 2020-06-23 RX ORDER — FOLIC ACID 1 MG/1
1000 TABLET ORAL DAILY
Status: DISCONTINUED | OUTPATIENT
Start: 2020-06-23 | End: 2020-06-25 | Stop reason: HOSPADM

## 2020-06-23 RX ADMIN — SODIUM CHLORIDE, SODIUM LACTATE, POTASSIUM CHLORIDE, AND CALCIUM CHLORIDE 125 ML/HR: .6; .31; .03; .02 INJECTION, SOLUTION INTRAVENOUS at 09:55

## 2020-06-23 RX ADMIN — METOPROLOL SUCCINATE 12.5 MG: 25 TABLET, EXTENDED RELEASE ORAL at 17:45

## 2020-06-23 RX ADMIN — CALCIUM CHLORIDE 2 G: 100 INJECTION, SOLUTION INTRAVENOUS at 10:03

## 2020-06-23 RX ADMIN — MAGNESIUM SULFATE HEPTAHYDRATE 4 G: 40 INJECTION, SOLUTION INTRAVENOUS at 11:21

## 2020-06-23 RX ADMIN — LORAZEPAM 1 MG: 2 INJECTION INTRAMUSCULAR; INTRAVENOUS at 08:25

## 2020-06-23 RX ADMIN — HEPARIN SODIUM 5000 UNITS: 5000 INJECTION INTRAVENOUS; SUBCUTANEOUS at 22:28

## 2020-06-24 PROBLEM — E83.51 HYPOCALCEMIA: Status: ACTIVE | Noted: 2020-06-24

## 2020-06-24 LAB
25(OH)D3 SERPL-MCNC: 36 NG/ML (ref 30–100)
ATRIAL RATE: 94 BPM
CALCIUM SERPL-MCNC: 7.7 MG/DL (ref 8.3–10.1)
CALCIUM SERPL-MCNC: 8.4 MG/DL (ref 8.3–10.1)
CALCIUM SERPL-MCNC: 9.3 MG/DL (ref 8.3–10.1)
MAGNESIUM SERPL-MCNC: 1.6 MG/DL (ref 1.6–2.6)
MAGNESIUM SERPL-MCNC: 1.7 MG/DL (ref 1.6–2.6)
P AXIS: 84 DEGREES
PHOSPHATE SERPL-MCNC: 4.4 MG/DL (ref 2.7–4.5)
PR INTERVAL: 190 MS
QRS AXIS: 49 DEGREES
QRSD INTERVAL: 80 MS
QT INTERVAL: 338 MS
QTC INTERVAL: 422 MS
T WAVE AXIS: 61 DEGREES
VENTRICULAR RATE: 94 BPM

## 2020-06-24 PROCEDURE — 93010 ELECTROCARDIOGRAM REPORT: CPT | Performed by: INTERNAL MEDICINE

## 2020-06-24 PROCEDURE — 99024 POSTOP FOLLOW-UP VISIT: CPT | Performed by: SURGERY

## 2020-06-24 PROCEDURE — 84100 ASSAY OF PHOSPHORUS: CPT | Performed by: SURGERY

## 2020-06-24 PROCEDURE — 82310 ASSAY OF CALCIUM: CPT | Performed by: SURGERY

## 2020-06-24 PROCEDURE — NC001 PR NO CHARGE: Performed by: PHYSICIAN ASSISTANT

## 2020-06-24 PROCEDURE — 82310 ASSAY OF CALCIUM: CPT | Performed by: PHYSICIAN ASSISTANT

## 2020-06-24 PROCEDURE — 97162 PT EVAL MOD COMPLEX 30 MIN: CPT

## 2020-06-24 PROCEDURE — 83735 ASSAY OF MAGNESIUM: CPT | Performed by: PHYSICIAN ASSISTANT

## 2020-06-24 PROCEDURE — 97166 OT EVAL MOD COMPLEX 45 MIN: CPT

## 2020-06-24 PROCEDURE — 82306 VITAMIN D 25 HYDROXY: CPT | Performed by: SURGERY

## 2020-06-24 RX ORDER — B-COMPLEX WITH VITAMIN C
2 TABLET ORAL
Status: DISCONTINUED | OUTPATIENT
Start: 2020-06-24 | End: 2020-06-25

## 2020-06-24 RX ORDER — MAGNESIUM SULFATE HEPTAHYDRATE 40 MG/ML
2 INJECTION, SOLUTION INTRAVENOUS ONCE
Status: COMPLETED | OUTPATIENT
Start: 2020-06-24 | End: 2020-06-25

## 2020-06-24 RX ORDER — CALCITRIOL 0.25 UG/1
0.25 CAPSULE, LIQUID FILLED ORAL 3 TIMES DAILY
Status: DISCONTINUED | OUTPATIENT
Start: 2020-06-24 | End: 2020-06-25

## 2020-06-24 RX ORDER — CALCITRIOL 0.25 UG/1
0.5 CAPSULE, LIQUID FILLED ORAL ONCE
Status: COMPLETED | OUTPATIENT
Start: 2020-06-24 | End: 2020-06-24

## 2020-06-24 RX ORDER — CALCIUM CHLORIDE 100 MG/ML
1 INJECTION INTRAVENOUS; INTRAVENTRICULAR ONCE
Status: COMPLETED | OUTPATIENT
Start: 2020-06-24 | End: 2020-06-24

## 2020-06-24 RX ADMIN — CALCITRIOL CAPSULES 0.25 MCG 0.5 MCG: 0.25 CAPSULE ORAL at 09:15

## 2020-06-24 RX ADMIN — HEPARIN SODIUM 5000 UNITS: 5000 INJECTION INTRAVENOUS; SUBCUTANEOUS at 21:38

## 2020-06-24 RX ADMIN — METOPROLOL SUCCINATE 25 MG: 25 TABLET, EXTENDED RELEASE ORAL at 09:16

## 2020-06-24 RX ADMIN — HEPARIN SODIUM 5000 UNITS: 5000 INJECTION INTRAVENOUS; SUBCUTANEOUS at 06:09

## 2020-06-24 RX ADMIN — Medication 2 TABLET: at 12:08

## 2020-06-24 RX ADMIN — CALCIUM CHLORIDE 1 G: 100 INJECTION INTRAVENOUS; INTRAVENTRICULAR at 09:31

## 2020-06-24 RX ADMIN — CALCITRIOL 0.25 MCG: 0.25 CAPSULE, LIQUID FILLED ORAL at 16:38

## 2020-06-24 RX ADMIN — ATORVASTATIN CALCIUM 40 MG: 40 TABLET, FILM COATED ORAL at 09:16

## 2020-06-24 RX ADMIN — CLOPIDOGREL BISULFATE 75 MG: 75 TABLET ORAL at 09:16

## 2020-06-24 RX ADMIN — MAGNESIUM SULFATE HEPTAHYDRATE 2 G: 40 INJECTION, SOLUTION INTRAVENOUS at 21:38

## 2020-06-24 RX ADMIN — Medication 2 TABLET: at 16:38

## 2020-06-24 RX ADMIN — HEPARIN SODIUM 5000 UNITS: 5000 INJECTION INTRAVENOUS; SUBCUTANEOUS at 14:18

## 2020-06-24 RX ADMIN — Medication 2 TABLET: at 09:15

## 2020-06-24 RX ADMIN — FOLIC ACID 1000 MCG: 1 TABLET ORAL at 09:16

## 2020-06-24 RX ADMIN — CALCITRIOL 0.25 MCG: 0.25 CAPSULE, LIQUID FILLED ORAL at 21:38

## 2020-06-25 ENCOUNTER — TELEPHONE (OUTPATIENT)
Dept: RADIOLOGY | Facility: CLINIC | Age: 60
End: 2020-06-25

## 2020-06-25 VITALS
TEMPERATURE: 99.6 F | BODY MASS INDEX: 29.37 KG/M2 | RESPIRATION RATE: 20 BRPM | OXYGEN SATURATION: 96 % | HEART RATE: 76 BPM | WEIGHT: 172 LBS | HEIGHT: 64 IN | DIASTOLIC BLOOD PRESSURE: 64 MMHG | SYSTOLIC BLOOD PRESSURE: 121 MMHG

## 2020-06-25 LAB
CALCIUM SERPL-MCNC: 7.5 MG/DL (ref 8.3–10.1)
CALCIUM SERPL-MCNC: 8 MG/DL (ref 8.3–10.1)
CALCIUM SERPL-MCNC: 8.6 MG/DL (ref 8.3–10.1)
MAGNESIUM SERPL-MCNC: 1.7 MG/DL (ref 1.6–2.6)
MAGNESIUM SERPL-MCNC: 1.8 MG/DL (ref 1.6–2.6)
MAGNESIUM SERPL-MCNC: 2 MG/DL (ref 1.6–2.6)
PTH-INTACT SERPL-MCNC: <6.3 PG/ML (ref 18.4–80.1)

## 2020-06-25 PROCEDURE — 83735 ASSAY OF MAGNESIUM: CPT | Performed by: PHYSICIAN ASSISTANT

## 2020-06-25 PROCEDURE — NC001 PR NO CHARGE: Performed by: SURGERY

## 2020-06-25 PROCEDURE — 99244 OFF/OP CNSLTJ NEW/EST MOD 40: CPT | Performed by: INTERNAL MEDICINE

## 2020-06-25 PROCEDURE — 83970 ASSAY OF PARATHORMONE: CPT | Performed by: PHYSICIAN ASSISTANT

## 2020-06-25 PROCEDURE — 82310 ASSAY OF CALCIUM: CPT | Performed by: PHYSICIAN ASSISTANT

## 2020-06-25 PROCEDURE — 99024 POSTOP FOLLOW-UP VISIT: CPT | Performed by: SURGERY

## 2020-06-25 RX ORDER — CALCIUM CARBONATE 200(500)MG
1 TABLET,CHEWABLE ORAL
Qty: 90 TABLET | Refills: 0 | Status: SHIPPED | OUTPATIENT
Start: 2020-06-25 | End: 2020-08-13

## 2020-06-25 RX ORDER — MELATONIN
2000 DAILY
Status: DISCONTINUED | OUTPATIENT
Start: 2020-06-25 | End: 2020-06-25 | Stop reason: HOSPADM

## 2020-06-25 RX ORDER — CALCIUM CHLORIDE 100 MG/ML
1 INJECTION INTRAVENOUS; INTRAVENTRICULAR ONCE
Status: COMPLETED | OUTPATIENT
Start: 2020-06-25 | End: 2020-06-25

## 2020-06-25 RX ORDER — CALCITRIOL 0.25 UG/1
0.5 CAPSULE, LIQUID FILLED ORAL 2 TIMES DAILY
Status: DISCONTINUED | OUTPATIENT
Start: 2020-06-25 | End: 2020-06-25 | Stop reason: HOSPADM

## 2020-06-25 RX ORDER — CALCITRIOL 0.5 UG/1
0.5 CAPSULE, LIQUID FILLED ORAL
Qty: 60 CAPSULE | Refills: 0 | Status: SHIPPED | OUTPATIENT
Start: 2020-06-25 | End: 2020-07-01 | Stop reason: DRUGHIGH

## 2020-06-25 RX ORDER — CALCIUM CARBONATE 500(1250)
1 TABLET ORAL
Status: DISCONTINUED | OUTPATIENT
Start: 2020-06-25 | End: 2020-06-25 | Stop reason: HOSPADM

## 2020-06-25 RX ADMIN — MAGNESIUM OXIDE TAB 400 MG (240 MG ELEMENTAL MG) 400 MG: 400 (240 MG) TAB at 10:37

## 2020-06-25 RX ADMIN — Medication 2 TABLET: at 09:05

## 2020-06-25 RX ADMIN — CALCIUM CHLORIDE 1 G: 100 INJECTION INTRAVENOUS; INTRAVENTRICULAR at 05:28

## 2020-06-25 RX ADMIN — MELATONIN 2000 UNITS: at 14:04

## 2020-06-25 RX ADMIN — ATORVASTATIN CALCIUM 40 MG: 40 TABLET, FILM COATED ORAL at 09:05

## 2020-06-25 RX ADMIN — MAGNESIUM OXIDE TAB 400 MG (240 MG ELEMENTAL MG) 400 MG: 400 (240 MG) TAB at 17:41

## 2020-06-25 RX ADMIN — HEPARIN SODIUM 5000 UNITS: 5000 INJECTION INTRAVENOUS; SUBCUTANEOUS at 05:28

## 2020-06-25 RX ADMIN — CALCITRIOL 0.25 MCG: 0.25 CAPSULE, LIQUID FILLED ORAL at 09:05

## 2020-06-25 RX ADMIN — CALCIUM 1 TABLET: 500 TABLET ORAL at 17:41

## 2020-06-25 RX ADMIN — METOPROLOL SUCCINATE 25 MG: 25 TABLET, EXTENDED RELEASE ORAL at 09:05

## 2020-06-25 RX ADMIN — CLOPIDOGREL BISULFATE 75 MG: 75 TABLET ORAL at 09:05

## 2020-06-25 RX ADMIN — HEPARIN SODIUM 5000 UNITS: 5000 INJECTION INTRAVENOUS; SUBCUTANEOUS at 14:05

## 2020-06-25 RX ADMIN — CALCITRIOL 0.5 MCG: 0.25 CAPSULE, LIQUID FILLED ORAL at 17:40

## 2020-06-25 RX ADMIN — Medication 2 TABLET: at 12:16

## 2020-06-25 RX ADMIN — FOLIC ACID 1000 MCG: 1 TABLET ORAL at 09:05

## 2020-06-26 DIAGNOSIS — Z71.89 COMPLEX CARE COORDINATION: Primary | ICD-10-CM

## 2020-06-29 ENCOUNTER — APPOINTMENT (OUTPATIENT)
Dept: LAB | Age: 60
End: 2020-06-29
Payer: COMMERCIAL

## 2020-06-29 ENCOUNTER — TELEPHONE (OUTPATIENT)
Dept: RADIOLOGY | Facility: CLINIC | Age: 60
End: 2020-06-29

## 2020-06-29 DIAGNOSIS — E83.51 HYPOCALCEMIA: ICD-10-CM

## 2020-06-29 LAB
ANION GAP SERPL CALCULATED.3IONS-SCNC: 9 MMOL/L (ref 4–13)
BUN SERPL-MCNC: 17 MG/DL (ref 5–25)
CALCIUM SERPL-MCNC: 9.3 MG/DL (ref 8.3–10.1)
CHLORIDE SERPL-SCNC: 107 MMOL/L (ref 100–108)
CO2 SERPL-SCNC: 24 MMOL/L (ref 21–32)
CREAT SERPL-MCNC: 0.78 MG/DL (ref 0.6–1.3)
GFR SERPL CREATININE-BSD FRML MDRD: 84 ML/MIN/1.73SQ M
GLUCOSE P FAST SERPL-MCNC: 104 MG/DL (ref 65–99)
POTASSIUM SERPL-SCNC: 4.4 MMOL/L (ref 3.5–5.3)
SODIUM SERPL-SCNC: 140 MMOL/L (ref 136–145)

## 2020-06-29 PROCEDURE — 80048 BASIC METABOLIC PNL TOTAL CA: CPT

## 2020-06-29 PROCEDURE — 36415 COLL VENOUS BLD VENIPUNCTURE: CPT

## 2020-06-30 ENCOUNTER — TELEPHONE (OUTPATIENT)
Dept: FAMILY MEDICINE CLINIC | Facility: CLINIC | Age: 60
End: 2020-06-30

## 2020-06-30 ENCOUNTER — PATIENT OUTREACH (OUTPATIENT)
Dept: FAMILY MEDICINE CLINIC | Facility: CLINIC | Age: 60
End: 2020-06-30

## 2020-06-30 DIAGNOSIS — I63.81 LACUNAR STROKE (HCC): ICD-10-CM

## 2020-07-01 ENCOUNTER — OFFICE VISIT (OUTPATIENT)
Dept: ENDOCRINOLOGY | Facility: CLINIC | Age: 60
End: 2020-07-01
Payer: COMMERCIAL

## 2020-07-01 VITALS
SYSTOLIC BLOOD PRESSURE: 106 MMHG | HEART RATE: 64 BPM | HEIGHT: 64 IN | DIASTOLIC BLOOD PRESSURE: 60 MMHG | WEIGHT: 170 LBS | BODY MASS INDEX: 29.02 KG/M2

## 2020-07-01 DIAGNOSIS — E83.52 HYPERCALCEMIA: ICD-10-CM

## 2020-07-01 DIAGNOSIS — E20.9 HYPOPARATHYROIDISM, UNSPECIFIED HYPOPARATHYROIDISM TYPE (HCC): ICD-10-CM

## 2020-07-01 DIAGNOSIS — E83.51 HYPOCALCEMIA: Primary | ICD-10-CM

## 2020-07-01 DIAGNOSIS — E04.1 THYROID NODULE: ICD-10-CM

## 2020-07-01 DIAGNOSIS — E55.9 VITAMIN D DEFICIENCY: ICD-10-CM

## 2020-07-01 DIAGNOSIS — E21.0 PRIMARY HYPERPARATHYROIDISM (HCC): ICD-10-CM

## 2020-07-01 DIAGNOSIS — Z78.0 POSTMENOPAUSAL: ICD-10-CM

## 2020-07-01 PROCEDURE — 3008F BODY MASS INDEX DOCD: CPT | Performed by: INTERNAL MEDICINE

## 2020-07-01 PROCEDURE — 3074F SYST BP LT 130 MM HG: CPT | Performed by: INTERNAL MEDICINE

## 2020-07-01 PROCEDURE — 99214 OFFICE O/P EST MOD 30 MIN: CPT | Performed by: INTERNAL MEDICINE

## 2020-07-01 PROCEDURE — 1036F TOBACCO NON-USER: CPT | Performed by: INTERNAL MEDICINE

## 2020-07-01 PROCEDURE — 3078F DIAST BP <80 MM HG: CPT | Performed by: INTERNAL MEDICINE

## 2020-07-01 RX ORDER — CALCITRIOL 0.25 UG/1
CAPSULE, LIQUID FILLED ORAL
Qty: 90 CAPSULE | Refills: 5 | Status: SHIPPED | OUTPATIENT
Start: 2020-07-01 | End: 2020-07-23 | Stop reason: SDUPTHER

## 2020-07-01 RX ORDER — CLOPIDOGREL BISULFATE 75 MG/1
75 TABLET ORAL DAILY
Qty: 90 TABLET | Refills: 1 | Status: SHIPPED | OUTPATIENT
Start: 2020-07-01 | End: 2020-12-23 | Stop reason: SDUPTHER

## 2020-07-02 PROBLEM — Z98.890 STATUS POST PARATHYROIDECTOMY: Status: ACTIVE | Noted: 2020-07-02

## 2020-07-02 PROBLEM — Z90.89 STATUS POST PARATHYROIDECTOMY: Status: ACTIVE | Noted: 2020-07-02

## 2020-07-02 PROBLEM — E21.3 HYPERPARATHYROIDISM (HCC): Status: RESOLVED | Noted: 2018-06-25 | Resolved: 2020-07-02

## 2020-07-02 PROBLEM — E89.2 STATUS POST PARATHYROIDECTOMY (HCC): Status: ACTIVE | Noted: 2020-07-02

## 2020-07-06 ENCOUNTER — OFFICE VISIT (OUTPATIENT)
Dept: SURGICAL ONCOLOGY | Facility: CLINIC | Age: 60
End: 2020-07-06

## 2020-07-06 VITALS
DIASTOLIC BLOOD PRESSURE: 70 MMHG | HEART RATE: 60 BPM | RESPIRATION RATE: 16 BRPM | WEIGHT: 171 LBS | TEMPERATURE: 98.2 F | HEIGHT: 64 IN | SYSTOLIC BLOOD PRESSURE: 120 MMHG | BODY MASS INDEX: 29.19 KG/M2

## 2020-07-06 DIAGNOSIS — E89.2 STATUS POST PARATHYROIDECTOMY (HCC): Primary | ICD-10-CM

## 2020-07-06 PROCEDURE — 3078F DIAST BP <80 MM HG: CPT | Performed by: SURGERY

## 2020-07-06 PROCEDURE — 3074F SYST BP LT 130 MM HG: CPT | Performed by: SURGERY

## 2020-07-06 PROCEDURE — 99024 POSTOP FOLLOW-UP VISIT: CPT | Performed by: SURGERY

## 2020-07-06 PROCEDURE — 3008F BODY MASS INDEX DOCD: CPT | Performed by: SURGERY

## 2020-07-06 NOTE — LETTER
July 6, 2020     Dennis DelaneyDO bijal  [address]  [id number]    Patient: Husam Angulo   YOB: 1960   Date of Visit: 7/6/2020       Dear Dr Lynnette Felix: Thank you for referring Husam Angulo to me for evaluation  Below are my notes for this consultation  If you have questions, please do not hesitate to call me  I look forward to following your patient along with you  Sincerely,        Mary Solorzano MD        CC: MD Joann Nova DO Priya D Rana, PA-C Tilda Beagle, MD  7/6/2020 10:51 AM  Sign at close encounter     Surgical Oncology Follow Up       [address] [facility] [place]  [address] [facility] [address]  [phone]    Husam Angulo  1960  [id number]  [facility] [facility] ONCOLOGY [place]  [address] [address]  [phone]    Chief Complaint   Patient presents with    Follow-up     Post-op parathyroidectomy       Assessment/Plan:    No problem-specific Assessment & Plan notes found for this encounter  Diagnoses and all orders for this visit:    Status post parathyroidectomy  -     PTH, intact; Future  -     Calcium; Future        Advance Care Planning/Advance Directives:  Discussed disease status, cancer treatment plans and/or cancer treatment goals with the patient  No history exists  History of Present Illness:  Patient is a 53-year-old woman here status post 3 gland parathyroidectomy  The bulk of her right upper parathyroid gland was left intact  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  -Interval History:  Postop fully she maintained normal calcium levels though developed symptoms of hypocalcemia including tingling in the fingertips and perioral area    She was started on calcium vitamin-D the endocrinology team     Review of Systems:  Review of Systems   Constitutional: Negative  HENT: Negative  Negative for voice change  Eyes: Negative  Respiratory: Negative  Cardiovascular: Negative  Gastrointestinal: Negative  Endocrine: Negative  Genitourinary: Negative  Musculoskeletal: Negative  Allergic/Immunologic: Negative  Neurological: Positive for numbness  Initially with tingling in fingertips and face   Hematological: Negative  Psychiatric/Behavioral: Negative          Patient Active Problem List   Diagnosis    Hypercalcemia    Enlarged thyroid    Essential hypertension    Hyperlipidemia, unspecified    Prediabetes    Simple goiter    History of lacunar cerebrovascular accident (CVA)    Pain in both lower extremities    Bilateral low back pain with sciatica    DDD (degenerative disc disease), lumbar    Bilateral sciatica    Lumbar disc herniation    Spinal stenosis of lumbar region    Lumbar spondylosis    Lumbar radiculopathy    High serum parathyroid hormone (PTH)    BMI 29 0-29 9,adult    Hypocalcemia    Status post parathyroidectomy     Past Medical History:   Diagnosis Date    Diverticulosis     Hemorrhoids     Hypertension     controlled    Stroke [facility])      Past Surgical History:   Procedure Laterality Date    COLONOSCOPY      ONSET 2015    NOSE SURGERY      GA EXPLORE PARATHYROID GLANDS Bilateral 6/18/2020    Procedure: PARATHYROIDECTOMY, MINIMALLY INVASIVE, 3 5 GLAND EXPLORATION, INTRAOPERATIVE PTH MONITORING;  Surgeon: Michelle Lopes MD;  Location: [facility];  Service: Surgical Oncology    SKIN BIOPSY      SKIN LESION EXCISION       Family History   Problem Relation Age of Onset    No Known Problems Mother     Hypertension Father     Parkinsonism Father     Hypertension Sister     Stroke Family     Glaucoma Family     Hypertension Family      Social History     Socioeconomic History    Marital status: /Civil Union     Spouse name: Not on file    Number of children: Not on file    Years of education: Not on file    Highest education level: Not on file   Occupational History    Not on file   Social Needs    Financial resource strain: Not on file    Food insecurity:     Worry: Not on file     Inability: Not on file    Transportation needs:     Medical: No     Non-medical: No   Tobacco Use    Smoking status: Never Smoker    Smokeless tobacco: Never Used   Substance and Sexual Activity    Alcohol use:  Yes     Alcohol/week: 2 0 standard drinks     Types: 2 Cans of beer per week     Frequency: 2-4 times a month     Drinks per session: 1 or 2     Binge frequency: Never     Comment: SOCIAL    Drug use: No    Sexual activity: Yes     Partners: Male     Birth control/protection: None   Lifestyle    Physical activity:     Days per week: 0 days     Minutes per session: 0 min    Stress: Not on file   Relationships    Social connections:     Talks on phone: Not on file     Gets together: Not on file     Attends Sabianist service: Not on file     Active member of club or organization: Not on file     Attends meetings of clubs or organizations: Not on file     Relationship status: Not on file    Intimate partner violence:     Fear of current or ex partner: Not on file     Emotionally abused: Not on file     Physically abused: Not on file     Forced sexual activity: Not on file   Other Topics Concern    Not on file   Social History Narrative    ALWAYS SEAT BELT    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       Current Outpatient Medications:     atorvastatin (LIPITOR) 40 mg tablet, Take 1 tablet (40 mg total) by mouth daily, Disp: 90 tablet, Rfl: 1    calcitriol (ROCALTROL) 0 25 mcg capsule, Take one tablet three times daily, Disp: 90 capsule, Rfl: 5    calcium carbonate (TUMS) 500 mg chewable tablet, Chew 1 tablet (500 mg total) 3 (three) times a day before meals, Disp: 90 tablet, Rfl: 0    Cholecalciferol (D 2000) 2000 units TABS, Take by mouth daily, Disp: , Rfl:     clopidogrel (PLAVIX) 75 mg tablet, Take 1 tablet (75 mg total) by mouth daily, Disp: 90 tablet, Rfl: 1    CO ENZYME Q-10 PO, daily , Disp: , Rfl:     EDARBYCLOR 40-12 5 MG TABS, TAKE ONE TABLET BY MOUTH ONCE DAILY, Disp: 30 tablet, Rfl: 6    folic acid (FOLVITE) 1 mg tablet, Take 1 tablet (1,000 mcg total) by mouth daily, Disp: 90 tablet, Rfl: 0    metoprolol succinate (TOPROL-XL) 25 mg 24 hr tablet, Take 1 tablet (25 mg total) by mouth daily (Patient taking differently: Take 12 5 mg by mouth 2 (two) times a day ), Disp: 90 tablet, Rfl: 1    Potassium 99 MG TABS, Take 1 tablet by mouth daily, Disp: , Rfl:     Calcium Carbonate (Caltrate 600) 1500 (600 Ca) MG TABS, Take 1 tablet twice a day (Patient not taking: Reported on 7/6/2020), Disp: , Rfl: 0  Allergies   Allergen Reactions    Codeine Chest Pain     Vitals:    07/06/20 1020   BP: 120/70   Pulse: 60   Resp: 16   Temp: 98 2 °F (36 8 °C)       Physical Exam   Neck:   Incision clean dry intact         Results:  Labs:  Lab Results   Component Value Date    PTH <6 3 (L) 06/25/2020    CALCIUM 9 3 06/29/2020    PHOS 4 4 06/24/2020     Case Report   Surgical Pathology Report                         Case: [id number]                                    Authorizing Provider: Balwinder Castanon MD        Collected:           06/18/2020 0910               Ordering Location:     [facility]      Received:            06/18/2020 0921                                      Hospital Operating Room                                                       Pathologist:           Myron Mcdermott MD                                                    Intraop:               Myron Mcdermott MD                                                    Specimens:   A) - Parathyroid                                                                                     B) - Parathyroid, Left superior parathyroid gland?                                                   C) - Parathyroid, RIGHT INFERIOR                                                                     D) - Parathyroid, RIGHT SUPERIOR?                                                          Final Diagnosis   A  Parathyroid:  - Hypercellular parathyroid tissue, 0 22g       B  Parathyroid, Left superior parathyroid gland:  - Hypercellular parathyroid tissue, 0 31g       C  Parathyroid, right inferior:  - Hypercellular parathyroid tissue, 0 15g       D  Parathyroid, right superior:  - Parathyroid tissue, 0 06g  Electronically signed by Montserrat Espino MD on 6/22/2020 at  2:29 PM   Comments: This is an appended report  These results have been appended to a previously preliminary verified report  Imaging  No results found  I reviewed the above laboratory and imaging data  Discussion/Summary:  65-year-old woman, status post parathyroidectomy  She is doing well  She has normal calcium ache  She is on calcium and vitamin-D however  This is being weaned by the endocrinology team   I will plan on seeing her in 6 months with repeat calcium PTH levels at that time  All questions answered  Copy of pathology report given to patient for her records

## 2020-07-06 NOTE — PROGRESS NOTES
Surgical Oncology Follow Up       [address] [facility] [place]  [address] [facility] [address] [phone]    [facility]  1960  [id number]  [address] [facility] [place]  [address] [facility] [id number] [phone]    Chief Complaint   Patient presents with    Follow-up     Post-op parathyroidectomy       Assessment/Plan:    No problem-specific Assessment & Plan notes found for this encounter  Diagnoses and all orders for this visit:    Status post parathyroidectomy  -     PTH, intact; Future  -     Calcium; Future        Advance Care Planning/Advance Directives:  Discussed disease status, cancer treatment plans and/or cancer treatment goals with the patient  No history exists  History of Present Illness:  Patient is a 51-year-old woman here status post 3 gland parathyroidectomy  The bulk of her right upper parathyroid gland was left intact  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  -Interval History:  Postop fully she maintained normal calcium levels though developed symptoms of hypocalcemia including tingling in the fingertips and perioral area  She was started on calcium vitamin-D the endocrinology team     Review of Systems:  Review of Systems   Constitutional: Negative  HENT: Negative  Negative for voice change  Eyes: Negative  Respiratory: Negative  Cardiovascular: Negative  Gastrointestinal: Negative  Endocrine: Negative  Genitourinary: Negative  Musculoskeletal: Negative  Allergic/Immunologic: Negative  Neurological: Positive for numbness  Initially with tingling in fingertips and face   Hematological: Negative  Psychiatric/Behavioral: Negative          Patient Active Problem List   Diagnosis    Hypercalcemia    Enlarged thyroid    Essential hypertension    Hyperlipidemia, unspecified    Prediabetes  Simple goiter    History of lacunar cerebrovascular accident (CVA)    Pain in both lower extremities    Bilateral low back pain with sciatica    DDD (degenerative disc disease), lumbar    Bilateral sciatica    Lumbar disc herniation    Spinal stenosis of lumbar region    Lumbar spondylosis    Lumbar radiculopathy    High serum parathyroid hormone (PTH)    BMI 29 0-29 9,adult    Hypocalcemia    Status post parathyroidectomy     Past Medical History:   Diagnosis Date    Diverticulosis     Hemorrhoids     Hypertension     controlled    Stroke [facility])      Past Surgical History:   Procedure Laterality Date    COLONOSCOPY      ONSET 2015    NOSE SURGERY      [place] EXPLORE PARATHYROID GLANDS Bilateral 6/18/2020    Procedure: PARATHYROIDECTOMY, MINIMALLY INVASIVE, 3 5 GLAND EXPLORATION, INTRAOPERATIVE PTH MONITORING;  Surgeon: Adri Lozano MD;  [facility];  Service: Surgical Oncology    SKIN BIOPSY      SKIN LESION EXCISION       Family History   Problem Relation Age of Onset    No Known Problems Mother     Hypertension Father     Parkinsonism Father     Hypertension Sister     Stroke Family     Glaucoma Family     Hypertension Family      Social History     Socioeconomic History    Marital status: /Civil Union     Spouse name: Not on file    Number of children: Not on file    Years of education: Not on file    Highest education level: Not on file   Occupational History    Not on file   Social Needs    Financial resource strain: Not on file    Food insecurity:     Worry: Not on file     Inability: Not on file    Transportation needs:     Medical: No     Non-medical: No   Tobacco Use    Smoking status: Never Smoker    Smokeless tobacco: Never Used   Substance and Sexual Activity    Alcohol use:  Yes     Alcohol/week: 2 0 standard drinks     Types: 2 Cans of beer per week     Frequency: 2-4 times a month     Drinks per session: 1 or 2     Binge frequency: Never Comment: SOCIAL    Drug use: No    Sexual activity: Yes     Partners: Male     Birth control/protection: None   Lifestyle    Physical activity:     Days per week: 0 days     Minutes per session: 0 min    Stress: Not on file   Relationships    Social connections:     Talks on phone: Not on file     Gets together: Not on file     Attends Hinduism service: Not on file     Active member of club or organization: Not on file     Attends meetings of clubs or organizations: Not on file     Relationship status: Not on file    Intimate partner violence:     Fear of current or ex partner: Not on file     Emotionally abused: Not on file     Physically abused: Not on file     Forced sexual activity: Not on file   Other Topics Concern    Not on file   Social History Narrative    ALWAYS SEAT BELT    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       Current Outpatient Medications:     atorvastatin (LIPITOR) 40 mg tablet, Take 1 tablet (40 mg total) by mouth daily, Disp: 90 tablet, Rfl: 1    calcitriol (ROCALTROL) 0 25 mcg capsule, Take one tablet three times daily, Disp: 90 capsule, Rfl: 5    calcium carbonate (TUMS) 500 mg chewable tablet, Chew 1 tablet (500 mg total) 3 (three) times a day before meals, Disp: 90 tablet, Rfl: 0    Cholecalciferol (D 2000) 2000 units TABS, Take by mouth daily, Disp: , Rfl:     clopidogrel (PLAVIX) 75 mg tablet, Take 1 tablet (75 mg total) by mouth daily, Disp: 90 tablet, Rfl: 1    CO ENZYME Q-10 PO, daily , Disp: , Rfl:     EDARBYCLOR 40-12 5 MG TABS, TAKE ONE TABLET BY MOUTH ONCE DAILY, Disp: 30 tablet, Rfl: 6    folic acid (FOLVITE) 1 mg tablet, Take 1 tablet (1,000 mcg total) by mouth daily, Disp: 90 tablet, Rfl: 0    metoprolol succinate (TOPROL-XL) 25 mg 24 hr tablet, Take 1 tablet (25 mg total) by mouth daily (Patient taking differently: Take 12 5 mg by mouth 2 (two) times a day ), Disp: 90 tablet, Rfl: 1    Potassium 99 MG TABS, Take 1 tablet by mouth daily, Disp: , Rfl:   Calcium Carbonate (Caltrate 600) 1500 (600 Ca) MG TABS, Take 1 tablet twice a day (Patient not taking: Reported on 7/6/2020), Disp: , Rfl: 0  Allergies   Allergen Reactions    Codeine Chest Pain     Vitals:    07/06/20 1020   BP: 120/70   Pulse: 60   Resp: 16   Temp: 98 2 °F (36 8 °C)       Physical Exam   Neck:   Incision clean dry intact         Results:  Labs:  Lab Results   Component Value Date    PTH <6 3 (L) 06/25/2020    CALCIUM 9 3 06/29/2020    PHOS 4 4 06/24/2020     Case Report   Surgical Pathology Report                         Case: [id number]                                    Authorizing Provider: Kaylee Bone MD        Collected:           06/18/2020 0910               Ordering Location:     [address]      Received:            06/18/2020 0921                                      Hospital Operating Room                                                       Pathologist:           Lisseth Kumar MD                                                    Intraop:               Lisseth Kumar MD                                                    Specimens:   A) - Parathyroid                                                                                     B) - Parathyroid, Left superior parathyroid gland?                                                   C) - Parathyroid, RIGHT INFERIOR                                                                     D) - Parathyroid, RIGHT SUPERIOR?                                                          Final Diagnosis   A  Parathyroid:  - Hypercellular parathyroid tissue, 0 22g       B  Parathyroid, Left superior parathyroid gland:  - Hypercellular parathyroid tissue, 0 31g       C  Parathyroid, right inferior:  - Hypercellular parathyroid tissue, 0 15g       D  Parathyroid, right superior:  - Parathyroid tissue, 0 06g  Electronically signed by Lisseth Kumar MD on 6/22/2020 at  2:29 PM   Comments:    This is an appended report  These results have been appended to a previously preliminary verified report  Imaging  No results found  I reviewed the above laboratory and imaging data  Discussion/Summary:  61-year-old woman, status post parathyroidectomy  She is doing well  She has normal calcium ache  She is on calcium and vitamin-D however  This is being weaned by the endocrinology team   I will plan on seeing her in 6 months with repeat calcium PTH levels at that time  All questions answered  Copy of pathology report given to patient for her records

## 2020-07-06 NOTE — PROGRESS NOTES
Vamshi Harmon 64 y o  female MRN: [id number]    Encounter: [id number]      Assessment/Plan     Assessment: This is a 64y o -year-old female with hypercalcemia, hyperparathyroidism, low phosphorus    Plan:    Diagnoses and all orders for this visit:    Primary hyperparathyroidism ([facility] [address] )  Review of records shows patient has elevated calcium along with elevated PTH, with normal vitamin-D level, suggestive of primary hyperparathyroidism  She underwent 4 gland exploration, parathyroid surgery  Three parathyroid glands were removed  Reviewed pathology, parathyroid gland showed hypercellular parathyroid tissue, suggestive of hyperplasia  Will continue to monitor PTH    Hypercalcemia  Improved      -     Vitamin D 25 hydroxy; Future  -     PTH, intact- Lab Collect; Future    Thyroid nodule  Will need the monitoring of thyroid nodule by ultrasound    Vitamin D deficiency  Continue current dose of vitamin-D supplementation, 2000 International Units daily    Hypocalcemia  Etiology, could be transient hypoparathyroidism and hypocalcemia secondary to parathyroid surgery(as a result of extensive surgery) or could be related to Surgical hypoparathyroidism   Calcium has improved on current regimen  Goal for calcium is 8 5-8 8  Will reduce Rocaltrol to 0 25 micrograms 3 times daily  Continue calcium carbonate 1000 milligram twice a day  Repeat calcium in 1 week   Educated that she will have most likely to take calcium supplementation and Rocaltrol for rest of her life  Will repeat PTH and calcium level again in 6 weeks and follow-up  Educated about sign and symptoms of hypocalcemia and call if she notices any  CC:       History of Present Illness     HPI:    Vamshi Harmon  Is 64year old woman with past medical history of primary  hyperparathyroidism, hypercalcemia, is here for follow up   She underwent parathyroid adenoma surgery recently 6/18/2020  She is status post 3 gland parathyroidectomy     She was found to have 4 gland hyperplasia  She developed symptoms of hypoglycemia after surgery and was admitted to ED, she was started on Rocaltrol 0 5 micrograms twice a day, she is taking it regularly  She is also taking Tums, 500 milligram, 2 tablets twice a day        Denies H/o kidney stones   No history of fracture bones  She is currently taking vitamin-D supplementation 2000 International Units daily    She denies family history of elevated calcium or elevated PTH    No history of family history of thyroid cancer, no history of external radiation to head/neck or chest  No recent iodine loading in form of medications, erbs or Kelp supplements    Thyroid ultrasound showed 0 9 x 0 3 x 0 4 cm nodule posterior to the left lower lobe of thyroid gland  This appears external to the thyroid gland is likely correspond to the abnormality identified on parathyroid scan  Her most recent calcium is 10 7, corrected calcium is 10 7 (normal range is 8 3-10 1)  Her GFR is 95  She also has history of low phosphorus  Her SPEP was negative previously  She always have elevated PTH above 100, 128 in April 2019      DEXA scan which was done recently on April 2019, showed lumbar spine T-score +1 9, left total hip T-score 0 3, left femoral neck T-score -0 3, suggestive of normal bone density which is considered at low risk of fracture    Parathyroid scan , 10/2018   FINDINGS:     Immediate planar images demonstrate homogeneous symmetric uptake of the radiotracer by the thyroid  Delayed planar images demonstrate normal washout of the radiotracer from the thyroid       Delayed SPECT images demonstrate slight asymmetric prominence of the left lower thyroid pole as compared to the right  It is uncertain if this represents normal thyroid tissue versus possible parathyroid adenoma  Otherwise no persistent nodular foci   of activity visualized      IMPRESSION:     1   Best seen on the delayed SPECT images, there is slight asymmetric prominence of the left lower thyroid pole  Recommend thyroid ultrasound to differentiate thyroid tissue from possible parathyroid adenoma        Review of Systems   Constitutional: Negative for activity change, diaphoresis, fatigue, fever and unexpected weight change  HENT: Negative  Eyes: Negative for visual disturbance  Respiratory: Negative for cough, chest tightness and shortness of breath  Cardiovascular: Negative for chest pain, palpitations and leg swelling  Gastrointestinal: Negative for abdominal pain, blood in stool, constipation, diarrhea, nausea and vomiting  Endocrine: Negative for cold intolerance, heat intolerance, polydipsia, polyphagia and polyuria  Genitourinary: Negative for dysuria, enuresis, frequency and urgency  Musculoskeletal: Negative for arthralgias and myalgias  Skin: Negative for pallor, rash and wound  Allergic/Immunologic: Negative  Neurological: Negative for dizziness, tremors and weakness  Hematological: Negative  Psychiatric/Behavioral: Negative          Historical Information   Past Medical History:   Diagnosis Date    Diverticulosis     Hemorrhoids     Hypertension     controlled    Stroke [facility])      Past Surgical History:   Procedure Laterality Date    COLONOSCOPY      ONSET 2015    NOSE SURGERY      [place] EXPLORE PARATHYROID GLANDS Bilateral 6/18/2020    Procedure: PARATHYROIDECTOMY, MINIMALLY INVASIVE, 3 5 GLAND EXPLORATION, INTRAOPERATIVE PTH MONITORING;  Surgeon: Kervin Lerner MD;  [facility]: [facility];  Service: Surgical Oncology    SKIN BIOPSY      SKIN LESION EXCISION       Social History   Social History     Substance and Sexual Activity   Alcohol Use Yes    Alcohol/week: 2 0 standard drinks    Types: 2 Cans of beer per week    Frequency: 2-4 times a month    Drinks per session: 1 or 2    Binge frequency: Never    Comment: SOCIAL     Social History     Substance and Sexual Activity   Drug Use No     Social History     Tobacco Use   Smoking Status Never Smoker   Smokeless Tobacco Never Used     Family History:   Family History   Problem Relation Age of Onset    No Known Problems Mother     Hypertension Father     Parkinsonism Father     Hypertension Sister     Stroke Family     Glaucoma Family     Hypertension Family        Meds/Allergies   Current Outpatient Medications   Medication Sig Dispense Refill    atorvastatin (LIPITOR) 40 mg tablet Take 1 tablet (40 mg total) by mouth daily 90 tablet 1    calcium carbonate (TUMS) 500 mg chewable tablet Chew 1 tablet (500 mg total) 3 (three) times a day before meals 90 tablet 0    Cholecalciferol (D 2000) 2000 units TABS Take by mouth daily      CO ENZYME Q-10 PO daily       EDARBYCLOR 40-12 5 MG TABS TAKE ONE TABLET BY MOUTH ONCE DAILY 30 tablet 6    folic acid (FOLVITE) 1 mg tablet Take 1 tablet (1,000 mcg total) by mouth daily 90 tablet 0    metoprolol succinate (TOPROL-XL) 25 mg 24 hr tablet Take 1 tablet (25 mg total) by mouth daily (Patient taking differently: Take 12 5 mg by mouth 2 (two) times a day ) 90 tablet 1    Potassium 99 MG TABS Take 1 tablet by mouth daily      calcitriol (ROCALTROL) 0 25 mcg capsule Take one tablet three times daily 90 capsule 5    Calcium Carbonate (Caltrate 600) 1500 (600 Ca) MG TABS Take 1 tablet twice a day (Patient not taking: Reported on 7/6/2020)  0    clopidogrel (PLAVIX) 75 mg tablet Take 1 tablet (75 mg total) by mouth daily 90 tablet 1     No current facility-administered medications for this visit  Allergies   Allergen Reactions    Codeine Chest Pain       Objective   Vitals: Blood pressure 106/60, pulse 64, height 5' 4" (1 626 m), weight 77 1 kg (170 lb)  Physical Exam   Constitutional: She is oriented to person, place, and time  She appears well-developed and well-nourished  No distress  HENT:   Head: Normocephalic and atraumatic  Eyes: Conjunctivae and EOM are normal  Right eye exhibits no discharge   Left eye exhibits no discharge  Neck: Normal range of motion  Neck supple  No thyromegaly present  Cardiovascular: Normal rate, regular rhythm and normal heart sounds  No murmur heard  Pulmonary/Chest: Effort normal and breath sounds normal  No respiratory distress  Abdominal: Soft  Bowel sounds are normal    Musculoskeletal: Normal range of motion  She exhibits no edema, tenderness or deformity  Neurological: She is alert and oriented to person, place, and time  She has normal reflexes  She displays normal reflexes  Skin: Skin is warm and dry  She is not diaphoretic  Psychiatric: She has a normal mood and affect  Vitals reviewed  The history was obtained from the review of the chart, patient  Lab Results:        Imaging Studies:   Results for orders placed during the hospital encounter of 10/05/18   US thyroid    Impression 1  Subcentimeter right-sided thyroid nodule as described above  2   Subcentimeter nodule posterior to the left lower pole, likely corresponding to the finding seen on recent nuclear medicine parathyroid scan  If clinically indicated, consider CT scan of the neck utilizing parathyroid imaging protocol  No nodule meets current ACR criteria for requiring biopsy or followup ultrasounds  Reference: ACR Thyroid Imaging, Reporting and Data System (TI-RADS): White Paper of the [employer]  J AM Anthony Radiol 6428;39:149-667  (additional recommendations based on American Thyroid Association 2015 guidelines )      Workstation performed: [id number]         I have personally reviewed pertinent reports  Portions of the record may have been created with voice recognition software  Occasional wrong word or "sound a like" substitutions may have occurred due to the inherent limitations of voice recognition software  Read the chart carefully and recognize, using context, where substitutions have occurred

## 2020-07-22 ENCOUNTER — APPOINTMENT (OUTPATIENT)
Dept: LAB | Facility: CLINIC | Age: 60
End: 2020-07-22
Payer: COMMERCIAL

## 2020-07-22 DIAGNOSIS — E83.51 HYPOCALCEMIA: ICD-10-CM

## 2020-07-22 DIAGNOSIS — I10 ESSENTIAL HYPERTENSION: ICD-10-CM

## 2020-07-22 DIAGNOSIS — E78.5 HYPERLIPIDEMIA, UNSPECIFIED HYPERLIPIDEMIA TYPE: ICD-10-CM

## 2020-07-22 DIAGNOSIS — E20.9 HYPOPARATHYROIDISM, UNSPECIFIED HYPOPARATHYROIDISM TYPE (HCC): ICD-10-CM

## 2020-07-22 LAB
ANION GAP SERPL CALCULATED.3IONS-SCNC: 6 MMOL/L (ref 4–13)
BUN SERPL-MCNC: 19 MG/DL (ref 5–25)
CALCIUM SERPL-MCNC: 9.3 MG/DL (ref 8.3–10.1)
CHLORIDE SERPL-SCNC: 102 MMOL/L (ref 100–108)
CO2 SERPL-SCNC: 28 MMOL/L (ref 21–32)
CREAT SERPL-MCNC: 0.81 MG/DL (ref 0.6–1.3)
GFR SERPL CREATININE-BSD FRML MDRD: 80 ML/MIN/1.73SQ M
GLUCOSE P FAST SERPL-MCNC: 82 MG/DL (ref 65–99)
POTASSIUM SERPL-SCNC: 3.6 MMOL/L (ref 3.5–5.3)
PTH-INTACT SERPL-MCNC: <6.3 PG/ML (ref 18.4–80.1)
SODIUM SERPL-SCNC: 136 MMOL/L (ref 136–145)

## 2020-07-22 PROCEDURE — 80048 BASIC METABOLIC PNL TOTAL CA: CPT

## 2020-07-22 PROCEDURE — 36415 COLL VENOUS BLD VENIPUNCTURE: CPT

## 2020-07-22 PROCEDURE — 83970 ASSAY OF PARATHORMONE: CPT

## 2020-07-22 NOTE — TELEPHONE ENCOUNTER
Message on refill line 07/22/2020 2:45 pm requesting:    Atorvastatin 40 mg  Metoprolol Succ ER 25 mg  To [facility]

## 2020-07-23 DIAGNOSIS — E20.9 HYPOPARATHYROIDISM, UNSPECIFIED HYPOPARATHYROIDISM TYPE (HCC): ICD-10-CM

## 2020-07-23 DIAGNOSIS — E04.0 SIMPLE GOITER: ICD-10-CM

## 2020-07-23 DIAGNOSIS — E83.52 HYPERCALCEMIA: ICD-10-CM

## 2020-07-23 DIAGNOSIS — E83.51 HYPOCALCEMIA: ICD-10-CM

## 2020-07-23 DIAGNOSIS — I10 ESSENTIAL HYPERTENSION: ICD-10-CM

## 2020-07-23 DIAGNOSIS — E04.9 ENLARGED THYROID: Primary | ICD-10-CM

## 2020-07-23 RX ORDER — METOPROLOL SUCCINATE 25 MG/1
25 TABLET, EXTENDED RELEASE ORAL DAILY
Qty: 90 TABLET | Refills: 1 | Status: SHIPPED | OUTPATIENT
Start: 2020-07-23 | End: 2021-01-18 | Stop reason: SDUPTHER

## 2020-07-23 RX ORDER — CALCITRIOL 0.25 UG/1
CAPSULE, LIQUID FILLED ORAL
Qty: 90 CAPSULE | Refills: 5
Start: 2020-07-23 | End: 2021-03-15 | Stop reason: SDUPTHER

## 2020-07-23 RX ORDER — ATORVASTATIN CALCIUM 40 MG/1
40 TABLET, FILM COATED ORAL DAILY
Qty: 90 TABLET | Refills: 1 | Status: SHIPPED | OUTPATIENT
Start: 2020-07-23 | End: 2021-06-29 | Stop reason: SDUPTHER

## 2020-07-23 NOTE — TELEPHONE ENCOUNTER
----- Message from Benedict Perry MD sent at 7/23/2020  9:10 AM EDT -----  Please call the patient regarding her results, please inform patient to reduce Calcitriol to 0 25 mcg twice a day (currently she is taking 3 times daily), she should continue her calcium supplementation at current dose  Call us if she notices any tingling or numbness in her extremities or around the lips or muscle spasms  She will need repeat basic metabolic profile in 1 week  Please order the basic metabolic profile

## 2020-07-23 NOTE — TELEPHONE ENCOUNTER
Last refill date: 1/20/20 # 90 with one refill  Last appointment: 5/20/20  Next appointment: 12/02/20

## 2020-08-10 ENCOUNTER — TELEPHONE (OUTPATIENT)
Dept: PAIN MEDICINE | Facility: CLINIC | Age: 60
End: 2020-08-10

## 2020-08-10 ENCOUNTER — APPOINTMENT (OUTPATIENT)
Dept: LAB | Age: 60
End: 2020-08-10
Payer: COMMERCIAL

## 2020-08-10 DIAGNOSIS — E04.0 SIMPLE GOITER: ICD-10-CM

## 2020-08-10 DIAGNOSIS — E21.0 PRIMARY HYPERPARATHYROIDISM (HCC): ICD-10-CM

## 2020-08-10 DIAGNOSIS — E83.52 HYPERCALCEMIA: ICD-10-CM

## 2020-08-10 DIAGNOSIS — I10 ESSENTIAL HYPERTENSION: ICD-10-CM

## 2020-08-10 DIAGNOSIS — E04.9 ENLARGED THYROID: ICD-10-CM

## 2020-08-10 DIAGNOSIS — E83.51 HYPOCALCEMIA: ICD-10-CM

## 2020-08-10 DIAGNOSIS — E55.9 VITAMIN D DEFICIENCY: ICD-10-CM

## 2020-08-10 LAB
25(OH)D3 SERPL-MCNC: 40.7 NG/ML (ref 30–100)
ANION GAP SERPL CALCULATED.3IONS-SCNC: 9 MMOL/L (ref 4–13)
BUN SERPL-MCNC: 18 MG/DL (ref 5–25)
CALCIUM SERPL-MCNC: 8.5 MG/DL (ref 8.3–10.1)
CHLORIDE SERPL-SCNC: 104 MMOL/L (ref 100–108)
CO2 SERPL-SCNC: 28 MMOL/L (ref 21–32)
CREAT SERPL-MCNC: 0.79 MG/DL (ref 0.6–1.3)
GFR SERPL CREATININE-BSD FRML MDRD: 83 ML/MIN/1.73SQ M
GLUCOSE P FAST SERPL-MCNC: 113 MG/DL (ref 65–99)
MAGNESIUM SERPL-MCNC: 1.6 MG/DL (ref 1.6–2.6)
POTASSIUM SERPL-SCNC: 3.8 MMOL/L (ref 3.5–5.3)
PTH-INTACT SERPL-MCNC: 7.3 PG/ML (ref 18.4–80.1)
SODIUM SERPL-SCNC: 141 MMOL/L (ref 136–145)

## 2020-08-10 PROCEDURE — 36415 COLL VENOUS BLD VENIPUNCTURE: CPT

## 2020-08-10 PROCEDURE — 83970 ASSAY OF PARATHORMONE: CPT

## 2020-08-10 PROCEDURE — 82306 VITAMIN D 25 HYDROXY: CPT

## 2020-08-10 PROCEDURE — 80048 BASIC METABOLIC PNL TOTAL CA: CPT

## 2020-08-10 PROCEDURE — 83735 ASSAY OF MAGNESIUM: CPT

## 2020-08-10 NOTE — TELEPHONE ENCOUNTER
----- Message from Damion Parikh MD sent at 8/10/2020  1:19 PM EDT -----  Please call the patient regarding her blood work results, her calcium is 8 5, which is in acceptable range, continue   Rocaltrol  0 25 micrograms 3 times daily, Continue calcium carbonate 1000 milligram twice a day,she should start magnesium Oxide 400 mg po daily ( OTC)     Damion Parikh MD

## 2020-08-11 NOTE — PROGRESS NOTES
Virtual Regular Visit      Assessment/Plan:    Problem List Items Addressed This Visit        Other    Hypocalcemia      Other Visit Diagnoses     Primary hyperparathyroidism ([facility] )    -  Primary    Hypoparathyroidism, unspecified hypoparathyroidism type ([facility] )        Vitamin D deficiency                   Reason for visit is hypoparathyroid  Chief Complaint   Patient presents with    Virtual Regular Visit        Encounter provider Anselmo Watts PA-C    Provider located at [address] [address]      Recent Visits  No visits were found meeting these conditions  Showing recent visits within past 7 days and meeting all other requirements     Future Appointments  No visits were found meeting these conditions  Showing future appointments within next 150 days and meeting all other requirements        The patient was identified by name and date of birth  Katalina Mclean was informed that this is a telemedicine visit and that the visit is being conducted through [employer]  My office door was closed  No one else was in the room  She acknowledged consent and understanding of privacy and security of the video platform  The patient has agreed to participate and understands they can discontinue the visit at any time  Patient is aware this is a billable service  Subjective  Katalina Mclean is a 64 y o  female here for follow-up of postsurgical hypoparathyroidism  She has a past medical history of primary hyperparathyroidism  She underwent a parathyroidectomy by Dr Colton Mccarthy in June 2020  Within a week after surgery, patient presented to the ED for paresthesias  She was taking Tums after surgery due to tingling in her face and hands however for a couple days prior to the ED visit she noted worsening of symptoms  During the hospitalization she received IV calcium   She was started on Rocaltrol and calcium carbonate  She is currently taking Rocaltrol 0 25 mcg 1 tablet BID and calcium carbonate 1000 mg (TUMS) TID  She is taking medications as instructed and tolerating medication well  Her most recent calcium was normal at 8 5 and vitamin D normal at 40 7  She is taking vitamin D3 2000 IU daily  Her PTH remains low at 7 3 but improved from < 6 3  Magnesium was low normal at 1 6  She had been advised to start taking magnesium oxide 400 mg daily  She has noted significant improvement in paresthesias  Occasionally she will experience right hand tingling      Component      Latest Ref Rng & Units 6/25/2020 6/29/2020 7/22/2020 8/10/2020   Sodium      136 - 145 mmol/L  140 136 141   Potassium      3 5 - 5 3 mmol/L  4 4 3 6 3 8   Chloride      100 - 108 mmol/L  107 102 104   CO2      21 - 32 mmol/L  24 28 28   Anion Gap      4 - 13 mmol/L  9 6 9   BUN      5 - 25 mg/dL  17 19 18   Creatinine      0 60 - 1 30 mg/dL  0 78 0 81 0 79   GLUCOSE FASTING      65 - 99 mg/dL  104 (H) 82 113 (H)   Calcium      8 3 - 10 1 mg/dL  9 3 9 3 8 5   eGFR      ml/min/1 73sq m  84 80 83   PARATHYROID HORMONE      18 4 - 80 1 pg/mL <6 3 (L)  <6 3 (L) 7 3 (L)   Vit D, 25-Hydroxy      30 0 - 100 0 ng/mL    40 7   Magnesium      1 6 - 2 6 mg/dL    1 6           Past Medical History:   Diagnosis Date    Diverticulosis     Hemorrhoids     Hypertension     controlled    Stroke [facility])        Past Surgical History:   Procedure Laterality Date    COLONOSCOPY      ONSET 2015    NOSE SURGERY      MI EXPLORE PARATHYROID GLANDS Bilateral 6/18/2020    Procedure: PARATHYROIDECTOMY, MINIMALLY INVASIVE, 3 5 GLAND EXPLORATION, INTRAOPERATIVE PTH MONITORING;  Surgeon: Enmanuel Clayton MD;  [facility];  Service: Surgical Oncology    SKIN BIOPSY      SKIN LESION EXCISION         Current Outpatient Medications   Medication Sig Dispense Refill    atorvastatin (LIPITOR) 40 mg tablet Take 1 tablet (40 mg total) by mouth daily 90 tablet 1    calcitriol (ROCALTROL) 0 25 mcg capsule Take one tablet two times daily 90 capsule 5    Calcium Carbonate (Caltrate 600) 1500 (600 Ca) MG TABS Take 1 tablet twice a day (Patient not taking: Reported on 7/6/2020)  0    calcium carbonate (TUMS) 500 mg chewable tablet Chew 1 tablet (500 mg total) 3 (three) times a day before meals 90 tablet 0    Cholecalciferol (D 2000) 2000 units TABS Take by mouth daily      clopidogrel (PLAVIX) 75 mg tablet Take 1 tablet (75 mg total) by mouth daily 90 tablet 1    CO ENZYME Q-10 PO daily       EDARBYCLOR 40-12 5 MG TABS TAKE ONE TABLET BY MOUTH ONCE DAILY 30 tablet 6    folic acid (FOLVITE) 1 mg tablet Take 1 tablet (1,000 mcg total) by mouth daily 90 tablet 0    metoprolol succinate (TOPROL-XL) 25 mg 24 hr tablet Take 1 tablet (25 mg total) by mouth daily 90 tablet 1    Potassium 99 MG TABS Take 1 tablet by mouth daily       No current facility-administered medications for this visit  Allergies   Allergen Reactions    Codeine Chest Pain       Review of Systems   Constitutional: Negative for activity change, appetite change, fatigue and unexpected weight change  HENT: Negative for trouble swallowing  Eyes: Negative for visual disturbance  Respiratory: Negative for shortness of breath  Cardiovascular: Negative for chest pain and palpitations  Gastrointestinal: Negative for constipation and diarrhea  Endocrine: Negative for cold intolerance, heat intolerance, polydipsia and polyuria  Musculoskeletal: Positive for back pain (bulging disc)  Skin: Negative  Neurological: Positive for numbness (occasional in right hand)  Negative for tremors and weakness  Psychiatric/Behavioral: Negative  Video Exam    There were no vitals filed for this visit  Physical Exam     Physical Exam   Constitutional: She is oriented to person, place, and time  She appears well-developed and well-nourished  No distress     HENT:   Head: Normocephalic and atraumatic  Neck: Normal range of motion  Pulmonary/Chest: Effort normal    Musculoskeletal: Normal range of motion  Neurological: She is alert and oriented to person, place, and time  Skin: She is not diaphoretic  Psychiatric: She has a normal mood and affect  Her behavior is normal      PLAN  Primary hyperparathyroidism  S/p parathyroidectomy   Postsurgical hypocalcemia   Hypocalcemia / postsurgical hypoparathyroidism  Continue current Rocaltrol and calcium supplementation  Most recent calcium normal at 8 5  Call for worsening signs/symptoms of hypocalcemia  Vitamin D deficiency  Vitamin D normal at 40 7  Continue current supplementation of vitamin D3      I spent 25 minutes directly with the patient during this visit    Saray Vivar PA-C    VIRTUAL VISIT Mandy Smith 25 acknowledges that she has consented to an online visit or consultation  She understands that the online visit is based solely on information provided by her, and that, in the absence of a face-to-face physical evaluation by the physician, the diagnosis she receives is both limited and provisional in terms of accuracy and completeness  This is not intended to replace a full medical face-to-face evaluation by the physician  Marlen Naylor understands and accepts these terms

## 2020-08-13 ENCOUNTER — TELEMEDICINE (OUTPATIENT)
Dept: ENDOCRINOLOGY | Facility: CLINIC | Age: 60
End: 2020-08-13
Payer: COMMERCIAL

## 2020-08-13 DIAGNOSIS — E55.9 VITAMIN D DEFICIENCY: ICD-10-CM

## 2020-08-13 DIAGNOSIS — E21.0 PRIMARY HYPERPARATHYROIDISM (HCC): Primary | ICD-10-CM

## 2020-08-13 DIAGNOSIS — E20.8 OTHER HYPOPARATHYROIDISM (HCC): ICD-10-CM

## 2020-08-13 DIAGNOSIS — E83.51 HYPOCALCEMIA: ICD-10-CM

## 2020-08-13 PROBLEM — E20.9 HYPOPARATHYROIDISM (HCC): Status: ACTIVE | Noted: 2020-08-13

## 2020-08-13 PROCEDURE — 1036F TOBACCO NON-USER: CPT | Performed by: PHYSICIAN ASSISTANT

## 2020-08-13 PROCEDURE — 3074F SYST BP LT 130 MM HG: CPT | Performed by: PHYSICIAN ASSISTANT

## 2020-08-13 PROCEDURE — 3078F DIAST BP <80 MM HG: CPT | Performed by: PHYSICIAN ASSISTANT

## 2020-08-13 PROCEDURE — 99214 OFFICE O/P EST MOD 30 MIN: CPT | Performed by: PHYSICIAN ASSISTANT

## 2020-08-13 RX ORDER — CALCIUM CARBONATE 200(500)MG
2 TABLET,CHEWABLE ORAL
Qty: 90 TABLET | Refills: 0
Start: 2020-08-13 | End: 2021-08-04 | Stop reason: SDUPTHER

## 2020-08-21 DIAGNOSIS — I63.81 LACUNAR STROKE (HCC): ICD-10-CM

## 2020-08-21 NOTE — TELEPHONE ENCOUNTER
08/21/2020 1:42 pm  Patient requests refill of Folic Acid 1 mg to [facility]

## 2020-08-24 RX ORDER — FOLIC ACID 1 MG/1
1000 TABLET ORAL DAILY
Qty: 90 TABLET | Refills: 1 | Status: SHIPPED | OUTPATIENT
Start: 2020-08-24

## 2020-08-24 NOTE — TELEPHONE ENCOUNTER
Last refill date: 5/20/20 # 90 with no refills    Last appointment: 5/20/20  Next appointment: 12/2/20

## 2020-09-01 DIAGNOSIS — I10 ESSENTIAL HYPERTENSION: ICD-10-CM

## 2020-09-01 RX ORDER — AZILSARTAN KAMEDOXOMIL AND CHLORTHALIDONE 40; 12.5 MG/1; MG/1
TABLET ORAL
Qty: 30 TABLET | Refills: 6 | Status: SHIPPED | OUTPATIENT
Start: 2020-09-01 | End: 2021-04-27 | Stop reason: SDUPTHER

## 2020-09-04 ENCOUNTER — OFFICE VISIT (OUTPATIENT)
Dept: URGENT CARE | Age: 60
End: 2020-09-04
Payer: COMMERCIAL

## 2020-09-04 VITALS
HEART RATE: 79 BPM | DIASTOLIC BLOOD PRESSURE: 69 MMHG | HEIGHT: 64 IN | TEMPERATURE: 98.6 F | RESPIRATION RATE: 18 BRPM | WEIGHT: 170 LBS | SYSTOLIC BLOOD PRESSURE: 140 MMHG | BODY MASS INDEX: 29.02 KG/M2 | OXYGEN SATURATION: 98 %

## 2020-09-04 DIAGNOSIS — B37.2 CANDIDIASIS, INTERTRIGO: Primary | ICD-10-CM

## 2020-09-04 PROCEDURE — G0382 LEV 3 HOSP TYPE B ED VISIT: HCPCS | Performed by: PHYSICIAN ASSISTANT

## 2020-09-04 RX ORDER — NYSTATIN 100000 U/G
CREAM TOPICAL 2 TIMES DAILY
Qty: 30 G | Refills: 1 | Status: SHIPPED | OUTPATIENT
Start: 2020-09-04 | End: 2021-01-26

## 2020-09-04 RX ORDER — FLUCONAZOLE 150 MG/1
150 TABLET ORAL ONCE
Qty: 1 TABLET | Refills: 0 | Status: SHIPPED | OUTPATIENT
Start: 2020-09-04 | End: 2020-09-04

## 2020-09-04 NOTE — PROGRESS NOTES
[address] [facility]        NAME: Rene Deluna is a 64 y o  female  : 1960    MRN: [id number]  DATE: 2020  TIME: 8:11 AM    Assessment and Plan   Candidiasis, intertrigo [B37 2]  1  Candidiasis, intertrigo  nystatin (MYCOSTATIN) cream    fluconazole (DIFLUCAN) 150 mg tablet         Patient Instructions     -start antifungals as directed  -keep area clean and dry  Follow up with PCP in 3-5 days  Proceed to  ER if symptoms worsen  Chief Complaint     Chief Complaint   Patient presents with    Rash     underneath b/l  breast x 2 weeks          History of Present Illness       Pt has an itchy rash under her breast for the past 2 weeks  She has had it in the past but ran out of her nystatin cream         Review of Systems   Review of Systems   Constitutional: Negative  HENT: Negative  Respiratory: Negative  Cardiovascular: Negative  Gastrointestinal: Negative  Musculoskeletal: Negative  Skin: Positive for rash  Neurological: Negative  Psychiatric/Behavioral: Negative            Current Medications       Current Outpatient Medications:     atorvastatin (LIPITOR) 40 mg tablet, Take 1 tablet (40 mg total) by mouth daily, Disp: 90 tablet, Rfl: 1    calcitriol (ROCALTROL) 0 25 mcg capsule, Take one tablet two times daily, Disp: 90 capsule, Rfl: 5    calcium carbonate (TUMS) 500 mg chewable tablet, Chew 2 tablets (1,000 mg total) 3 (three) times a day before meals, Disp: 90 tablet, Rfl: 0    Cholecalciferol (D 2000) 2000 units TABS, Take by mouth daily, Disp: , Rfl:     clopidogrel (PLAVIX) 75 mg tablet, Take 1 tablet (75 mg total) by mouth daily, Disp: 90 tablet, Rfl: 1    CO ENZYME Q-10 PO, daily , Disp: , Rfl:     Edarbyclor 40-12 5 MG TABS, TAKE ONE TABLET BY MOUTH ONCE DAILY , Disp: 30 tablet, Rfl: 6    folic acid (FOLVITE) 1 mg tablet, Take 1 tablet (1,000 mcg total) by mouth daily, Disp: 90 tablet, Rfl: 1    magnesium oxide (MAG-OX) 400 mg, Take 1 tablet (400 mg total) by mouth daily, Disp: , Rfl:     metoprolol succinate (TOPROL-XL) 25 mg 24 hr tablet, Take 1 tablet (25 mg total) by mouth daily, Disp: 90 tablet, Rfl: 1    Potassium 99 MG TABS, Take 1 tablet by mouth daily, Disp: , Rfl:     fluconazole (DIFLUCAN) 150 mg tablet, Take 1 tablet (150 mg total) by mouth once for 1 dose, Disp: 1 tablet, Rfl: 0    nystatin (MYCOSTATIN) cream, Apply topically 2 (two) times a day, Disp: 30 g, Rfl: 1    Current Allergies     Allergies as of 09/04/2020 - Reviewed 09/04/2020   Allergen Reaction Noted    Codeine Chest Pain 07/02/2014            The following portions of the patient's history were reviewed and updated as appropriate: allergies, current medications, past family history, past medical history, past social history, past surgical history and problem list      Past Medical History:   Diagnosis Date    Diverticulosis     Hemorrhoids     Hypertension     controlled    Stroke [facility])        Past Surgical History:   Procedure Laterality Date    COLONOSCOPY      ONSET 2015    NOSE SURGERY      NM EXPLORE PARATHYROID GLANDS Bilateral 6/18/2020    Procedure: PARATHYROIDECTOMY, MINIMALLY INVASIVE, 3 5 GLAND EXPLORATION, INTRAOPERATIVE PTH MONITORING;  Surgeon: Hardy Blackwell MD;  [facility];  Service: Surgical Oncology    SKIN BIOPSY      SKIN LESION EXCISION         Family History   Problem Relation Age of Onset    No Known Problems Mother     Hypertension Father     Parkinsonism Father     Hypertension Sister     Stroke Family     Glaucoma Family     Hypertension Family          Medications have been verified  Objective   /69   Pulse 79   Temp 98 6 °F (37 °C)   Resp 18   Ht 5' 4" (1 626 m)   Wt 77 1 kg (170 lb)   SpO2 98%   BMI 29 18 kg/m²        Physical Exam     Physical Exam  Vitals signs and nursing note reviewed  Constitutional:       General: She is not in acute distress  Appearance: Normal appearance   She is normal weight  She is not ill-appearing, toxic-appearing or diaphoretic  HENT:      Head: Normocephalic and atraumatic  Cardiovascular:      Rate and Rhythm: Normal rate  Pulses: Normal pulses  Pulmonary:      Effort: Pulmonary effort is normal    Skin:     Comments: Area of erythema, white discharge under both breasts  Neurological:      General: No focal deficit present  Mental Status: She is alert and oriented to person, place, and time     Psychiatric:         Mood and Affect: Mood normal

## 2020-09-04 NOTE — PATIENT INSTRUCTIONS
Skin Yeast Infection   AMBULATORY CARE:   What do I need to know about a skin yeast infection? Yeast is normally present on the skin  Infection happens when you have too much yeast, or when it gets into a cut on your skin  Certain types of mold and fungus can cause a yeast infection  A skin yeast infection can appear anywhere on your skin or nail beds  Skin yeast infections are usually found on warm, moist parts of the body  Examples include between skin folds or under the breasts  Common symptoms include the following:  Signs and symptoms will depend on the type of yeast causing the infection, and where the infection is located  · Red, scaly skin    · Changes in skin color, especially a beefy red color    · Itching, dry skin    · Painful, cracking skin at the corners of your mouth    · Thick, discolored, chipping nails    · Skin lesions that may be red or purple and round    · Pus bumps  Seek care immediately if:   · You have signs of infection, such as pus, warmth or red streaks coming from the wound, or a fever  Contact your healthcare provider if:   · Your symptoms worsen or do not get better within 7 to 10 days  · You have new or returning signs of a skin yeast infection after treatment  · You have questions or concerns about your condition or care  Treatment for a skin yeast infection  may include antifungal medicine to treat the fungal infection  The medicine be given as a cream, ointment, or pill  Care for the skin near the infection:  You may only have discolored patches of skin, or areas that are dry and flaking  Care for these skin problems as directed by your healthcare provider  If you have painful skin or an open sore, you will need to protect the skin and prevent damage  You will also need to keep the skin dry as much as possible  Ask your healthcare provider how to care for your skin while the infection clears   The following are general guidelines for caring for painful or open skin:  · Keep the skin clean  Ask your healthcare provider if you should wash with mild soap and water  Do not use soap that contains alcohol  Alcohol can dry and irritate the skin and make symptoms worse  Your baby's healthcare provider may tell you to use diaper cream or ointment when you change his diaper  This will protect the skin and prevent moisture from collecting  · Keep the skin dry  Pat the area dry with a towel  Do not rub, because this may irritate the skin  If you have a skin yeast infection between skin folds, lift the top part gently and hold it while you dry between your skin folds  Always dry your feet completely after you swim or bathe, including between your toes  Dry your skin if you are sweating from exercise or exposure to heat  Use a clean towel each time to prevent spreading or continuing the infection  · Keep the skin protected  Ask your healthcare provider if you should cover the area with a bandage or leave it open  Check your skin each day to make sure you do not have new or worsening problems  You may need to have someone check the skin if you cannot see the area easily  Prevent another skin yeast infection:   · Do not share clothing or towels    · Wear shower shoes if you need to use a public shower    · Dry your feet completely after you bathe, and apply antifungal powder or cream as directed    · Put on socks before you get dressed so you do not spread fungus from your feet    · Wear light clothing that allows air to get to your skin    · Manage your weight to prevent skin folds where yeast can collect    · Manage diabetes    · Change your baby's diaper often, and keep the area clean and dry as much as possible    · Use a diaper cream or ointment that contains zinc oxide or dimethicone on your baby's diaper area as directed  Follow up with your healthcare provider as directed:  Write down your questions so you remember to ask them during your visits     © 2017 [employer] [facility] is for End User's use only and may not be sold, redistributed or otherwise used for commercial purposes  All illustrations and images included in CareNotes® are the copyrighted property of A D A M , Inc  or Tanner Keita  The above information is an  only  It is not intended as medical advice for individual conditions or treatments  Talk to your doctor, nurse or pharmacist before following any medical regimen to see if it is safe and effective for you

## 2020-12-02 ENCOUNTER — TELEMEDICINE (OUTPATIENT)
Dept: FAMILY MEDICINE CLINIC | Facility: CLINIC | Age: 60
End: 2020-12-02
Payer: COMMERCIAL

## 2020-12-02 DIAGNOSIS — Z86.73 HISTORY OF LACUNAR CEREBROVASCULAR ACCIDENT (CVA): ICD-10-CM

## 2020-12-02 DIAGNOSIS — Z12.31 BREAST CANCER SCREENING BY MAMMOGRAM: Primary | ICD-10-CM

## 2020-12-02 DIAGNOSIS — I10 ESSENTIAL HYPERTENSION: ICD-10-CM

## 2020-12-02 DIAGNOSIS — E20.8 OTHER HYPOPARATHYROIDISM (HCC): ICD-10-CM

## 2020-12-02 DIAGNOSIS — Z11.4 SCREENING FOR HIV (HUMAN IMMUNODEFICIENCY VIRUS): ICD-10-CM

## 2020-12-02 DIAGNOSIS — Z13.220 LIPID SCREENING: ICD-10-CM

## 2020-12-02 DIAGNOSIS — E04.9 ENLARGED THYROID: ICD-10-CM

## 2020-12-02 DIAGNOSIS — E89.2 STATUS POST PARATHYROIDECTOMY (HCC): ICD-10-CM

## 2020-12-02 DIAGNOSIS — Z12.4 SCREENING FOR CERVICAL CANCER: ICD-10-CM

## 2020-12-02 DIAGNOSIS — R73.03 PREDIABETES: ICD-10-CM

## 2020-12-02 DIAGNOSIS — Z13.0 SCREENING FOR DEFICIENCY ANEMIA: ICD-10-CM

## 2020-12-02 PROCEDURE — 99214 OFFICE O/P EST MOD 30 MIN: CPT | Performed by: FAMILY MEDICINE

## 2020-12-23 DIAGNOSIS — I63.81 LACUNAR STROKE (HCC): ICD-10-CM

## 2020-12-24 RX ORDER — CLOPIDOGREL BISULFATE 75 MG/1
75 TABLET ORAL DAILY
Qty: 90 TABLET | Refills: 1 | Status: SHIPPED | OUTPATIENT
Start: 2020-12-24 | End: 2021-06-29 | Stop reason: SDUPTHER

## 2020-12-29 ENCOUNTER — TELEPHONE (OUTPATIENT)
Dept: SURGICAL ONCOLOGY | Facility: CLINIC | Age: 60
End: 2020-12-29

## 2020-12-31 ENCOUNTER — LAB (OUTPATIENT)
Dept: LAB | Facility: CLINIC | Age: 60
End: 2020-12-31
Payer: COMMERCIAL

## 2020-12-31 DIAGNOSIS — E21.0 PRIMARY HYPERPARATHYROIDISM (HCC): ICD-10-CM

## 2020-12-31 DIAGNOSIS — E83.51 HYPOCALCEMIA: ICD-10-CM

## 2020-12-31 DIAGNOSIS — I10 ESSENTIAL HYPERTENSION: ICD-10-CM

## 2020-12-31 DIAGNOSIS — Z13.0 SCREENING FOR DEFICIENCY ANEMIA: ICD-10-CM

## 2020-12-31 DIAGNOSIS — E20.8 OTHER HYPOPARATHYROIDISM (HCC): ICD-10-CM

## 2020-12-31 DIAGNOSIS — Z13.220 LIPID SCREENING: ICD-10-CM

## 2020-12-31 DIAGNOSIS — R73.03 PREDIABETES: ICD-10-CM

## 2020-12-31 DIAGNOSIS — Z86.73 HISTORY OF LACUNAR CEREBROVASCULAR ACCIDENT (CVA): ICD-10-CM

## 2020-12-31 DIAGNOSIS — E55.9 VITAMIN D DEFICIENCY: ICD-10-CM

## 2020-12-31 DIAGNOSIS — E04.9 ENLARGED THYROID: ICD-10-CM

## 2020-12-31 DIAGNOSIS — E89.2 STATUS POST PARATHYROIDECTOMY (HCC): ICD-10-CM

## 2020-12-31 LAB
25(OH)D3 SERPL-MCNC: 30.3 NG/ML (ref 30–100)
ALBUMIN SERPL BCP-MCNC: 4.2 G/DL (ref 3.5–5)
ALP SERPL-CCNC: 82 U/L (ref 46–116)
ALT SERPL W P-5'-P-CCNC: 46 U/L (ref 12–78)
ANION GAP SERPL CALCULATED.3IONS-SCNC: 4 MMOL/L (ref 4–13)
AST SERPL W P-5'-P-CCNC: 23 U/L (ref 5–45)
BASOPHILS # BLD AUTO: 0.06 THOUSANDS/ΜL (ref 0–0.1)
BASOPHILS NFR BLD AUTO: 1 % (ref 0–1)
BILIRUB SERPL-MCNC: 0.44 MG/DL (ref 0.2–1)
BUN SERPL-MCNC: 25 MG/DL (ref 5–25)
CALCIUM SERPL-MCNC: 10.3 MG/DL (ref 8.3–10.1)
CHLORIDE SERPL-SCNC: 104 MMOL/L (ref 100–108)
CHOLEST SERPL-MCNC: 231 MG/DL (ref 50–200)
CO2 SERPL-SCNC: 31 MMOL/L (ref 21–32)
CREAT SERPL-MCNC: 0.94 MG/DL (ref 0.6–1.3)
CREAT UR-MCNC: 89.8 MG/DL
EOSINOPHIL # BLD AUTO: 0.34 THOUSAND/ΜL (ref 0–0.61)
EOSINOPHIL NFR BLD AUTO: 4 % (ref 0–6)
ERYTHROCYTE [DISTWIDTH] IN BLOOD BY AUTOMATED COUNT: 12.9 % (ref 11.6–15.1)
EST. AVERAGE GLUCOSE BLD GHB EST-MCNC: 117 MG/DL
GFR SERPL CREATININE-BSD FRML MDRD: 67 ML/MIN/1.73SQ M
GLUCOSE P FAST SERPL-MCNC: 85 MG/DL (ref 65–99)
HBA1C MFR BLD: 5.7 %
HCT VFR BLD AUTO: 43.3 % (ref 34.8–46.1)
HDLC SERPL-MCNC: 64 MG/DL
HGB BLD-MCNC: 13.7 G/DL (ref 11.5–15.4)
IMM GRANULOCYTES # BLD AUTO: 0.02 THOUSAND/UL (ref 0–0.2)
IMM GRANULOCYTES NFR BLD AUTO: 0 % (ref 0–2)
LDLC SERPL CALC-MCNC: 122 MG/DL (ref 0–100)
LYMPHOCYTES # BLD AUTO: 2.9 THOUSANDS/ΜL (ref 0.6–4.47)
LYMPHOCYTES NFR BLD AUTO: 37 % (ref 14–44)
MCH RBC QN AUTO: 29.1 PG (ref 26.8–34.3)
MCHC RBC AUTO-ENTMCNC: 31.6 G/DL (ref 31.4–37.4)
MCV RBC AUTO: 92 FL (ref 82–98)
MICROALBUMIN UR-MCNC: 6 MG/L (ref 0–20)
MICROALBUMIN/CREAT 24H UR: 7 MG/G CREATININE (ref 0–30)
MONOCYTES # BLD AUTO: 0.48 THOUSAND/ΜL (ref 0.17–1.22)
MONOCYTES NFR BLD AUTO: 6 % (ref 4–12)
NEUTROPHILS # BLD AUTO: 4.13 THOUSANDS/ΜL (ref 1.85–7.62)
NEUTS SEG NFR BLD AUTO: 52 % (ref 43–75)
NONHDLC SERPL-MCNC: 167 MG/DL
NRBC BLD AUTO-RTO: 0 /100 WBCS
PLATELET # BLD AUTO: 336 THOUSANDS/UL (ref 149–390)
PMV BLD AUTO: 9.6 FL (ref 8.9–12.7)
POTASSIUM SERPL-SCNC: 3.7 MMOL/L (ref 3.5–5.3)
PROT SERPL-MCNC: 7.8 G/DL (ref 6.4–8.2)
PTH-INTACT SERPL-MCNC: <6.3 PG/ML (ref 18.4–80.1)
RBC # BLD AUTO: 4.7 MILLION/UL (ref 3.81–5.12)
SODIUM SERPL-SCNC: 139 MMOL/L (ref 136–145)
TRIGL SERPL-MCNC: 225 MG/DL
TSH SERPL DL<=0.05 MIU/L-ACNC: 3.04 UIU/ML (ref 0.36–3.74)
WBC # BLD AUTO: 7.93 THOUSAND/UL (ref 4.31–10.16)

## 2020-12-31 PROCEDURE — 82306 VITAMIN D 25 HYDROXY: CPT

## 2020-12-31 PROCEDURE — 80053 COMPREHEN METABOLIC PANEL: CPT

## 2020-12-31 PROCEDURE — 36415 COLL VENOUS BLD VENIPUNCTURE: CPT

## 2020-12-31 PROCEDURE — 83970 ASSAY OF PARATHORMONE: CPT

## 2020-12-31 PROCEDURE — 82570 ASSAY OF URINE CREATININE: CPT | Performed by: FAMILY MEDICINE

## 2020-12-31 PROCEDURE — 83036 HEMOGLOBIN GLYCOSYLATED A1C: CPT

## 2020-12-31 PROCEDURE — 80061 LIPID PANEL: CPT

## 2020-12-31 PROCEDURE — 82043 UR ALBUMIN QUANTITATIVE: CPT | Performed by: FAMILY MEDICINE

## 2020-12-31 PROCEDURE — 85025 COMPLETE CBC W/AUTO DIFF WBC: CPT

## 2020-12-31 PROCEDURE — 84443 ASSAY THYROID STIM HORMONE: CPT

## 2021-01-04 ENCOUNTER — TELEPHONE (OUTPATIENT)
Dept: SURGICAL ONCOLOGY | Facility: CLINIC | Age: 61
End: 2021-01-04

## 2021-01-05 ENCOUNTER — OFFICE VISIT (OUTPATIENT)
Dept: SURGICAL ONCOLOGY | Facility: CLINIC | Age: 61
End: 2021-01-05
Payer: COMMERCIAL

## 2021-01-05 VITALS
RESPIRATION RATE: 16 BRPM | BODY MASS INDEX: 29.02 KG/M2 | WEIGHT: 170 LBS | TEMPERATURE: 97.2 F | HEIGHT: 64 IN | DIASTOLIC BLOOD PRESSURE: 70 MMHG | HEART RATE: 82 BPM | SYSTOLIC BLOOD PRESSURE: 150 MMHG

## 2021-01-05 DIAGNOSIS — E89.2 STATUS POST PARATHYROIDECTOMY (HCC): Primary | ICD-10-CM

## 2021-01-05 PROBLEM — R79.89 HIGH SERUM PARATHYROID HORMONE (PTH): Status: RESOLVED | Noted: 2020-02-06 | Resolved: 2021-01-05

## 2021-01-05 PROBLEM — E83.52 HYPERCALCEMIA: Status: RESOLVED | Noted: 2018-05-04 | Resolved: 2021-01-05

## 2021-01-05 PROBLEM — E21.0 PRIMARY HYPERPARATHYROIDISM (HCC): Status: RESOLVED | Noted: 2018-06-25 | Resolved: 2021-01-05

## 2021-01-05 PROCEDURE — 99213 OFFICE O/P EST LOW 20 MIN: CPT | Performed by: SURGERY

## 2021-01-05 NOTE — PROGRESS NOTES
Surgical Oncology Follow Up       [address] [facility] [place]  [address] [facility] [address] [place]  [facility] [place] [id number] [phone]    Cindi Shown  1960  [id number]  [address] [facility] Cathalene Closs [address] [place] [address] [phone]    Chief Complaint   Patient presents with    Follow-up     6 month follow up       Assessment/Plan:    No problem-specific Assessment & Plan notes found for this encounter  Diagnoses and all orders for this visit:    Status post parathyroidectomy  -     Calcium; Future  -     PTH, intact; Future        Advance Care Planning/Advance Directives:  Discussed disease status, cancer treatment plans and/or cancer treatment goals with the patient  Oncology History    No history exists  History of Present Illness:  Elroy Martinez is a 70-year-old woman status post 3 5 gland parathyroidectomy   -Interval History:  She is here for 6 month follow-up visit  She is still on Tums, calcitriol, and vitamin-D  She mentions occasional tingling around the mouth  Review of Systems:  Review of Systems   Constitutional: Negative  Negative for fatigue  HENT: Negative  Eyes: Negative  Respiratory: Negative  Cardiovascular: Negative  Gastrointestinal: Negative  Endocrine: Negative  Genitourinary: Negative  Musculoskeletal: Negative  Skin: Negative  Allergic/Immunologic: Negative  Neurological: Negative  Hematological: Negative  Psychiatric/Behavioral: Negative  All other systems reviewed and are negative        Patient Active Problem List   Diagnosis    Enlarged thyroid    Essential hypertension    Hyperlipidemia, unspecified    Prediabetes    Simple goiter    History of lacunar cerebrovascular accident (CVA)    Pain in both lower extremities    Bilateral low back pain with sciatica    DDD (degenerative disc disease), lumbar  Bilateral sciatica    Lumbar disc herniation    Spinal stenosis of lumbar region    Lumbar spondylosis    Lumbar radiculopathy    BMI 29 0-29 9,adult    Hypocalcemia    Status post parathyroidectomy    Vitamin D deficiency    Hypoparathyroidism (HCC)     Past Medical History:   Diagnosis Date    Diverticulosis     Hemorrhoids     Hypertension     controlled    Stroke [facility])      Past Surgical History:   Procedure Laterality Date    COLONOSCOPY      ONSET 2015    NOSE SURGERY      OR EXPLORE PARATHYROID GLANDS Bilateral 6/18/2020    Procedure: PARATHYROIDECTOMY, MINIMALLY INVASIVE, 3 5 GLAND EXPLORATION, INTRAOPERATIVE PTH MONITORING;  Surgeon: Niki Waldron MD;  [facility];  Service: Surgical Oncology    SKIN BIOPSY      SKIN LESION EXCISION       Family History   Problem Relation Age of Onset    No Known Problems Mother     Hypertension Father     Parkinsonism Father     Hypertension Sister     Stroke Family     Glaucoma Family     Hypertension Family      Social History     Socioeconomic History    Marital status: /Civil Union     Spouse name: Not on file    Number of children: Not on file    Years of education: Not on file    Highest education level: Not on file   Occupational History    Not on file   Social Needs    Financial resource strain: Not on file    Food insecurity     Worry: Not on file     Inability: Not on file    Transportation needs     Medical: No     Non-medical: No   Tobacco Use    Smoking status: Never Smoker    Smokeless tobacco: Never Used   Substance and Sexual Activity    Alcohol use:  Yes     Alcohol/week: 2 0 standard drinks     Types: 2 Cans of beer per week     Frequency: 2-4 times a month     Drinks per session: 1 or 2     Binge frequency: Never     Comment: SOCIAL    Drug use: No    Sexual activity: Yes     Partners: Male     Birth control/protection: None   Lifestyle    Physical activity     Days per week: 0 days     Minutes per session: 0 min    Stress: Not on file   Relationships    Social connections     Talks on phone: Not on file     Gets together: Not on file     Attends Bahai service: Not on file     Active member of club or organization: Not on file     Attends meetings of clubs or organizations: Not on file     Relationship status: Not on file    Intimate partner violence     Fear of current or ex partner: Not on file     Emotionally abused: Not on file     Physically abused: Not on file     Forced sexual activity: Not on file   Other Topics Concern    Not on file   Social History Narrative    ALWAYS SEAT BELT    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       Current Outpatient Medications:     atorvastatin (LIPITOR) 40 mg tablet, Take 1 tablet (40 mg total) by mouth daily, Disp: 90 tablet, Rfl: 1    calcitriol (ROCALTROL) 0 25 mcg capsule, Take one tablet two times daily, Disp: 90 capsule, Rfl: 5    calcium carbonate (TUMS) 500 mg chewable tablet, Chew 2 tablets (1,000 mg total) 3 (three) times a day before meals, Disp: 90 tablet, Rfl: 0    Cholecalciferol (D 2000) 2000 units TABS, Take by mouth daily, Disp: , Rfl:     clopidogrel (PLAVIX) 75 mg tablet, Take 1 tablet (75 mg total) by mouth daily, Disp: 90 tablet, Rfl: 1    CO ENZYME Q-10 PO, daily , Disp: , Rfl:     Edarbyclor 40-12 5 MG TABS, TAKE ONE TABLET BY MOUTH ONCE DAILY , Disp: 30 tablet, Rfl: 6    folic acid (FOLVITE) 1 mg tablet, Take 1 tablet (1,000 mcg total) by mouth daily, Disp: 90 tablet, Rfl: 1    magnesium oxide (MAG-OX) 400 mg, Take 1 tablet (400 mg total) by mouth daily, Disp: , Rfl:     metoprolol succinate (TOPROL-XL) 25 mg 24 hr tablet, Take 1 tablet (25 mg total) by mouth daily, Disp: 90 tablet, Rfl: 1    Potassium 99 MG TABS, Take 1 tablet by mouth daily, Disp: , Rfl:     nystatin (MYCOSTATIN) cream, Apply topically 2 (two) times a day (Patient not taking: Reported on 1/5/2021), Disp: 30 g, Rfl: 1  Allergies   Allergen Reactions    Codeine Chest Pain     Vitals:    01/05/21 0813   BP: 150/70   Pulse: 82   Resp: 16   Temp: (!) 97 2 °F (36 2 °C)       Physical Exam  Constitutional:       Appearance: Normal appearance  HENT:      Head: Normocephalic and atraumatic  Right Ear: External ear normal       Left Ear: External ear normal       Mouth/Throat:      Mouth: Mucous membranes are dry  Eyes:      Extraocular Movements: Extraocular movements intact  Pupils: Pupils are equal, round, and reactive to light  Neck:      Musculoskeletal: Normal range of motion and neck supple  Cardiovascular:      Rate and Rhythm: Normal rate and regular rhythm  Pulses: Normal pulses  Heart sounds: Normal heart sounds  Pulmonary:      Breath sounds: Normal breath sounds  Musculoskeletal: Normal range of motion  Skin:     General: Skin is warm and dry  Neurological:      General: No focal deficit present  Mental Status: She is alert and oriented to person, place, and time  Psychiatric:         Mood and Affect: Mood normal          Behavior: Behavior normal          Thought Content: Thought content normal          Judgment: Judgment normal            Results:  Labs:  Lab Results   Component Value Date    PTH <6 3 (L) 12/31/2020    CALCIUM 10 3 (H) 12/31/2020    PHOS 4 4 06/24/2020         Imaging  No results found  I reviewed the above laboratory and imaging data  Discussion/Summary:  63-year-old woman status post parathyroidectomy  She is doing well  I recommended she start weaning off her calcium over the next few months  I would anticipate that this would allow her parathyroid remnant to start functioning normally  Hopefully, she will be able to wean off the Rocaltrol as well  Plan on follow-up in 6 months with blood work

## 2021-01-05 NOTE — LETTER
January 5, 2021     [address], [address] [facility]  [phone]    Patient: Gabrielle Lopez   YOB: 1960   Date of Visit: 1/5/2021       Dear Dr Tj Lugo: Thank you for referring Gabrielle Lopez to me for evaluation  Below are my notes for this consultation  If you have questions, please do not hesitate to call me  I look forward to following your patient along with you  Sincerely,        Ashly Clark MD        CC: MD Silvano Espinosa DO Gloria Leader, CHAVO Matias MD  1/5/2021  8:38 AM  Sign when Signing Visit     Surgical Oncology Follow Up       [address] [facility] [place]  [id number] [place] [address] [id number] [phone]    Gabrielle Lopez  1960  [id number]  [address] [facility] [place] [facility] Servando Bigger  [address] [place] [address]  [phone]    Chief Complaint   Patient presents with    Follow-up     6 month follow up       Assessment/Plan:    No problem-specific Assessment & Plan notes found for this encounter  Diagnoses and all orders for this visit:    Status post parathyroidectomy  -     Calcium; Future  -     PTH, intact; Future        Advance Care Planning/Advance Directives:  Discussed disease status, cancer treatment plans and/or cancer treatment goals with the patient  Oncology History    No history exists  History of Present Illness:  Wayne Rodríguez is a 82-year-old woman status post 3 5 gland parathyroidectomy   -Interval History:  She is here for 6 month follow-up visit  She is still on Tums, calcitriol, and vitamin-D  She mentions occasional tingling around the mouth  Review of Systems:  Review of Systems   Constitutional: Negative  Negative for fatigue  HENT: Negative  Eyes: Negative  Respiratory: Negative      Cardiovascular: Negative  Gastrointestinal: Negative  Endocrine: Negative  Genitourinary: Negative  Musculoskeletal: Negative  Skin: Negative  Allergic/Immunologic: Negative  Neurological: Negative  Hematological: Negative  Psychiatric/Behavioral: Negative  All other systems reviewed and are negative        Patient Active Problem List   Diagnosis    Enlarged thyroid    Essential hypertension    Hyperlipidemia, unspecified    Prediabetes    Simple goiter    History of lacunar cerebrovascular accident (CVA)    Pain in both lower extremities    Bilateral low back pain with sciatica    DDD (degenerative disc disease), lumbar    Bilateral sciatica    Lumbar disc herniation    Spinal stenosis of lumbar region    Lumbar spondylosis    Lumbar radiculopathy    BMI 29 0-29 9,adult    Hypocalcemia    Status post parathyroidectomy    Vitamin D deficiency    Hypoparathyroidism ([facility] [address] )     Past Medical History:   Diagnosis Date    Diverticulosis     Hemorrhoids     Hypertension     controlled    Stroke [facility])      Past Surgical History:   Procedure Laterality Date    COLONOSCOPY      ONSET 2015    NOSE SURGERY      IN EXPLORE PARATHYROID GLANDS Bilateral 6/18/2020    Procedure: PARATHYROIDECTOMY, MINIMALLY INVASIVE, 3 5 GLAND EXPLORATION, INTRAOPERATIVE PTH MONITORING;  Surgeon: Otilia Connor MD;  [facility];  Service: Surgical Oncology    SKIN BIOPSY      SKIN LESION EXCISION       Family History   Problem Relation Age of Onset    No Known Problems Mother     Hypertension Father     Parkinsonism Father     Hypertension Sister     Stroke Family     Glaucoma Family     Hypertension Family      Social History     Socioeconomic History    Marital status: /Civil Union     Spouse name: Not on file    Number of children: Not on file    Years of education: Not on file    Highest education level: Not on file   Occupational History    Not on file   Social Needs    Financial resource strain: Not on file    Food insecurity     Worry: Not on file     Inability: Not on file    Transportation needs     Medical: No     Non-medical: No   Tobacco Use    Smoking status: Never Smoker    Smokeless tobacco: Never Used   Substance and Sexual Activity    Alcohol use:  Yes     Alcohol/week: 2 0 standard drinks     Types: 2 Cans of beer per week     Frequency: 2-4 times a month     Drinks per session: 1 or 2     Binge frequency: Never     Comment: SOCIAL    Drug use: No    Sexual activity: Yes     Partners: Male     Birth control/protection: None   Lifestyle    Physical activity     Days per week: 0 days     Minutes per session: 0 min    Stress: Not on file   Relationships    Social connections     Talks on phone: Not on file     Gets together: Not on file     Attends Taoist service: Not on file     Active member of club or organization: Not on file     Attends meetings of clubs or organizations: Not on file     Relationship status: Not on file    Intimate partner violence     Fear of current or ex partner: Not on file     Emotionally abused: Not on file     Physically abused: Not on file     Forced sexual activity: Not on file   Other Topics Concern    Not on file   Social History Narrative    ALWAYS SEAT BELT    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       Current Outpatient Medications:     atorvastatin (LIPITOR) 40 mg tablet, Take 1 tablet (40 mg total) by mouth daily, Disp: 90 tablet, Rfl: 1    calcitriol (ROCALTROL) 0 25 mcg capsule, Take one tablet two times daily, Disp: 90 capsule, Rfl: 5    calcium carbonate (TUMS) 500 mg chewable tablet, Chew 2 tablets (1,000 mg total) 3 (three) times a day before meals, Disp: 90 tablet, Rfl: 0    Cholecalciferol (D 2000) 2000 units TABS, Take by mouth daily, Disp: , Rfl:     clopidogrel (PLAVIX) 75 mg tablet, Take 1 tablet (75 mg total) by mouth daily, Disp: 90 tablet, Rfl: 1    CO ENZYME Q-10 PO, daily , Disp: , Rfl:   Edarbyclor 40-12 5 MG TABS, TAKE ONE TABLET BY MOUTH ONCE DAILY , Disp: 30 tablet, Rfl: 6    folic acid (FOLVITE) 1 mg tablet, Take 1 tablet (1,000 mcg total) by mouth daily, Disp: 90 tablet, Rfl: 1    magnesium oxide (MAG-OX) 400 mg, Take 1 tablet (400 mg total) by mouth daily, Disp: , Rfl:     metoprolol succinate (TOPROL-XL) 25 mg 24 hr tablet, Take 1 tablet (25 mg total) by mouth daily, Disp: 90 tablet, Rfl: 1    Potassium 99 MG TABS, Take 1 tablet by mouth daily, Disp: , Rfl:     nystatin (MYCOSTATIN) cream, Apply topically 2 (two) times a day (Patient not taking: Reported on 1/5/2021), Disp: 30 g, Rfl: 1  Allergies   Allergen Reactions    Codeine Chest Pain     Vitals:    01/05/21 0813   BP: 150/70   Pulse: 82   Resp: 16   Temp: (!) 97 2 °F (36 2 °C)       Physical Exam  Constitutional:       Appearance: Normal appearance  HENT:      Head: Normocephalic and atraumatic  Right Ear: External ear normal       Left Ear: External ear normal       Mouth/Throat:      Mouth: Mucous membranes are dry  Eyes:      Extraocular Movements: Extraocular movements intact  Pupils: Pupils are equal, round, and reactive to light  Neck:      Musculoskeletal: Normal range of motion and neck supple  Cardiovascular:      Rate and Rhythm: Normal rate and regular rhythm  Pulses: Normal pulses  Heart sounds: Normal heart sounds  Pulmonary:      Breath sounds: Normal breath sounds  Musculoskeletal: Normal range of motion  Skin:     General: Skin is warm and dry  Neurological:      General: No focal deficit present  Mental Status: She is alert and oriented to person, place, and time  Psychiatric:         Mood and Affect: Mood normal          Behavior: Behavior normal          Thought Content:  Thought content normal          Judgment: Judgment normal            Results:  Labs:  Lab Results   Component Value Date    PTH <6 3 (L) 12/31/2020    CALCIUM 10 3 (H) 12/31/2020    PHOS 4 4 06/24/2020         Imaging  No results found  I reviewed the above laboratory and imaging data  Discussion/Summary:  65-year-old woman status post parathyroidectomy  She is doing well  I recommended she start weaning off her calcium over the next few months  I would anticipate that this would allow her parathyroid remnant to start functioning normally  Hopefully, she will be able to wean off the Rocaltrol as well  Plan on follow-up in 6 months with blood work

## 2021-01-18 DIAGNOSIS — I10 ESSENTIAL HYPERTENSION: ICD-10-CM

## 2021-01-21 RX ORDER — METOPROLOL SUCCINATE 25 MG/1
25 TABLET, EXTENDED RELEASE ORAL DAILY
Qty: 90 TABLET | Refills: 1 | Status: SHIPPED | OUTPATIENT
Start: 2021-01-21 | End: 2021-07-14 | Stop reason: SDUPTHER

## 2021-01-21 NOTE — TELEPHONE ENCOUNTER
Message left requesting a refill of Metoprolol  Pharmacy is [facility] in [place]      Last refill date: 7/23/20 # 90 with one refill  Last appointment: 12/2/20  Next appointment: no appointment scheduled
Please have pt schedule appt to discuss lab results
sheridan 1/26 with Dr Fonda Goodpasture
06-Dec-2019 13:01

## 2021-01-26 ENCOUNTER — TELEMEDICINE (OUTPATIENT)
Dept: FAMILY MEDICINE CLINIC | Facility: CLINIC | Age: 61
End: 2021-01-26
Payer: COMMERCIAL

## 2021-01-26 DIAGNOSIS — I10 ESSENTIAL HYPERTENSION: Primary | ICD-10-CM

## 2021-01-26 DIAGNOSIS — R73.03 PREDIABETES: ICD-10-CM

## 2021-01-26 DIAGNOSIS — E20.8 OTHER HYPOPARATHYROIDISM (HCC): ICD-10-CM

## 2021-01-26 DIAGNOSIS — E78.5 HYPERLIPIDEMIA, UNSPECIFIED HYPERLIPIDEMIA TYPE: ICD-10-CM

## 2021-01-26 DIAGNOSIS — E83.52 HYPERCALCEMIA: ICD-10-CM

## 2021-01-26 PROCEDURE — 99214 OFFICE O/P EST MOD 30 MIN: CPT | Performed by: FAMILY MEDICINE

## 2021-01-26 NOTE — PROGRESS NOTES
Virtual Regular Visit      Assessment/Plan:  Pooja Galvan was seen today for virtual regular visit  Diagnoses and all orders for this visit:    Essential hypertension    Prediabetes  Comments:  well-controlled, cpm    Hyperlipidemia, unspecified hyperlipidemia type  Comments:  pt admits she can adjust her diet more, is currently on statin, will recheck with next f/u approx 6 months    Other hypoparathyroidism ([address] )  Comments:  our staff helped facilitate f/u appt with her endocrinologist    Hypercalcemia  Comments:  mild on most recent lab, had been hypocalcemic; is due for endo f/u and our staff will facilitate appt        Problem List Items Addressed This Visit        Endocrine    Hypoparathyroidism [facility])       Cardiovascular and Mediastinum    Essential hypertension - Primary       Other    Prediabetes    Hyperlipidemia, unspecified    Hypercalcemia               Reason for visit is   Chief Complaint   Patient presents with    Virtual Regular Visit        Encounter provider Akhil Cintron DO    Provider located at [address] [facility],   [address] [facility] [id number]      Recent Visits  Date Type Provider Dept   01/27/21 Telephone Philip Tracy [facility] [place]   01/26/21 Telemedicine Akhil Cintron DO [facility] [address] [facility] recent visits within past 7 days and meeting all other requirements     Future Appointments  No visits were found meeting these conditions  Showing future appointments within next 150 days and meeting all other requirements        The patient was identified by name and date of birth  Paolo Hernández was informed that this is a telemedicine visit and that the visit is being conducted through [facility] and patient was informed that this is a secure, HIPAA-compliant platform  She agrees to proceed     My office door was closed  No one else was in the room    She acknowledged consent and understanding of privacy and security of the video platform  The patient has agreed to participate and understands they can discontinue the visit at any time  Patient is aware this is a billable service  Subjective  Domenico Molina is a 64 y o  female    F/u with endo should have been last month, according to last endo note, but pt states no one called  No muscle spasms or pain, no tremors  States she has been trying to sched her vaccine but has been locked out of her [employer] account, and every time she calls to get it fixed, is on hold for too long    HPI   8/13/2020  [facility]  Primary hyperparathyroidism  S/p parathyroidectomy   Postsurgical hypocalcemia   Hypocalcemia / postsurgical hypoparathyroidism  Continue current Rocaltrol and calcium supplementation  Most recent calcium normal at 8 5  Call for worsening signs/symptoms of hypocalcemia  Vitamin D deficiency  Vitamin D normal at 40 7  Continue current supplementation of vitamin D3   Return in about 4 months (around 12/13/2020    Past Medical History:   Diagnosis Date    Diverticulosis     Hemorrhoids     Hypertension     controlled    Stroke [facility])        Past Surgical History:   Procedure Laterality Date    COLONOSCOPY      ONSET 2015    NOSE SURGERY      AL EXPLORE PARATHYROID GLANDS Bilateral 6/18/2020    Procedure: PARATHYROIDECTOMY, MINIMALLY INVASIVE, 3 5 GLAND EXPLORATION, INTRAOPERATIVE PTH MONITORING;  Surgeon: Shanta Horvath MD;  [facility];  Service: Surgical Oncology    SKIN BIOPSY      SKIN LESION EXCISION         Current Outpatient Medications   Medication Sig Dispense Refill    atorvastatin (LIPITOR) 40 mg tablet Take 1 tablet (40 mg total) by mouth daily 90 tablet 1    calcitriol (ROCALTROL) 0 25 mcg capsule Take one tablet two times daily 90 capsule 5    calcium carbonate (TUMS) 500 mg chewable tablet Chew 2 tablets (1,000 mg total) 3 (three) times a day before meals 90 tablet 0    Cholecalciferol (D 2000) 2000 units TABS Take by mouth daily      clopidogrel (PLAVIX) 75 mg tablet Take 1 tablet (75 mg total) by mouth daily 90 tablet 1    CO ENZYME Q-10 PO daily       Edarbyclor 40-12 5 MG TABS TAKE ONE TABLET BY MOUTH ONCE DAILY  30 tablet 6    folic acid (FOLVITE) 1 mg tablet Take 1 tablet (1,000 mcg total) by mouth daily 90 tablet 1    magnesium oxide (MAG-OX) 400 mg Take 1 tablet (400 mg total) by mouth daily      metoprolol succinate (TOPROL-XL) 25 mg 24 hr tablet Take 1 tablet (25 mg total) by mouth daily 90 tablet 1    Potassium 99 MG TABS Take 1 tablet by mouth daily       No current facility-administered medications for this visit  Allergies   Allergen Reactions    Codeine Chest Pain       Review of Systems   All other systems reviewed and are negative  Video Exam    There were no vitals filed for this visit  Physical Exam  Constitutional:       General: She is not in acute distress  Appearance: She is well-developed  She is not ill-appearing, toxic-appearing or diaphoretic  HENT:      Head: Normocephalic and atraumatic  Mouth/Throat:      Pharynx: Uvula midline  Neck:      Trachea: Phonation normal    Pulmonary:      Effort: Pulmonary effort is normal    Skin:     Coloration: Skin is not pale  Neurological:      Mental Status: She is alert and oriented to person, place, and time  Psychiatric:         Behavior: Behavior is cooperative  F/u here in 6 mo   I spent 20 minutes directly with the patient during this visit      VIRTUAL VISIT DISCLAIMER    Edgar Chua acknowledges that she has consented to an online visit or consultation  She understands that the online visit is based solely on information provided by her, and that, in the absence of a face-to-face physical evaluation by the physician, the diagnosis she receives is both limited and provisional in terms of accuracy and completeness   This is not intended to replace a full medical face-to-face evaluation by the physician  Job Stephanie understands and accepts these terms

## 2021-01-27 ENCOUNTER — TELEPHONE (OUTPATIENT)
Dept: FAMILY MEDICINE CLINIC | Facility: CLINIC | Age: 61
End: 2021-01-27

## 2021-01-27 ENCOUNTER — TELEPHONE (OUTPATIENT)
Dept: ENDOCRINOLOGY | Facility: CLINIC | Age: 61
End: 2021-01-27

## 2021-01-27 NOTE — TELEPHONE ENCOUNTER
[facility] at [address] [place] location for them to either call us back to help set up appointment for patient or contact patient directly per Dr Sujata Cole request

## 2021-01-27 NOTE — TELEPHONE ENCOUNTER
----- Message from Disha Melchor sent at 1/27/2021  8:53 AM EST -----  Hello, I had received a call from the [facility] asking to call this patient back to schedule a follow up appointment  She said she would prefer to stick with [facility] as that is who she has been seeing and would like a call back to schedule an appointment  Thank you!

## 2021-03-10 DIAGNOSIS — Z23 ENCOUNTER FOR IMMUNIZATION: ICD-10-CM

## 2021-03-14 ENCOUNTER — IMMUNIZATIONS (OUTPATIENT)
Dept: FAMILY MEDICINE CLINIC | Facility: HOSPITAL | Age: 61
End: 2021-03-14

## 2021-03-14 DIAGNOSIS — Z23 ENCOUNTER FOR IMMUNIZATION: Primary | ICD-10-CM

## 2021-03-14 PROCEDURE — 91300 SARS-COV-2 / COVID-19 MRNA VACCINE (PFIZER-BIONTECH) 30 MCG: CPT

## 2021-03-14 PROCEDURE — 0001A SARS-COV-2 / COVID-19 MRNA VACCINE (PFIZER-BIONTECH) 30 MCG: CPT

## 2021-03-15 ENCOUNTER — OFFICE VISIT (OUTPATIENT)
Dept: ENDOCRINOLOGY | Facility: CLINIC | Age: 61
End: 2021-03-15
Payer: COMMERCIAL

## 2021-03-15 VITALS
BODY MASS INDEX: 31.67 KG/M2 | DIASTOLIC BLOOD PRESSURE: 80 MMHG | WEIGHT: 184.5 LBS | SYSTOLIC BLOOD PRESSURE: 132 MMHG | TEMPERATURE: 98.1 F | HEART RATE: 66 BPM

## 2021-03-15 DIAGNOSIS — E83.51 HYPOCALCEMIA: Primary | ICD-10-CM

## 2021-03-15 DIAGNOSIS — E55.9 VITAMIN D DEFICIENCY: ICD-10-CM

## 2021-03-15 DIAGNOSIS — E04.1 THYROID NODULE: ICD-10-CM

## 2021-03-15 DIAGNOSIS — E20.9 HYPOPARATHYROIDISM, UNSPECIFIED HYPOPARATHYROIDISM TYPE (HCC): ICD-10-CM

## 2021-03-15 PROCEDURE — 99214 OFFICE O/P EST MOD 30 MIN: CPT | Performed by: INTERNAL MEDICINE

## 2021-03-15 RX ORDER — CALCITRIOL 0.25 UG/1
CAPSULE, LIQUID FILLED ORAL
Qty: 180 CAPSULE | Refills: 1 | Status: SHIPPED | OUTPATIENT
Start: 2021-03-15 | End: 2021-08-04 | Stop reason: SDUPTHER

## 2021-03-15 NOTE — PROGRESS NOTES
Gabrielle Lopez 64 y o  female MRN: [id number]    Encounter: [id number]      Assessment/Plan     Assessment: This is a 64y o -year-old female with     Plan:    Diagnoses and all orders for this visit:    Hypocalcemia   etiology- postsurgical hypoparathyroidism   continue Rocaltrol 0 25 mcg 1 tablet twice a day  will order serum calcium and albumin now in order to ensure calcium is okay, and will have to adjust the dose of Rocaltrol accordingly  discussed to continue to hold Tums for now      educated to call office or go to the ER if she has nausea vomiting on not able to take Rocaltrol by mouth, also if she has tingling or numbness which is worsening in extremities or around the lips  -     Basic metabolic panel; Future  -     Albumin Lab Collect; Future  -     PTH, intact- Lab Collect; Future  -     Basic metabolic panel; Future  -     Albumin Lab Collect; Future  -     Vitamin D 25 hydroxy; Future  -     calcitriol (ROCALTROL) 0 25 mcg capsule; Take one tablet two times daily ( 90 day supply)    Hypoparathyroidism, unspecified hypoparathyroidism type ([address] )   repeat PTH now    -     Basic metabolic panel; Future  -     Albumin Lab Collect; Future  -     PTH, intact- Lab Collect; Future  -     calcitriol (ROCALTROL) 0 25 mcg capsule; Take one tablet two times daily ( 90 day supply)    Thyroid nodule   obtain thyroid ultrasound for follow-up   Will also order thyroid function test  -     US thyroid; Future  -     T4, free; Future  -     TSH, 3rd generation; Future    Vitamin D deficiency   take vitamin-D 3 supplementation 2000 International Units daily  -     Vitamin D 25 hydroxy;  Future     discussed with patient importance of keeping follow-up appointment and also doing the blood work as suggested to prevent complications of hypocalcemia/ hypoparathyroidism/    CC:    postsurgical hypoparathyroidism, hypocalcemia, thyroid nodules    History of Present Illness     HPI:    Gabrielle Lopez Is 53-year-old woman with medical history of primary hyperparathyroidism, history of  3 gland parathyroidectomy(  For 4 gland parathyroid hyperplasia)  postsurgical hypoparathyroidism and hypocalcemia is here for follow-up  For hypocalcemia she takes Rocaltrol 0 25 mcg, twice daily  she is also taking vitamin-D 3 supplementation 2000 International Units daily  She is not taking Tums since dEc 2020  She was taking calcium carbonate 500 mg, 2 tablets three times daily  for hypertension she takes Toprol-XL 25 mg daily     She has history of thyroid nodule, 0 8 x 0 4 x 0 7 cm in right mid pole, moderately suspicious  She also has history of 5 mm colloid cyst in mid right lobe as well as lower pole of right lobe  0 9 x 0 3 x 0 4 cm solid nodule posterior to the left lower lobe of thyroid gland  This appears external to thyroid gland  she denies obstructive symptoms, denies family history of thyroid cancer     she denies taking over-the-counter Biotene supplementation or iodine supplementation or kelp tablets  Component      Latest Ref Rng & Units 12/31/2020   Absolute Neutrophils      1 85 - 7 62 Thousands/µL 4 13   Immature Grans Absolute      0 00 - 0 20 Thousand/uL 0 02   Lymphocytes Absolute      0 60 - 4 47 Thousands/µL 2 90   Absolute Monocytes      0 17 - 1 22 Thousand/µL 0 48   Absolute Eosinophils      0 00 - 0 61 Thousand/µL 0 34   Basophils Absolute      0 00 - 0 10 Thousands/µL 0 06   Sodium      136 - 145 mmol/L 139   Potassium      3 5 - 5 3 mmol/L 3 7   Chloride      100 - 108 mmol/L 104   CO2      21 - 32 mmol/L 31   Anion Gap      4 - 13 mmol/L 4   BUN      5 - 25 mg/dL 25   Creatinine      0 60 - 1 30 mg/dL 0 94   GLUCOSE FASTING      65 - 99 mg/dL 85   Calcium      8 3 - 10 1 mg/dL 10 3 (H)   AST      5 - 45 U/L 23   ALT      12 - 78 U/L 46   Alkaline Phosphatase      46 - 116 U/L 82   Total Protein      6 4 - 8 2 g/dL 7 8   Albumin      3 5 - 5 0 g/dL 4 2   TOTAL BILIRUBIN      0 20 - 1 00 mg/dL 0 44   eGFR      ml/min/1 73sq m 67   Cholesterol      50 - 200 mg/dL 231 (H)   Triglycerides      <=150 mg/dL 225 (H)   HDL      >=40 mg/dL 64   LDL Calculated      0 - 100 mg/dL 122 (H)   Non-HDL Cholesterol      mg/dl 167   EXT Creatinine Urine      mg/dL 89 8   MICROALBUM ,U,RANDOM      0 0 - 20 0 mg/L 6 0   MICROALBUMIN/CREATININE RATIO      0 - 30 mg/g creatinine 7   Hemoglobin A1C      Normal 3 8-5 6%; PreDiabetic 5 7-6 4%; Diabetic >=6 5%; Glycemic control for adults with diabetes <7 0% % 5 7 (H)   eAG, EST AVG Glucose      mg/dl 117   PARATHYROID HORMONE      18 4 - 80 1 pg/mL <6 3 (L)   Vit D, 25-Hydroxy      30 0 - 100 0 ng/mL 30 3     Review of Systems   Constitutional: Negative for activity change, diaphoresis, fatigue, fever and unexpected weight change  HENT: Negative  Eyes: Negative for visual disturbance  Respiratory: Negative for cough, chest tightness and shortness of breath  Cardiovascular: Negative for chest pain, palpitations and leg swelling  Gastrointestinal: Negative for abdominal pain, blood in stool, constipation, diarrhea, nausea and vomiting  Endocrine: Negative for cold intolerance, heat intolerance, polydipsia, polyphagia and polyuria  Genitourinary: Negative for dysuria, enuresis, frequency and urgency  Musculoskeletal: Negative for arthralgias and myalgias  Skin: Negative for pallor, rash and wound  Allergic/Immunologic: Negative  Neurological: Positive for numbness (Complains of numbness in her right hand off and on)  Negative for dizziness, tremors and weakness (Denies muscle spasms)  Hematological: Negative  Psychiatric/Behavioral: Negative          Historical Information   Past Medical History:   Diagnosis Date    Diverticulosis     Hemorrhoids     Hypertension     controlled    Stroke [facility])      Past Surgical History:   Procedure Laterality Date    COLONOSCOPY      ONSET 2015    NOSE SURGERY      ID EXPLORE PARATHYROID GLANDS Bilateral 6/18/2020    Procedure: PARATHYROIDECTOMY, MINIMALLY INVASIVE, 3 5 GLAND EXPLORATION, INTRAOPERATIVE PTH MONITORING;  Surgeon: Gregory Lindquist MD;  [facility];  Service: Surgical Oncology    SKIN BIOPSY      SKIN LESION EXCISION       Social History   Social History     Substance and Sexual Activity   Alcohol Use Yes    Alcohol/week: 2 0 standard drinks    Types: 2 Cans of beer per week    Frequency: 2-4 times a month    Drinks per session: 1 or 2    Binge frequency: Never    Comment: SOCIAL     Social History     Substance and Sexual Activity   Drug Use No     Social History     Tobacco Use   Smoking Status Never Smoker   Smokeless Tobacco Never Used     Family History:   Family History   Problem Relation Age of Onset    No Known Problems Mother     Hypertension Father     Parkinsonism Father     Hypertension Sister     Stroke Family     Glaucoma Family     Hypertension Family        Meds/Allergies   Current Outpatient Medications   Medication Sig Dispense Refill    atorvastatin (LIPITOR) 40 mg tablet Take 1 tablet (40 mg total) by mouth daily 90 tablet 1    calcitriol (ROCALTROL) 0 25 mcg capsule Take one tablet two times daily ( 90 day supply) 180 capsule 1    Cholecalciferol (D 2000) 2000 units TABS Take by mouth daily      clopidogrel (PLAVIX) 75 mg tablet Take 1 tablet (75 mg total) by mouth daily 90 tablet 1    CO ENZYME Q-10 PO daily       Edarbyclor 40-12 5 MG TABS TAKE ONE TABLET BY MOUTH ONCE DAILY  30 tablet 6    folic acid (FOLVITE) 1 mg tablet Take 1 tablet (1,000 mcg total) by mouth daily 90 tablet 1    magnesium oxide (MAG-OX) 400 mg Take 1 tablet (400 mg total) by mouth daily      metoprolol succinate (TOPROL-XL) 25 mg 24 hr tablet Take 1 tablet (25 mg total) by mouth daily 90 tablet 1    Potassium 99 MG TABS Take 1 tablet by mouth daily      calcium carbonate (TUMS) 500 mg chewable tablet Chew 2 tablets (1,000 mg total) 3 (three) times a day before meals (Patient not taking: Reported on 3/15/2021) 90 tablet 0     No current facility-administered medications for this visit  Allergies   Allergen Reactions    Codeine Chest Pain       Objective   Vitals: Blood pressure 132/80, pulse 66, temperature 98 1 °F (36 7 °C), weight 83 7 kg (184 lb 8 oz)  Physical Exam  Vitals signs reviewed  Constitutional:       Appearance: Normal appearance  HENT:      Head: Normocephalic and atraumatic  Mouth/Throat:      Mouth: Mucous membranes are moist    Eyes:      Extraocular Movements: Extraocular movements intact  Pupils: Pupils are equal, round, and reactive to light  Cardiovascular:      Rate and Rhythm: Normal rate and regular rhythm  Pulses: Normal pulses  Pulmonary:      Effort: Pulmonary effort is normal       Breath sounds: Normal breath sounds  Musculoskeletal: Normal range of motion  General: No swelling  Skin:     General: Skin is warm and dry  Neurological:      General: No focal deficit present  Mental Status: She is alert and oriented to person, place, and time  Psychiatric:         Mood and Affect: Mood normal          Behavior: Behavior normal          The history was obtained from the review of the chart, patient  Lab Results:   Lab Results   Component Value Date/Time    TSH 3RD GENERATON 3 040 12/31/2020 07:05 AM       Imaging Studies:   Results for orders placed during the hospital encounter of 10/05/18   US thyroid    Impression 1  Subcentimeter right-sided thyroid nodule as described above  2   Subcentimeter nodule posterior to the left lower pole, likely corresponding to the finding seen on recent nuclear medicine parathyroid scan  If clinically indicated, consider CT scan of the neck utilizing parathyroid imaging protocol  No nodule meets current ACR criteria for requiring biopsy or followup ultrasounds  Reference: ACR Thyroid Imaging, Reporting and Data System (TI-RADS):  White Paper of the ACR TI-RADS Committee  J AM Anthony Radiol 6485;77:330-536  (additional recommendations based on American Thyroid Association 2015 guidelines )      Workstation performed: [id number]         I have personally reviewed pertinent reports  Portions of the record may have been created with voice recognition software  Occasional wrong word or "sound a like" substitutions may have occurred due to the inherent limitations of voice recognition software  Read the chart carefully and recognize, using context, where substitutions have occurred

## 2021-03-20 ENCOUNTER — APPOINTMENT (OUTPATIENT)
Dept: LAB | Age: 61
End: 2021-03-20
Payer: COMMERCIAL

## 2021-03-20 DIAGNOSIS — E04.1 THYROID NODULE: ICD-10-CM

## 2021-03-20 DIAGNOSIS — E83.51 HYPOCALCEMIA: ICD-10-CM

## 2021-03-20 DIAGNOSIS — E20.9 HYPOPARATHYROIDISM, UNSPECIFIED HYPOPARATHYROIDISM TYPE (HCC): ICD-10-CM

## 2021-03-20 LAB
ALBUMIN SERPL BCP-MCNC: 3.8 G/DL (ref 3.5–5)
ANION GAP SERPL CALCULATED.3IONS-SCNC: 4 MMOL/L (ref 4–13)
BUN SERPL-MCNC: 24 MG/DL (ref 5–25)
CALCIUM SERPL-MCNC: 9 MG/DL (ref 8.3–10.1)
CHLORIDE SERPL-SCNC: 106 MMOL/L (ref 100–108)
CO2 SERPL-SCNC: 29 MMOL/L (ref 21–32)
CREAT SERPL-MCNC: 0.85 MG/DL (ref 0.6–1.3)
GFR SERPL CREATININE-BSD FRML MDRD: 76 ML/MIN/1.73SQ M
GLUCOSE SERPL-MCNC: 99 MG/DL (ref 65–140)
POTASSIUM SERPL-SCNC: 3.8 MMOL/L (ref 3.5–5.3)
PTH-INTACT SERPL-MCNC: 11.3 PG/ML (ref 18.4–80.1)
SODIUM SERPL-SCNC: 139 MMOL/L (ref 136–145)
T4 FREE SERPL-MCNC: 0.99 NG/DL (ref 0.76–1.46)
TSH SERPL DL<=0.05 MIU/L-ACNC: 2.36 UIU/ML (ref 0.36–3.74)

## 2021-03-20 PROCEDURE — 80048 BASIC METABOLIC PNL TOTAL CA: CPT

## 2021-03-20 PROCEDURE — 84443 ASSAY THYROID STIM HORMONE: CPT

## 2021-03-20 PROCEDURE — 84439 ASSAY OF FREE THYROXINE: CPT

## 2021-03-20 PROCEDURE — 82040 ASSAY OF SERUM ALBUMIN: CPT

## 2021-03-20 PROCEDURE — 36415 COLL VENOUS BLD VENIPUNCTURE: CPT

## 2021-03-20 PROCEDURE — 83970 ASSAY OF PARATHORMONE: CPT

## 2021-03-22 ENCOUNTER — TELEPHONE (OUTPATIENT)
Dept: ENDOCRINOLOGY | Facility: CLINIC | Age: 61
End: 2021-03-22

## 2021-03-22 NOTE — TELEPHONE ENCOUNTER
----- Message from Mikaela Guardado MD sent at 3/22/2021  9:50 AM EDT -----  Please call the patient regarding her normal thyroid blood work results, continue current dose of levothyroxine and calcium is normal, continue rocaltrol at current dose

## 2021-04-10 ENCOUNTER — IMMUNIZATIONS (OUTPATIENT)
Dept: FAMILY MEDICINE CLINIC | Facility: HOSPITAL | Age: 61
End: 2021-04-10

## 2021-04-10 DIAGNOSIS — Z23 ENCOUNTER FOR IMMUNIZATION: Primary | ICD-10-CM

## 2021-04-10 PROCEDURE — 0002A SARS-COV-2 / COVID-19 MRNA VACCINE (PFIZER-BIONTECH) 30 MCG: CPT

## 2021-04-10 PROCEDURE — 91300 SARS-COV-2 / COVID-19 MRNA VACCINE (PFIZER-BIONTECH) 30 MCG: CPT

## 2021-04-27 DIAGNOSIS — I10 ESSENTIAL HYPERTENSION: ICD-10-CM

## 2021-04-29 RX ORDER — AZILSARTAN KAMEDOXOMIL AND CHLORTHALIDONE 40; 12.5 MG/1; MG/1
1 TABLET ORAL DAILY
Qty: 30 TABLET | Refills: 6 | Status: SHIPPED | OUTPATIENT
Start: 2021-04-29 | End: 2021-07-14

## 2021-06-29 DIAGNOSIS — I63.81 LACUNAR STROKE (HCC): ICD-10-CM

## 2021-06-29 DIAGNOSIS — E78.5 HYPERLIPIDEMIA, UNSPECIFIED HYPERLIPIDEMIA TYPE: ICD-10-CM

## 2021-06-29 NOTE — TELEPHONE ENCOUNTER
Message left requesting a refill of Plavix and Atorvastatin  Pharmacy is [facility]      Last refill date:  Plavix: 12/24/20 #90 with one refill  Atorvastatin: 7/23/20 #90 with one refill   Last appointment: 1/26/21  Next appointment: 7/14/21

## 2021-07-01 RX ORDER — CLOPIDOGREL BISULFATE 75 MG/1
75 TABLET ORAL DAILY
Qty: 90 TABLET | Refills: 1 | Status: SHIPPED | OUTPATIENT
Start: 2021-07-01 | End: 2021-12-21 | Stop reason: SDUPTHER

## 2021-07-01 RX ORDER — ATORVASTATIN CALCIUM 40 MG/1
40 TABLET, FILM COATED ORAL DAILY
Qty: 90 TABLET | Refills: 1 | Status: SHIPPED | OUTPATIENT
Start: 2021-07-01

## 2021-07-09 ENCOUNTER — HOSPITAL ENCOUNTER (OUTPATIENT)
Dept: RADIOLOGY | Age: 61
Discharge: HOME | End: 2021-07-09
Payer: COMMERCIAL

## 2021-07-09 ENCOUNTER — OFFICE VISIT (OUTPATIENT)
Dept: URGENT CARE | Age: 61
End: 2021-07-09
Payer: COMMERCIAL

## 2021-07-09 ENCOUNTER — APPOINTMENT (OUTPATIENT)
Dept: RADIOLOGY | Age: 61
End: 2021-07-09
Payer: COMMERCIAL

## 2021-07-09 VITALS
SYSTOLIC BLOOD PRESSURE: 118 MMHG | DIASTOLIC BLOOD PRESSURE: 54 MMHG | HEART RATE: 84 BPM | RESPIRATION RATE: 18 BRPM | TEMPERATURE: 97.7 F | OXYGEN SATURATION: 97 %

## 2021-07-09 DIAGNOSIS — M79.671 RIGHT FOOT PAIN: ICD-10-CM

## 2021-07-09 DIAGNOSIS — E04.1 THYROID NODULE: ICD-10-CM

## 2021-07-09 DIAGNOSIS — M79.671 RIGHT FOOT PAIN: Primary | ICD-10-CM

## 2021-07-09 PROCEDURE — 76536 US EXAM OF HEAD AND NECK: CPT

## 2021-07-09 PROCEDURE — 73630 X-RAY EXAM OF FOOT: CPT

## 2021-07-09 PROCEDURE — G0382 LEV 3 HOSP TYPE B ED VISIT: HCPCS | Performed by: NURSE PRACTITIONER

## 2021-07-09 NOTE — PATIENT INSTRUCTIONS
Metatarsalgia   AMBULATORY CARE:   Metatarsalgia  is pain in the ball of your foot, near your second, third, and fourth toes  Common signs and symptoms of metatarsalgia:  Symptoms usually develop over time, but you may have sudden pain from an injury  You may have any of the following:  · Pain at the ball of your foot or near your toes that gets worse when you walk or stand, especially on hard surfaces    · Pain during exercises such as running    · Sharp or shooting pain in your toes that may get worse when you flex your toes    · Tingling or numbness in your toes    · Feeling like you are walking over rocks, or that you have a bruise    · A change in the way you walk because you try to avoid putting pressure on the ball of your foot    Contact your healthcare provider if:   · You develop knee, back, or hip pain  · You have more pain or redness in the foot  · You have questions or concerns about your condition or care  Treatment:  The cause of your metatarsalgia will be treated, if possible  You may also need any of the following:  · NSAIDs , such as ibuprofen, help decrease swelling, pain, and fever  This medicine is available with or without a doctor's order  NSAIDs can cause stomach bleeding or kidney problems in certain people  If you take blood thinner medicine, always ask if NSAIDs are safe for you  Always read the medicine label and follow directions  Do not give these medicines to children under 10months of age without direction from your child's healthcare provider  · Ultrasound  may be used to relieve your pain  Sound waves from the ultrasound can help send heat deeper into your tissues  · A steroid injection  may help decrease inflammation  · Surgery  may be needed if other treatments do not work  Surgery is used to align the bones near your toes  You may also need surgery to fix a problem such as hammertoe  Manage or prevent metatarsalgia:   · Rest your foot    If you play sports, you may not be able to do weight-bearing exercises  Examples include swimming and bike riding  Ask your healthcare provider which exercises are safe for you  · Apply ice as directed  Ice helps reduce pain and swelling  Use an ice pack, or put crushed ice in a plastic bag  Cover the pack or bag with a towel before you apply it to your foot  Apply ice for 15 to 20 minutes every hour, or as directed  · Use a cane or crutch if directed  These devices may help take pressure off your foot while it heals  · Wear proper shoes  Do not wear shoes that are narrow or tight  You may need to wear shoes that are wider than you usually wear  Choose shoes that do not have a raised heel  Shock-absorbing shoes can help prevent injury  These shoes will have extra support under your feet and toes  You can also add shoe cushions inside your shoes or to the bottoms of your feet, near your toes  The cushions may provide more support and make walking or standing more comfortable  Arch supports may help take pressure off your toes  · Reach or maintain a healthy weight  Extra weight can put pressure on your feet  Talk to your healthcare provider about a healthy weight for you  Your provider can help you create a safe weight loss plan if you are overweight  · Go to physical therapy if directed  A physical therapist can help improve your strength and range of motion  The therapist can also help you improve the way you walk to prevent metatarsalgia from happening again  Your therapist can also teach you exercises to help relieve your pain  Follow up with your healthcare provider as directed:  Write down your questions so you remember to ask them during your visits  © Copyright [address] Information is for End User's use only and may not be sold, redistributed or otherwise used for commercial purposes   All illustrations and images included in CareNotes® are the copyrighted property of [employer]  or Elsa Benavidez  The above information is an  only  It is not intended as medical advice for individual conditions or treatments  Talk to your doctor, nurse or pharmacist before following any medical regimen to see if it is safe and effective for you

## 2021-07-09 NOTE — PROGRESS NOTES
[address] [facility]        NAME: Marilyn Perez is a 63 y o  female  : 1960    MRN: [id number]  DATE: 2021  TIME: 8:47 AM    Assessment and Plan   Right foot pain [M79 [id number]]  1  Right foot pain  XR foot 3+ vw right    Ambulatory referral to Podiatry         Patient Instructions     Referral to ortho  Cont NSAIDs OTC prn  Follow up with PCP in 3-5 days  Proceed to  ER if symptoms worsen  Chief Complaint     Chief Complaint   Patient presents with    Foot Pain     right foot pain          History of Present Illness       HPI   Reports right foot pain, duration 1 week  There was no trauma  No increased activity  Pain is described as discomfort at rest, sharp burning pain after standing for more than 1 hour  No radiation of the foot  Pain is located at the ball of the foot  Denies any diagnosed Hx of arthritis  Review of Systems   Review of Systems   Constitutional: Negative for chills and fever  Musculoskeletal: Positive for gait problem (pain with walking, sometimes)  Negative for joint swelling and neck pain  Skin: Negative for rash and wound  Neurological: Negative for weakness and numbness           Current Medications       Current Outpatient Medications:     atorvastatin (LIPITOR) 40 mg tablet, Take 1 tablet (40 mg total) by mouth daily, Disp: 90 tablet, Rfl: 1    Azilsartan-Chlorthalidone (Edarbyclor) 40-12 5 MG TABS, Take 1 tablet by mouth daily, Disp: 30 tablet, Rfl: 6    calcitriol (ROCALTROL) 0 25 mcg capsule, Take one tablet two times daily ( 90 day supply), Disp: 180 capsule, Rfl: 1    calcium carbonate (TUMS) 500 mg chewable tablet, Chew 2 tablets (1,000 mg total) 3 (three) times a day before meals (Patient not taking: Reported on 3/15/2021), Disp: 90 tablet, Rfl: 0    Cholecalciferol (D 2000) 2000 units TABS, Take by mouth daily, Disp: , Rfl:     clopidogrel (PLAVIX) 75 mg tablet, Take 1 tablet (75 mg total) by mouth daily, Disp: 90 tablet, Rfl: 1    CO ENZYME Q-10 PO, daily , Disp: , Rfl:     folic acid (FOLVITE) 1 mg tablet, Take 1 tablet (1,000 mcg total) by mouth daily, Disp: 90 tablet, Rfl: 1    magnesium oxide (MAG-OX) 400 mg, Take 1 tablet (400 mg total) by mouth daily, Disp: , Rfl:     metoprolol succinate (TOPROL-XL) 25 mg 24 hr tablet, Take 1 tablet (25 mg total) by mouth daily, Disp: 90 tablet, Rfl: 1    Potassium 99 MG TABS, Take 1 tablet by mouth daily, Disp: , Rfl:     Current Allergies     Allergies as of 07/09/2021 - Reviewed 07/09/2021   Allergen Reaction Noted    Codeine Chest Pain 07/02/2014            The following portions of the patient's history were reviewed and updated as appropriate: allergies, current medications, past family history, past medical history, past social history, past surgical history and problem list      Past Medical History:   Diagnosis Date    Diverticulosis     Hemorrhoids     Hypertension     controlled    Stroke [facility])        Past Surgical History:   Procedure Laterality Date    COLONOSCOPY      ONSET 2015    NOSE SURGERY      DC EXPLORE PARATHYROID GLANDS Bilateral 6/18/2020    Procedure: PARATHYROIDECTOMY, MINIMALLY INVASIVE, 3 5 GLAND EXPLORATION, INTRAOPERATIVE PTH MONITORING;  Surgeon: Jose Morales MD;  Location[facility];  Service: Surgical Oncology    SKIN BIOPSY      SKIN LESION EXCISION         Family History   Problem Relation Age of Onset    No Known Problems Mother     Hypertension Father     Parkinsonism Father     Hypertension Sister     Stroke Family     Glaucoma Family     Hypertension Family          Medications have been verified  Objective   /54   Pulse 84   Temp 97 7 °F (36 5 °C)   Resp 18   SpO2 97%   No LMP recorded  Patient is postmenopausal        Physical Exam     Physical Exam  Musculoskeletal:         General: No swelling, tenderness or signs of injury  Right lower leg: No edema  Left lower leg: No edema     Skin:     Capillary Refill: Capillary refill takes less than 2 seconds  Findings: No bruising, erythema or rash  Comments: There is a large bunion on the medial aspect of the right foot, at the base of the right great toe  ROM of the Toes of the R foot are normal  Area patient is having the pain is between the ball of the R foot and the toes of the R foot

## 2021-07-10 ENCOUNTER — APPOINTMENT (OUTPATIENT)
Dept: LAB | Facility: HOSPITAL | Age: 61
End: 2021-07-10
Payer: COMMERCIAL

## 2021-07-10 DIAGNOSIS — E89.2 STATUS POST PARATHYROIDECTOMY (HCC): ICD-10-CM

## 2021-07-10 LAB
CALCIUM SERPL-MCNC: 9.1 MG/DL (ref 8.6–10.5)
PTH-INTACT SERPL-MCNC: 16.5 PG/ML (ref 18.4–80.1)

## 2021-07-10 PROCEDURE — 36415 COLL VENOUS BLD VENIPUNCTURE: CPT

## 2021-07-10 PROCEDURE — 82310 ASSAY OF CALCIUM: CPT

## 2021-07-10 PROCEDURE — 83970 ASSAY OF PARATHORMONE: CPT

## 2021-07-12 ENCOUNTER — OFFICE VISIT (OUTPATIENT)
Dept: SURGICAL ONCOLOGY | Facility: CLINIC | Age: 61
End: 2021-07-12
Payer: COMMERCIAL

## 2021-07-12 VITALS
HEIGHT: 64 IN | DIASTOLIC BLOOD PRESSURE: 68 MMHG | RESPIRATION RATE: 18 BRPM | TEMPERATURE: 98.4 F | WEIGHT: 184 LBS | HEART RATE: 72 BPM | SYSTOLIC BLOOD PRESSURE: 108 MMHG | BODY MASS INDEX: 31.41 KG/M2 | OXYGEN SATURATION: 98 %

## 2021-07-12 DIAGNOSIS — E89.2 STATUS POST PARATHYROIDECTOMY (HCC): Primary | ICD-10-CM

## 2021-07-12 PROCEDURE — 99213 OFFICE O/P EST LOW 20 MIN: CPT | Performed by: SURGERY

## 2021-07-12 NOTE — LETTER
July 12, 2021     Deandra Louise, [address] [facility]  [place] [facility]  [phone]    Patient: Marilyn Perez   YOB: 1960   Date of Visit: 7/12/2021       Dear Dr Elli Barnes: Thank you for referring Marilyn Perez to me for evaluation  Below are my notes for this consultation  If you have questions, please do not hesitate to call me  I look forward to following your patient along with you  Sincerely,        Elisa Shukla MD        CC: MD Jess Ventura, DO Sanjuana Sanon, CHAVO Matias MD  7/12/2021  8:18 AM  Sign when Signing Visit     Surgical Oncology Follow Up       [address] [facility] [place]  [id number] [place] [id number]  [phone]    Marilyn Perez  1960  [id number]  [address] [facility]  [address] [place] [address]  [phone]    Chief Complaint   Patient presents with    Follow-up       Assessment/Plan:    No problem-specific Assessment & Plan notes found for this encounter  There are no diagnoses linked to this encounter  Advance Care Planning/Advance Directives:  Discussed disease status, cancer treatment plans and/or cancer treatment goals with the patient  Oncology History    No history exists  History of Present Illness: Patient is a 57-year-old woman here for follow-up 6 months post parathyroidectomy   -Interval History:  She comes in with no new complaints to report  Review of Systems:  Review of Systems   Constitutional: Negative  HENT: Negative  Eyes: Negative  Respiratory: Negative  Cardiovascular: Negative  Gastrointestinal: Negative  Endocrine: Negative  Genitourinary: Negative  Musculoskeletal: Negative  Skin: Negative  Allergic/Immunologic: Negative  Neurological: Negative  Hematological: Negative  Psychiatric/Behavioral: Negative  Patient Active Problem List   Diagnosis    Hypercalcemia    Enlarged thyroid    Essential hypertension    Hyperlipidemia, unspecified    Prediabetes    Simple goiter    History of lacunar cerebrovascular accident (CVA)    Pain in both lower extremities    Bilateral low back pain with sciatica    DDD (degenerative disc disease), lumbar    Bilateral sciatica    Lumbar disc herniation    Spinal stenosis of lumbar region    Lumbar spondylosis    Lumbar radiculopathy    BMI 29 0-29 9,adult    Hypocalcemia    Status post parathyroidectomy ([facility] [address] )    Vitamin D deficiency    Hypoparathyroidism ([facility] [address] )     Past Medical History:   Diagnosis Date    Diverticulosis     Hemorrhoids     Hypertension     controlled    Stroke [facility])      Past Surgical History:   Procedure Laterality Date    COLONOSCOPY      ONSET 2015    NOSE SURGERY      HI EXPLORE PARATHYROID GLANDS Bilateral 6/18/2020    Procedure: PARATHYROIDECTOMY, MINIMALLY INVASIVE, 3 5 GLAND EXPLORATION, INTRAOPERATIVE PTH MONITORING;  Surgeon: Lizzeth Guo MD;  [facility];  Service: Surgical Oncology    SKIN BIOPSY      SKIN LESION EXCISION       Family History   Problem Relation Age of Onset    No Known Problems Mother     Hypertension Father     Parkinsonism Father     Hypertension Sister     Stroke Family     Glaucoma Family     Hypertension Family      Social History     Socioeconomic History    Marital status: /Civil Union     Spouse name: Not on file    Number of children: Not on file    Years of education: Not on file    Highest education level: Not on file   Occupational History    Not on file   Tobacco Use    Smoking status: Never Smoker    Smokeless tobacco: Never Used   Vaping Use    Vaping Use: Never used   Substance and Sexual Activity    Alcohol use:  Yes     Alcohol/week: 2 0 standard drinks     Types: 2 Cans of beer per week Comment: SOCIAL    Drug use: No    Sexual activity: Yes     Partners: Male     Birth control/protection: None   Other Topics Concern    Not on file   Social History Narrative    ALWAYS SEAT BELT    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     Social Determinants of Health     Financial Resource Strain:     Difficulty of Paying Living Expenses:    Food Insecurity:     Worried About Running Out of Food in the Last Year:     Ran Out of Food in the Last Year:    Transportation Needs:     Lack of Transportation (Medical):      Lack of Transportation (Non-Medical):    Physical Activity:     Days of Exercise per Week:     Minutes of Exercise per Session:    Stress:     Feeling of Stress :    Social Connections:     Frequency of Communication with Friends and Family:     Frequency of Social Gatherings with Friends and Family:     Attends Yazidi Services:     Active Member of Clubs or Organizations:     Attends Club or Organization Meetings:     Marital Status:    Intimate Partner Violence:     Fear of Current or Ex-Partner:     Emotionally Abused:     Physically Abused:     Sexually Abused:        Current Outpatient Medications:     atorvastatin (LIPITOR) 40 mg tablet, Take 1 tablet (40 mg total) by mouth daily, Disp: 90 tablet, Rfl: 1    Azilsartan-Chlorthalidone (Edarbyclor) 40-12 5 MG TABS, Take 1 tablet by mouth daily, Disp: 30 tablet, Rfl: 6    calcitriol (ROCALTROL) 0 25 mcg capsule, Take one tablet two times daily ( 90 day supply), Disp: 180 capsule, Rfl: 1    Cholecalciferol (D 2000) 2000 units TABS, Take by mouth daily, Disp: , Rfl:     clopidogrel (PLAVIX) 75 mg tablet, Take 1 tablet (75 mg total) by mouth daily, Disp: 90 tablet, Rfl: 1    CO ENZYME Q-10 PO, daily , Disp: , Rfl:     folic acid (FOLVITE) 1 mg tablet, Take 1 tablet (1,000 mcg total) by mouth daily, Disp: 90 tablet, Rfl: 1    magnesium oxide (MAG-OX) 400 mg, Take 1 tablet (400 mg total) by mouth daily, Disp: , Rfl:   metoprolol succinate (TOPROL-XL) 25 mg 24 hr tablet, Take 1 tablet (25 mg total) by mouth daily, Disp: 90 tablet, Rfl: 1    Potassium 99 MG TABS, Take 1 tablet by mouth daily, Disp: , Rfl:     calcium carbonate (TUMS) 500 mg chewable tablet, Chew 2 tablets (1,000 mg total) 3 (three) times a day before meals (Patient not taking: Reported on 3/15/2021), Disp: 90 tablet, Rfl: 0  Allergies   Allergen Reactions    Codeine Chest Pain     Vitals:    07/12/21 0757   BP: 108/68   Pulse: 72   Resp: 18   Temp: 98 4 °F (36 9 °C)   SpO2: 98%       Physical Exam  Constitutional:       Appearance: Normal appearance  HENT:      Head: Normocephalic and atraumatic  Right Ear: External ear normal       Left Ear: External ear normal       Nose: Nose normal    Eyes:      General: No scleral icterus  Extraocular Movements: Extraocular movements intact  Pupils: Pupils are equal, round, and reactive to light  Cardiovascular:      Rate and Rhythm: Normal rate and regular rhythm  Pulses: Normal pulses  Heart sounds: Normal heart sounds  Pulmonary:      Breath sounds: Normal breath sounds  Abdominal:      Palpations: Abdomen is soft  Musculoskeletal:         General: Normal range of motion  Cervical back: Normal range of motion and neck supple  No rigidity  Lymphadenopathy:      Cervical: No cervical adenopathy  Skin:     General: Skin is warm and dry  Neurological:      General: No focal deficit present  Mental Status: She is oriented to person, place, and time  Psychiatric:         Mood and Affect: Mood normal          Behavior: Behavior normal          Thought Content:  Thought content normal          Judgment: Judgment normal            Results:  Labs:  Lab Results   Component Value Date    PTH 16 5 (L) 07/10/2021    CALCIUM 9 1 07/10/2021    PHOS 4 4 06/24/2020         Imaging  XR foot 3+ vw right    Result Date: 7/9/2021  Narrative: RIGHT FOOT INDICATION:   M79 671: Pain in right foot  COMPARISON:  None VIEWS:  XR FOOT 3+ VW RIGHT FINDINGS: Moderate degenerative changes first metatarsophalangeal joint  hallux valgus deformity  Moderate plantar heel spur  There is no acute fracture or dislocation  No lytic or blastic osseous lesion  Soft tissues are unremarkable  Impression: Degenerative arthritis first MTP joint  Hallux valgus deformity Heel spur No acute osseous abnormality  Workstation performed: [id number]     I reviewed the above laboratory and imaging data  Discussion/Summary:  68-year-old woman, history of parathyroid adenoma, status post MIP  Doing well  Calcium and PTH levels now normal   We will therefore follow up as needed

## 2021-07-12 NOTE — PROGRESS NOTES
Surgical Oncology Follow Up       [address] [facility] [place]  [address] [facility] [address] [phone]    Demar Perez  1960  [id number]  [address] [facility] Arlys Seymour  [address] [facility] [id number] [phone]    Chief Complaint   Patient presents with    Follow-up       Assessment/Plan:    No problem-specific Assessment & Plan notes found for this encounter  There are no diagnoses linked to this encounter  Advance Care Planning/Advance Directives:  Discussed disease status, cancer treatment plans and/or cancer treatment goals with the patient  Oncology History    No history exists  History of Present Illness: Patient is a 70-year-old woman here for follow-up 6 months post parathyroidectomy   -Interval History:  She comes in with no new complaints to report  Review of Systems:  Review of Systems   Constitutional: Negative  HENT: Negative  Eyes: Negative  Respiratory: Negative  Cardiovascular: Negative  Gastrointestinal: Negative  Endocrine: Negative  Genitourinary: Negative  Musculoskeletal: Negative  Skin: Negative  Allergic/Immunologic: Negative  Neurological: Negative  Hematological: Negative  Psychiatric/Behavioral: Negative          Patient Active Problem List   Diagnosis    Hypercalcemia    Enlarged thyroid    Essential hypertension    Hyperlipidemia, unspecified    Prediabetes    Simple goiter    History of lacunar cerebrovascular accident (CVA)    Pain in both lower extremities    Bilateral low back pain with sciatica    DDD (degenerative disc disease), lumbar    Bilateral sciatica    Lumbar disc herniation    Spinal stenosis of lumbar region    Lumbar spondylosis    Lumbar radiculopathy    BMI 29 0-29 9,adult    Hypocalcemia    Status post parathyroidectomy (HCC)    Vitamin D deficiency    Hypoparathyroidism [facility])     Past Medical History:   Diagnosis Date    Diverticulosis     Hemorrhoids     Hypertension     controlled    Stroke [facility])      Past Surgical History:   Procedure Laterality Date    COLONOSCOPY      ONSET 2015    NOSE SURGERY      MO EXPLORE PARATHYROID GLANDS Bilateral 6/18/2020    Procedure: PARATHYROIDECTOMY, MINIMALLY INVASIVE, 3 5 GLAND EXPLORATION, INTRAOPERATIVE PTH MONITORING;  Surgeon: Niko Phillips MD;  [facility];  Service: Surgical Oncology    SKIN BIOPSY      SKIN LESION EXCISION       Family History   Problem Relation Age of Onset    No Known Problems Mother     Hypertension Father     Parkinsonism Father     Hypertension Sister     Stroke Family     Glaucoma Family     Hypertension Family      Social History     Socioeconomic History    Marital status: /Civil Union     Spouse name: Not on file    Number of children: Not on file    Years of education: Not on file    Highest education level: Not on file   Occupational History    Not on file   Tobacco Use    Smoking status: Never Smoker    Smokeless tobacco: Never Used   Vaping Use    Vaping Use: Never used   Substance and Sexual Activity    Alcohol use: Yes     Alcohol/week: 2 0 standard drinks     Types: 2 Cans of beer per week     Comment: SOCIAL    Drug use: No    Sexual activity: Yes     Partners: Male     Birth control/protection: None   Other Topics Concern    Not on file   Social History Narrative    ALWAYS SEAT BELT    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     Social Determinants of Health     Financial Resource Strain:     Difficulty of Paying Living Expenses:    Food Insecurity:     Worried About Running Out of Food in the Last Year:     Ran Out of Food in the Last Year:    Transportation Needs:     Lack of Transportation (Medical):      Lack of Transportation (Non-Medical):    Physical Activity:     Days of Exercise per Week:     Minutes of Exercise per Session:    Stress:     Feeling of Stress :    Social Connections:     Frequency of Communication with Friends and Family:     Frequency of Social Gatherings with Friends and Family:     Attends Jewish Services:     Active Member of Clubs or Organizations:     Attends Club or Organization Meetings:     Marital Status:    Intimate Partner Violence:     Fear of Current or Ex-Partner:     Emotionally Abused:     Physically Abused:     Sexually Abused:        Current Outpatient Medications:     atorvastatin (LIPITOR) 40 mg tablet, Take 1 tablet (40 mg total) by mouth daily, Disp: 90 tablet, Rfl: 1    Azilsartan-Chlorthalidone (Edarbyclor) 40-12 5 MG TABS, Take 1 tablet by mouth daily, Disp: 30 tablet, Rfl: 6    calcitriol (ROCALTROL) 0 25 mcg capsule, Take one tablet two times daily ( 90 day supply), Disp: 180 capsule, Rfl: 1    Cholecalciferol (D 2000) 2000 units TABS, Take by mouth daily, Disp: , Rfl:     clopidogrel (PLAVIX) 75 mg tablet, Take 1 tablet (75 mg total) by mouth daily, Disp: 90 tablet, Rfl: 1    CO ENZYME Q-10 PO, daily , Disp: , Rfl:     folic acid (FOLVITE) 1 mg tablet, Take 1 tablet (1,000 mcg total) by mouth daily, Disp: 90 tablet, Rfl: 1    magnesium oxide (MAG-OX) 400 mg, Take 1 tablet (400 mg total) by mouth daily, Disp: , Rfl:     metoprolol succinate (TOPROL-XL) 25 mg 24 hr tablet, Take 1 tablet (25 mg total) by mouth daily, Disp: 90 tablet, Rfl: 1    Potassium 99 MG TABS, Take 1 tablet by mouth daily, Disp: , Rfl:     calcium carbonate (TUMS) 500 mg chewable tablet, Chew 2 tablets (1,000 mg total) 3 (three) times a day before meals (Patient not taking: Reported on 3/15/2021), Disp: 90 tablet, Rfl: 0  Allergies   Allergen Reactions    Codeine Chest Pain     Vitals:    07/12/21 0757   BP: 108/68   Pulse: 72   Resp: 18   Temp: 98 4 °F (36 9 °C)   SpO2: 98%       Physical Exam  Constitutional:       Appearance: Normal appearance     HENT:      Head: Normocephalic and atraumatic  Right Ear: External ear normal       Left Ear: External ear normal       Nose: Nose normal    Eyes:      General: No scleral icterus  Extraocular Movements: Extraocular movements intact  Pupils: Pupils are equal, round, and reactive to light  Cardiovascular:      Rate and Rhythm: Normal rate and regular rhythm  Pulses: Normal pulses  Heart sounds: Normal heart sounds  Pulmonary:      Breath sounds: Normal breath sounds  Abdominal:      Palpations: Abdomen is soft  Musculoskeletal:         General: Normal range of motion  Cervical back: Normal range of motion and neck supple  No rigidity  Lymphadenopathy:      Cervical: No cervical adenopathy  Skin:     General: Skin is warm and dry  Neurological:      General: No focal deficit present  Mental Status: She is oriented to person, place, and time  Psychiatric:         Mood and Affect: Mood normal          Behavior: Behavior normal          Thought Content: Thought content normal          Judgment: Judgment normal            Results:  Labs:  Lab Results   Component Value Date    PTH 16 5 (L) 07/10/2021    CALCIUM 9 1 07/10/2021    PHOS 4 4 06/24/2020         Imaging  XR foot 3+ vw right    Result Date: 7/9/2021  Narrative: RIGHT FOOT INDICATION:   M79 671: Pain in right foot  COMPARISON:  None VIEWS:  XR FOOT 3+ VW RIGHT FINDINGS: Moderate degenerative changes first metatarsophalangeal joint  hallux valgus deformity  Moderate plantar heel spur  There is no acute fracture or dislocation  No lytic or blastic osseous lesion  Soft tissues are unremarkable  Impression: Degenerative arthritis first MTP joint  Hallux valgus deformity Heel spur No acute osseous abnormality  Workstation performed: [id number]     I reviewed the above laboratory and imaging data  Discussion/Summary:  37-year-old woman, history of parathyroid adenoma, status post MIP  Doing well    Calcium and PTH levels now normal   We will therefore follow up as needed

## 2021-07-14 ENCOUNTER — OFFICE VISIT (OUTPATIENT)
Dept: FAMILY MEDICINE CLINIC | Facility: CLINIC | Age: 61
End: 2021-07-14
Payer: COMMERCIAL

## 2021-07-14 VITALS
BODY MASS INDEX: 31.41 KG/M2 | DIASTOLIC BLOOD PRESSURE: 74 MMHG | HEIGHT: 64 IN | SYSTOLIC BLOOD PRESSURE: 104 MMHG | TEMPERATURE: 98.5 F | WEIGHT: 184 LBS | OXYGEN SATURATION: 97 % | HEART RATE: 70 BPM

## 2021-07-14 DIAGNOSIS — I10 ESSENTIAL HYPERTENSION: ICD-10-CM

## 2021-07-14 DIAGNOSIS — Z12.4 CERVICAL CANCER SCREENING: ICD-10-CM

## 2021-07-14 DIAGNOSIS — M72.2 PLANTAR FASCIITIS: ICD-10-CM

## 2021-07-14 DIAGNOSIS — Z00.01 ENCOUNTER FOR ROUTINE ADULT PHYSICAL EXAM WITH ABNORMAL FINDINGS: Primary | ICD-10-CM

## 2021-07-14 PROCEDURE — 99396 PREV VISIT EST AGE 40-64: CPT | Performed by: FAMILY MEDICINE

## 2021-07-14 RX ORDER — AZILSARTAN KAMEDOXOMIL AND CHLORTHALIDONE 40; 12.5 MG/1; MG/1
TABLET ORAL
Qty: 30 TABLET | Refills: 6
Start: 2021-07-14 | End: 2021-07-15 | Stop reason: ALTCHOICE

## 2021-07-14 RX ORDER — METOPROLOL SUCCINATE 25 MG/1
25 TABLET, EXTENDED RELEASE ORAL DAILY
Qty: 90 TABLET | Refills: 1 | Status: SHIPPED | OUTPATIENT
Start: 2021-07-14 | End: 2021-10-13 | Stop reason: DRUGHIGH

## 2021-07-14 NOTE — PROGRESS NOTES
[address] [facility]    NAME: Cassi Gonzalez  AGE: 63 y o  SEX: female  : 1960     DATE: 2021     Assessment and Plan:   Bianca Higgins was seen today for well check and pain  Diagnoses and all orders for this visit:    Encounter for routine adult physical exam with abnormal findings    Essential hypertension  Comments:  Advised can try off of Edarbyclo; if bp rises, increase dose of metoprolol, pt understands she would need more frequent office visits (in person) for bp monitor  Orders:  -     Discontinue: metoprolol succinate (TOPROL-XL) 25 mg 24 hr tablet; Take 1 tablet (25 mg total) by mouth daily  -     Discontinue: Azilsartan-Chlorthalidone (Edarbyclor) 40-12 5 MG TABS; 2 days on and 1 day off for 8 days, then every other day for 7 days, then stop (Patient not taking: Reported on 2021)    Plantar fasciitis  Comments:  advised NSAID, stretching exercises    Cervical cancer screening  -     Ambulatory referral to Gynecology; Future        Problem List Items Addressed This Visit        Cardiovascular and Mediastinum    Essential hypertension      Other Visit Diagnoses     Encounter for routine adult physical exam with abnormal findings    -  Primary    Plantar fasciitis        advised NSAID, stretching exercises    Cervical cancer screening        Relevant Orders    Ambulatory referral to Gynecology          Immunizations and preventive care screenings were discussed with patient today  Appropriate education was printed on patient's after visit summary  Counseling:  · Exercise: the importance of regular exercise/physical activity was discussed  Recommend exercise 3-5 times per week for at least 30 minutes  Return in about 3 months (around 10/14/2021) for Next scheduled follow up       Chief Complaint:     Chief Complaint   Patient presents with    Well Check    Pain     Right foot pain that started 2 weeks ago        History of Present Illness:     Adult Annual Physical   Patient here for a comprehensive physical exam  This is pt's first in-office/in-person visit since 2019  The patient reports :  Acute problem "ball of foot and backs of my toes started bugging me about 2 weeks ago"  No injury or trauma  Was taking ibuprofen without benefit so stopped  "Hurts more when am off it, when on it, walking, it's not as bad"  Was seen at [facility] for this c/o 07/09/2021 and xr done:  IMPRESSION:  Degenerative arthritis first MTP joint  Hallux valgus deformity  Heel spur  No acute osseous abnormality      Pt asks whether she is "doomed to be on the Adarbichlor forever"- reviewed bp- low today and few days ago, and even at [facility] when in more pain was ok, also in March was good at endo  Pt not having any hypotension sx currently  Med very expensive for pt, per pt  Advised can try off of med and if bp creeps up, can increase dose of metoprolol, pt understands she would need more frequent office visits (in person) for bp monitoring      Diet and Physical Activity  · Diet/Nutrition: well balanced diet  · Exercise: mild cardio exercise  Depression Screening  PHQ-2/9 Depression Screening    Little interest or pleasure in doing things: 0 - not at all  Feeling down, depressed, or hopeless: 0 - not at all  PHQ-2 Score: 0       General Health  · Sleep: sleeps well  · Hearing: normal - bilateral   · Vision: goes for regular eye exams, most recent eye exam <1 year ago and wears glasses  · Dental: no dental visits for >1 year  /GYN Health  · Has not seen GYN for years       Review of Systems:     Review of Systems   Past Medical History:     Past Medical History:   Diagnosis Date    Diverticulosis     Hemorrhoids     Hypercalcemia 5/4/2018    Hypertension     controlled    Stroke [facility])     Vitamin D deficiency 8/13/2020      Past Surgical History:     Past Surgical History:   Procedure Laterality Date    COLONOSCOPY      ONSET 2015    NOSE SURGERY      VT EXPLORE PARATHYROID GLANDS Bilateral 6/18/2020    Procedure: PARATHYROIDECTOMY, MINIMALLY INVASIVE, 3 5 GLAND EXPLORATION, INTRAOPERATIVE PTH MONITORING;  Surgeon: Stefany Qureshi MD;  [facility];  Service: Surgical Oncology    SKIN BIOPSY      SKIN LESION EXCISION        Social History:     Social History     Socioeconomic History    Marital status: /Civil Union     Spouse name: None    Number of children: None    Years of education: None    Highest education level: None   Occupational History    None   Tobacco Use    Smoking status: Never Smoker    Smokeless tobacco: Never Used   Vaping Use    Vaping Use: Never used   Substance and Sexual Activity    Alcohol use:  Yes     Alcohol/week: 2 0 standard drinks     Types: 2 Cans of beer per week     Comment: SOCIAL    Drug use: No    Sexual activity: Yes     Partners: Male     Birth control/protection: None   Other Topics Concern    None   Social History Narrative    ALWAYS SEAT BELT    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     Social Determinants of Health     Financial Resource Strain: Not on file   Food Insecurity: Not on file   Transportation Needs: Not on file   Physical Activity: Not on file   Stress: Not on file   Social Connections: Not on file   Intimate Partner Violence: Not on file   Housing Stability: Not on file      Family History:     Family History   Problem Relation Age of Onset    No Known Problems Mother     Hypertension Father     Parkinsonism Father     Hypertension Sister     Stroke Family     Glaucoma Family     Hypertension Family       Current Medications:     Current Outpatient Medications   Medication Sig Dispense Refill    atorvastatin (LIPITOR) 40 mg tablet Take 1 tablet (40 mg total) by mouth daily 90 tablet 1    Cholecalciferol 50 MCG (2000 UT) TABS Take by mouth daily      CO ENZYME Q-10 PO daily       folic acid (FOLVITE) 1 mg tablet Take 1 tablet (1,000 mcg total) by mouth daily 90 tablet 1    magnesium oxide (MAG-OX) 400 mg Take 1 tablet (400 mg total) by mouth daily      Potassium 99 MG TABS Take 1 tablet by mouth daily      calcium carbonate (OS-BRUNILDA) 600 MG tablet Take one tablet in AM and 2 tabs with dinner      clopidogrel (PLAVIX) 75 mg tablet Take 1 tablet (75 mg total) by mouth daily 90 tablet 1    losartan (COZAAR) 25 mg tablet Take 1 tablet (25 mg total) by mouth daily 90 tablet 1    metoprolol tartrate (LOPRESSOR) 25 mg tablet Take 1 tablet (25 mg total) by mouth every 12 (twelve) hours 180 tablet 1     No current facility-administered medications for this visit  Allergies: Allergies   Allergen Reactions    Codeine Chest Pain      Physical Exam:     /74 (BP Location: Left arm, Patient Position: Sitting, Cuff Size: Large)   Pulse 70   Temp 98 5 °F (36 9 °C) (Tympanic)   Ht 5' 4" (1 626 m)   Wt 83 5 kg (184 lb)   SpO2 97%   BMI 31 58 kg/m²     Physical Exam  Vitals and nursing note reviewed  Constitutional:       General: She is not in acute distress  Appearance: She is well-developed  She is not ill-appearing, toxic-appearing or diaphoretic  HENT:      Head: Normocephalic and atraumatic  Right Ear: Hearing, tympanic membrane, ear canal and external ear normal       Left Ear: Hearing, tympanic membrane, ear canal and external ear normal       Nose: Nose normal       Mouth/Throat:      Mouth: Mucous membranes are moist       Pharynx: Oropharynx is clear  Eyes:      General: Lids are normal       Extraocular Movements: Extraocular movements intact  Conjunctiva/sclera: Conjunctivae normal       Pupils: Pupils are equal, round, and reactive to light  Neck:      Thyroid: No thyroid mass or thyromegaly  Vascular: No JVD  Trachea: Trachea normal    Cardiovascular:      Rate and Rhythm: Normal rate and regular rhythm  Pulses: Normal pulses  Heart sounds: Normal heart sounds  Pulmonary:      Effort: Pulmonary effort is normal       Breath sounds: Normal breath sounds  Abdominal:      General: Bowel sounds are normal  There is no distension or abdominal bruit  Palpations: Abdomen is soft  There is no hepatomegaly, splenomegaly or mass  Tenderness: There is no abdominal tenderness  Hernia: There is no hernia in the ventral area  Musculoskeletal:      Cervical back: Neck supple  Right lower leg: No edema  Left lower leg: No edema  Feet:    Lymphadenopathy:      Cervical: No cervical adenopathy  Upper Body:      Right upper body: No supraclavicular adenopathy  Left upper body: No supraclavicular adenopathy  Skin:     General: Skin is warm and dry  Capillary Refill: Capillary refill takes less than 2 seconds  Coloration: Skin is not pale  Neurological:      Mental Status: She is alert and oriented to person, place, and time  Cranial Nerves: Cranial nerves are intact  Sensory: Sensation is intact  Motor: Motor function is intact  Coordination: Coordination is intact  Gait: Gait normal       Deep Tendon Reflexes: Reflexes are normal and symmetric  Psychiatric:         Mood and Affect: Mood normal          Behavior: Behavior normal  Behavior is cooperative            Chris Granda, [address] [place] [address]

## 2021-08-04 ENCOUNTER — TELEPHONE (OUTPATIENT)
Dept: ENDOCRINOLOGY | Facility: CLINIC | Age: 61
End: 2021-08-04

## 2021-08-04 ENCOUNTER — OFFICE VISIT (OUTPATIENT)
Dept: ENDOCRINOLOGY | Facility: CLINIC | Age: 61
End: 2021-08-04
Payer: COMMERCIAL

## 2021-08-04 ENCOUNTER — APPOINTMENT (OUTPATIENT)
Dept: LAB | Facility: CLINIC | Age: 61
End: 2021-08-04
Payer: COMMERCIAL

## 2021-08-04 VITALS
BODY MASS INDEX: 32.24 KG/M2 | SYSTOLIC BLOOD PRESSURE: 112 MMHG | WEIGHT: 187.8 LBS | DIASTOLIC BLOOD PRESSURE: 82 MMHG | HEART RATE: 82 BPM | TEMPERATURE: 96.8 F

## 2021-08-04 DIAGNOSIS — E83.51 HYPOCALCEMIA: ICD-10-CM

## 2021-08-04 DIAGNOSIS — E55.9 VITAMIN D DEFICIENCY: ICD-10-CM

## 2021-08-04 DIAGNOSIS — E21.0 PRIMARY HYPERPARATHYROIDISM (HCC): ICD-10-CM

## 2021-08-04 DIAGNOSIS — E20.9 HYPOPARATHYROIDISM, UNSPECIFIED HYPOPARATHYROIDISM TYPE (HCC): Primary | ICD-10-CM

## 2021-08-04 LAB
ALBUMIN SERPL BCP-MCNC: 3.4 G/DL (ref 3.5–5)
ANION GAP SERPL CALCULATED.3IONS-SCNC: 8 MMOL/L (ref 4–13)
BUN SERPL-MCNC: 14 MG/DL (ref 5–25)
CALCIUM SERPL-MCNC: 8.2 MG/DL (ref 8.3–10.1)
CHLORIDE SERPL-SCNC: 104 MMOL/L (ref 100–108)
CO2 SERPL-SCNC: 31 MMOL/L (ref 21–32)
CREAT SERPL-MCNC: 0.85 MG/DL (ref 0.6–1.3)
GFR SERPL CREATININE-BSD FRML MDRD: 75 ML/MIN/1.73SQ M
GLUCOSE SERPL-MCNC: 96 MG/DL (ref 65–140)
POTASSIUM SERPL-SCNC: 3.6 MMOL/L (ref 3.5–5.3)
SODIUM SERPL-SCNC: 143 MMOL/L (ref 136–145)

## 2021-08-04 PROCEDURE — 82040 ASSAY OF SERUM ALBUMIN: CPT

## 2021-08-04 PROCEDURE — 36415 COLL VENOUS BLD VENIPUNCTURE: CPT

## 2021-08-04 PROCEDURE — 99214 OFFICE O/P EST MOD 30 MIN: CPT | Performed by: INTERNAL MEDICINE

## 2021-08-04 PROCEDURE — 80048 BASIC METABOLIC PNL TOTAL CA: CPT

## 2021-08-04 RX ORDER — PHENOL 1.4 %
1200 AEROSOL, SPRAY (ML) MUCOUS MEMBRANE 2 TIMES DAILY WITH MEALS
COMMUNITY
End: 2022-02-14 | Stop reason: SDUPTHER

## 2021-08-04 RX ORDER — CALCIUM CARBONATE 200(500)MG
1 TABLET,CHEWABLE ORAL 2 TIMES DAILY
Qty: 90 TABLET | Refills: 0
Start: 2021-08-04 | End: 2021-08-04 | Stop reason: DRUGHIGH

## 2021-08-04 RX ORDER — CALCITRIOL 0.25 UG/1
CAPSULE, LIQUID FILLED ORAL
Qty: 180 CAPSULE | Refills: 1 | Status: SHIPPED | OUTPATIENT
Start: 2021-08-04 | End: 2021-08-04 | Stop reason: ALTCHOICE

## 2021-08-04 NOTE — TELEPHONE ENCOUNTER
Please inform patient that her corrected calcium is 8 6, which is normal    she should continue calcium carbonate 500 mg, 2 tablets in the morning and 2 tablets in the evening  repeat basic metabolic profile in 2 weeks  she does not need to start Rocaltrol       please call pharmacy and cancel the Rocaltrol prescription    Jeanie Leal MD

## 2021-08-04 NOTE — PROGRESS NOTES
Carlos Miller 63 y o  female MRN: [id number]    Encounter: [id number]      Assessment/Plan     Assessment: This is a 63y o -year-old female with  Hypoparathyroidism, hypocalcemia    Plan:  Diagnoses and all orders for this visit:    Hypoparathyroidism, unspecified hypoparathyroidism type ([facility] )  Lab Results   Component Value Date    PTH 16 5 (L) 07/10/2021    CALCIUM 8 2 (L) 08/04/2021    PHOS 4 4 06/24/2020    patient has postsurgical hypoparathyroidism, discussed to get blood work done as soon as possible today, based on her calcium level will have to decide if she will start taking Rocaltrol 0 25 mcg 1 tablet daily or twice a day  will follow-up and make further recommendations  discussed to take calcium supplementation 500 mg 3 times daily for now with food  also discussed with patient that she will have to take Rocaltrol and calcium supplementation for lifelong, because of postsurgical hypoparathyroidism  Also discussed the consequences of severe hypocalcemia, and in case of nausea vomiting and not able to keep Rocaltrol or calcium supplementation she needs to go to the ER immediately for IV calcium therapy  -     calcitriol (ROCALTROL) 0 25 mcg capsule; Take one tablet two times daily ( 90 day supply)  -     Basic metabolic panel; Future  -     Albumin Lab Collect; Future    Hypocalcemia   see above  -     calcium carbonate (TUMS) 500 mg chewable tablet; Chew 1 tablet (500 mg total) 2 (two) times a day  -     calcitriol (ROCALTROL) 0 25 mcg capsule; Take one tablet two times daily ( 90 day supply)  -     Basic metabolic panel; Future  -     Albumin Lab Collect; Future  -     Basic metabolic panel; Future  -     Albumin Lab Collect; Future    Primary hyperparathyroidism ([facility] )   resolved  Status post parathyroid gland exploration surgery,, 3 gland removal for parathyroid hyperplasia  Vitamin D deficiency   continue current dose of  Vitamin-D 3 supplementation        CC:    hypocalcemia, hypoparathyroidism( postsurgical)    History of Present Illness     HPI:    Carlos Miller Is 68-year-old woman with medical history of primary hyperparathyroidism, history of 3 gland parathyroidectomy for parathyroid hyperplasia, postsurgical hypoparathyroidism and hypocalcemia is here for follow-up  for hypocalcemia she  Was started on Rocaltrol 0 25 mcg twice daily, and her calcium was controlled on that dose, she stopped taking Rocaltrol 1 month ago  she was recently hospitalized for hypocalcemia in carpopedal spasms  she was also having shortness of breath  she was started back on calcium carbonate 500 mg 3 times daily  she denies muscle spasms, shortness of breath or tingling or numbness in her extremities today  she is also taking vitamin-D 3 supplementation 2000 International Units daily  she has history of thyroid nodule, 0 8 x 0 4 x 0 7 cm right mid pole, 0 9 x 0 3 x 0 4 cm left lower  Lobe thyroid nodule  she denies any obstructive symptoms, family history of thyroid cancer  Results for Jose Goo (MRN [id number]) as of 8/4/2021 11:53   Ref  Range 3/20/2021 08:37 7/10/2021 08:25   Sodium Latest Ref Range: 136 - 145 mmol/L 139    Potassium Latest Ref Range: 3 5 - 5 3 mmol/L 3 8    Chloride Latest Ref Range: 100 - 108 mmol/L 106    CO2 Latest Ref Range: 21 - 32 mmol/L 29    Anion Gap Latest Ref Range: 4 - 13 mmol/L 4    BUN Latest Ref Range: 5 - 25 mg/dL 24    Creatinine Latest Ref Range: 0 60 - 1 30 mg/dL 0 85    Glucose, Random Latest Ref Range: 65 - 140 mg/dL 99    Calcium Latest Ref Range: 8 6 - 10 5 mg/dL 9 0 9 1   Albumin Latest Ref Range: 3 5 - 5 0 g/dL 3 8    eGFR Latest Units: ml/min/1 73sq m 76    Results for Jose Goo (MRN [id number]) as of 8/4/2021 11:53   Ref  Range 12/31/2020 07:05   Vit D, 25-Hydroxy Latest Ref Range: 30 0 - 100 0 ng/mL 30 3   Results for Jose Goo (MRN [id number]) as of 8/4/2021 11:53   Ref   Range 7/10/2021 08:25 PARATHYROID HORMONE Latest Ref Range: 18 4 - 80 1 pg/mL 16 5 (L)   Results for Yappn Sports (MRN [id number]) as of 8/4/2021 11:53   Ref  Range 3/20/2021 08:37   TSH 3RD GENERATON Latest Ref Range: 0 358 - 3 740 uIU/mL 2 360   Free T4 Latest Ref Range: 0 76 - 1 46 ng/dL 0 99     Review of Systems   Constitutional: Negative for activity change, diaphoresis, fatigue, fever and unexpected weight change  HENT: Negative  Eyes: Negative for visual disturbance  Respiratory: Negative for cough, chest tightness and shortness of breath  Cardiovascular: Negative for chest pain, palpitations and leg swelling  Gastrointestinal: Negative for abdominal pain, blood in stool, constipation, diarrhea, nausea and vomiting  Endocrine: Negative for cold intolerance, heat intolerance, polydipsia, polyphagia and polyuria  Genitourinary: Negative for dysuria, enuresis, frequency and urgency  Musculoskeletal: Positive for myalgias  Negative for arthralgias  Skin: Negative for pallor, rash and wound  Allergic/Immunologic: Negative  Neurological: Positive for numbness (Off and on)  Negative for dizziness, tremors and weakness  Hematological: Negative  Psychiatric/Behavioral: Negative          Historical Information   Past Medical History:   Diagnosis Date    Diverticulosis     Hemorrhoids     Hypertension     controlled    Stroke [facility])      Past Surgical History:   Procedure Laterality Date    COLONOSCOPY      ONSET 2015    NOSE SURGERY      MN EXPLORE PARATHYROID GLANDS Bilateral 6/18/2020    Procedure: PARATHYROIDECTOMY, MINIMALLY INVASIVE, 3 5 GLAND EXPLORATION, INTRAOPERATIVE PTH MONITORING;  Surgeon: Raven Mejias MD;  Location: [facility];  Service: Surgical Oncology    SKIN BIOPSY      SKIN LESION EXCISION       Social History   Social History     Substance and Sexual Activity   Alcohol Use Yes    Alcohol/week: 2 0 standard drinks    Types: 2 Cans of beer per week    Comment: SOCIAL Social History     Substance and Sexual Activity   Drug Use No     Social History     Tobacco Use   Smoking Status Never Smoker   Smokeless Tobacco Never Used     Family History:   Family History   Problem Relation Age of Onset    No Known Problems Mother     Hypertension Father     Parkinsonism Father     Hypertension Sister     Stroke Family     Glaucoma Family     Hypertension Family        Meds/Allergies   Current Outpatient Medications   Medication Sig Dispense Refill    atorvastatin (LIPITOR) 40 mg tablet Take 1 tablet (40 mg total) by mouth daily 90 tablet 1    Azilsartan-Chlorthalidone (Edarbyclor) 40-12 5 MG TABS 2 days on and 1 day off for 8 days, then every other day for 7 days, then stop 30 tablet 6    Cholecalciferol (D 2000) 2000 units TABS Take by mouth daily      clopidogrel (PLAVIX) 75 mg tablet Take 1 tablet (75 mg total) by mouth daily 90 tablet 1    CO ENZYME Q-10 PO daily       folic acid (FOLVITE) 1 mg tablet Take 1 tablet (1,000 mcg total) by mouth daily 90 tablet 1    magnesium oxide (MAG-OX) 400 mg Take 1 tablet (400 mg total) by mouth daily      metoprolol succinate (TOPROL-XL) 25 mg 24 hr tablet Take 1 tablet (25 mg total) by mouth daily 90 tablet 1    Potassium 99 MG TABS Take 1 tablet by mouth daily      calcitriol (ROCALTROL) 0 25 mcg capsule Take one tablet two times daily ( 90 day supply) 180 capsule 1    calcium carbonate (TUMS) 500 mg chewable tablet Chew 1 tablet (500 mg total) 2 (two) times a day 90 tablet 0     No current facility-administered medications for this visit  Allergies   Allergen Reactions    Codeine Chest Pain       Objective   Vitals: Blood pressure 112/82, pulse 82, temperature (!) 96 8 °F (36 °C), weight 85 2 kg (187 lb 12 8 oz)  Physical Exam  Vitals reviewed  Constitutional:       General: She is not in acute distress  Appearance: Normal appearance  She is not ill-appearing        Comments: No Chvostek's sign  No trousseau's sign    HENT:      Head: Normocephalic and atraumatic  Nose: Nose normal    Eyes:      Extraocular Movements: Extraocular movements intact  Conjunctiva/sclera: Conjunctivae normal    Pulmonary:      Effort: Pulmonary effort is normal  No respiratory distress  Breath sounds: Normal breath sounds  Musculoskeletal:         General: Normal range of motion  Cervical back: Normal range of motion and neck supple  Right lower leg: No edema  Left lower leg: No edema  Skin:     General: Skin is warm  Findings: No rash  Neurological:      General: No focal deficit present  Mental Status: She is alert and oriented to person, place, and time  Psychiatric:         Mood and Affect: Mood normal          Behavior: Behavior normal          The history was obtained from the review of the chart, patient      Lab Results:   Lab Results   Component Value Date/Time    Potassium 3 6 08/04/2021 12:41 PM    Potassium 3 8 03/20/2021 08:37 AM    Potassium 3 7 12/31/2020 07:05 AM    Chloride 104 08/04/2021 12:41 PM    Chloride 106 03/20/2021 08:37 AM    Chloride 104 12/31/2020 07:05 AM    CO2 31 08/04/2021 12:41 PM    CO2 29 03/20/2021 08:37 AM    CO2 31 12/31/2020 07:05 AM    BUN 14 08/04/2021 12:41 PM    BUN 24 03/20/2021 08:37 AM    BUN 25 12/31/2020 07:05 AM    Creatinine 0 85 08/04/2021 12:41 PM    Creatinine 0 85 03/20/2021 08:37 AM    Creatinine 0 94 12/31/2020 07:05 AM    Glucose, Fasting 85 12/31/2020 07:05 AM    Glucose, Fasting 113 (H) 08/10/2020 07:46 AM    Calcium 8 2 (L) 08/04/2021 12:41 PM    Calcium 9 1 07/10/2021 08:25 AM    Calcium 9 0 03/20/2021 08:37 AM    eGFR 75 08/04/2021 12:41 PM    eGFR 76 03/20/2021 08:37 AM    eGFR 67 12/31/2020 07:05 AM    TSH 3RD GENERATON 2 360 03/20/2021 08:37 AM    TSH 3RD GENERATON 3 040 12/31/2020 07:05 AM    Free T4 0 99 03/20/2021 08:37 AM    PTH 16 5 (L) 07/10/2021 08:25 AM    PTH 11 3 (L) 03/20/2021 08:37 AM    PTH <6 3 (L) 12/31/2020 07:05 AM Vit D, 25-Hydroxy 30 3 12/31/2020 07:05 AM    Vit D, 25-Hydroxy 40 7 08/10/2020 07:46 AM         Imaging Studies:         Results for orders placed during the hospital encounter of 04/10/19    DXA bone density spine hip and pelvis    Impression  1  Based on the [facility] classification, this study identifies above normal spine density and the patient is considered at low risk for fracture  2  A daily intake of calcium of at least 1200 mg and vitamin D, 800-1000 IU, as well as weight bearing and muscle strengthening exercise, fall prevention and avoidance of tobacco and excessive alcohol intake as basic preventive measures are recommended  3  Repeat DXA scan on the same equipment in 18-24 months as clinically indicated  The 10 year risk of hip fracture is 0 1%, with the 10 year risk of major osteoporotic fracture being 5 5%, as calculated by the [facility] fracture risk assessment tool (FRAX)  The current NOF guidelines recommend treating patients with FRAX 10 year risk score  of >3% for hip fracture and >20% for major osteoporotic fracture  WHO CLASSIFICATION:  Normal (a T-score of -1 0 or higher)  Low bone mineral density (a T-score of less than -1 0 but higher than -2 5)  Osteoporosis (a T-score of -2 5 or less)  Severe osteoporosis (a T-score of -2 5 or less with a fragility fracture)    Thank you for allowing us the opportunity to participate in your patient care  The expanded DEXA report will no longer be arriving in your mail  If you desire to view the full report please contact [facility] records or access the PACS system  Workstation performed: [id number]            I have personally reviewed pertinent reports  Portions of the record may have been created with voice recognition software  Occasional wrong word or "sound a like" substitutions may have occurred due to the inherent limitations of voice recognition software   Read the chart carefully and recognize, using context, where substitutions have occurred

## 2021-08-20 ENCOUNTER — APPOINTMENT (OUTPATIENT)
Dept: LAB | Age: 61
End: 2021-08-20
Payer: COMMERCIAL

## 2021-08-20 DIAGNOSIS — E83.51 HYPOCALCEMIA: ICD-10-CM

## 2021-08-20 DIAGNOSIS — E55.9 VITAMIN D DEFICIENCY: ICD-10-CM

## 2021-08-20 LAB
25(OH)D3 SERPL-MCNC: 63.2 NG/ML (ref 30–100)
ALBUMIN SERPL BCP-MCNC: 3.6 G/DL (ref 3.5–5)
ANION GAP SERPL CALCULATED.3IONS-SCNC: 4 MMOL/L (ref 4–13)
BUN SERPL-MCNC: 15 MG/DL (ref 5–25)
CALCIUM SERPL-MCNC: 7.9 MG/DL (ref 8.3–10.1)
CHLORIDE SERPL-SCNC: 106 MMOL/L (ref 100–108)
CO2 SERPL-SCNC: 29 MMOL/L (ref 21–32)
CREAT SERPL-MCNC: 0.79 MG/DL (ref 0.6–1.3)
GFR SERPL CREATININE-BSD FRML MDRD: 82 ML/MIN/1.73SQ M
GLUCOSE SERPL-MCNC: 87 MG/DL (ref 65–140)
POTASSIUM SERPL-SCNC: 3.8 MMOL/L (ref 3.5–5.3)
SODIUM SERPL-SCNC: 139 MMOL/L (ref 136–145)

## 2021-08-20 PROCEDURE — 82040 ASSAY OF SERUM ALBUMIN: CPT

## 2021-08-20 PROCEDURE — 36415 COLL VENOUS BLD VENIPUNCTURE: CPT

## 2021-08-20 PROCEDURE — 82306 VITAMIN D 25 HYDROXY: CPT

## 2021-08-20 PROCEDURE — 80048 BASIC METABOLIC PNL TOTAL CA: CPT

## 2021-08-23 ENCOUNTER — TELEPHONE (OUTPATIENT)
Dept: ENDOCRINOLOGY | Facility: CLINIC | Age: 61
End: 2021-08-23

## 2021-08-23 NOTE — TELEPHONE ENCOUNTER
----- Message from Roderick Suarez PA-C sent at 8/23/2021 12:32 PM EDT -----  Please call the patient regarding her abnormal result  Vitamin D is normal  Continue current supplementation  Corrected calcium is low normal at 8 2  Continue calcium supplementation

## 2021-08-24 ENCOUNTER — TELEPHONE (OUTPATIENT)
Dept: ENDOCRINOLOGY | Facility: CLINIC | Age: 61
End: 2021-08-24

## 2021-08-24 NOTE — TELEPHONE ENCOUNTER
----- Message from Asad Medellin PA-C sent at 8/23/2021 12:32 PM EDT -----  Please call the patient regarding her abnormal result  Vitamin D is normal  Continue current supplementation  Corrected calcium is low normal at 8 2  Continue calcium supplementation

## 2021-09-14 ENCOUNTER — TELEPHONE (OUTPATIENT)
Dept: FAMILY MEDICINE CLINIC | Facility: CLINIC | Age: 61
End: 2021-09-14

## 2021-09-14 ENCOUNTER — OFFICE VISIT (OUTPATIENT)
Dept: URGENT CARE | Age: 61
End: 2021-09-14
Payer: COMMERCIAL

## 2021-09-14 VITALS
HEART RATE: 91 BPM | OXYGEN SATURATION: 98 % | HEIGHT: 64 IN | BODY MASS INDEX: 31.07 KG/M2 | TEMPERATURE: 97.9 F | WEIGHT: 182 LBS

## 2021-09-14 DIAGNOSIS — H65.03 BILATERAL ACUTE SEROUS OTITIS MEDIA, RECURRENCE NOT SPECIFIED: ICD-10-CM

## 2021-09-14 DIAGNOSIS — Z11.59 SPECIAL SCREENING EXAMINATION FOR VIRAL DISEASE: Primary | ICD-10-CM

## 2021-09-14 DIAGNOSIS — J20.9 ACUTE BRONCHITIS, UNSPECIFIED ORGANISM: ICD-10-CM

## 2021-09-14 PROCEDURE — U0005 INFEC AGEN DETEC AMPLI PROBE: HCPCS | Performed by: PHYSICIAN ASSISTANT

## 2021-09-14 PROCEDURE — U0003 INFECTIOUS AGENT DETECTION BY NUCLEIC ACID (DNA OR RNA); SEVERE ACUTE RESPIRATORY SYNDROME CORONAVIRUS 2 (SARS-COV-2) (CORONAVIRUS DISEASE [COVID-19]), AMPLIFIED PROBE TECHNIQUE, MAKING USE OF HIGH THROUGHPUT TECHNOLOGIES AS DESCRIBED BY CMS-2020-01-R: HCPCS | Performed by: PHYSICIAN ASSISTANT

## 2021-09-14 PROCEDURE — G0382 LEV 3 HOSP TYPE B ED VISIT: HCPCS | Performed by: PHYSICIAN ASSISTANT

## 2021-09-14 RX ORDER — BENZONATATE 100 MG/1
100 CAPSULE ORAL 3 TIMES DAILY PRN
Qty: 20 CAPSULE | Refills: 0 | Status: SHIPPED | OUTPATIENT
Start: 2021-09-14 | End: 2021-10-13 | Stop reason: ALTCHOICE

## 2021-09-14 RX ORDER — AZITHROMYCIN 250 MG/1
TABLET, FILM COATED ORAL
Qty: 6 TABLET | Refills: 0 | Status: SHIPPED | OUTPATIENT
Start: 2021-09-14 | End: 2021-09-18

## 2021-09-14 NOTE — PATIENT INSTRUCTIONS
Check or sign up for [facility] my Chart to view your results  We do not call patient's with negative results  Go directly home after today's visit, quarantine until you receive a negative result  If you have a positive result you need to quarantine at home for a minimum of 10 days  You may end your quarantine when you are symptoms free without medications to reduce fever (e g  acetaminophen/Tylenol) for 72 hours  Recommend over the counter antihistamines such as Claratin, Allegra, Zyrtec, or Benadryl for congestion  You may also use over the counter nasal sprays such as Flonase for this  Over the counter lozenges such as Cepacol, Ricola, Halls, or Chloroseptic can be used for sore throat symptoms  Recommend Vitamin C 1,000 mg twice daily, Vitamin D3 2000 IU daily, multivitamin and Zinc for immune support  If your symptoms worsen or you develop shortness of breath report to the nearest emergency room  Check cdc gov for most current guidelines as guidelines are subject to change as we learn more about the virus  [address]    Your healthcare provider and/or public health staff have evaluated you and have determined that you do not need to remain in the hospital at this time  At this time you can be isolated at home where you will be monitored by staff from your local or state health department  You should carefully follow the prevention and isolation steps below until a healthcare provider or local or state health department says that you can return to your normal activities  Stay home except to get medical care    People who are mildly ill with COVID-19 are able to isolate at home during their illness  You should restrict activities outside your home, except for getting medical care  Do not go to work, school, or public areas  Avoid using public transportation, ride-sharing, or taxis      Separate yourself from other people and animals in your home    People: As much as possible, you should stay in a specific room and away from other people in your home  Also, you should use a separate bathroom, if available  Animals: You should restrict contact with pets and other animals while you are sick with COVID-19, just like you would around other people  Although there have not been reports of pets or other animals becoming sick with COVID-19, it is still recommended that people sick with COVID-19 limit contact with animals until more information is known about the virus  When possible, have another member of your household care for your animals while you are sick  If you are sick with COVID-19, avoid contact with your pet, including petting, snuggling, being kissed or licked, and sharing food  If you must care for your pet or be around animals while you are sick, wash your hands before and after you interact with pets and wear a facemask  See COVID-19 and Animals for more information  Call ahead before visiting your doctor    If you have a medical appointment, call the healthcare provider and tell them that you have or may have COVID-19  This will help the healthcare providers office take steps to keep other people from getting infected or exposed  Wear a facemask    You should wear a facemask when you are around other people (e g , sharing a room or vehicle) or pets and before you enter a healthcare providers office  If you are not able to wear a facemask (for example, because it causes trouble breathing), then people who live with you should not stay in the same room with you, or they should wear a facemask if they enter your room  Cover your coughs and sneezes    Cover your mouth and nose with a tissue when you cough or sneeze  Throw used tissues in a lined trash can   Immediately wash your hands with soap and water for at least 20 seconds or, if soap and water are not available, clean your hands with an alcohol-based hand  that contains at least 60% alcohol  Clean your hands often    Wash your hands often with soap and water for at least 20 seconds, especially after blowing your nose, coughing, or sneezing; going to the bathroom; and before eating or preparing food  If soap and water are not readily available, use an alcohol-based hand  with at least 60% alcohol, covering all surfaces of your hands and rubbing them together until they feel dry  Soap and water are the best option if hands are visibly dirty  Avoid touching your eyes, nose, and mouth with unwashed hands  Avoid sharing personal household items    You should not share dishes, drinking glasses, cups, eating utensils, towels, or bedding with other people or pets in your home  After using these items, they should be washed thoroughly with soap and water  Clean all high-touch surfaces everyday    High touch surfaces include counters, tabletops, doorknobs, bathroom fixtures, toilets, phones, keyboards, tablets, and bedside tables  Also, clean any surfaces that may have blood, stool, or body fluids on them  Use a household cleaning spray or wipe, according to the label instructions  Labels contain instructions for safe and effective use of the cleaning product including precautions you should take when applying the product, such as wearing gloves and making sure you have good ventilation during use of the product  Monitor your symptoms    Seek prompt medical attention if your illness is worsening (e g , difficulty breathing)  Before seeking care, call your healthcare provider and tell them that you have, or are being evaluated for, COVID-19  Put on a facemask before you enter the facility  These steps will help the healthcare providers office to keep other people in the office or waiting room from getting infected or exposed  Ask your healthcare provider to call the local or state health department   Persons who are placed under active monitoring or facilitated self-monitoring should follow instructions provided by their local health department or occupational health professionals, as appropriate  If you have a medical emergency and need to call 911, notify the dispatch personnel that you have, or are being evaluated for COVID-19  If possible, put on a facemask before emergency medical services arrive  Discontinuing home isolation    Patients with confirmed COVID-19 should remain under home isolation precautions until the following conditions are met:   - They have had no fever for at least 24 hours (that is one full day of no fever without the use medicine that reduces fevers)  AND  - other symptoms have improved (for example, when their cough or shortness of breath have improved)  AND  - If had mild or moderate illness, at least 10 days have passed since their symptoms first appeared or if severe illness (needed oxygen) or immunosuppressed, at least 20 days have passed since symptoms first appeared  Patients with confirmed COVID-19 should also notify close contacts (including their workplace) and ask that they self-quarantine  Currently, close contact is defined as being within 6 feet for 15 minutes or more from the period 24 hours starting 48 hours before symptom onset to the time at which the patient went into isolation  Close contacts of patients diagnosed with COVID-19 should be instructed by the patient to self-quarantine for 14 days from the last time of their last contact with the patient       Source: [employer]

## 2021-09-14 NOTE — PROGRESS NOTES
[facility]        NAME: Shelley Schaffer is a 63 y o  female  : 1960    MRN: [id number]  DATE: 2021  TIME: 12:07 PM    Assessment and Plan   Special screening examination for viral disease [Z11 59]  1  Special screening examination for viral disease  Novel Coronavirus (Covid-19),PCR [facility] - Office Collection   2  Acute bronchitis, unspecified organism  azithromycin (ZITHROMAX) 250 mg tablet    benzonatate (TESSALON PERLES) 100 mg capsule   3  Bilateral acute serous otitis media, recurrence not specified  azithromycin (ZITHROMAX) 250 mg tablet   Pt presents with symptoms consistent with possible COVID 19 infection  Pt will be tested in accordance with CDC guidelines and recommendations for symptomatic individuals regardless of vaccination status  We discussed quarantine protocols and symptomatic treatments  The pt may follow-up with their PCP if symptoms are not improved in 2-3 days and COVID test is negative  Pt will report to the emergency department if symptoms worsen  patient will be started on azithromycin for bilateral serous otitis media  I have provided her with [employer] for cough  Patient Instructions     Patient Instructions   Check or sign up for [facility]  Chart to view your results  We do not call patient's with negative results  Go directly home after today's visit, quarantine until you receive a negative result  If you have a positive result you need to quarantine at home for a minimum of 10 days  You may end your quarantine when you are symptoms free without medications to reduce fever (e g  acetaminophen/Tylenol) for 72 hours  Recommend over the counter antihistamines such as Claratin, Allegra, Zyrtec, or Benadryl for congestion  You may also use over the counter nasal sprays such as Flonase for this  Over the counter lozenges such as Cepacol, Ricola, Halls, or Chloroseptic can be used for sore throat symptoms     Recommend Vitamin C 1,000 mg twice daily, Vitamin D3 2000 IU daily, multivitamin and Zinc for immune support  If your symptoms worsen or you develop shortness of breath report to the nearest emergency room  Check cdc gov for most current guidelines as guidelines are subject to change as we learn more about the virus  [address]    Your healthcare provider and/or public health staff have evaluated you and have determined that you do not need to remain in the hospital at this time  At this time you can be isolated at home where you will be monitored by staff from your local or state health department  You should carefully follow the prevention and isolation steps below until a healthcare provider or local or state health department says that you can return to your normal activities  Stay home except to get medical care    People who are mildly ill with COVID-19 are able to isolate at home during their illness  You should restrict activities outside your home, except for getting medical care  Do not go to work, school, or public areas  Avoid using public transportation, ride-sharing, or taxis  Separate yourself from other people and animals in your home    People: As much as possible, you should stay in a specific room and away from other people in your home  Also, you should use a separate bathroom, if available  Animals: You should restrict contact with pets and other animals while you are sick with COVID-19, just like you would around other people  Although there have not been reports of pets or other animals becoming sick with COVID-19, it is still recommended that people sick with COVID-19 limit contact with animals until more information is known about the virus  When possible, have another member of your household care for your animals while you are sick  If you are sick with COVID-19, avoid contact with your pet, including petting, snuggling, being kissed or licked, and sharing food   If you must care for your pet or be around animals while you are sick, wash your hands before and after you interact with pets and wear a facemask  See COVID-19 and Animals for more information  Call ahead before visiting your doctor    If you have a medical appointment, call the healthcare provider and tell them that you have or may have COVID-19  This will help the healthcare providers office take steps to keep other people from getting infected or exposed  Wear a facemask    You should wear a facemask when you are around other people (e g , sharing a room or vehicle) or pets and before you enter a healthcare providers office  If you are not able to wear a facemask (for example, because it causes trouble breathing), then people who live with you should not stay in the same room with you, or they should wear a facemask if they enter your room  Cover your coughs and sneezes    Cover your mouth and nose with a tissue when you cough or sneeze  Throw used tissues in a lined trash can  Immediately wash your hands with soap and water for at least 20 seconds or, if soap and water are not available, clean your hands with an alcohol-based hand  that contains at least 60% alcohol  Clean your hands often    Wash your hands often with soap and water for at least 20 seconds, especially after blowing your nose, coughing, or sneezing; going to the bathroom; and before eating or preparing food  If soap and water are not readily available, use an alcohol-based hand  with at least 60% alcohol, covering all surfaces of your hands and rubbing them together until they feel dry  Soap and water are the best option if hands are visibly dirty  Avoid touching your eyes, nose, and mouth with unwashed hands  Avoid sharing personal household items    You should not share dishes, drinking glasses, cups, eating utensils, towels, or bedding with other people or pets in your home   After using these items, they should be washed thoroughly with soap and water  Clean all high-touch surfaces everyday    High touch surfaces include counters, tabletops, doorknobs, bathroom fixtures, toilets, phones, keyboards, tablets, and bedside tables  Also, clean any surfaces that may have blood, stool, or body fluids on them  Use a household cleaning spray or wipe, according to the label instructions  Labels contain instructions for safe and effective use of the cleaning product including precautions you should take when applying the product, such as wearing gloves and making sure you have good ventilation during use of the product  Monitor your symptoms    Seek prompt medical attention if your illness is worsening (e g , difficulty breathing)  Before seeking care, call your healthcare provider and tell them that you have, or are being evaluated for, COVID-19  Put on a facemask before you enter the facility  These steps will help the healthcare providers office to keep other people in the office or waiting room from getting infected or exposed  Ask your healthcare provider to call the local or [facility] health department  Persons who are placed under active monitoring or facilitated self-monitoring should follow instructions provided by their local health department or occupational health professionals, as appropriate  If you have a medical emergency and need to call 911, notify the dispatch personnel that you have, or are being evaluated for COVID-19  If possible, put on a facemask before emergency medical services arrive      Discontinuing home isolation    Patients with confirmed COVID-19 should remain under home isolation precautions until the following conditions are met:   - They have had no fever for at least 24 hours (that is one full day of no fever without the use medicine that reduces fevers)  AND  - other symptoms have improved (for example, when their cough or shortness of breath have improved)  AND  - If had mild or moderate illness, at least 10 days have passed since their symptoms first appeared or if severe illness (needed oxygen) or immunosuppressed, at least 20 days have passed since symptoms first appeared  Patients with confirmed COVID-19 should also notify close contacts (including their workplace) and ask that they self-quarantine  Currently, close contact is defined as being within 6 feet for 15 minutes or more from the period 24 hours starting 48 hours before symptom onset to the time at which the patient went into isolation  Close contacts of patients diagnosed with COVID-19 should be instructed by the patient to self-quarantine for 14 days from the last time of their last contact with the patient  Source: [employer]        Follow up with PCP in 3-5 days  Proceed to  ER if symptoms worsen  Chief Complaint     Chief Complaint   Patient presents with    Cold Like Symptoms     Pt reports cough, bilateral ear pain and nasal congestion x four days  Denies exposure to Covid-19, pt fully vaccinated  Exposed to granddaughter for had RSV  Attempted Zycam            History of Present Illness       63year old male or female presents with complaints of cough, nasal congestion, and bilateral ear pain for 4 days duration  She has tried Zicam with minimal relief  Pt denies fever, chills,  sore throat, headache, shortness of breath, chest pain, nausea, vomiting, diarrhea, fatigue, myalgias, and loss of taste and smell  Pt has not been exposed to anyone who has tested positive for COVID to their knowledge and has not traveled outside of the state in the last 2 weeks  She does note that her granddaughter was positive for RSV recently  She denies history of asthma She denies smoking and use of e-cigarettes  She is vaccinated against COVID 19  No other concerns or complaints today         Review of Systems   Review of Systems   Constitutional: Negative for chills, fatigue and fever    HENT: Positive for congestion, postnasal drip and rhinorrhea  Negative for sore throat  Respiratory: Positive for cough  Negative for shortness of breath  Cardiovascular: Negative for chest pain  Gastrointestinal: Negative for diarrhea, nausea and vomiting  Musculoskeletal: Negative for myalgias  Neurological: Negative for headaches           Current Medications       Current Outpatient Medications:     atorvastatin (LIPITOR) 40 mg tablet, Take 1 tablet (40 mg total) by mouth daily, Disp: 90 tablet, Rfl: 1    calcium carbonate (OS-BRUNILDA) 600 MG tablet, Take 1,200 mg by mouth 2 (two) times a day with meals, Disp: , Rfl:     Cholecalciferol (D 2000) 2000 units TABS, Take by mouth daily, Disp: , Rfl:     clopidogrel (PLAVIX) 75 mg tablet, Take 1 tablet (75 mg total) by mouth daily, Disp: 90 tablet, Rfl: 1    CO ENZYME Q-10 PO, daily , Disp: , Rfl:     folic acid (FOLVITE) 1 mg tablet, Take 1 tablet (1,000 mcg total) by mouth daily, Disp: 90 tablet, Rfl: 1    magnesium oxide (MAG-OX) 400 mg, Take 1 tablet (400 mg total) by mouth daily, Disp: , Rfl:     metoprolol succinate (TOPROL-XL) 25 mg 24 hr tablet, Take 1 tablet (25 mg total) by mouth daily, Disp: 90 tablet, Rfl: 1    Potassium 99 MG TABS, Take 1 tablet by mouth daily, Disp: , Rfl:     Azilsartan-Chlorthalidone (Edarbyclor) 40-12 5 MG TABS, 2 days on and 1 day off for 8 days, then every other day for 7 days, then stop (Patient not taking: Reported on 9/14/2021), Disp: 30 tablet, Rfl: 6    azithromycin (ZITHROMAX) 250 mg tablet, Take 2 tablets today then 1 tablet daily x 4 days, Disp: 6 tablet, Rfl: 0    benzonatate (TESSALON PERLES) 100 mg capsule, Take 1 capsule (100 mg total) by mouth 3 (three) times a day as needed for cough, Disp: 20 capsule, Rfl: 0    Current Allergies     Allergies as of 09/14/2021 - Reviewed 09/14/2021   Allergen Reaction Noted    Codeine Chest Pain 07/02/2014            The following portions of the patient's history were reviewed and updated as appropriate: allergies, current medications, past family history, past medical history, past social history, past surgical history and problem list      Past Medical History:   Diagnosis Date    Diverticulosis     Hemorrhoids     Hypertension     controlled    Stroke [facility])        Past Surgical History:   Procedure Laterality Date    COLONOSCOPY      ONSET 2015    NOSE SURGERY      SC EXPLORE PARATHYROID GLANDS Bilateral 6/18/2020    Procedure: PARATHYROIDECTOMY, MINIMALLY INVASIVE, 3 5 GLAND EXPLORATION, INTRAOPERATIVE PTH MONITORING;  Surgeon: Cliffodr Mckenzie MD;  Location[facility];  Service: Surgical Oncology    SKIN BIOPSY      SKIN LESION EXCISION         Family History   Problem Relation Age of Onset    No Known Problems Mother     Hypertension Father     Parkinsonism Father     Hypertension Sister     Stroke Family     Glaucoma Family     Hypertension Family          Medications have been verified  Objective   Pulse 91   Temp 97 9 °F (36 6 °C)   Ht 5' 4" (1 626 m)   Wt 82 6 kg (182 lb)   SpO2 98%   BMI 31 24 kg/m²   No LMP recorded  Patient is postmenopausal        Physical Exam     Physical Exam  Vitals and nursing note reviewed  Constitutional:       General: She is awake  She is not in acute distress  Appearance: Normal appearance  She is well-developed and well-groomed  She is not ill-appearing, toxic-appearing or diaphoretic  HENT:      Head: Normocephalic and atraumatic  Right Ear: Hearing, ear canal and external ear normal  A middle ear effusion (serous) is present  There is no impacted cerumen  No foreign body  Left Ear: Hearing, ear canal and external ear normal  A middle ear effusion (serous) is present  There is no impacted cerumen  No foreign body  Nose: Nose normal  No mucosal edema, congestion or rhinorrhea  Right Nostril: No foreign body, epistaxis or occlusion        Left Nostril: No foreign body, epistaxis or occlusion  Right Turbinates: Not enlarged, swollen or pale  Left Turbinates: Not enlarged, swollen or pale  Mouth/Throat:      Lips: Pink  No lesions  Mouth: Mucous membranes are moist  No injury, oral lesions or angioedema  Dentition: Normal dentition  Tongue: No lesions  Tongue does not deviate from midline  Palate: No mass and lesions  Pharynx: Uvula midline  No pharyngeal swelling, oropharyngeal exudate, posterior oropharyngeal erythema or uvula swelling  Tonsils: No tonsillar exudate or tonsillar abscesses  Eyes:      General: Lids are normal  Vision grossly intact  Gaze aligned appropriately  Cardiovascular:      Rate and Rhythm: Normal rate  Pulmonary:      Effort: Pulmonary effort is normal       Breath sounds: Normal breath sounds  No decreased breath sounds, wheezing, rhonchi or rales  Comments: Patient is speaking in full sentences with no increased respiratory effort  No audible wheezing or stridor  Dry barking cough  Musculoskeletal:      Cervical back: Normal range of motion  Skin:     General: Skin is warm and dry  Neurological:      Mental Status: She is alert and oriented to person, place, and time  Coordination: Coordination is intact  Gait: Gait is intact  Psychiatric:         Attention and Perception: Attention and perception normal          Mood and Affect: Mood and affect normal          Speech: Speech normal          Behavior: Behavior normal  Behavior is cooperative  Note: Portions of this record may have been created with voice recognition software  Occasional wrong word or "sound a like" substitutions may have occurred due to the inherent limitations of voice recognition software  Please read the chart carefully and recognize, using context, where substitutions have occurred  *

## 2021-09-14 NOTE — LETTER
September 14, 2021     Patient: Trinh Zarco   YOB: 1960   Date of Visit: 9/14/2021       To Whom It May Concern: It is my medical opinion that Trinh Zarco should remain out of work until negative test result  Pt may work from home if remote work is available       If you have any questions or concerns, please don't hesitate to call           Sincerely,        Ofe Carcamo PA-C    CC: No Recipients

## 2021-09-15 LAB — SARS-COV-2 RNA RESP QL NAA+PROBE: NEGATIVE

## 2021-10-13 ENCOUNTER — OFFICE VISIT (OUTPATIENT)
Dept: FAMILY MEDICINE CLINIC | Facility: CLINIC | Age: 61
End: 2021-10-13
Payer: COMMERCIAL

## 2021-10-13 VITALS
BODY MASS INDEX: 32.78 KG/M2 | SYSTOLIC BLOOD PRESSURE: 142 MMHG | OXYGEN SATURATION: 99 % | TEMPERATURE: 98.5 F | DIASTOLIC BLOOD PRESSURE: 96 MMHG | WEIGHT: 192 LBS | RESPIRATION RATE: 17 BRPM | HEART RATE: 77 BPM | HEIGHT: 64 IN

## 2021-10-13 DIAGNOSIS — Z23 FLU VACCINE NEED: ICD-10-CM

## 2021-10-13 DIAGNOSIS — I10 ESSENTIAL HYPERTENSION: Primary | ICD-10-CM

## 2021-10-13 DIAGNOSIS — I10 ESSENTIAL HYPERTENSION: ICD-10-CM

## 2021-10-13 PROCEDURE — 99214 OFFICE O/P EST MOD 30 MIN: CPT | Performed by: FAMILY MEDICINE

## 2021-10-13 PROCEDURE — 90682 RIV4 VACC RECOMBINANT DNA IM: CPT

## 2021-10-13 PROCEDURE — 90471 IMMUNIZATION ADMIN: CPT

## 2021-10-20 ENCOUNTER — TELEPHONE (OUTPATIENT)
Dept: FAMILY MEDICINE CLINIC | Facility: CLINIC | Age: 61
End: 2021-10-20

## 2021-11-09 ENCOUNTER — OFFICE VISIT (OUTPATIENT)
Dept: DERMATOLOGY | Facility: CLINIC | Age: 61
End: 2021-11-09
Payer: COMMERCIAL

## 2021-11-09 VITALS — WEIGHT: 192 LBS | BODY MASS INDEX: 32.78 KG/M2 | TEMPERATURE: 97.2 F | HEIGHT: 64 IN

## 2021-11-09 DIAGNOSIS — Z12.83 SCREENING FOR MALIGNANT NEOPLASM OF SKIN: Primary | ICD-10-CM

## 2021-11-09 PROCEDURE — 99203 OFFICE O/P NEW LOW 30 MIN: CPT | Performed by: DERMATOLOGY

## 2021-12-18 ENCOUNTER — IMMUNIZATIONS (OUTPATIENT)
Dept: FAMILY MEDICINE CLINIC | Facility: HOSPITAL | Age: 61
End: 2021-12-18

## 2021-12-18 DIAGNOSIS — Z23 ENCOUNTER FOR IMMUNIZATION: Primary | ICD-10-CM

## 2021-12-18 PROCEDURE — 0001A COVID-19 PFIZER VACC 0.3 ML: CPT

## 2021-12-18 PROCEDURE — 91300 COVID-19 PFIZER VACC 0.3 ML: CPT

## 2021-12-21 DIAGNOSIS — I63.81 LACUNAR STROKE (HCC): ICD-10-CM

## 2021-12-22 RX ORDER — CLOPIDOGREL BISULFATE 75 MG/1
75 TABLET ORAL DAILY
Qty: 90 TABLET | Refills: 1 | Status: SHIPPED | OUTPATIENT
Start: 2021-12-22 | End: 2022-06-10 | Stop reason: SDUPTHER

## 2022-02-12 ENCOUNTER — LAB (OUTPATIENT)
Dept: LAB | Age: 62
End: 2022-02-12
Payer: COMMERCIAL

## 2022-02-12 DIAGNOSIS — E20.9 HYPOPARATHYROIDISM, UNSPECIFIED HYPOPARATHYROIDISM TYPE (HCC): ICD-10-CM

## 2022-02-12 DIAGNOSIS — E83.51 HYPOCALCEMIA: ICD-10-CM

## 2022-02-12 LAB
ALBUMIN SERPL BCP-MCNC: 4.3 G/DL (ref 3.5–5)
ANION GAP SERPL CALCULATED.3IONS-SCNC: 6 MMOL/L (ref 4–13)
BUN SERPL-MCNC: 13 MG/DL (ref 5–25)
CALCIUM SERPL-MCNC: 9.1 MG/DL (ref 8.3–10.1)
CHLORIDE SERPL-SCNC: 102 MMOL/L (ref 100–108)
CO2 SERPL-SCNC: 28 MMOL/L (ref 21–32)
CREAT SERPL-MCNC: 0.84 MG/DL (ref 0.6–1.3)
GFR SERPL CREATININE-BSD FRML MDRD: 76 ML/MIN/1.73SQ M
GLUCOSE P FAST SERPL-MCNC: 100 MG/DL (ref 65–99)
POTASSIUM SERPL-SCNC: 4.1 MMOL/L (ref 3.5–5.3)
SODIUM SERPL-SCNC: 136 MMOL/L (ref 136–145)

## 2022-02-12 PROCEDURE — 82040 ASSAY OF SERUM ALBUMIN: CPT

## 2022-02-12 PROCEDURE — 80048 BASIC METABOLIC PNL TOTAL CA: CPT

## 2022-02-12 PROCEDURE — 36415 COLL VENOUS BLD VENIPUNCTURE: CPT

## 2022-02-14 ENCOUNTER — OFFICE VISIT (OUTPATIENT)
Dept: ENDOCRINOLOGY | Facility: CLINIC | Age: 62
End: 2022-02-14
Payer: COMMERCIAL

## 2022-02-14 VITALS
SYSTOLIC BLOOD PRESSURE: 152 MMHG | DIASTOLIC BLOOD PRESSURE: 100 MMHG | HEART RATE: 79 BPM | WEIGHT: 196 LBS | TEMPERATURE: 97.1 F | BODY MASS INDEX: 33.64 KG/M2

## 2022-02-14 DIAGNOSIS — E21.0 PRIMARY HYPERPARATHYROIDISM (HCC): ICD-10-CM

## 2022-02-14 DIAGNOSIS — E04.1 THYROID NODULE: ICD-10-CM

## 2022-02-14 DIAGNOSIS — E55.9 VITAMIN D DEFICIENCY: ICD-10-CM

## 2022-02-14 DIAGNOSIS — I10 ESSENTIAL HYPERTENSION: ICD-10-CM

## 2022-02-14 DIAGNOSIS — E20.9 HYPOPARATHYROIDISM, UNSPECIFIED HYPOPARATHYROIDISM TYPE (HCC): Primary | ICD-10-CM

## 2022-02-14 PROCEDURE — 99214 OFFICE O/P EST MOD 30 MIN: CPT | Performed by: INTERNAL MEDICINE

## 2022-02-14 RX ORDER — PHENOL 1.4 %
AEROSOL, SPRAY (ML) MUCOUS MEMBRANE
Start: 2022-02-14

## 2022-02-14 NOTE — PROGRESS NOTES
Mercedes Jenkins 63 y o  female MRN: [id number]    Encounter: [id number]      Assessment/Plan     Assessment: This is a 63y o -year-old female with hypoparathyroidism, hypocalcemia    Plan:  Diagnoses and all orders for this visit:    Hypoparathyroidism, unspecified hypoparathyroidism type [facility])  Lab Results   Component Value Date    PTH 16 5 (L) 07/10/2021    CALCIUM 9 1 02/12/2022    PHOS 4 4 06/24/2020   Latest calcium is 9 1  Will make change in dose of calcium carbonate as below  Discussed to hold Tums for now  Discussed to go for blood work in 1 week, in order to make sure that calcium is okay  Also discussed symptoms of hypocalcemia and call if she notices any tingling or numbness in her extremities, around the face or lips, muscle spasms, or chest pain, shortness of breath  -     calcium carbonate (OS-BRUNILDA) 600 MG tablet; Take one tablet in AM and 2 tabs with dinner  -     Basic metabolic panel; Future  -     PTH, intact- Lab Collect; Future    Essential hypertension  Blood pressure slightly elevated  Discussed to follow with PCP, stay away from salt intake medication for hypertension  regularly  -     Basic metabolic panel; Future    Primary hyperparathyroidism ([address] )  Obtain PTH now  -     Basic metabolic panel; Future  -     Albumin Lab Collect; Future  -     PTH, intact- Lab Collect; Future    Vitamin D deficiency  Continue current dose of vitamin-D 3 supplementation, 2000 International Units daily  Thyroid nodule  July 2021  Right mid gland thyroid nodule is 1 1 x 0 4 x 0 9 cm, stable  No nodules meet criteria for biopsy but follow-up ultrasound is recommended in 1 year  Obstructive symptoms  Obtain ultrasound before next appointment in 6 months   -     US thyroid;  Future        CC:   Hypoparathyroidism, hypocalcemia    History of Present Illness     HPI:    Mercedes Jenkins is 28-year-old woman with medical history of primary hyperparathyroidism history of 3 gland parathyroidectomy for primary hyperparathyroidism, postsurgical hypoparathyroidism and hypocalcemia is here for follow-up  She takes calcium carbonate 600 mg, 2 tablets twice a day, she also takes Tums 500 mg twice a day, vitamin D3 2000 International Units daily  She is not taking Rocaltrol currently  She eats dairy products  She denies symptoms of hypocalcemia such as tingling and numbness in her extremities, around the face or muscle spasms    He has history of thyroid nodule 0 8 x 0 4 x 0 7 cm, on right side and 0 9 x 0 3 x 0 4 cm on left side  She denies obstructive symptoms  Results for Opal Fine (MRN [id number]) as of 2/14/2022 14:44   Ref  Range 2/12/2022 09:29   Sodium Latest Ref Range: 136 - 145 mmol/L 136   Potassium Latest Ref Range: 3 5 - 5 3 mmol/L 4 1   Chloride Latest Ref Range: 100 - 108 mmol/L 102   CO2 Latest Ref Range: 21 - 32 mmol/L 28   Anion Gap Latest Ref Range: 4 - 13 mmol/L 6   BUN Latest Ref Range: 5 - 25 mg/dL 13   Creatinine Latest Ref Range: 0 60 - 1 30 mg/dL 0 84   GLUCOSE FASTING Latest Ref Range: 65 - 99 mg/dL 100 (H)   Calcium Latest Ref Range: 8 3 - 10 1 mg/dL 9 1   Albumin Latest Ref Range: 3 5 - 5 0 g/dL 4 3   eGFR Latest Units: ml/min/1 73sq m 76     Review of Systems   Constitutional: Negative for activity change, diaphoresis, fatigue, fever and unexpected weight change  HENT: Negative  Eyes: Negative for visual disturbance  Respiratory: Negative for cough, chest tightness and shortness of breath  Cardiovascular: Negative for chest pain, palpitations and leg swelling  Gastrointestinal: Negative for abdominal pain, blood in stool, constipation, diarrhea, nausea and vomiting  Endocrine: Negative for cold intolerance, heat intolerance, polydipsia, polyphagia and polyuria  Genitourinary: Negative for dysuria, enuresis, frequency and urgency  Musculoskeletal: Negative for arthralgias and myalgias  Skin: Negative for pallor, rash and wound     Allergic/Immunologic: Negative  Neurological: Negative for dizziness, tremors, weakness and numbness  Hematological: Negative  Psychiatric/Behavioral: Negative          Historical Information   Past Medical History:   Diagnosis Date    Diverticulosis     Hemorrhoids     Hypertension     controlled    Stroke [facility])      Past Surgical History:   Procedure Laterality Date    COLONOSCOPY      ONSET 2015    NOSE SURGERY      [place] EXPLORE PARATHYROID GLANDS Bilateral 6/18/2020    Procedure: PARATHYROIDECTOMY, MINIMALLY INVASIVE, 3 5 GLAND EXPLORATION, INTRAOPERATIVE PTH MONITORING;  Surgeon: Hardy Blackwell MD;  Location: [facility];  Service: Surgical Oncology    SKIN BIOPSY      SKIN LESION EXCISION       Social History   Social History     Substance and Sexual Activity   Alcohol Use Yes    Alcohol/week: 2 0 standard drinks    Types: 2 Cans of beer per week    Comment: SOCIAL     Social History     Substance and Sexual Activity   Drug Use No     Social History     Tobacco Use   Smoking Status Never Smoker   Smokeless Tobacco Never Used     Family History:   Family History   Problem Relation Age of Onset    No Known Problems Mother     Hypertension Father     Parkinsonism Father     Hypertension Sister     Stroke Family     Glaucoma Family     Hypertension Family        Meds/Allergies   Current Outpatient Medications   Medication Sig Dispense Refill    atorvastatin (LIPITOR) 40 mg tablet Take 1 tablet (40 mg total) by mouth daily 90 tablet 1    calcium carbonate (OS-BRUNILDA) 600 MG tablet Take one tablet in AM and 2 tabs with dinner      Cholecalciferol (D 2000) 2000 units TABS Take by mouth daily      clopidogrel (PLAVIX) 75 mg tablet Take 1 tablet (75 mg total) by mouth daily 90 tablet 1    CO ENZYME Q-10 PO daily       folic acid (FOLVITE) 1 mg tablet Take 1 tablet (1,000 mcg total) by mouth daily 90 tablet 1    magnesium oxide (MAG-OX) 400 mg Take 1 tablet (400 mg total) by mouth daily      metoprolol tartrate (LOPRESSOR) 25 mg tablet Take 1 tablet (25 mg total) by mouth every 12 (twelve) hours 180 tablet 1    Potassium 99 MG TABS Take 1 tablet by mouth daily       No current facility-administered medications for this visit  Allergies   Allergen Reactions    Codeine Chest Pain       Objective   Vitals: Blood pressure 152/100, pulse 79, temperature (!) 97 1 °F (36 2 °C), weight 88 9 kg (196 lb)  Physical Exam  Vitals reviewed  Constitutional:       General: She is not in acute distress  Appearance: Normal appearance  She is not ill-appearing  HENT:      Head: Normocephalic and atraumatic  Nose: Nose normal    Eyes:      Extraocular Movements: Extraocular movements intact  Conjunctiva/sclera: Conjunctivae normal    Cardiovascular:      Rate and Rhythm: Normal rate and regular rhythm  Heart sounds: Normal heart sounds  Pulmonary:      Effort: Pulmonary effort is normal  No respiratory distress  Breath sounds: Normal breath sounds  Musculoskeletal:         General: Normal range of motion  Cervical back: Normal range of motion and neck supple  Right lower leg: No edema  Left lower leg: No edema  Skin:     General: Skin is warm  Neurological:      General: No focal deficit present  Mental Status: She is alert and oriented to person, place, and time  Psychiatric:         Mood and Affect: Mood normal          Behavior: Behavior normal          The history was obtained from the review of the chart, patient      Lab Results:   Lab Results   Component Value Date/Time    Potassium 4 1 02/12/2022 09:29 AM    Potassium 3 8 08/20/2021 05:29 PM    Potassium 3 6 08/04/2021 12:41 PM    Chloride 102 02/12/2022 09:29 AM    Chloride 106 08/20/2021 05:29 PM    Chloride 104 08/04/2021 12:41 PM    CO2 28 02/12/2022 09:29 AM    CO2 29 08/20/2021 05:29 PM    CO2 31 08/04/2021 12:41 PM    BUN 13 02/12/2022 09:29 AM    BUN 15 08/20/2021 05:29 PM    BUN 14 08/04/2021 12:41 PM Creatinine 0 84 02/12/2022 09:29 AM    Creatinine 0 79 08/20/2021 05:29 PM    Creatinine 0 85 08/04/2021 12:41 PM    Glucose, Fasting 100 (H) 02/12/2022 09:29 AM    Calcium 9 1 02/12/2022 09:29 AM    Calcium 7 9 (L) 08/20/2021 05:29 PM    Calcium 8 2 (L) 08/04/2021 12:41 PM    eGFR 76 02/12/2022 09:29 AM    eGFR 82 08/20/2021 05:29 PM    eGFR 75 08/04/2021 12:41 PM    TSH 3RD GENERATON 2 360 03/20/2021 08:37 AM    Free T4 0 99 03/20/2021 08:37 AM    PTH 16 5 (L) 07/10/2021 08:25 AM    PTH 11 3 (L) 03/20/2021 08:37 AM    Vit D, 25-Hydroxy 63 2 08/20/2021 05:29 PM         Imaging Studies:         Results for orders placed during the hospital encounter of 04/10/19    DXA bone density spine hip and pelvis    Impression  1  Based on the [facility] classification, this study identifies above normal spine density and the patient is considered at low risk for fracture  2  A daily intake of calcium of at least 1200 mg and vitamin D, 800-1000 IU, as well as weight bearing and muscle strengthening exercise, fall prevention and avoidance of tobacco and excessive alcohol intake as basic preventive measures are recommended  3  Repeat DXA scan on the same equipment in 18-24 months as clinically indicated  The 10 year risk of hip fracture is 0 1%, with the 10 year risk of major osteoporotic fracture being 5 5%, as calculated by the [facility] fracture risk assessment tool (FRAX)  The current NOF guidelines recommend treating patients with FRAX 10 year risk score  of >3% for hip fracture and >20% for major osteoporotic fracture  WHO CLASSIFICATION:  Normal (a T-score of -1 0 or higher)  Low bone mineral density (a T-score of less than -1 0 but higher than -2 5)  Osteoporosis (a T-score of -2 5 or less)  Severe osteoporosis (a T-score of -2 5 or less with a fragility fracture)    Thank you for allowing us the opportunity to participate in your patient care  The expanded DEXA report will no longer be arriving in your mail    If you desire to view the full report please contact [facility]'s medical records or access the PACS system  Workstation performed: [id number]          I have personally reviewed pertinent reports  Portions of the record may have been created with voice recognition software  Occasional wrong word or "sound a like" substitutions may have occurred due to the inherent limitations of voice recognition software  Read the chart carefully and recognize, using context, where substitutions have occurred

## 2022-02-21 ENCOUNTER — OFFICE VISIT (OUTPATIENT)
Dept: FAMILY MEDICINE CLINIC | Facility: CLINIC | Age: 62
End: 2022-02-21
Payer: COMMERCIAL

## 2022-02-21 VITALS
OXYGEN SATURATION: 98 % | WEIGHT: 195.2 LBS | HEART RATE: 68 BPM | BODY MASS INDEX: 33.32 KG/M2 | HEIGHT: 64 IN | DIASTOLIC BLOOD PRESSURE: 90 MMHG | SYSTOLIC BLOOD PRESSURE: 150 MMHG | TEMPERATURE: 99.5 F

## 2022-02-21 DIAGNOSIS — R73.03 PREDIABETES: ICD-10-CM

## 2022-02-21 DIAGNOSIS — M79.642 BILATERAL HAND PAIN: ICD-10-CM

## 2022-02-21 DIAGNOSIS — M79.641 BILATERAL HAND PAIN: ICD-10-CM

## 2022-02-21 DIAGNOSIS — I10 ESSENTIAL HYPERTENSION: Primary | ICD-10-CM

## 2022-02-21 DIAGNOSIS — Z12.31 BREAST CANCER SCREENING BY MAMMOGRAM: ICD-10-CM

## 2022-02-21 DIAGNOSIS — R79.82 ELEVATED C-REACTIVE PROTEIN (CRP): ICD-10-CM

## 2022-02-21 DIAGNOSIS — E78.5 HYPERLIPIDEMIA, UNSPECIFIED HYPERLIPIDEMIA TYPE: ICD-10-CM

## 2022-02-21 DIAGNOSIS — Z12.4 SCREENING FOR CERVICAL CANCER: ICD-10-CM

## 2022-02-21 DIAGNOSIS — M79.89 SWELLING OF RIGHT HAND: ICD-10-CM

## 2022-02-21 DIAGNOSIS — Z13.0 SCREENING FOR DEFICIENCY ANEMIA: ICD-10-CM

## 2022-02-21 PROBLEM — E55.9 VITAMIN D DEFICIENCY: Status: RESOLVED | Noted: 2020-08-13 | Resolved: 2022-02-21

## 2022-02-21 PROCEDURE — 99214 OFFICE O/P EST MOD 30 MIN: CPT | Performed by: FAMILY MEDICINE

## 2022-02-21 RX ORDER — LOSARTAN POTASSIUM 25 MG/1
25 TABLET ORAL DAILY
Qty: 90 TABLET | Refills: 0 | Status: SHIPPED | OUTPATIENT
Start: 2022-02-21 | End: 2022-05-10 | Stop reason: SDUPTHER

## 2022-02-21 NOTE — PROGRESS NOTES
Assessment/Plan:         Diagnoses and all orders for this visit:    Essential hypertension  Comments:  bp has gone back up; we had stopped edarbyclor in July and increased metoprolol at last visit in October; I added losartan today  Orders:  -     Comprehensive metabolic panel; Future  -     Microalbumin / creatinine urine ratio  -     losartan (COZAAR) 25 mg tablet; Take 1 tablet (25 mg total) by mouth daily    Prediabetes  Comments:  stable, controlled, cpm  Orders:  -     Comprehensive metabolic panel; Future  -     HEMOGLOBIN A1C W/ EAG ESTIMATION; Future    Swelling of right hand  Comments:  new complaint- as below, pt had elevated CRP on prior labwork, but today politely defers rheum eval    Bilateral hand pain  Comments:  advised avoid NSAIDs due to her HTN; may use tylenol or ASA prn; as above, pt declines Rheumatologist eval at this time    Elevated C-reactive protein (CRP)  Comments:  on review of chart, pt had RF/GURJTI/CRP done in 2020- CRP was slightly elevated, remainder negative; also in 2016 CRP was higher >7; pt defers Rheumatologist eval    Hyperlipidemia, unspecified hyperlipidemia type  -     Lipid panel; Future    Screening for deficiency anemia  -     CBC and differential; Future    Breast cancer screening by mammogram  -     Mammo screening bilateral w 3d & cad; Future    Screening for cervical cancer  -     Ambulatory Referral to Gynecology; Future          Subjective:   Chief Complaint   Patient presents with    Follow-up        Patient ID: Katalina Mclean is a 63 y o  female      Here for routine f/u  Doing well  Tetanus booster not quite 10 yrs ago per pt- when had dog bite on her nose  Also c/o "hands get numb, but the right one gets swollen at times- knuckles top of hand- just past few months"  bp meds were adjusted at last visit in October - off Edarbyclor, bp had gone back up, so metoprolol was increased, as below:    << 10/13/2021  [facility]  Last visit in July, pt had asked whether she is "doomed to be on the [address] forever", so I reviewed bp- was low that day and few days prior, and even at [facility] when she had been in increased pain, her bp was ok, also in March 2021, bp was good at endo; Med was very expensive for pt, so I Advised at July visit that we can try off of med and if bp creeps up, can increase dose of metoprolol, pt understood that she would need more frequent office visits (in person) for bp monitoring   bp at endo in interim was normal, but today is high and pt states that at home when she checks it recently has been high "but a lot going on in the family-" mother-in-law passed away and several family members recently went on hospice per pt  "And my weight is going up"  No sx  increase dose of metoprolol - change to BID metoprolol tartrate  Orders:  -     metoprolol tartrate (LOPRESSOR) 25 mg tablet; Take 1 tablet (25 mg total) by mouth every 12 (twelve) hours >>          The following portions of the patient's history were reviewed and updated as appropriate: allergies, current medications, past family history, past medical history, past social history, past surgical history and problem list     Review of Systems   All other systems reviewed and are negative  Objective:      /90 (BP Location: Left arm, Patient Position: Sitting, Cuff Size: Standard)   Pulse 68   Temp 99 5 °F (37 5 °C) (Tympanic)   Ht 5' 4" (1 626 m)   Wt 88 5 kg (195 lb 3 2 oz)   SpO2 98%   BMI 33 51 kg/m²          Physical Exam  Vitals and nursing note reviewed  Constitutional:       General: She is not in acute distress  Appearance: She is well-developed  She is not ill-appearing, toxic-appearing or diaphoretic  HENT:      Head: Normocephalic and atraumatic  Eyes:      General: Lids are normal       Conjunctiva/sclera: Conjunctivae normal       Pupils: Pupils are equal, round, and reactive to light     Neck:      Thyroid: No thyroid mass or thyromegaly  Vascular: No JVD  Trachea: Trachea normal    Cardiovascular:      Rate and Rhythm: Normal rate and regular rhythm  Pulses: Normal pulses  Heart sounds: Normal heart sounds  Comments: No edema  Pulmonary:      Effort: Pulmonary effort is normal       Breath sounds: Normal breath sounds  Chest:   Breasts:      Right: No supraclavicular adenopathy  Left: No supraclavicular adenopathy  Abdominal:      General: Bowel sounds are normal  There is no distension  Palpations: Abdomen is soft  There is no hepatomegaly, splenomegaly or mass  Tenderness: There is no abdominal tenderness  Musculoskeletal:      Cervical back: Neck supple  Comments: Slight swelling visible over dorsal right 2nd and 3rd MCP joints, no erythema or heat   Lymphadenopathy:      Cervical: No cervical adenopathy  Upper Body:      Right upper body: No supraclavicular adenopathy  Left upper body: No supraclavicular adenopathy  Skin:     General: Skin is warm and dry  Coloration: Skin is not pale  Neurological:      Mental Status: She is alert and oriented to person, place, and time  Gait: Gait normal    Psychiatric:         Mood and Affect: Mood normal          Behavior: Behavior normal  Behavior is cooperative

## 2022-04-20 DIAGNOSIS — I10 ESSENTIAL HYPERTENSION: ICD-10-CM

## 2022-05-10 DIAGNOSIS — I10 ESSENTIAL HYPERTENSION: ICD-10-CM

## 2022-05-10 RX ORDER — LOSARTAN POTASSIUM 25 MG/1
25 TABLET ORAL DAILY
Qty: 90 TABLET | Refills: 1 | Status: SHIPPED | OUTPATIENT
Start: 2022-05-10 | End: 2022-06-22 | Stop reason: SDUPTHER

## 2022-05-14 ENCOUNTER — APPOINTMENT (OUTPATIENT)
Dept: LAB | Age: 62
End: 2022-05-14
Payer: COMMERCIAL

## 2022-05-14 DIAGNOSIS — I10 ESSENTIAL HYPERTENSION: ICD-10-CM

## 2022-05-14 DIAGNOSIS — R73.03 PREDIABETES: ICD-10-CM

## 2022-05-14 DIAGNOSIS — E78.5 HYPERLIPIDEMIA, UNSPECIFIED HYPERLIPIDEMIA TYPE: ICD-10-CM

## 2022-05-14 DIAGNOSIS — Z13.0 SCREENING FOR DEFICIENCY ANEMIA: ICD-10-CM

## 2022-05-14 LAB
ALBUMIN SERPL BCP-MCNC: 3.5 G/DL (ref 3.5–5)
ALP SERPL-CCNC: 98 U/L (ref 46–116)
ALT SERPL W P-5'-P-CCNC: 38 U/L (ref 12–78)
ANION GAP SERPL CALCULATED.3IONS-SCNC: 5 MMOL/L (ref 4–13)
AST SERPL W P-5'-P-CCNC: 20 U/L (ref 5–45)
BASOPHILS # BLD AUTO: 0.05 THOUSANDS/ΜL (ref 0–0.1)
BASOPHILS NFR BLD AUTO: 1 % (ref 0–1)
BILIRUB SERPL-MCNC: 0.22 MG/DL (ref 0.2–1)
BUN SERPL-MCNC: 12 MG/DL (ref 5–25)
CALCIUM SERPL-MCNC: 8.2 MG/DL (ref 8.3–10.1)
CHLORIDE SERPL-SCNC: 106 MMOL/L (ref 100–108)
CHOLEST SERPL-MCNC: 207 MG/DL
CO2 SERPL-SCNC: 29 MMOL/L (ref 21–32)
CREAT SERPL-MCNC: 0.78 MG/DL (ref 0.6–1.3)
EOSINOPHIL # BLD AUTO: 0.24 THOUSAND/ΜL (ref 0–0.61)
EOSINOPHIL NFR BLD AUTO: 3 % (ref 0–6)
ERYTHROCYTE [DISTWIDTH] IN BLOOD BY AUTOMATED COUNT: 13 % (ref 11.6–15.1)
EST. AVERAGE GLUCOSE BLD GHB EST-MCNC: 128 MG/DL
GFR SERPL CREATININE-BSD FRML MDRD: 83 ML/MIN/1.73SQ M
GLUCOSE P FAST SERPL-MCNC: 90 MG/DL (ref 65–99)
HBA1C MFR BLD: 6.1 %
HCT VFR BLD AUTO: 42.4 % (ref 34.8–46.1)
HDLC SERPL-MCNC: 47 MG/DL
HGB BLD-MCNC: 14.2 G/DL (ref 11.5–15.4)
IMM GRANULOCYTES # BLD AUTO: 0.03 THOUSAND/UL (ref 0–0.2)
IMM GRANULOCYTES NFR BLD AUTO: 0 % (ref 0–2)
LDLC SERPL CALC-MCNC: 142 MG/DL (ref 0–100)
LYMPHOCYTES # BLD AUTO: 2.91 THOUSANDS/ΜL (ref 0.6–4.47)
LYMPHOCYTES NFR BLD AUTO: 35 % (ref 14–44)
MCH RBC QN AUTO: 28.6 PG (ref 26.8–34.3)
MCHC RBC AUTO-ENTMCNC: 33.5 G/DL (ref 31.4–37.4)
MCV RBC AUTO: 85 FL (ref 82–98)
MONOCYTES # BLD AUTO: 0.51 THOUSAND/ΜL (ref 0.17–1.22)
MONOCYTES NFR BLD AUTO: 6 % (ref 4–12)
NEUTROPHILS # BLD AUTO: 4.66 THOUSANDS/ΜL (ref 1.85–7.62)
NEUTS SEG NFR BLD AUTO: 55 % (ref 43–75)
NONHDLC SERPL-MCNC: 160 MG/DL
NRBC BLD AUTO-RTO: 0 /100 WBCS
PLATELET # BLD AUTO: 418 THOUSANDS/UL (ref 149–390)
PMV BLD AUTO: 9.2 FL (ref 8.9–12.7)
POTASSIUM SERPL-SCNC: 4.5 MMOL/L (ref 3.5–5.3)
PROT SERPL-MCNC: 7.6 G/DL (ref 6.4–8.2)
RBC # BLD AUTO: 4.97 MILLION/UL (ref 3.81–5.12)
SODIUM SERPL-SCNC: 140 MMOL/L (ref 136–145)
TRIGL SERPL-MCNC: 89 MG/DL
WBC # BLD AUTO: 8.4 THOUSAND/UL (ref 4.31–10.16)

## 2022-05-14 PROCEDURE — 80061 LIPID PANEL: CPT

## 2022-05-14 PROCEDURE — 36415 COLL VENOUS BLD VENIPUNCTURE: CPT

## 2022-05-14 PROCEDURE — 83036 HEMOGLOBIN GLYCOSYLATED A1C: CPT

## 2022-05-14 PROCEDURE — 85025 COMPLETE CBC W/AUTO DIFF WBC: CPT

## 2022-05-14 PROCEDURE — 80053 COMPREHEN METABOLIC PANEL: CPT

## 2022-06-10 DIAGNOSIS — I63.81 LACUNAR STROKE (HCC): ICD-10-CM

## 2022-06-14 RX ORDER — CLOPIDOGREL BISULFATE 75 MG/1
75 TABLET ORAL DAILY
Qty: 90 TABLET | Refills: 1 | Status: SHIPPED | OUTPATIENT
Start: 2022-06-14

## 2022-06-22 ENCOUNTER — OFFICE VISIT (OUTPATIENT)
Dept: FAMILY MEDICINE CLINIC | Facility: CLINIC | Age: 62
End: 2022-06-22
Payer: COMMERCIAL

## 2022-06-22 VITALS
OXYGEN SATURATION: 99 % | HEART RATE: 65 BPM | DIASTOLIC BLOOD PRESSURE: 96 MMHG | WEIGHT: 194 LBS | BODY MASS INDEX: 33.12 KG/M2 | TEMPERATURE: 98.2 F | HEIGHT: 64 IN | SYSTOLIC BLOOD PRESSURE: 140 MMHG

## 2022-06-22 DIAGNOSIS — I10 ESSENTIAL HYPERTENSION: ICD-10-CM

## 2022-06-22 DIAGNOSIS — I10 ESSENTIAL HYPERTENSION: Primary | ICD-10-CM

## 2022-06-22 DIAGNOSIS — E78.5 HYPERLIPIDEMIA, UNSPECIFIED HYPERLIPIDEMIA TYPE: ICD-10-CM

## 2022-06-22 DIAGNOSIS — R73.03 PREDIABETES: ICD-10-CM

## 2022-06-22 PROBLEM — E83.52 HYPERCALCEMIA: Status: RESOLVED | Noted: 2018-05-04 | Resolved: 2022-06-22

## 2022-06-22 PROCEDURE — 99214 OFFICE O/P EST MOD 30 MIN: CPT | Performed by: FAMILY MEDICINE

## 2022-06-22 RX ORDER — LOSARTAN POTASSIUM 50 MG/1
50 TABLET ORAL DAILY
Start: 2022-06-22 | End: 2022-06-30 | Stop reason: SDUPTHER

## 2022-06-22 NOTE — PROGRESS NOTES
Assessment/Plan:         Diagnoses and all orders for this visit:    Essential hypertension  Comments:   I increased losartan today to 50mg  Orders:  -     losartan (COZAAR) 50 mg tablet; Take 1 tablet (50 mg total) by mouth daily    Essential hypertension  -     losartan (COZAAR) 50 mg tablet; Take 1 tablet (50 mg total) by mouth daily    Prediabetes  Comments:  well controlled, cpm    Hyperlipidemia, unspecified hyperlipidemia type  Comments:  pt reports had been doing keto diet- explains the bump in LDL, though TG and TChol are both lower          Subjective:   Chief Complaint   Patient presents with    Follow-up     And review blood work results from May 2022 - declines mammogram and cervical cancer screening        Patient ID: Rhett Brown is a 63 y o  female  Scheduled f/u visit   last visit losartan 25mg was added due to bp not controlled- BP was 150/90  States home bp's the same as here today  She had been taken off of edarbyclor in July due to symptomatic hypotension and the cost of the med      The following portions of the patient's history were reviewed and updated as appropriate: allergies, current medications, past family history, past medical history, past social history, past surgical history and problem list     Review of Systems      Objective:      /96 (BP Location: Left arm, Patient Position: Sitting)   Pulse 65   Temp 98 2 °F (36 8 °C)   Ht 5' 4" (1 626 m)   Wt 88 kg (194 lb)   SpO2 99%   BMI 33 30 kg/m²          Physical Exam  Constitutional:       General: She is not in acute distress  Appearance: She is well-developed  She is not ill-appearing, toxic-appearing or diaphoretic  HENT:      Head: Normocephalic and atraumatic  Mouth/Throat:      Pharynx: Uvula midline  Neck:      Trachea: Phonation normal    Cardiovascular:      Rate and Rhythm: Normal rate and regular rhythm  Heart sounds: Normal heart sounds     Pulmonary:      Effort: Pulmonary effort is normal       Breath sounds: Normal breath sounds and air entry  Skin:     Coloration: Skin is not pale  Neurological:      Mental Status: She is alert and oriented to person, place, and time  Psychiatric:         Behavior: Behavior is cooperative

## 2022-06-30 DIAGNOSIS — I10 ESSENTIAL HYPERTENSION: ICD-10-CM

## 2022-06-30 RX ORDER — LOSARTAN POTASSIUM 50 MG/1
50 TABLET ORAL DAILY
Qty: 90 TABLET | Refills: 1 | Status: SHIPPED | OUTPATIENT
Start: 2022-06-30

## 2022-06-30 NOTE — TELEPHONE ENCOUNTER
Patient left a message on the voicemail stating that her insurance is ending tomorrow and was hoping as she was just here for a visit on the 22nd to get a refill on her Losartan 50 mg tablet  It wasn't called in as she had enough but is hoping this could be called in today as she is not sure what will be covered and not covered with her deductible  Please advise

## 2022-09-14 ENCOUNTER — APPOINTMENT (OUTPATIENT)
Dept: RADIOLOGY | Facility: HOSPITAL | Age: 62
DRG: 066 | End: 2022-09-14
Payer: COMMERCIAL

## 2022-09-14 ENCOUNTER — HOSPITAL ENCOUNTER (INPATIENT)
Facility: HOSPITAL | Age: 62
LOS: 2 days | Discharge: HOME | DRG: 066 | End: 2022-09-16
Attending: EMERGENCY MEDICINE | Admitting: PSYCHIATRY & NEUROLOGY
Payer: COMMERCIAL

## 2022-09-14 ENCOUNTER — APPOINTMENT (EMERGENCY)
Dept: RADIOLOGY | Facility: HOSPITAL | Age: 62
DRG: 066 | End: 2022-09-14
Payer: COMMERCIAL

## 2022-09-14 DIAGNOSIS — I63.9 CVA (CEREBRAL VASCULAR ACCIDENT) (HCC): Primary | ICD-10-CM

## 2022-09-14 DIAGNOSIS — R26.2 AMBULATORY DYSFUNCTION: ICD-10-CM

## 2022-09-14 LAB
2HR DELTA HS TROPONIN: 1 NG/L
ANION GAP SERPL CALCULATED.3IONS-SCNC: 9 MMOL/L (ref 4–13)
APTT PPP: 29 SECONDS (ref 23–37)
BUN SERPL-MCNC: 17 MG/DL (ref 5–25)
CALCIUM SERPL-MCNC: 7.6 MG/DL (ref 8.3–10.1)
CARDIAC TROPONIN I PNL SERPL HS: 3 NG/L
CARDIAC TROPONIN I PNL SERPL HS: 4 NG/L
CHLORIDE SERPL-SCNC: 107 MMOL/L (ref 96–108)
CO2 SERPL-SCNC: 18 MMOL/L (ref 21–32)
CREAT SERPL-MCNC: 0.79 MG/DL (ref 0.6–1.3)
ERYTHROCYTE [DISTWIDTH] IN BLOOD BY AUTOMATED COUNT: 13.2 % (ref 11.6–15.1)
FLUAV RNA RESP QL NAA+PROBE: NEGATIVE
FLUBV RNA RESP QL NAA+PROBE: NEGATIVE
GFR SERPL CREATININE-BSD FRML MDRD: 81 ML/MIN/1.73SQ M
GLUCOSE SERPL-MCNC: 101 MG/DL (ref 65–140)
GLUCOSE SERPL-MCNC: 95 MG/DL (ref 65–140)
HCT VFR BLD AUTO: 44.4 % (ref 34.8–46.1)
HGB BLD-MCNC: 14.5 G/DL (ref 11.5–15.4)
INR PPP: 0.87 (ref 0.84–1.19)
MCH RBC QN AUTO: 29.1 PG (ref 26.8–34.3)
MCHC RBC AUTO-ENTMCNC: 32.7 G/DL (ref 31.4–37.4)
MCV RBC AUTO: 89 FL (ref 82–98)
PLATELET # BLD AUTO: 295 THOUSANDS/UL (ref 149–390)
PMV BLD AUTO: 9 FL (ref 8.9–12.7)
POTASSIUM SERPL-SCNC: 3.7 MMOL/L (ref 3.5–5.3)
PROTHROMBIN TIME: 12 SECONDS (ref 11.6–14.5)
RBC # BLD AUTO: 4.99 MILLION/UL (ref 3.81–5.12)
RSV RNA RESP QL NAA+PROBE: NEGATIVE
SARS-COV-2 RNA RESP QL NAA+PROBE: NEGATIVE
SODIUM SERPL-SCNC: 134 MMOL/L (ref 135–147)
TSH SERPL DL<=0.05 MIU/L-ACNC: 1.77 UIU/ML (ref 0.45–4.5)
WBC # BLD AUTO: 9.76 THOUSAND/UL (ref 4.31–10.16)

## 2022-09-14 PROCEDURE — 99223 1ST HOSP IP/OBS HIGH 75: CPT | Performed by: PSYCHIATRY & NEUROLOGY

## 2022-09-14 PROCEDURE — 99285 EMERGENCY DEPT VISIT HI MDM: CPT | Performed by: EMERGENCY MEDICINE

## 2022-09-14 PROCEDURE — 70498 CT ANGIOGRAPHY NECK: CPT

## 2022-09-14 PROCEDURE — 84484 ASSAY OF TROPONIN QUANT: CPT

## 2022-09-14 PROCEDURE — 85730 THROMBOPLASTIN TIME PARTIAL: CPT | Performed by: EMERGENCY MEDICINE

## 2022-09-14 PROCEDURE — 70551 MRI BRAIN STEM W/O DYE: CPT

## 2022-09-14 PROCEDURE — 0241U HB NFCT DS VIR RESP RNA 4 TRGT: CPT

## 2022-09-14 PROCEDURE — 82948 REAGENT STRIP/BLOOD GLUCOSE: CPT

## 2022-09-14 PROCEDURE — 84484 ASSAY OF TROPONIN QUANT: CPT | Performed by: EMERGENCY MEDICINE

## 2022-09-14 PROCEDURE — 84443 ASSAY THYROID STIM HORMONE: CPT

## 2022-09-14 PROCEDURE — 70496 CT ANGIOGRAPHY HEAD: CPT

## 2022-09-14 PROCEDURE — 85610 PROTHROMBIN TIME: CPT | Performed by: EMERGENCY MEDICINE

## 2022-09-14 PROCEDURE — 99285 EMERGENCY DEPT VISIT HI MDM: CPT

## 2022-09-14 PROCEDURE — G1004 CDSM NDSC: HCPCS

## 2022-09-14 PROCEDURE — 80048 BASIC METABOLIC PNL TOTAL CA: CPT | Performed by: EMERGENCY MEDICINE

## 2022-09-14 PROCEDURE — 36415 COLL VENOUS BLD VENIPUNCTURE: CPT | Performed by: EMERGENCY MEDICINE

## 2022-09-14 PROCEDURE — 93005 ELECTROCARDIOGRAM TRACING: CPT

## 2022-09-14 PROCEDURE — 85027 COMPLETE CBC AUTOMATED: CPT | Performed by: EMERGENCY MEDICINE

## 2022-09-14 RX ORDER — FOLIC ACID 1 MG/1
1000 TABLET ORAL DAILY
Status: DISCONTINUED | OUTPATIENT
Start: 2022-09-15 | End: 2022-09-16 | Stop reason: HOSPADM

## 2022-09-14 RX ORDER — CLOPIDOGREL BISULFATE 75 MG/1
75 TABLET ORAL DAILY
Status: DISCONTINUED | OUTPATIENT
Start: 2022-09-15 | End: 2022-09-15

## 2022-09-14 RX ORDER — ASPIRIN 325 MG
325 TABLET ORAL DAILY
Status: DISCONTINUED | OUTPATIENT
Start: 2022-09-15 | End: 2022-09-15

## 2022-09-14 RX ORDER — ATORVASTATIN CALCIUM 40 MG/1
40 TABLET, FILM COATED ORAL EVERY EVENING
Status: DISCONTINUED | OUTPATIENT
Start: 2022-09-14 | End: 2022-09-16 | Stop reason: HOSPADM

## 2022-09-14 RX ORDER — MELATONIN
2000 DAILY
Status: DISCONTINUED | OUTPATIENT
Start: 2022-09-15 | End: 2022-09-16 | Stop reason: HOSPADM

## 2022-09-14 RX ORDER — LOSARTAN POTASSIUM 50 MG/1
50 TABLET ORAL DAILY
Status: DISCONTINUED | OUTPATIENT
Start: 2022-09-15 | End: 2022-09-15

## 2022-09-14 RX ORDER — POTASSIUM CHLORIDE 750 MG/1
10 TABLET, EXTENDED RELEASE ORAL DAILY
Status: DISCONTINUED | OUTPATIENT
Start: 2022-09-15 | End: 2022-09-16 | Stop reason: HOSPADM

## 2022-09-14 RX ORDER — ASPIRIN 81 MG/1
81 TABLET, CHEWABLE ORAL DAILY
Status: DISCONTINUED | OUTPATIENT
Start: 2022-09-15 | End: 2022-09-15

## 2022-09-14 RX ADMIN — IOHEXOL 85 ML: 350 INJECTION, SOLUTION INTRAVENOUS at 17:24

## 2022-09-14 RX ADMIN — ATORVASTATIN CALCIUM 40 MG: 40 TABLET, FILM COATED ORAL at 20:59

## 2022-09-14 RX ADMIN — METOPROLOL TARTRATE 25 MG: 25 TABLET, FILM COATED ORAL at 20:59

## 2022-09-14 NOTE — LETTER
[address] [facility] [address]  [address]  [address] [facility] [address]  Dept: [phone]    September 16, 2022     Patient: Reid Day   YOB: 1960   Date of Visit: 9/14/2022       To Whom it May Concern:    Redi Day is under my professional care  She was seen in the hospital from 9/14/2022   to 09/16/22  She may return to work on *** without limitations  If you have any questions or concerns, please don't hesitate to call           Sincerely,          Tara Granda Moder

## 2022-09-14 NOTE — LETTER
[address] [facility] [address]  [address]  [place] [address]  Dept: [phone]    September 16, 2022     Patient: Alfa Schreiber   YOB: 1960   Date of Visit: 9/14/2022       To Whom it May Concern:    Alfa Schreiber is under my professional care  She was seen in the hospital from 9/14/2022   to 09/16/22 where she was treated for a stroke  I anticipate that she will be able to return to work on 9/23/2022  However, everyone's recovery from a stroke is different, and she may need additional time to recover sufficiently to be able to return to work  If you have any questions or concerns, please don't hesitate to call           Sincerely,          Tianna Singleton MD

## 2022-09-14 NOTE — ED PROVIDER NOTES
History  Chief Complaint   Patient presents with    Difficulty Walking     Pt reports difficulty walking today as well as L arm/leg heaviness; pt has hx TIA     HPI  Tasneem Addison is a 63 y o  female with PMH significant for CVA, HTN, HLD coming in today with complaint of difficulty walking  She reports her symptoms started at 4:00pm  She noted heaviness in her L side, specifically her L leg  She has had difficulty walking due to this  She was at work when this occurred  Also reports an associated headache that started prior to that  No inciting event or injury  Denies any sudden onset or progressive worsening  No nausea, vomiting, chest pain, shortness of breath  She has had two separate CVAs and reports this feels similar  Denies any episodes of syncope, visual changes  She has been compliant with her Plavix and other medications  No fevers, chills, abd pain, hx of abd surgeries, dysuria  No other complaints at this time       - No language barrier  -PFH/PSH reviewed  - History obtained from patient and chart    Prior to Admission Medications   Prescriptions Last Dose Informant Patient Reported? Taking?    CO ENZYME Q-10 PO 9/14/2022 at Unknown time Self Yes Yes   Sig: daily    Cholecalciferol 50 MCG (2000 UT) TABS 9/14/2022 at Unknown time Self Yes Yes   Sig: Take by mouth daily   Potassium 99 MG TABS 9/14/2022 at Unknown time Self Yes Yes   Sig: Take 1 tablet by mouth daily   atorvastatin (LIPITOR) 40 mg tablet More than a month at Unknown time Self No No   Sig: Take 1 tablet (40 mg total) by mouth daily   calcium carbonate (OS-BRUNILDA) 600 MG tablet 9/14/2022 at Unknown time  No Yes   Sig: Take one tablet in AM and 2 tabs with dinner   clopidogrel (PLAVIX) 75 mg tablet 9/14/2022 at Unknown time  No Yes   Sig: Take 1 tablet (75 mg total) by mouth daily   folic acid (FOLVITE) 1 mg tablet 9/14/2022 at Unknown time Self No Yes   Sig: Take 1 tablet (1,000 mcg total) by mouth daily   losartan (COZAAR) 50 mg tablet 9/13/2022 at Unknown time  No Yes   Sig: Take 1 tablet (50 mg total) by mouth daily   magnesium oxide (MAG-OX) 400 mg 9/14/2022 at Unknown time Self No Yes   Sig: Take 1 tablet (400 mg total) by mouth daily   metoprolol tartrate (LOPRESSOR) 25 mg tablet 9/14/2022 at Unknown time  No Yes   Sig: Take 1 tablet (25 mg total) by mouth every 12 (twelve) hours      Facility-Administered Medications: None       Past Medical History:   Diagnosis Date    Diverticulosis     Hemorrhoids     Hypercalcemia 5/4/2018    Hypertension     controlled    Stroke [facility])     Vitamin D deficiency 8/13/2020       Past Surgical History:   Procedure Laterality Date    COLONOSCOPY      ONSET 2015    NOSE SURGERY      AL EXPLORE PARATHYROID GLANDS Bilateral 6/18/2020    Procedure: PARATHYROIDECTOMY, MINIMALLY INVASIVE, 3 5 GLAND EXPLORATION, INTRAOPERATIVE PTH MONITORING;  Surgeon: Charlotte Adhikari MD;  Location: [facility];  Service: Surgical Oncology    SKIN BIOPSY      SKIN LESION EXCISION         Family History   Problem Relation Age of Onset    No Known Problems Mother     Hypertension Father     Parkinsonism Father     Hypertension Sister     Stroke Family     Glaucoma Family     Hypertension Family      I have reviewed and agree with the history as documented  E-Cigarette/Vaping    E-Cigarette Use Never User      E-Cigarette/Vaping Substances    Nicotine No     THC No     CBD No     Flavoring No     Other No     Unknown No      Social History     Tobacco Use    Smoking status: Never Smoker    Smokeless tobacco: Never Used   Vaping Use    Vaping Use: Never used   Substance Use Topics    Alcohol use:  Yes     Alcohol/week: 2 0 standard drinks     Types: 2 Cans of beer per week     Comment: SOCIAL    Drug use: No        Review of Systems   Unable to perform ROS: Acuity of condition       Physical Exam  ED Triage Vitals   Temperature Pulse Respirations Blood Pressure SpO2   09/14/22 1704 09/14/22 1658 09/14/22 1658 09/14/22 1700 09/14/22 1658   99 1 °F (37 3 °C) 87 18 (!) 218/127 98 %      Temp Source Heart Rate Source Patient Position - Orthostatic VS BP Location FiO2 (%)   09/14/22 1658 09/14/22 1658 09/14/22 1700 09/14/22 1700 --   Oral Monitor Sitting Right arm       Pain Score       09/14/22 1658       No Pain             Orthostatic Vital Signs  Vitals:    09/14/22 1754 09/14/22 1809 09/14/22 1824 09/14/22 2059   BP: (!) 197/106 (!) 205/104 (!) 205/104 (!) 189/102   Pulse: 86 84 83 80   Patient Position - Orthostatic VS: Sitting Sitting Sitting        Physical Exam  Vitals and nursing note reviewed  Constitutional:       General: She is not in acute distress  Appearance: She is well-developed  HENT:      Head: Normocephalic and atraumatic  Eyes:      Conjunctiva/sclera: Conjunctivae normal    Cardiovascular:      Rate and Rhythm: Normal rate and regular rhythm  Heart sounds: No murmur heard  Pulmonary:      Effort: Pulmonary effort is normal  No respiratory distress  Breath sounds: Normal breath sounds  Abdominal:      Palpations: Abdomen is soft  Tenderness: There is no abdominal tenderness  There is no guarding or rebound  Musculoskeletal:      Cervical back: Neck supple  Skin:     General: Skin is warm and dry  Neurological:      Mental Status: She is alert and oriented to person, place, and time  Cranial Nerves: No cranial nerve deficit  Motor: Weakness present        Gait: Gait abnormal       Deep Tendon Reflexes: Reflexes normal    Psychiatric:         Behavior: Behavior normal          ED Medications  Medications   atorvastatin (LIPITOR) tablet 40 mg (40 mg Oral Given 9/14/22 2059)   aspirin tablet 325 mg (has no administration in time range)   clopidogrel (PLAVIX) tablet 75 mg (has no administration in time range)   folic acid (FOLVITE) tablet 1,000 mcg (has no administration in time range)   losartan (COZAAR) tablet 50 mg (has no administration in time range)   magnesium oxide (MAG-OX) tablet 400 mg (has no administration in time range)   metoprolol tartrate (LOPRESSOR) tablet 25 mg (25 mg Oral Given 9/14/22 2059)   potassium chloride (K-DUR,KLOR-CON) CR tablet 10 mEq (has no administration in time range)   Cholecalciferol TABS 2,000 Units (has no administration in time range)   aspirin chewable tablet 81 mg (has no administration in time range)   iohexol (OMNIPAQUE) 350 MG/ML injection (MULTI-DOSE) 100 mL (85 mL Intravenous Given 9/14/22 1724)       Diagnostic Studies  Results Reviewed     Procedure Component Value Units Date/Time    HS Troponin I 2hr [[id number]]  (Normal) Collected: 09/14/22 2105    Lab Status: Final result Specimen: Blood from Arm, Left Updated: 09/14/22 2156     hs TnI 2hr 4 ng/L      Delta 2hr hsTnI 1 ng/L     TSH, 3rd generation with Free T4 reflex [[id number]]  (Normal) Collected: 09/14/22 2105    Lab Status: Final result Specimen: Blood from Arm, Left Updated: 09/14/22 2139     TSH 3RD GENERATON 1 770 uIU/mL     Narrative:      Patients undergoing fluorescein dye angiography may retain small amounts of fluorescein in the body for 48-72 hours post procedure  Samples containing fluorescein can produce falsely depressed TSH values  If the patient had this procedure,a specimen should be resubmitted post fluorescein clearance  COVID/FLU/RSV [[id number]]  (Normal) Collected: 09/14/22 1939    Lab Status: Final result Specimen: Nares from Nose Updated: 09/14/22 2031     SARS-CoV-2 Negative     INFLUENZA A PCR Negative     INFLUENZA B PCR Negative     RSV PCR Negative    Narrative:      FOR PEDIATRIC PATIENTS - copy/paste COVID Guidelines URL to browser: [email or web address]    SARS-CoV-2 assay is a Nucleic Acid Amplification assay intended for the  qualitative detection of nucleic acid from SARS-CoV-2 in nasopharyngeal  swabs   Results are for the presumptive identification of SARS-CoV-2 RNA     Positive results are indicative of infection with SARS-CoV-2, the virus  causing COVID-19, but do not rule out bacterial infection or co-infection  with other viruses  Laboratories within the [place] and its  territories are required to report all positive results to the appropriate  public health authorities  Negative results do not preclude SARS-CoV-2  infection and should not be used as the sole basis for treatment or other  patient management decisions  Negative results must be combined with  clinical observations, patient history, and epidemiological information  This test has not been FDA cleared or approved  This test has been authorized by FDA under an Emergency Use Authorization  (EUA)  This test is only authorized for the duration of time the  declaration that circumstances exist justifying the authorization of the  emergency use of an in vitro diagnostic tests for detection of SARS-CoV-2  virus and/or diagnosis of COVID-19 infection under section 564(b)(1) of  the Act, 21 U  S C  179FJL-9(W)(1), unless the authorization is terminated  or revoked sooner  The test has been validated but independent review by FDA  and CLIA is pending  Test performed using [employer] GeneXpert: This RT-PCR assay targets N2,  a region unique to SARS-CoV-2  A conserved region in the E-gene was chosen  for pan-Sarbecovirus detection which includes SARS-CoV-2      Basic metabolic panel [[id number]]  (Abnormal) Collected: 09/14/22 1737    Lab Status: Final result Specimen: Blood from Arm, Left Updated: 09/14/22 1947     Sodium 134 mmol/L      Potassium 3 7 mmol/L      Chloride 107 mmol/L      CO2 18 mmol/L      ANION GAP 9 mmol/L      BUN 17 mg/dL      Creatinine 0 79 mg/dL      Glucose 95 mg/dL      Calcium 7 6 mg/dL      eGFR 81 ml/min/1 73sq m     Narrative:      [place] guidelines for Chronic Kidney Disease (CKD):     Stage 1 with normal or high GFR (GFR > 90 mL/min/1 73 square meters)    Stage 2 Mild CKD (GFR = 60-89 mL/min/1 73 square meters)    Stage 3A Moderate CKD (GFR = 45-59 mL/min/1 73 square meters)    Stage 3B Moderate CKD (GFR = 30-44 mL/min/1 73 square meters)    Stage 4 Severe CKD (GFR = 15-29 mL/min/1 73 square meters)    Stage 5 End Stage CKD (GFR <15 mL/min/1 73 square meters)  Note: GFR calculation is accurate only with a steady state creatinine    HS Troponin I 4hr [[id number]]     Lab Status: No result Specimen: Blood     Protime-INR [[id number]]  (Normal) Collected: 09/14/22 1835    Lab Status: Final result Specimen: Blood from Arm, Left Updated: 09/14/22 1901     Protime 12 0 seconds      INR 0 87    APTT [[id number]]  (Normal) Collected: 09/14/22 1835    Lab Status: Final result Specimen: Blood from Arm, Left Updated: 09/14/22 1901     PTT 29 seconds     TSH, 3rd generation [[id number]]     Lab Status: No result Specimen: Blood     HS Troponin 0hr (reflex protocol) [[id number]]  (Normal) Collected: 09/14/22 1737    Lab Status: Final result Specimen: Blood from Arm, Left Updated: 09/14/22 1837     hs TnI 0hr 3 ng/L     CBC and Platelet [[id number]]  (Normal) Collected: 09/14/22 1737    Lab Status: Final result Specimen: Blood from Arm, Left Updated: 09/14/22 1752     WBC 9 76 Thousand/uL      RBC 4 99 Million/uL      Hemoglobin 14 5 g/dL      Hematocrit 44 4 %      MCV 89 fL      MCH 29 1 pg      MCHC 32 7 g/dL      RDW 13 2 %      Platelets 783 Thousands/uL      MPV 9 0 fL     Fingerstick Glucose (POCT) [[id number]]  (Normal) Collected: 09/14/22 1719    Lab Status: Final result Updated: 09/14/22 1723     POC Glucose 101 mg/dl                  CT stroke alert brain   Final Result by Dalia Willett MD (09/14 1757)   Addendum 1 of 1 by Dalia Willett MD (09/14 1757)   ADDENDUM:      Left occipital stroke is old when compared to the prior MRI October 22,    2014  Final         1  No evidence of acute internal hemorrhage     2  Suspect left occipital lobe ischemia, possibly acute to subacute  As this lesion /area does not correspond to the patient's symptoms, follow-up contrast-enhanced brain MRI is recommended to exclude other etiology  I personally discussed this study with Dr Mac Sinha on 9/14/2022 at 5:38 PM                 Workstation performed: [id number]         CTA stroke alert (head/neck)   Final Result by Hannah Pena MD (09/14 1749)      No evidence of hemodynamic significant stenosis, aneurysm or dissection  I personally discussed this study with Dr Mac Sinha on 9/14/2022 at 5:38 PM                         Workstation performed: [id number]         MRI brain wo contrast    (Results Pending)         Procedures  Procedures      ED Course  ED Course as of 09/14/22 2206   Wed Sep 14, 2022   1721 Stroke alert called   1803 Re-assessed, pt comfortable, mentating well  No neuro deficits   Discussing with Neurology, will admit with them as primary or medicine, pending Neuro's decision   1841 Admitting to neurology                  Stroke Assessment     Row Name 09/14/22 1715             NIH Stroke Scale    Interval --      Level of Consciousness (1a ) 0      LOC Questions (1b ) 0      LOC Commands (1c ) 0      Best Gaze (2 ) 0      Visual (3 ) 0      Facial Palsy (4 ) --      Motor Arm, Left (5a ) 0      Motor Arm, Right (5b ) 0      Motor Leg, Left (6a ) 1      Motor Leg, Right (6b ) 0      Limb Ataxia (7 ) 1      Sensory (8 ) 0      Best Language (9 ) 0      Dysarthria (10 ) 0      Extinction and Inattention (11 ) (Formerly Neglect) 0      Total --                                    MDM  Number of Diagnoses or Management Options  Ambulatory dysfunction  [facility]  Diagnosis management comments: Trevor Landau is a 63 y o  who presents with complaints of stroke like symptoms    Vital signs are remarkable for HTN, physical exam shows L sided weakness, ambulatory disfunction    Ddx: CVA vs TIA    Plan: Stroke alert, NIH of 2    Summary: Imaging unremarkable on CT scan, however will require urgent MRI  Discussed with Neurology who accepted for admission  Discussed the patient's case with the Neurology service who agreed to admit the patient for further care and evaluation  The patient was also stable throughout their ED course and suffered from no acute changes and had no further questions or concerns from the ED team's standpoint  Amount and/or Complexity of Data Reviewed  Clinical lab tests: reviewed and ordered  Tests in the radiology section of CPT®: ordered and reviewed  Discuss the patient with other providers: yes    Risk of Complications, Morbidity, and/or Mortality  Presenting problems: high  Diagnostic procedures: low  Management options: low    Patient Progress  Patient progress: stable      Disposition  Final diagnoses:   CVA (cerebral vascular accident) [facility])   Ambulatory dysfunction     Time reflects when diagnosis was documented in both MDM as applicable and the Disposition within this note     Time User Action Codes Description Comment    9/14/2022  5:14 PM Stephen Cerna Add [I63 9] CVA (cerebral vascular accident) ([facility] )     9/14/2022  6:41 PM Smiley Chavez Add [R26 2] Ambulatory dysfunction       ED Disposition     ED Disposition   Admit    Condition   Stable    Date/Time   Wed Sep 14, 2022  6:41 PM    Comment   Case was discussed with Neurology and the patient's admission status was agreed to be Admission Status: inpatient status to the service of Dr Magnus Blum   Follow-up Information    None         Patient's Medications   Discharge Prescriptions    No medications on file     No discharge procedures on file  PDMP Review       Value Time User    PDMP Reviewed  Yes 2/24/2020  8:47 AM Michele Cochran MD           ED Provider  Attending physically available and evaluated Gabrielle Troy SEVERINO managed the patient along with the ED Attending      Electronically Signed by         Balwinder Mishra Belkis Allred MD  09/14/22 9925

## 2022-09-14 NOTE — ED ATTENDING ATTESTATION
Final Diagnosis:  1  CVA (cerebral vascular accident) ([facility] [address] )           Judi Grover MD, saw and evaluated the patient  All available labs and X-rays were ordered by me or the resident and have been reviewed by myself  I discussed the patient with the resident / non-physician and agree with the resident's / non-physician practitioner's findings and plan as documented in the resident's / non-physician practicitioner's note, except where noted  At this point, I agree with the current assessment done in the ED  I was present during key portions of all procedures performed unless otherwise stated  Chief Complaint   Patient presents with    Difficulty Walking     Pt reports difficulty walking today as well as L arm/leg heaviness; pt has hx TIA     This is a 63 y o  female presenting for evaluation of left arm and leg heaviness  About 1 hour ago the patient was perfectly fine at 4:00 p m  All of sudden started knows this left-sided heaviness where she had difficulty walking because of it  No falls no trauma  History of something 8 years ago that she thinks was high blood pressure causing stroke-like symptoms  She was started on Plavix then  She does not know she has any history of stenosis  Denies any fevers chills nausea vomiting chest pain shortness breath palpitations  No numbness or tingling  Just a heaviness sensation  She was seen immediately and stroke alert was called given the above  Airway intact bilateral breath sounds 2+ pulses throughout  C-spine: NT, supple  No midline tenderness  Kernig's Brudzinski's negative  EHL/FHL/PF/DF/KF/KE/HF/HE 5/5  No saddle anesthesia  2+ patellar reflexes  SLR negative  EXCEPT LLE seems slightly weakner than RIGHT  M/U/R/A nerve sensation bilateral intact and equal    strength 5/5  Finger abduction/adduction/opposition intact  Suppination/Pronation intact without reproduction of pain  Good capillary refill   2+ radial pulses, bilaterally equal    WE/WF/WRD/WUD 5/5, intact, without pain  BICEPS/TRICEPS 5/5  Shoulder abduction/adduction 5/5  No pronator drift  No aphasia/dysarthria  CN 2-12 intact  Normal finger to nose  Normal rapid alternating movements of hands  No nystagmus  No facial droop  NIH Stroke Scale Score = 1    A/P:  - concern for stroke  - Stroke alert    PMH:   has a past medical history of Diverticulosis, Hemorrhoids, Hypercalcemia (5/4/2018), Hypertension, Stroke [facility]), and Vitamin D deficiency (8/13/2020)  PSH:   has a past surgical history that includes Colonoscopy; Nose surgery; Skin lesion excision; Skin biopsy; and pr explore parathyroid glands (Bilateral, 6/18/2020)  Social:  Social History     Substance and Sexual Activity   Alcohol Use Yes    Alcohol/week: 2 0 standard drinks    Types: 2 Cans of beer per week    Comment: SOCIAL     Social History     Tobacco Use   Smoking Status Never Smoker   Smokeless Tobacco Never Used     Social History     Substance and Sexual Activity   Drug Use No     PE:  Vitals:    09/14/22 1658 09/14/22 1700 09/14/22 1704   BP:  (!) 218/127    BP Location:  Right arm    Pulse: 87 88    Resp: 18 18    Temp:   99 1 °F (37 3 °C)   TempSrc: Oral  Tympanic   SpO2: 98% 95%      - 13 point ROS was performed and all are normal unless stated in the history above  - Nursing note reviewed  Vitals reviewed  - Orders placed by myself and/or advanced practitioner / resident     - Previous chart was reviewed  - No language barrier    - History obtained from patient  - There are no limitations to the history obtained  - Critical care time: Not applicable for this patient     - Patient does not need initiation of IV thrombolytics: on Plavix    Code Status: Prior  Advance Directive and Living Will:      Power of :    POLST:      Medications - No data to display  CT stroke alert brain    (Results Pending)   CTA stroke alert (head/neck)    (Results Pending)     Orders Placed This Encounter   Procedures    CT stroke alert brain    CTA stroke alert (head/neck)    Basic metabolic panel    CBC and Platelet    Protime-INR    APTT    HS Troponin 0hr (reflex protocol)    Continuous cardiac monitoring    Continuous pulse oximetry    Neuro checks    Weigh patient and record    Insert peripheral IV    Nursing dysphagia assessment    Nasal cannula oxygen    Fingerstick Glucose (POCT)    Consult to Neurology    Contact and airborne isolation status    ECG 12 lead     Labs Reviewed - No data to display  Time reflects when diagnosis was documented in both MDM as applicable and the Disposition within this note     Time User Action Codes Description Comment    9/14/2022  5:14 PM Aga Fine Add [I63 9] CVA (cerebral vascular accident) [facility])       ED Disposition     None      Follow-up Information    None       Patient's Medications   Discharge Prescriptions    No medications on file     No discharge procedures on file  Prior to Admission Medications   Prescriptions Last Dose Informant Patient Reported? Taking?    CO ENZYME Q-10 PO  Self Yes No   Sig: daily    Cholecalciferol 50 MCG (2000 UT) TABS  Self Yes No   Sig: Take by mouth daily   Potassium 99 MG TABS  Self Yes No   Sig: Take 1 tablet by mouth daily   atorvastatin (LIPITOR) 40 mg tablet  Self No No   Sig: Take 1 tablet (40 mg total) by mouth daily   calcium carbonate (OS-BRUNILDA) 600 MG tablet   No No   Sig: Take one tablet in AM and 2 tabs with dinner   clopidogrel (PLAVIX) 75 mg tablet   No No   Sig: Take 1 tablet (75 mg total) by mouth daily   folic acid (FOLVITE) 1 mg tablet  Self No No   Sig: Take 1 tablet (1,000 mcg total) by mouth daily   losartan (COZAAR) 50 mg tablet   No No   Sig: Take 1 tablet (50 mg total) by mouth daily   magnesium oxide (MAG-OX) 400 mg  Self No No   Sig: Take 1 tablet (400 mg total) by mouth daily   metoprolol tartrate (LOPRESSOR) 25 mg tablet   No No   Sig: Take 1 tablet (25 mg total) by mouth every 12 (twelve) hours      Facility-Administered Medications: None       Portions of the record may have been created with voice recognition software  Occasional wrong word or "sound a like" substitutions may have occurred due to the inherent limitations of voice recognition software  Read the chart carefully and recognize, using context, where substitutions have occurred      Electronically signed by:  Laura Chiu square meters)    Stage 2 Mild CKD (GFR = 60-89 mL/min/1 73 square meters)    Stage 3A Moderate CKD (GFR = 45-59 mL/min/1 73 square meters)    Stage 3B Moderate CKD (GFR = 30-44 mL/min/1 73 square meters)    Stage 4 Severe CKD (GFR = 15-29 mL/min/1 73 square meters)    Stage 5 End Stage CKD (GFR <15 mL/min/1 73 square meters)  Note: GFR calculation is accurate only with a steady state creatinine   COVID19, INFLUENZA A/B, RSV PCR, [facility] - Normal    SARS-CoV-2 Negative  Negative Final    Comment:      INFLUENZA A PCR Negative  Negative Final    Comment:      INFLUENZA B PCR Negative  Negative Final    Comment:      RSV PCR Negative  Negative Final    Comment:      Narrative:     FOR PEDIATRIC PATIENTS - copy/paste COVID Guidelines URL to browser: https://[employer]/  [employer]    SARS-CoV-2 assay is a Nucleic Acid Amplification assay intended for the  qualitative detection of nucleic acid from SARS-CoV-2 in nasopharyngeal  swabs  Results are for the presumptive identification of SARS-CoV-2 RNA  Positive results are indicative of infection with SARS-CoV-2, the virus  causing COVID-19, but do not rule out bacterial infection or co-infection  with other viruses  Laboratories within the [place] and its  territories are required to report all positive results to the appropriate  public health authorities  Negative results do not preclude SARS-CoV-2  infection and should not be used as the sole basis for treatment or other  patient management decisions  Negative results must be combined with  clinical observations, patient history, and epidemiological information  This test has not been FDA cleared or approved  This test has been authorized by FDA under an Emergency Use Authorization  (EUA)   This test is only authorized for the duration of time the  declaration that circumstances exist justifying the authorization of the  emergency use of an in vitro diagnostic tests for detection of SARS-CoV-2  virus and/or diagnosis of COVID-19 infection under section 564(b)(1) of  the Act, 21 U  S C  570VTR-5(C)(6), unless the authorization is terminated  or revoked sooner  The test has been validated but independent review by FDA  and CLIA is pending  Test performed using THE EMPTY JOINT GeneXpert: This RT-PCR assay targets N2,  a region unique to SARS-CoV-2  A conserved region in the E-gene was chosen  for pan-Sarbecovirus detection which includes SARS-CoV-2  CBC AND PLATELET - Normal    WBC 9 76  4 31 - 10 16 Thousand/uL Final    RBC 4 99  3 81 - 5 12 Million/uL Final    Hemoglobin 14 5  11 5 - 15 4 g/dL Final    Hematocrit 44 4  34 8 - 46 1 % Final    MCV 89  82 - 98 fL Final    MCH 29 1  26 8 - 34 3 pg Final    MCHC 32 7  31 4 - 37 4 g/dL Final    RDW 13 2  11 6 - 15 1 % Final    Platelets 423  805 - 390 Thousands/uL Final    MPV 9 0  8 9 - 12 7 fL Final   PROTIME-INR - Normal    Protime 12 0  11 6 - 14 5 seconds Final    INR 0 87  0 84 - 1 19 Final   APTT - Normal    PTT 29  23 - 37 seconds Final    Comment: Therapeutic Heparin Range =  60-90 seconds   HS TROPONIN I 0HR - Normal    hs TnI 0hr 3  "Refer to ACS Flowchart"- see link ng/L Final    Comment:                                              Initial (time 0) result  If >=50 ng/L, Myocardial injury suggested ;  Type of myocardial injury and treatment strategy  to be determined  If 5-49 ng/L, a delta result at 2 hours and or 4 hours will be needed to further evaluate  If <4 ng/L, and chest pain has been >3 hours since onset, patient may qualify for discharge based on the HEART score in the ED  If <5 ng/L and <3hours since onset of chest pain, a delta result at 2 hours will be needed to further evaluate  HS Troponin 99th Percentile URL of a Health Population=12 ng/L with a 95% Confidence Interval of 8-18 ng/L      Second Troponin (time 2 hours)  If calculated delta >= 20 ng/L,  Myocardial injury suggested ; Type of myocardial injury and treatment strategy to be determined  If 5-49 ng/L and the calculated delta is 5-19 ng/L, consult medical service for evaluation  Continue evaluation for ischemia on ecg and other possible etiology and repeat hs troponin at 4 hours  If delta is <5 ng/L at 2 hours, consider discharge based on risk stratification via the HEART score (if in ED), or MAIKEL risk score in IP/Observation  HS Troponin 99th Percentile URL of a Health Population=12 ng/L with a 95% Confidence Interval of 8-18 ng/L    TSH, 3RD GENERATION WITH FREE T4 REFLEX - Normal    TSH 3RD GENERATON 1 770  0 450 - 4 500 uIU/mL Final    Comment: The recommended reference ranges for TSH during pregnancy are as follows:   First trimester 0 1 to 2 5 uIU/mL   Second trimester  0 2 to 3 0 uIU/mL   Third trimester 0 3 to 3 0 uIU/m    Note: Normal ranges may not apply to patients who are transgender, non-binary, or whose legal sex, sex at birth, and gender identity differ  Adult TSH (3rd generation) reference range follows the recommended guidelines of the American [employer] Association, January, 2020  Narrative:     Patients undergoing fluorescein dye angiography may retain small amounts of fluorescein in the body for 48-72 hours post procedure  Samples containing fluorescein can produce falsely depressed TSH values  If the patient had this procedure,a specimen should be resubmitted post fluorescein clearance  HS TROPONIN I 2HR - Normal    hs TnI 2hr 4  "Refer to ACS Flowchart"- see link ng/L Final    Comment:                                              Initial (time 0) result  If >=50 ng/L, Myocardial injury suggested ;  Type of myocardial injury and treatment strategy  to be determined  If 5-49 ng/L, a delta result at 2 hours and or 4 hours will be needed to further evaluate  If <4 ng/L, and chest pain has been >3 hours since onset, patient may qualify for discharge based on the HEART score in the ED    If <5 ng/L and <3hours since onset of chest pain, a delta result at 2 hours will be needed to further evaluate  HS Troponin 99th Percentile URL of a Health Population=12 ng/L with a 95% Confidence Interval of 8-18 ng/L  Second Troponin (time 2 hours)  If calculated delta >= 20 ng/L,  Myocardial injury suggested ; Type of myocardial injury and treatment strategy to be determined  If 5-49 ng/L and the calculated delta is 5-19 ng/L, consult medical service for evaluation  Continue evaluation for ischemia on ecg and other possible etiology and repeat hs troponin at 4 hours  If delta is <5 ng/L at 2 hours, consider discharge based on risk stratification via the HEART score (if in ED), or MAIKEL risk score in IP/Observation  HS Troponin 99th Percentile URL of a Health Population=12 ng/L with a 95% Confidence Interval of 8-18 ng/L  Delta 2hr hsTnI 1  <20 ng/L Final   POCT GLUCOSE - Normal    POC Glucose 101  65 - 140 mg/dl Final     Time reflects when diagnosis was documented in both MDM as applicable and the Disposition within this note       Time User Action Codes Description Comment    9/14/2022  5:14 PM Emanuel Rothman Add [I63 9] CVA (cerebral vascular accident) ([facility] [address] )     9/14/2022  6:41 PM Humera Faustin Add [R26 2] Ambulatory dysfunction           ED Disposition       ED Disposition   Admit    Condition   Stable    Date/Time   Wed Sep 14, 2022  6:41 PM    Comment   Case was discussed with Neurology and the patient's admission status was agreed to be Admission Status: inpatient status to the service of Dr Marisabel Kirkpatrick                  Follow-up Information       Follow up With Specialties Details Why Contact Info Additional [phone] Maritza Ochoa, DO Family Medicine Follow up in 1 week(s)  [address]       Kiera Bhandari MD Endocrinology   [phone] [facility] [place] [address] [facility]       [facility] Neurology Follow up in 4 week(s) Please call the number provided if no one calls you in a week Rena Noonan [phone] [facility] Neurology [address], [address], [place], [place], [address] [facility]          Discharge Medication List as of 9/16/2022  3:39 PM        START taking these medications    Details   aspirin 81 mg chewable tablet Chew 1 tablet (81 mg total) daily for 19 doses, Starting Sat 9/17/2022, Until Thu 10/6/2022, Normal           CONTINUE these medications which have CHANGED    Details   ticagrelor (Brilinta) 90 MG Take 1 tablet (90 mg total) by mouth every 12 (twelve) hours, Starting Fri 9/16/2022, Until Sun 10/16/2022, Normal           CONTINUE these medications which have NOT CHANGED    Details   calcium carbonate (OS-BRUNILDA) 600 MG tablet Take one tablet in AM and 2 tabs with dinner, No Print      Cholecalciferol 50 MCG (2000 UT) TABS Take by mouth daily, Historical Med      CO ENZYME Q-10 PO daily , Starting Fri 10/31/2014, Historical Med      folic acid (FOLVITE) 1 mg tablet Take 1 tablet (1,000 mcg total) by mouth daily, Starting Mon 8/24/2020, Normal      losartan (COZAAR) 50 mg tablet Take 1 tablet (50 mg total) by mouth daily, Starting Thu 6/30/2022, Normal      magnesium oxide (MAG-OX) 400 mg Take 1 tablet (400 mg total) by mouth daily, Starting Thu 8/13/2020, No Print      metoprolol tartrate (LOPRESSOR) 25 mg tablet Take 1 tablet (25 mg total) by mouth every 12 (twelve) hours, Starting Wed 4/20/2022, Normal      Potassium 99 MG TABS Take 1 tablet by mouth daily, Historical Med      atorvastatin (LIPITOR) 40 mg tablet Take 1 tablet (40 mg total) by mouth daily, Starting Thu 7/1/2021, Normal           STOP taking these medications       clopidogrel (PLAVIX) 75 mg tablet Comments:   Reason for Stopping:               Prior to Admission Medications   Prescriptions Last Dose Informant Patient Reported? Taking?    CO ENZYME Q-10 PO 9/14/2022 at Unknown time Self Yes Yes   Sig: daily    Cholecalciferol 50 MCG (2000 UT) TABS 9/14/2022 at Unknown time Self Yes Yes   Sig: Take by mouth daily   Potassium 99 MG TABS 9/14/2022 at Unknown time Self Yes Yes   Sig: Take 1 tablet by mouth daily   atorvastatin (LIPITOR) 40 mg tablet Not Taking at Unknown time Self No No   Sig: Take 1 tablet (40 mg total) by mouth daily   Patient not taking: Reported on 9/15/2022   calcium carbonate (OS-BRUNILDA) 600 MG tablet 9/14/2022 at Unknown time  No Yes   Sig: Take one tablet in AM and 2 tabs with dinner   clopidogrel (PLAVIX) 75 mg tablet 9/14/2022 at Unknown time  No Yes   Sig: Take 1 tablet (75 mg total) by mouth daily   folic acid (FOLVITE) 1 mg tablet 9/14/2022 at Unknown time Self No Yes   Sig: Take 1 tablet (1,000 mcg total) by mouth daily   losartan (COZAAR) 50 mg tablet 9/13/2022 at Unknown time  No Yes   Sig: Take 1 tablet (50 mg total) by mouth daily   magnesium oxide (MAG-OX) 400 mg 9/14/2022 at Unknown time Self No Yes   Sig: Take 1 tablet (400 mg total) by mouth daily   metoprolol tartrate (LOPRESSOR) 25 mg tablet 9/14/2022 at Unknown time  No Yes   Sig: Take 1 tablet (25 mg total) by mouth every 12 (twelve) hours      Facility-Administered Medications: None       Portions of the record may have been created with voice recognition software  Occasional wrong word or "sound a like" substitutions may have occurred due to the inherent limitations of voice recognition software  Read the chart carefully and recognize, using context, where substitutions have occurred      Electronically signed by:  Justice Aaron

## 2022-09-14 NOTE — H&P
NEUROLOGY RESIDENCY - STROKE ADMISSION H&P NOTE     Name: Walter Canmer   Age & Sex: 63 y o  female   MRN: [id number]  Unit/Bed#: PELON   Encounter: [id number]    ASSESSMENT & PLAN     Stroke [facility])  Assessment & Plan  Stroke alert on 9/14/2022  4:53 PM with initial NIHSS of 3 and LKW 15:55, initial Blood Pressure: (!) 218/127  Initial presenting deficits were arm and leg heaviness (leg worse than arm) and difficulty walking  As a result of dramatic improvement with symptoms and non disabling pt was determined to not be a candidate for thrombolysis (TNK)   Exam on 09/14/22:  L leg drift, L leg ataxia and subjective L foot reduced sensation  Current Blood Pressure: (!) 205/104, BP over 24 hours: BP  Min: 171/114  Max: 223/117    Vascular risk factors: elevated cholesterol, elevated a1c and weight   Home meds: Plavix 75mg daily    Workup:  Lab Results   Component Value Date    HGBA1C 6 1 (H) 05/14/2022    CHOLESTEROL 207 (H) 05/14/2022    LDLCALC 142 (H) 05/14/2022    TRIG 89 05/14/2022    INR 0 87 09/14/2022       CTH: Left occipital stroke is old when compared to the prior MRI October 22, 2014   CTA: No LVO   MRI: Pending   Echocardiogram: pending   Telemetry: pending    Pertinent scores:  - NIHSS: 3: L leg drift, L leg ataxia, subjective reduced L foot sensation  Stroke Modified [place] Score: 1 (No significant disability   Able to carry out all usual activities, despite some symptoms)    Impression: likely new infarct vs TIA     Plan:  - Stroke pathway  - Discussed plan with neurology attending, Dr Leighton Cardozo  - BP: Permissive hypertension for first 24 hours up to 220 SBP  - Obtain lipids and A1c  - atorvastatin 40mg qhs  - Maintain glucose <180, SSI for coverage if indicated  - Antiplatelet agents: ASA 81 mg, Plavix 75 mg and Load with ASA 325mg x1  - TTE  - MRI brain pending  - DVT ppx and SCDs  - Monitor on telemetry  - PT/OT/Speech/PM&R input appreciated  - Stroke education      Recommendations for outpatient neurological follow up have yet to be determined  Pending for discharge: Stroke workup    Anticipated Length of Stay:  Patient will be admitted on an Inpatient basis with an anticipated length of stay of  2 midnights  Justification for Hospital Stay: Stroke workup    CHIEF COMPLAINT     Chief Complaint   Patient presents with    Difficulty Walking     Pt reports difficulty walking today as well as L arm/leg heaviness; pt has hx TIA        HISTORY OF PRESENT ILLNESS     Hx and PE limited by: None  Patient last known well: 15:55 09/14/2022  Stroke alert called: 17:17 09/14/2022  Neurology time of arrival: 17:20 09/14/2022    HPI: Kym Rowland is a 63 y o  right handed female with PMhx of CVA x2 in 2014 on Plavix 75mg, HTN, and HLD who presents with arm and leg heaviness (leg worse than arm) and difficulty walking  Her last known well was 15:55 today  She started feeling arm and leg heaviness while standing at her desk at work 16:00 today and then had difficulty walking  Then she asked her co-worker to bring her to the ED  She needed an assistant and had to hold on to the wall to ambulate  In ED, her BP was 218/127, HR 87, afebrile and comfortable in room air  CT H and CTA did not show any acute abnormality or LVO  Patient felt better and her leg felt less heavy after CT imaging and while examining  Denies headache, shortness of breath, nausea/vomiting or fevers/chills  Patient does not smoke  She drinks occasionally  Per patient she has never taken Aspirin before  Only Plavix 75mg daily  TNK Decision: Patient not a candidate  Symptoms resolved/clearly non disabling  Review of previous medical records was completed  REVIEW OF SYSTEMS     Review of Systems   Constitutional: Negative for chills and fever  HENT: Negative for ear pain and sore throat  Eyes: Negative for pain and visual disturbance  Respiratory: Negative for cough and shortness of breath      Cardiovascular: Negative for chest pain and palpitations  Gastrointestinal: Negative for abdominal pain and vomiting  Genitourinary: Negative for dysuria and hematuria  Musculoskeletal: Negative for arthralgias and back pain  Skin: Negative for color change and rash  Neurological: Positive for weakness  Negative for seizures and syncope  All other systems reviewed and are negative  PAST MEDICAL HISTORY     Past Medical History:   Diagnosis Date    Diverticulosis     Hemorrhoids     Hypercalcemia 5/4/2018    Hypertension     controlled    Stroke [facility])     Vitamin D deficiency 8/13/2020       Allergies:    Allergies   Allergen Reactions    Codeine Chest Pain       PAST SURGICAL HISTORY     Past Surgical History:   Procedure Laterality Date    COLONOSCOPY      ONSET 2015    NOSE SURGERY      ME EXPLORE PARATHYROID GLANDS Bilateral 6/18/2020    Procedure: PARATHYROIDECTOMY, MINIMALLY INVASIVE, 3 5 GLAND EXPLORATION, INTRAOPERATIVE PTH MONITORING;  Surgeon: Adri Lozano MD;  Location: [facility];  Service: Surgical Oncology    SKIN BIOPSY      SKIN LESION EXCISION         SOCIAL & FAMILY HISTORY     Social History     Substance and Sexual Activity   Alcohol Use Yes    Alcohol/week: 2 0 standard drinks    Types: 2 Cans of beer per week    Comment: SOCIAL     Substance and Sexual Activity   Alcohol Use Yes    Alcohol/week: 2 0 standard drinks    Types: 2 Cans of beer per week    Comment: SOCIAL        Substance and Sexual Activity   Drug Use No     Social History     Tobacco Use   Smoking Status Never Smoker   Smokeless Tobacco Never Used       Marital Status: /Civil Union   Occupation: Office job  Patient Pre-hospital Living Situation: with spouse  Patient Pre-hospital Level of Mobility: complete independent with ADLs  Patient Pre-hospital Diet Restrictions: none    Family History:  Family History   Problem Relation Age of Onset    No Known Problems Mother     Hypertension Father    Cameron Robertson Parkinsonism Father     Hypertension Sister     Stroke Family     Glaucoma Family     Hypertension Family        Code Status: Level 1 - Full Code  Advance Directive and Living Will:      Power of :    POLST:      OBJECTIVE     Vitals:    22 1741 22 1754 22 1809 22 1824   BP: (!) 223/117 (!) 197/106 (!) 205/104 (!) 205/104   BP Location:       Pulse: 102 86 84 83   Resp: 20 18 20 20   Temp:       TempSrc:       SpO2: 99% 99% 100% 98%   Weight:            Temperature:   Temp (24hrs), Av 1 °F (37 3 °C), Min:99 1 °F (37 3 °C), Max:99 1 °F (37 3 °C)    Temperature: 99 1 °F (37 3 °C)    Intake & Output:  I/O     None          Weights:        Body mass index is 33 15 kg/m²  Weight (last 2 days)     Date/Time Weight    22 17:38:20 87 6 (193 12)          Physical Exam  Vitals and nursing note reviewed  Constitutional:       General: She is not in acute distress  Appearance: She is well-developed  HENT:      Head: Normocephalic and atraumatic  Nose: Nose normal    Eyes:      Extraocular Movements: Extraocular movements intact  Conjunctiva/sclera: Conjunctivae normal       Pupils: Pupils are equal, round, and reactive to light  Cardiovascular:      Rate and Rhythm: Normal rate  Pulmonary:      Effort: Pulmonary effort is normal  No respiratory distress  Abdominal:      Palpations: Abdomen is soft  Tenderness: There is no abdominal tenderness  Musculoskeletal:         General: No swelling  Normal range of motion  Cervical back: Neck supple  Skin:     General: Skin is warm and dry  Neurological:      Mental Status: She is alert and oriented to person, place, and time  Deep Tendon Reflexes:      Reflex Scores:       Tricep reflexes are 3+ on the right side and 3+ on the left side  Bicep reflexes are 3+ on the right side and 3+ on the left side  Brachioradialis reflexes are 3+ on the right side and 3+ on the left side  Patellar reflexes are 3+ on the right side and 3+ on the left side  Achilles reflexes are 2+ on the right side and 2+ on the left side  Neurologic Exam     Mental Status   Oriented to person, place, and time  Level of consciousness: alert    Cranial Nerves     CN II   Visual acuity: normal  Right visual field deficit: lower temporal (reduced vision in right lower temporal from prior stroke per patient) quadrant(s)    CN III, IV, VI   Pupils are equal, round, and reactive to light  CN V   Facial sensation intact  CN VII   Facial expression full, symmetric  CN VIII   CN VIII normal      CN IX, X   CN IX normal    CN X normal      CN XI   CN XI normal      CN XII   CN XII normal      Motor Exam   Muscle bulk: normal  Overall muscle tone: normal    Strength   Strength 5/5 except as noted  Left iliopsoas: 4/5  Left quadriceps: 4/5Within an hr, patient's strength improved to 4 - /5     Sensory Exam   Light touch normal    Reduced pinprick sensation on L foot initially however, it improved in a few minutes     Gait, Coordination, and Reflexes     Reflexes   Right brachioradialis: 3+  Left brachioradialis: 3+  Right biceps: 3+  Left biceps: 3+  Right triceps: 3+  Left triceps: 3+  Right patellar: 3+  Left patellar: 3+  Right achilles: 2+  Left achilles: 2+  Right : 2+  Left : 2+  Right plantar: equivocal  Left plantar: normal  Right ankle clonus: absent  Left ankle clonus: absentNeeds assistant to walk due to Left leg heaviness      NIHSS:  1a Level of Consciousness: 0 = Alert   1b  LOC Questions: 0 = Answers both correctly   1c  LOC Commands: 0 = Obeys both correctly   2  Best Gaze: 0 = Normal   3  Visual: 0 = No visual field loss   4  Facial Palsy: 0=Normal symmetric movement   5a  Motor Right Arm: 0=No drift, limb holds 90 (or 45) degrees for full 10 seconds   5b  Motor Left Arm: 0=No drift, limb holds 90 (or 45) degrees for full 10 seconds   6a   Motor Right Le=No drift, limb holds 90 (or 45) degrees for full 10 seconds   6b  Motor Left Le=Drift, limb holds 90 (or 45) degrees but drifts down before full 10 seconds: does not hit bed   7  Limb Ataxia:  1=Present in one limb   8  Sensory: 1=Mild to moderate sensory loss; patient feels pinprick is less sharp or is dull on the affected side; there is a loss of superficial pain with pinprick but patient is aware She is being touched   9  Best Language:  0=No aphasia, normal   10  Dysarthria: 0=Normal articulation   11  Extinction and Inattention (formerly Neglect): 0=No abnormality   Total Score: 3     Time NIHSS was completed: 17:30    Modified [place] Score:  1 (No significant disability  Able to carry out all usual activities, despite some symptoms)    LABORATORY DATA     Labs: I have personally reviewed pertinent reports  Results from last 7 days   Lab Units 22  1737   WBC Thousand/uL 9 76   HEMOGLOBIN g/dL 14 5   HEMATOCRIT % 44 4   PLATELETS Thousands/uL 295      Results from last 7 days   Lab Units 22  1737   SODIUM mmol/L 134*   POTASSIUM mmol/L 3 7   CHLORIDE mmol/L 107   CO2 mmol/L 18*   BUN mg/dL 17   CREATININE mg/dL 0 79   CALCIUM mg/dL 7 6*              Results from last 7 days   Lab Units 22  1835   INR  0 87   PTT seconds 29               Micro:  Lab Results   Component Value Date    URINECX >100,000 cfu/ml Mixed Contaminants X6 2016    WOUNDCULT Culture too young- will reincubate 2016    WOUNDCULT 3+ Growth of Mixed Skin Radha 2016       IMAGING & DIAGNOSTIC TESTING     Radiology Results: I have personally reviewed pertinent reports  CT stroke alert brain   Final Result by Jaye Moss MD ([id number])   Addendum 1 of 1 by Jaye Moss MD ([id number])   ADDENDUM:      Left occipital stroke is old when compared to the prior MRI 2014  Final         1  No evidence of acute internal hemorrhage  2  Suspect left occipital lobe ischemia, possibly acute to subacute    As this lesion /area does not correspond to the patient's symptoms, follow-up contrast-enhanced brain MRI is recommended to exclude other etiology  I personally discussed this study with Dr Asiya Waggoner on 9/14/2022 at 5:38 PM                 Workstation performed: [id number]         CTA stroke alert (head/neck)   Final Result by Jaye Moss MD (09/14 1749)      No evidence of hemodynamic significant stenosis, aneurysm or dissection  I personally discussed this study with Dr Asiya Waggoner on 9/14/2022 at 5:38 PM                         Workstation performed: [id number]         MRI brain wo contrast    (Results Pending)       Other Diagnostic Testing: I have personally reviewed pertinent reports  ACTIVE MEDICATIONS     Current Facility-Administered Medications   Medication Dose Route Frequency    [START ON 9/15/2022] aspirin chewable tablet 81 mg  81 mg Oral Daily    [START ON 9/15/2022] aspirin tablet 325 mg  325 mg Oral Daily    atorvastatin (LIPITOR) tablet 40 mg  40 mg Oral QPM    [START ON 9/15/2022] Cholecalciferol TABS 2,000 Units  2,000 Units Oral Daily    [START ON 9/15/2022] clopidogrel (PLAVIX) tablet 75 mg  75 mg Oral Daily    [START ON 9/72/2044] folic acid (FOLVITE) tablet 1,000 mcg  1,000 mcg Oral Daily    [START ON 9/15/2022] losartan (COZAAR) tablet 50 mg  50 mg Oral Daily    [START ON 9/15/2022] magnesium oxide (MAG-OX) tablet 400 mg  400 mg Oral Daily    metoprolol tartrate (LOPRESSOR) tablet 25 mg  25 mg Oral Q12H    [START ON 9/15/2022] potassium chloride (K-DUR,KLOR-CON) CR tablet 10 mEq  10 mEq Oral Daily       HOME MEDICATIONS     Prior to Admission medications    Medication Sig Start Date End Date Taking?  Authorizing Provider   calcium carbonate (OS-BRUNILDA) 600 MG tablet Take one tablet in AM and 2 tabs with dinner 2/14/22  Yes Daily Barone MD   clopidogrel (PLAVIX) 75 mg tablet Take 1 tablet (75 mg total) by mouth daily 6/14/22  Yes Ledy Paez, DO atorvastatin (LIPITOR) 40 mg tablet Take 1 tablet (40 mg total) by mouth daily 7/1/21   Donzell Bio, DO   Cholecalciferol 50 MCG (2000 UT) TABS Take by mouth daily    Historical Provider, MD   CO ENZYME Q-10 PO daily  10/31/14   Historical Provider, MD   folic acid (FOLVITE) 1 mg tablet Take 1 tablet (1,000 mcg total) by mouth daily 8/24/20   Donzell Bio, DO   losartan (COZAAR) 50 mg tablet Take 1 tablet (50 mg total) by mouth daily 6/30/22   Donzell Bio, DO   magnesium oxide (MAG-OX) 400 mg Take 1 tablet (400 mg total) by mouth daily 8/13/20   Nova Alvarez PA-C   metoprolol tartrate (LOPRESSOR) 25 mg tablet Take 1 tablet (25 mg total) by mouth every 12 (twelve) hours 4/20/22   Donzell Bio, DO   Potassium 99 MG TABS Take 1 tablet by mouth daily    Historical Provider, MD     ACTIVE MEDICATIONS  ==  MD Jarvis Shelley 75 Neurology Residency, PGY-2    Counseling / Coordination of Care  Total time spent today 60 minutes  Greater than 50% of total time was spent with the patient and / or family counseling and / or coordination of care

## 2022-09-15 ENCOUNTER — APPOINTMENT (INPATIENT)
Dept: NON INVASIVE DIAGNOSTICS | Facility: HOSPITAL | Age: 62
DRG: 066 | End: 2022-09-15
Payer: COMMERCIAL

## 2022-09-15 LAB
ANION GAP SERPL CALCULATED.3IONS-SCNC: 6 MMOL/L (ref 4–13)
AORTIC ROOT: 2.6 CM
APICAL FOUR CHAMBER EJECTION FRACTION: 68 %
ASCENDING AORTA: 3.3 CM
ATRIAL RATE: 750 BPM
BASOPHILS # BLD AUTO: 0.05 THOUSANDS/ΜL (ref 0–0.1)
BASOPHILS NFR BLD AUTO: 1 % (ref 0–1)
BUN SERPL-MCNC: 11 MG/DL (ref 5–25)
CALCIUM SERPL-MCNC: 8.4 MG/DL (ref 8.3–10.1)
CHLORIDE SERPL-SCNC: 105 MMOL/L (ref 96–108)
CHOLEST SERPL-MCNC: 206 MG/DL
CO2 SERPL-SCNC: 26 MMOL/L (ref 21–32)
CREAT SERPL-MCNC: 0.74 MG/DL (ref 0.6–1.3)
E WAVE DECELERATION TIME: 239 MS
EOSINOPHIL # BLD AUTO: 0.23 THOUSAND/ΜL (ref 0–0.61)
EOSINOPHIL NFR BLD AUTO: 3 % (ref 0–6)
ERYTHROCYTE [DISTWIDTH] IN BLOOD BY AUTOMATED COUNT: 13.1 % (ref 11.6–15.1)
EST. AVERAGE GLUCOSE BLD GHB EST-MCNC: 123 MG/DL
FRACTIONAL SHORTENING: 36 (ref 28–44)
GFR SERPL CREATININE-BSD FRML MDRD: 88 ML/MIN/1.73SQ M
GLUCOSE SERPL-MCNC: 104 MG/DL (ref 65–140)
HBA1C MFR BLD: 5.9 %
HCT VFR BLD AUTO: 44.6 % (ref 34.8–46.1)
HDLC SERPL-MCNC: 57 MG/DL
HGB BLD-MCNC: 14.6 G/DL (ref 11.5–15.4)
IMM GRANULOCYTES # BLD AUTO: 0.04 THOUSAND/UL (ref 0–0.2)
IMM GRANULOCYTES NFR BLD AUTO: 0 % (ref 0–2)
INTERVENTRICULAR SEPTUM IN DIASTOLE (PARASTERNAL SHORT AXIS VIEW): 0.9 CM
INTERVENTRICULAR SEPTUM: 0.9 CM (ref 0.6–1.1)
LAAS-AP2: 14.5 CM2
LAAS-AP4: 17 CM2
LDLC SERPL CALC-MCNC: 122 MG/DL (ref 0–100)
LEFT ATRIUM SIZE: 3.2 CM
LEFT INTERNAL DIMENSION IN SYSTOLE: 2.7 CM (ref 2.1–4)
LEFT VENTRICULAR INTERNAL DIMENSION IN DIASTOLE: 4.2 CM (ref 3.5–6)
LEFT VENTRICULAR POSTERIOR WALL IN END DIASTOLE: 0.9 CM
LEFT VENTRICULAR STROKE VOLUME: 52 ML
LVSV (TEICH): 52 ML
LYMPHOCYTES # BLD AUTO: 3.1 THOUSANDS/ΜL (ref 0.6–4.47)
LYMPHOCYTES NFR BLD AUTO: 35 % (ref 14–44)
MCH RBC QN AUTO: 28.7 PG (ref 26.8–34.3)
MCHC RBC AUTO-ENTMCNC: 32.7 G/DL (ref 31.4–37.4)
MCV RBC AUTO: 88 FL (ref 82–98)
MONOCYTES # BLD AUTO: 0.65 THOUSAND/ΜL (ref 0.17–1.22)
MONOCYTES NFR BLD AUTO: 7 % (ref 4–12)
MV E'TISSUE VEL-SEP: 10 CM/S
MV PEAK A VEL: 0.81 M/S
MV PEAK E VEL: 59 CM/S
MV STENOSIS PRESSURE HALF TIME: 69 MS
MV VALVE AREA P 1/2 METHOD: 3.19
NEUTROPHILS # BLD AUTO: 4.85 THOUSANDS/ΜL (ref 1.85–7.62)
NEUTS SEG NFR BLD AUTO: 54 % (ref 43–75)
NRBC BLD AUTO-RTO: 0 /100 WBCS
P AXIS: 85 DEGREES
PA ADP BLD-ACNC: 154 PRU (ref 194–418)
PLATELET # BLD AUTO: 328 THOUSANDS/UL (ref 149–390)
PMV BLD AUTO: 9.2 FL (ref 8.9–12.7)
POTASSIUM SERPL-SCNC: 3.3 MMOL/L (ref 3.5–5.3)
QRS AXIS: 51 DEGREES
QRSD INTERVAL: 90 MS
QT INTERVAL: 362 MS
QTC INTERVAL: 459 MS
RBC # BLD AUTO: 5.09 MILLION/UL (ref 3.81–5.12)
RIGHT ATRIUM AREA SYSTOLE A4C: 13.1 CM2
RIGHT VENTRICLE ID DIMENSION: 3.7 CM
SL CV LEFT ATRIUM LENGTH A2C: 5.4 CM
SL CV LV EF: 60
SL CV PED ECHO LEFT VENTRICLE DIASTOLIC VOLUME (MOD BIPLANE) 2D: 79 ML
SL CV PED ECHO LEFT VENTRICLE SYSTOLIC VOLUME (MOD BIPLANE) 2D: 27 ML
SODIUM SERPL-SCNC: 137 MMOL/L (ref 135–147)
T WAVE AXIS: 72 DEGREES
TR MAX PG: 19 MMHG
TR PEAK VELOCITY: 2.2 M/S
TRICUSPID VALVE PEAK REGURGITATION VELOCITY: 2.19 M/S
TRIGL SERPL-MCNC: 136 MG/DL
VENTRICULAR RATE: 97 BPM
WBC # BLD AUTO: 8.92 THOUSAND/UL (ref 4.31–10.16)

## 2022-09-15 PROCEDURE — 85025 COMPLETE CBC W/AUTO DIFF WBC: CPT

## 2022-09-15 PROCEDURE — 93306 TTE W/DOPPLER COMPLETE: CPT | Performed by: INTERNAL MEDICINE

## 2022-09-15 PROCEDURE — 97167 OT EVAL HIGH COMPLEX 60 MIN: CPT

## 2022-09-15 PROCEDURE — 97163 PT EVAL HIGH COMPLEX 45 MIN: CPT

## 2022-09-15 PROCEDURE — 85576 BLOOD PLATELET AGGREGATION: CPT | Performed by: PSYCHIATRY & NEUROLOGY

## 2022-09-15 PROCEDURE — 80061 LIPID PANEL: CPT

## 2022-09-15 PROCEDURE — 83036 HEMOGLOBIN GLYCOSYLATED A1C: CPT

## 2022-09-15 PROCEDURE — 99232 SBSQ HOSP IP/OBS MODERATE 35: CPT | Performed by: PSYCHIATRY & NEUROLOGY

## 2022-09-15 PROCEDURE — 93306 TTE W/DOPPLER COMPLETE: CPT

## 2022-09-15 PROCEDURE — 93010 ELECTROCARDIOGRAM REPORT: CPT | Performed by: INTERNAL MEDICINE

## 2022-09-15 PROCEDURE — 80048 BASIC METABOLIC PNL TOTAL CA: CPT

## 2022-09-15 RX ORDER — LABETALOL HYDROCHLORIDE 5 MG/ML
10 INJECTION, SOLUTION INTRAVENOUS EVERY 6 HOURS PRN
Status: DISCONTINUED | OUTPATIENT
Start: 2022-09-15 | End: 2022-09-16 | Stop reason: HOSPADM

## 2022-09-15 RX ORDER — ASPIRIN 81 MG/1
81 TABLET, CHEWABLE ORAL DAILY
Status: DISCONTINUED | OUTPATIENT
Start: 2022-09-15 | End: 2022-09-15

## 2022-09-15 RX ORDER — ASPIRIN 325 MG
325 TABLET ORAL ONCE
Status: COMPLETED | OUTPATIENT
Start: 2022-09-15 | End: 2022-09-15

## 2022-09-15 RX ORDER — ASPIRIN 81 MG/1
81 TABLET, CHEWABLE ORAL DAILY
Status: DISCONTINUED | OUTPATIENT
Start: 2022-09-16 | End: 2022-09-15

## 2022-09-15 RX ORDER — ENOXAPARIN SODIUM 100 MG/ML
40 INJECTION SUBCUTANEOUS EVERY 12 HOURS SCHEDULED
Status: DISCONTINUED | OUTPATIENT
Start: 2022-09-15 | End: 2022-09-16 | Stop reason: HOSPADM

## 2022-09-15 RX ORDER — ASPIRIN 81 MG/1
81 TABLET, CHEWABLE ORAL DAILY
Status: DISCONTINUED | OUTPATIENT
Start: 2022-09-15 | End: 2022-09-16 | Stop reason: HOSPADM

## 2022-09-15 RX ORDER — POTASSIUM CHLORIDE 20 MEQ/1
40 TABLET, EXTENDED RELEASE ORAL ONCE
Status: COMPLETED | OUTPATIENT
Start: 2022-09-15 | End: 2022-09-15

## 2022-09-15 RX ORDER — ACETAMINOPHEN 325 MG/1
650 TABLET ORAL EVERY 6 HOURS PRN
Status: DISCONTINUED | OUTPATIENT
Start: 2022-09-15 | End: 2022-09-16 | Stop reason: HOSPADM

## 2022-09-15 RX ADMIN — Medication 2000 UNITS: at 09:55

## 2022-09-15 RX ADMIN — ENOXAPARIN SODIUM 40 MG: 40 INJECTION SUBCUTANEOUS at 20:23

## 2022-09-15 RX ADMIN — MAGNESIUM OXIDE TAB 400 MG (241.3 MG ELEMENTAL MG) 400 MG: 400 (241.3 MG) TAB at 09:56

## 2022-09-15 RX ADMIN — ASPIRIN 81 MG CHEWABLE TABLET 81 MG: 81 TABLET CHEWABLE at 16:12

## 2022-09-15 RX ADMIN — POTASSIUM CHLORIDE 10 MEQ: 750 TABLET, EXTENDED RELEASE ORAL at 09:56

## 2022-09-15 RX ADMIN — CLOPIDOGREL BISULFATE 75 MG: 75 TABLET ORAL at 09:56

## 2022-09-15 RX ADMIN — METOPROLOL TARTRATE 25 MG: 25 TABLET, FILM COATED ORAL at 06:26

## 2022-09-15 RX ADMIN — ENOXAPARIN SODIUM 40 MG: 40 INJECTION SUBCUTANEOUS at 10:33

## 2022-09-15 RX ADMIN — LOSARTAN POTASSIUM 50 MG: 50 TABLET, FILM COATED ORAL at 09:56

## 2022-09-15 RX ADMIN — ENOXAPARIN SODIUM 40 MG: 40 INJECTION SUBCUTANEOUS at 01:46

## 2022-09-15 RX ADMIN — POTASSIUM CHLORIDE 40 MEQ: 1500 TABLET, EXTENDED RELEASE ORAL at 09:55

## 2022-09-15 RX ADMIN — ATORVASTATIN CALCIUM 40 MG: 40 TABLET, FILM COATED ORAL at 16:12

## 2022-09-15 RX ADMIN — FOLIC ACID 1000 MCG: 1 TABLET ORAL at 09:55

## 2022-09-15 RX ADMIN — ASPIRIN 325 MG ORAL TABLET 325 MG: 325 PILL ORAL at 04:32

## 2022-09-15 NOTE — PROGRESS NOTES
NEUROLOGY RESIDENCY PROGRESS NOTE     Name: Rai Tejada   Age & Sex: 63 y o  female   MRN: [id number]  Unit/Bed#: [facility] 723-01   Encounter: [id number]    Rai Tejada will need follow up in in 4 weeks with neurovascular attending/resident or AP  She will not require outpatient neurological testing  Pending for discharge: Echo pending, PT/OT/PMR eval pending    ASSESSMENT & PLAN     * Stroke [facility])  Assessment & Plan  Stroke alert on 9/14/2022  4:53 PM with initial NIHSS of 3 and LKW 15:55, initial Blood Pressure: (!) 218/127  Initial presenting deficits were arm and leg heaviness (leg worse than arm) and difficulty walking  As a result of dramatic improvement with symptoms and non disabling pt was determined to not be a candidate for thrombolysis (TNK)   Exam on 09/15/22:  L leg drift, L leg ataxia and subjective L foot reduced sensation  Current Blood Pressure: 167/99, BP over 24 hours: BP  Min: 167/99  Max: 223/117    Vascular risk factors: elevated cholesterol, elevated a1c and weight   Home meds: Plavix 75mg daily  Per patient she has never taken aspirin before  Per chart review she was put on  previously  Workup:    A1c 5 9, , P2Y12 154   CTH: Left occipital stroke is old when compared to the prior MRI October 22, 2014   CTA: No LVO   MRI: No evidence of acute infarct, acute intracranial hemorrhage or mass   Echocardiogram: pending   Telemetry: unremarkable    Pertinent scores:  - NIHSS: 3: L leg drift, L leg ataxia, subjective reduced L foot sensation  Stroke Modified [place] Score: 1 (No significant disability   Able to carry out all usual activities, despite some symptoms)    Impression: likely MRI negative stroke since MRI is negative and patient is still having subjective symptoms    Plan:  - Stroke pathway  - Discussed plan with neurology attending, Dr Bita Puckett  - BP: Permissive hypertension for first 24-48 hours up to 220 SBP then after 48hrs, BP goal: normotension  - atorvastatin 40mg qhs  - Maintain glucose <180, SSI for coverage if indicated  - Antiplatelet agents: ASA 81 mg, Plavix 75 mg for now  Since she failed both ASA and Plavix, will consider Alice (price check before)  - TTE  - DVT ppx and SCDs  - Monitor on telemetry  - PT/OT/Speech/PM&R input appreciated  - Stroke education    Essential hypertension  Assessment & Plan  Hold home losartan 50mg daily and metoprolol tartrate 25mg BID  Labetalol PRN for SBP >220 or DBP >110       SUBJECTIVE     Patient is a 67yo female with a past medical history of 2 CVAs in 2014, HTN and HLD who presented yesterday with L arm and L leg heaviness with associated difficulty ambulating that started 1 hour before she came to the hospital  NIHSS Stroke score was a 3 on arrival due to L leg drift, L leg ataxia, and subjectively reduced foot sensation  She quickly improved afterwards so no TNK was given  CT Head was taken with no evidence of acute hemorrhage and MRI head showed no acute intracranial pathology (chronic infarct on L occipital lobe and lacunar infarct R caudate head)  No acute events overnight  This morning the patient states she is feeling okay, just some residual heaviness in L arm and L leg, but better than yesterday  Reports having some frontal headaches  No nausea/vomiting, no shortness of breath, no fevers/chills  Review of Systems   Constitutional: Negative for chills and fever  HENT: Negative for ear pain, sore throat and voice change  Eyes: Negative for pain and visual disturbance  Respiratory: Negative for cough and shortness of breath  Cardiovascular: Negative for chest pain and palpitations  Gastrointestinal: Negative for abdominal pain and vomiting  Genitourinary: Negative for dysuria and hematuria  Musculoskeletal: Negative for arthralgias and back pain  Skin: Negative for color change and rash  Neurological: Positive for dizziness, weakness and headaches  Negative for seizures, syncope and speech difficulty  All other systems reviewed and are negative  OBJECTIVE     Patient ID: Beltran Sierra is a 63 y o  female  Vitals:    09/15/22 0310 09/15/22 0431 09/15/22 0619 09/15/22 1018   BP: 168/100 167/100 167/99 167/99   BP Location:       Pulse: 60 64 64 60   Resp:       Temp:       TempSrc:       SpO2: 91% 92% 95%    Weight:    87 5 kg (193 lb)   Height:    5' 4" (1 626 m)   Afebrile, tmax 99 1   167 - 223 / 90 - 117 (max was 223/117, 218/127 on admission and 167/99 this AM)  HR 60 - 106   95% RA    Physical Exam  Vitals and nursing note reviewed  Constitutional:       General: She is not in acute distress  Appearance: She is well-developed  HENT:      Head: Normocephalic and atraumatic  Eyes:      Conjunctiva/sclera: Conjunctivae normal    Cardiovascular:      Rate and Rhythm: Normal rate  Pulmonary:      Effort: Pulmonary effort is normal  No respiratory distress  Abdominal:      Palpations: Abdomen is soft  Tenderness: There is no abdominal tenderness  Musculoskeletal:      Cervical back: Neck supple  Skin:     General: Skin is warm and dry  Neurological:      Mental Status: She is alert          Pt was resting comfortably in bed    Neurologic Exam     Alert and Oriented x4   CNs  II - Reduced visual acuity in R eye, RLQ (able to detect movement but not # of fingers)  Oculomotor movements normal   Facial sensation normal bilaterally  Facial movements normal  Hearing normal  Uvula/soft palate normal  Tongue: not atrophic, no fasciculations, and no deviation  Sternocleidomastoid strength normal     Motor: strength 5/5 BL UEs  5/5 RLE 4- LLE  Light touch sensation normal throughout     Reflexes:  3+ brisk reflexes throughout  Plantar reflex with toe movement upward on L side; Normal R    Subtle movement of L hand with pronator drift, although unclear if this is due to weakness    Unsteady gait, pt states she feels lack of control over L upper thigh  Romberg negative      LABORATORY DATA     Labs: I have personally reviewed pertinent reports  Hg A1C was 6 1 in May 2022    Results from last 7 days   Lab Units 09/15/22  0527 09/14/22  1737   WBC Thousand/uL 8 92 9 76   HEMOGLOBIN g/dL 14 6 14 5   HEMATOCRIT % 44 6 44 4   PLATELETS Thousands/uL 328 295   NEUTROS PCT % 54  --    MONOS PCT % 7  --       Results from last 7 days   Lab Units 09/15/22  0527 09/14/22  1737   SODIUM mmol/L 137 134*   POTASSIUM mmol/L 3 3* 3 7   CHLORIDE mmol/L 105 107   CO2 mmol/L 26 18*   BUN mg/dL 11 17   CREATININE mg/dL 0 74 0 79   CALCIUM mg/dL 8 4 7 6*              Results from last 7 days   Lab Units 09/14/22  1835   INR  0 87   PTT seconds 29       IMAGING & DIAGNOSTIC TESTING     Radiology Results: I have personally reviewed pertinent reports  CTH and MRI head previously mentioned, pending Echo today    MRI brain wo contrast   Final Result by David Frias MD (09/15 0006)      No evidence of acute infarct, acute intracranial hemorrhage or mass  Workstation performed: [id number]         CT stroke alert brain   Final Result by Lisa Connor MD (09/14 1757)   Addendum 1 of 1 by Lisa Connor MD (09/14 1757)   ADDENDUM:      Left occipital stroke is old when compared to the prior MRI October 22, 2014  Final         1  No evidence of acute internal hemorrhage  2  Suspect left occipital lobe ischemia, possibly acute to subacute  As this lesion /area does not correspond to the patient's symptoms, follow-up contrast-enhanced brain MRI is recommended to exclude other etiology  I personally discussed this study with Dr Jennifer Roth on 9/14/2022 at 5:38 PM                 Workstation performed: [id number]         CTA stroke alert (head/neck)   Final Result by Lisa Connor MD (09/14 1749)      No evidence of hemodynamic significant stenosis, aneurysm or dissection              I personally discussed this study with Dr Jennifer Roth on 9/14/2022 at 5:38 PM                         Workstation performed: [id number]             Other Diagnostic Testing: I have personally reviewed pertinent reports  ACTIVE MEDICATIONS     Current Facility-Administered Medications   Medication Dose Route Frequency    acetaminophen (TYLENOL) tablet 650 mg  650 mg Oral Q6H PRN    aspirin chewable tablet 81 mg  81 mg Oral Daily    atorvastatin (LIPITOR) tablet 40 mg  40 mg Oral QPM    cholecalciferol (VITAMIN D3) tablet 2,000 Units  2,000 Units Oral Daily    clopidogrel (PLAVIX) tablet 75 mg  75 mg Oral Daily    enoxaparin (LOVENOX) subcutaneous injection 40 mg  40 mg Subcutaneous Y42F CATE    folic acid (FOLVITE) tablet 1,000 mcg  1,000 mcg Oral Daily    labetalol (NORMODYNE) injection 10 mg  10 mg Intravenous Q6H PRN    magnesium oxide (MAG-OX) tablet 400 mg  400 mg Oral Daily    potassium chloride (K-DUR,KLOR-CON) CR tablet 10 mEq  10 mEq Oral Daily       Prior to Admission medications    Medication Sig Start Date End Date Taking?  Authorizing Provider   calcium carbonate (OS-BRUNILDA) 600 MG tablet Take one tablet in AM and 2 tabs with dinner 2/14/22  Yes Nuria Fofana MD   Cholecalciferol 50 MCG (2000 UT) TABS Take by mouth daily   Yes Historical Provider, MD   clopidogrel (PLAVIX) 75 mg tablet Take 1 tablet (75 mg total) by mouth daily 6/14/22  Yes Victor Hugo Ford DO   CO ENZYME Q-10 PO daily  10/31/14  Yes Historical Provider, MD   folic acid (FOLVITE) 1 mg tablet Take 1 tablet (1,000 mcg total) by mouth daily 8/24/20  Yes Victor Hugo Ford DO   losartan (COZAAR) 50 mg tablet Take 1 tablet (50 mg total) by mouth daily 6/30/22  Yes Jenna Ivan DO   magnesium oxide (MAG-OX) 400 mg Take 1 tablet (400 mg total) by mouth daily 8/13/20  Yes Nova Alvarez PA-C   metoprolol tartrate (LOPRESSOR) 25 mg tablet Take 1 tablet (25 mg total) by mouth every 12 (twelve) hours 4/20/22  Yes Jenna Ivan DO   Potassium 99 MG TABS Take 1 tablet by mouth daily Yes Historical Provider, MD   atorvastatin (LIPITOR) 40 mg tablet Take 1 tablet (40 mg total) by mouth daily  Patient not taking: Reported on 9/15/2022 7/1/21   Ford Cosby,        VTE Pharmacologic Prophylaxis: Enoxaparin (Lovenox)  VTE Mechanical Prophylaxis: sequential compression device    ==    Julio Francois, Medical Student    Michoacano Smith MD   [address] Neurology Residency, PGY-2

## 2022-09-15 NOTE — PHYSICAL THERAPY NOTE
Physical Therapy Evaluation    Patient Name: Rai VERNON Date: 9/15/2022     Problem List  Principal Problem:    Stroke [facility])  Active Problems:    Essential hypertension       Past Medical History  Past Medical History:   Diagnosis Date    Diverticulosis     Hemorrhoids     Hypercalcemia 5/4/2018    Hypertension     controlled    Stroke [facility])     Vitamin D deficiency 8/13/2020        Past Surgical History  Past Surgical History:   Procedure Laterality Date    COLONOSCOPY      ONSET 2015    NOSE SURGERY      W[place] EXPLORE PARATHYROID GLANDS Bilateral 6/18/2020    Procedure: PARATHYROIDECTOMY, MINIMALLY INVASIVE, 3 5 GLAND EXPLORATION, INTRAOPERATIVE PTH MONITORING;  Surgeon: Leisa Resendez MD;  [facility]: [facility];  Service: Surgical Oncology    SKIN BIOPSY      SKIN LESION EXCISION        09/15/22 0814   PT Last Visit   PT Visit Date 09/15/22   Note Type   Note type Evaluation   Pain Assessment   Pain Assessment Tool 0-10   Pain Score No Pain   Restrictions/Precautions   Weight Bearing Precautions Per Order No   Other Precautions Multiple lines; Chair Alarm; Bed Alarm  (L UE/LE ataxia)   Home Living   Type of Home House  [facility])   Home Layout One level;Performs ADLs on one level  (2 steps to enter home)   Bathroom Shower/Tub Walk-in shower   Bathroom Toilet Raised  (has low + elevated toilet)   Bathroom Accessibility Accessible   Prior Function   Level of [place] Independent with ADLs and functional mobility   Lives With [place] Help From Family  ()   ADL Assistance Independent   IADLs Independent   Falls in the last 6 months 0   Comments Patient denies use of AD prior to admission;  works 8 hrs day mon-fri, pt home alone   General   Family/Caregiver Present Yes  ()   Cognition   Overall Cognitive Status WFL   Arousal/Participation Cooperative   Attention Within functional limits   Orientation Level Oriented X4   Memory Within functional limits   Following Commands Follows one step commands with increased time or repetition   Subjective   Subjective Pt cooperative and agreeable to participate in PT   RLE Assessment   RLE Assessment X  (4+/5 overall)   LLE Assessment   LLE Assessment X  (3+/5 overall)   Coordination   Finger to Nose & Finger to Finger  Impaired   Heel to [place] Impaired  ((+) heel to shin on L side)   Light Touch   RLE Light Touch Grossly intact   LLE Light Touch Grossly intact   Bed Mobility   Rolling R Unable to assess   Rolling L Unable to assess   Sit to Supine Unable to assess   Additional Comments At end of session, pt was seated on bedside chair with call bell and all other personal needs within functional reach  (pt presented on toilet)   Transfers   Sit to Stand 4  Minimal assistance   Additional items Assist x 1; Increased time required;Verbal cues   Stand to Sit 4  Minimal assistance   Additional items Verbal cues; Increased time required   Ambulation/Elevation   Gait pattern L Foot drag;L Hemiparesis; Improper Weight shift;Narrow CATRACHO; Decreased foot clearance;Decreased L stance; Inconsistent inan; Short stride; Ataxia; Excessively slow;Decreased heel strike;Decreased toe off   Gait Assistance 4  Minimal assist   Additional items Assist x 1  (HHA w/ SBA of therapist following behind with chair)   Distance 60'x2   Stair Management Assistance 3  Moderate assist   Additional items Assist x 1   Stair Management Technique One rail R;One rail L;Foreward;Nonreciprocal  (Pt performs alternating pattern on some steps)   Number of Stairs 7   Balance   Static Sitting Fair +   Dynamic Sitting Fair +   Static Standing Fair -   Dynamic Standing Poor +   Ambulatory Poor +   Endurance Deficit   Endurance Deficit Yes   Endurance Deficit Description standing rest break   Activity Tolerance   Activity Tolerance Patient limited by fatigue   Medical Staff Made Aware [address] Mary Jo Hilliard OT; Kyle Wilkes DPT   Nurse Made Aware Yes   Assessment   Prognosis Fair   Problem List Decreased strength;Decreased endurance; Impaired balance;Decreased mobility; Decreased coordination   Assessment Pt is a 64 y/o female admitted to [facility] on 9/14/22 with a primary diagnosis of stroke  Patient has a pmhx of hypercalcemia, HTN, and stroke all of which affect PT treatment  PT was consulted to evaluate pt's functional mobility and discharge needs  Upon evaluation, pt presents as Gary for transfers and ambulation and a ModA for stairs  Pt presents with the following impairments: decreased strength, decreased endurance, impaired balance, decreased mobility, and decreased coordination  The patient is considered a high complexity pt d/t Ongoing medical management for primary dx, Decreased activity tolerance compared to baseline, Fall risk, trending lab values, increased reliance of more restrictive AD than baseline, diagnostic imaging pending, increased assistance needed from caregiver at current time  The patient's AM-PAC Basic Mobility Inpatient Short Form Raw Score is 17  A Raw score of greater than 16 suggests the patient may benefit from discharge to home  Please also refer to the recommendation of the Physical Therapist for safe discharge planning  However, pt is currently functioning below her baseline and would benefit from rehab to address her impairments  The patient would benefit from skilled PT services in order to address the aforementioned impairments  At the end of tx, the patient was left seated on bedside chair with chair alarm on, call bell and all other personal needs within functional reach  D/c recommendations acute rehab     Barriers to Discharge Decreased caregiver support   Goals   Patient Goals To return home with    STG Expiration Date 09/29/22   Short Term Goal #1 In 14 days, patient will be able to 1) perform bed mobility with S assistance in order to promote functional independence 2) perform transfers with S assistance in order to decrease caregiver burden 3) ambulate 150 ft with S assistance and least restrictive AD in order to increase functional mobility 4) Negotiate 2 steps with S  in order to easily negotiate steps to enter home  Plan   Treatment/Interventions ADL retraining;Functional transfer training;LE strengthening/ROM; Elevations; Therapeutic exercise; Endurance training;Patient/family training;Equipment eval/education;Gait training;Bed mobility;Spoke to nursing   PT Frequency 3-5x/wk   Recommendation   PT Discharge Recommendation (S)  Post acute rehabilitation services  (PMR)   Equipment Recommended Total Immersion Package Recommended Wheeled walker   Additional Comments 2 Pt may progress to home with OPPT pending progress during hospital stay   [facility] [address] Mobility Inpatient   Turning in Bed Without Bedrails 3   Lying on Back to Sitting on Edge of Flat Bed 3   Moving Bed to Chair 3   Standing Up From Chair 3   Walk in Room 3   Climb 3-5 Stairs 2   Basic Mobility Inpatient Raw Score 17   Basic Mobility Standardized Score 39 67   Highest Level Of Mobility   -[facility] Goal 5: Stand one or more mins   -HLM Achieved 7: Walk 25 feet or more   Modified [place] Scale   Modified Tess Scale 3   Barthel Index   Feeding 5   Bathing 0   Grooming Score 0   Dressing Score 5   Bladder Score 10   Bowels Score 10   Toilet Use Score 5   Transfers (Bed/Chair) Score 10   Mobility (Level Surface) Score 10   Stairs Score 5   Barthel Index Score 60   Daniela Bates, SPT

## 2022-09-15 NOTE — CASE MANAGEMENT
Case Management Assessment & Discharge Planning Note    Patient name Rene Deluna  Location [facility] 719/[facility] 301-06 MRN [id number]  : 1960 Date 9/15/2022       Current Admission Date: 2022  Current Admission Diagnosis:Stroke [facility])   Patient Active Problem List    Diagnosis Date Noted    Hypoparathyroidism ([facility] ) 2020    Status post parathyroidectomy ([facility] ) 2020    Hypocalcemia 2020    BMI 32 0-32 9,adult 2020    DDD (degenerative disc disease), lumbar 2020    Bilateral sciatica 2020    Lumbar disc herniation 2020    Spinal stenosis of lumbar region 2020    Lumbar spondylosis 2020    Lumbar radiculopathy 2020    History of lacunar cerebrovascular accident (CVA) 2019    Pain in both lower extremities 2019    Bilateral low back pain with sciatica 2019    Prediabetes 2017    Enlarged thyroid 2017    Essential hypertension 2015    Hyperlipidemia, unspecified 2015    Simple goiter 2015    Stroke ([facility] ) 2015      LOS (days): 1  Geometric Mean LOS (GMLOS) (days):   Days to GMLOS:     OBJECTIVE:    Risk of Unplanned Readmission Score: 8 35         Current admission status: Inpatient       Preferred Pharmacy:   [address] [place] [address] [address] [place] [address] Ana Luisa MARISSA Lidia 38 [facility]  Phone: [phone] Fax: [phone] [address] [place] [address] O  [address]  [address] [place] [address]  Phone: [phone] Fax: [address], [address]  [address] [place] [address]  Phone: [phone] Fax: [phone]    Primary Care Provider: Radha Morris DO    Primary Insurance: CAPITAL  Secondary Insurance:     ASSESSMENT:  [address], [address] [facility] Representative - Spouse   Primary Phone: [phone] (Mobile)               Advance Directives  Does patient have a [address]?: No  Was patient offered paperwork?: Yes (declined)  Does patient currently have a Health Care decision maker?: Yes, please see Health Care Proxy section (, Jessica Still [phone])  Does patient have Advance Directives?: No  Was patient offered paperwork?: Yes (declined)  Primary Contact: , Jessica Still [phone]         Readmission Root Cause  30 Day Readmission: No    Patient Information  Admitted from[de-identified] Home  Mental Status: Alert  During Assessment patient was accompanied by: Not accompanied during assessment  Assessment information provided by[de-identified] Patient  Primary Caregiver: Self  Support Systems: Spouse/significant other  [place] of Residence: [address] [facility] you live [address]?: [address] entry access options   Select all that apply : Stairs  Number of steps to enter home : 2  Do the steps have railings?: Yes  Type of Current Residence: [place]  In the last 12 months, was there a time when you were not able to pay the mortgage or rent on time?: No  In the last 12 months, how many places have you lived?: 1  In the last 12 months, was there a time when you did not have a steady place to sleep or slept in a shelter (including now)?: No  Homeless/housing insecurity resource given?: N/A  Living Arrangements: Lives w/ Spouse/significant other  Is patient a ?: No    Activities of Daily Living Prior to Admission  Functional Status: Independent  Completes ADLs independently?: Yes  Ambulates independently?: Yes  Does patient use assisted devices?: No  Does patient currently own DME?: No  Does patient have a history of Outpatient Therapy (PT/OT)?: Yes  Does the patient have a history of Short-Term Rehab?: No  Does patient have a history of HHC?: No  Does patient currently have [facility]?: No         Patient Information Continued  Income Source: Employed  Does patient have prescription coverage?: Yes (generic med only)  Within the past 12 months, you worried that your food would run out before you got the money to buy more : Never true  Within the past 12 months, the food you bought just didn't last and you didn't have money to get more : Never true  Food insecurity resource given?: N/A  Does patient receive dialysis treatments?: No  Does patient have a history of substance abuse?: No  Does patient have a history of Mental Health Diagnosis?: No         Means of Transportation  Means of Transport to Appts[de-identified] Drives Self  In the past 12 months, has lack of transportation kept you from medical appointments or from getting medications?: No  In the past 12 months, has lack of transportation kept you from meetings, work, or from getting things needed for daily living?: No  Was application for public transport provided?: N/A        DISCHARGE DETAILS:    Discharge planning discussed with[de-identified] patient     Comments - Freedom of Choice: Therapy recommending outpt rehab   Provided pt with list of  facilities  CM contacted family/caregiver?: No- see comments (declined)  Were Treatment Team discharge recommendations reviewed with patient/caregiver?: Yes  Did patient/caregiver verbalize understanding of patient care needs?: Yes  Were patient/caregiver advised of the risks associated with not following Treatment Team discharge recommendations?: Yes    Contacts  Patient Contacts:  Dai Marks  Relationship to Patient[de-identified] Family  Contact Method: Phone  Phone Number: [phone]  Reason/Outcome: Continuity of Care, Emergency Contact, Discharge [address]         Is the patient interested in [place] [address] at discharge?: No    DME Referral Provided  Referral made for DME?: No         Would you like to participate in our [address] service program?  : Yes    Treatment Team Recommendation: Other (outpt rehab)  Discharge Destination Plan[de-identified] Other (outpt rehab)  Transport at Discharge : Family Pt independent pta, no DME    Works FT and drives  Therapy rec outpt therapy-provided list of  facilities  No d/c needs evaluated

## 2022-09-15 NOTE — CASE MANAGEMENT
Case Management Progress Note    Patient name Juanito Mcnulty  Location [address] 723/PPHP 108-32 MRN [id number]  : 1960 Date 9/15/2022       LOS (days): 1  Geometric Mean LOS (GMLOS) (days):   Days to [address]:        OBJECTIVE:        Current admission status: Inpatient  Preferred Pharmacy:   [address] [place] [address] [place] [address] [facility] 94 [facility] 38 210 [facility]  Phone: [phone] Fax: [phone] [address] [place] [address] [address]  [address] [place] [id number]  Phone: [phone] Fax: [phone]    [address]  [address] [facility] [address]  Phone: [phone] Fax: [phone]    Primary Care Provider: Amy Wong DO    Primary Insurance: CAPITAL  Secondary Insurance:     PROGRESS NOTE:  Kristine Salas pricing: Cost for pt would be $305/30 days before her deductible is met then would be $55/30 days  [facility] is able to apply [employer] card which brings the cost down to $105/30 days this month  Pt reports she would be able to afford same

## 2022-09-15 NOTE — PLAN OF CARE
Problem: OCCUPATIONAL THERAPY ADULT  Goal: Performs self-care activities at highest level of function for planned discharge setting  See evaluation for individualized goals  Description: Treatment Interventions: ADL retraining, Functional transfer training, UE strengthening/ROM, Endurance training, Cognitive reorientation, Patient/family training, Equipment evaluation/education, Compensatory technique education, Fine motor coordination activities  Equipment Recommended: Bedside commode, Shower/Tub chair with back ($)       See flowsheet documentation for full assessment, interventions and recommendations  Note: Limitation: Decreased ADL status, Decreased UE ROM, Decreased UE strength, Decreased Safe judgement during ADL, Decreased endurance, Decreased fine motor control, Decreased self-care trans, Decreased high-level ADLs  Prognosis: Good  Assessment: Pt is a 63 y o  female who was admitted to [facility] on 9/14/2022 with Initial presenting deficits were arm and leg heaviness (leg worse than arm) and difficulty walking and now here with  Stroke [facility]) (chronic infarct on L occipital lobe and lacunar infarct R caudate head, CT/CThead /MRI no new deficits, changes noted  Pt's problem list also includes PMH of  has a past medical history of Diverticulosis, Hemorrhoids, Hypercalcemia (5/4/2018), Hypertension, Stroke [facility]), and Vitamin D deficiency (8/13/2020)    At baseline pt was completing I with ADL's/iaDL's, no AD with functional ambulation +drives, works full time  Pt lives with spouse in a ranch style home with 2STE recreational room in basement  Currently pt requires  for overall ADLS and min a without Ad for functional mobility/transfers   Pt currently presents with impairments in the following categories -steps to enter environment, limited home support, difficulty performing ADLS and difficulty performing IADLS  endurance, standing balance/tolerance, sitting balance/tolerance, UE ROM, [facility] and [facility]  These impairments, as well as pt's fatigue, (L) UE dysmetria , (L) hemiparesis, decreased caregiver support and risk for falls  limit pt's ability to safely engage in all baseline areas of occupation, includingeating, grooming, bathing, dressing, toileting, functional mobility/transfers, community mobility, laundry , driving, house maintenance, medication management, meal prep, cleaning, work/volunteer work , social participation  and leisure activities  The patient's raw score on the -PAC Daily Activity inpatient short form is 18, standardized score is 38 66, less than 39 4  Patients at this level are likely to benefit from discharge to post-acute rehabilitation services  Please refer to the recommendation of the Occupational Therapist for safe discharge planning  From OT standpoint, recommend STR  upon D/C  OT will continue to follow to address the below stated goals    Recommendation: Physiatry Consult  OT Discharge Recommendation: Post acute rehabilitation services (STR vs outpatient OT pending progress)

## 2022-09-15 NOTE — PLAN OF CARE
Problem: Potential for Falls  Goal: Patient will remain free of falls  Description: INTERVENTIONS:  - Educate patient/family on patient safety including physical limitations  - Instruct patient to call for assistance with activity   - Consult OT/PT to assist with strengthening/mobility   - Keep Call bell within reach  - Keep bed low and locked with side rails adjusted as appropriate  - Keep care items and personal belongings within reach  - Initiate and maintain comfort rounds  - Make Fall Risk Sign visible to staff  - Offer Toileting every 2 Hours, in advance of need  - Initiate/Maintain 2 alarm  - Obtain necessary fall risk management equipment: 2  - Apply yellow socks and bracelet for high fall risk patients  - Consider moving patient to room near nurses station  Outcome: Progressing     Problem: MOBILITY - ADULT  Goal: Maintain or return to baseline ADL function  Description: INTERVENTIONS:  -  Assess patient's ability to carry out ADLs; assess patient's baseline for ADL function and identify physical deficits which impact ability to perform ADLs (bathing, care of mouth/teeth, toileting, grooming, dressing, etc )  - Assess/evaluate cause of self-care deficits   - Assess range of motion  - Assess patient's mobility; develop plan if impaired  - Assess patient's need for assistive devices and provide as appropriate  - Encourage maximum independence but intervene and supervise when necessary  - Involve family in performance of ADLs  - Assess for home care needs following discharge   - Consider OT consult to assist with ADL evaluation and planning for discharge  - Provide patient education as appropriate  Outcome: Progressing  Goal: Maintains/Returns to pre admission functional level  Description: INTERVENTIONS:  - Perform BMAT or MOVE assessment daily    - Set and communicate daily mobility goal to care team and patient/family/caregiver     - Collaborate with rehabilitation services on mobility goals if consulted  - Perform Range of Motion 2 times a day  - Reposition patient every 2 hours  - Dangle patient 2 times a day  - Stand patient 2 times a day  - Ambulate patient 2 times a day  - Out of bed to chair 2 times a day   - Out of bed for meals 2 times a day  - Out of bed for toileting  - Record patient progress and toleration of activity level   Outcome: Progressing     Problem: DISCHARGE PLANNING  Goal: Discharge to home or other facility with appropriate resources  Description: INTERVENTIONS:  - Identify barriers to discharge w/patient and caregiver  - Arrange for needed discharge resources and transportation as appropriate  - Identify discharge learning needs (meds, wound care, etc )  - Arrange for interpretive services to assist at discharge as needed  - Refer to Case Management Department for coordinating discharge planning if the patient needs post-hospital services based on physician/advanced practitioner order or complex needs related to functional status, cognitive ability, or social support system  Outcome: Progressing     Problem: Knowledge Deficit  Goal: Patient/family/caregiver demonstrates understanding of disease process, treatment plan, medications, and discharge instructions  Description: Complete learning assessment and assess knowledge base    Interventions:  - Provide teaching at level of understanding  - Provide teaching via preferred learning methods  Outcome: Progressing     Problem: NEUROSENSORY - ADULT  Goal: Achieves stable or improved neurological status  Description: INTERVENTIONS  - Monitor and report changes in neurological status  - Monitor vital signs such as temperature, blood pressure, glucose, and any other labs ordered   - Initiate measures to prevent increased intracranial pressure  - Monitor for seizure activity and implement precautions if appropriate      Outcome: Progressing     Problem: CARDIOVASCULAR - ADULT  Goal: Maintains optimal cardiac output and hemodynamic stability  Description: INTERVENTIONS:  - Monitor I/O, vital signs and rhythm  - Monitor for S/S and trends of decreased cardiac output  - Administer and titrate ordered vasoactive medications to optimize hemodynamic stability  - Assess quality of pulses, skin color and temperature  - Assess for signs of decreased coronary artery perfusion  - Instruct patient to report change in severity of symptoms  Outcome: Progressing

## 2022-09-15 NOTE — RESTORATIVE TECHNICIAN NOTE
Restorative Technician Note      Patient Name: Alireza Marrufo     Note Type: Mobility  Patient Position Upon Consult: Bedside chair  Activity Performed: Ambulated; [id number]; Stood  Assistive Device: Roller walker  Education Provided: Yes  Patient Position at [employer] of Consult: Bedside chair;  All needs within reach; Bed/Chair alarm activated    Bob COURTNEY, Restorative Technician, [employer]

## 2022-09-15 NOTE — PLAN OF CARE
Problem: Potential for Falls  Goal: Patient will remain free of falls  Description: INTERVENTIONS:  - Educate patient/family on patient safety including physical limitations  - Instruct patient to call for assistance with activity   - Consult OT/PT to assist with strengthening/mobility   - Keep Call bell within reach  - Keep bed low and locked with side rails adjusted as appropriate  - Keep care items and personal belongings within reach  - Initiate and maintain comfort rounds  - Make Fall Risk Sign visible to staff  - Offer Toileting every 2  Hours, in advance of need  - Initiate/Maintain tabs -  bed alarm  - Obtain necessary fall risk management equipment:    Problem: MOBILITY - ADULT  Goal: Maintain or return to baseline ADL function  Description: INTERVENTIONS:  -  Assess patient's ability to carry out ADLs; assess patient's baseline for ADL function and identify physical deficits which impact ability to perform ADLs (bathing, care of mouth/teeth, toileting, grooming, dressing, etc )  - Assess/evaluate cause of self-care deficits   - Assess range of motion  - Assess patient's mobility; develop plan if impaired  - Assess patient's need for assistive devices and provide as appropriate  - Encourage maximum independence but intervene and supervise when necessary  - Involve family in performance of ADLs  - Assess for home care needs following discharge   - Consider OT consult to assist with ADL evaluation and planning for discharge  - Provide patient education as appropriate  Outcome: Progressing     Problem: MOBILITY - ADULT  Goal: Maintains/Returns to pre admission functional level  Description: INTERVENTIONS:  - Perform BMAT or MOVE assessment daily    - Set and communicate daily mobility goal to care team and patient/family/caregiver  - Collaborate with rehabilitation services on mobility goals if consulted  - Perform Range of Motion  times a day  - Reposition patient every   hours    - Dangle patient  times a day  - Stand patient 3 times a day  - Ambulate patient 3 times a day  - Out of bed to chair 3 times a day   - Out of bed for meals 3  Problem: DISCHARGE PLANNING  Goal: Discharge to home or other facility with appropriate resources  Description: INTERVENTIONS:  - Identify barriers to discharge w/patient and caregiver  - Arrange for needed discharge resources and transportation as appropriate  - Identify discharge learning needs (meds, wound care, etc )  - Arrange for interpretive services to assist at discharge as needed  - Refer to Case Management Department for coordinating discharge planning if the patient needs post-hospital services based on physician/advanced practitioner order or complex needs related to functional status, cognitive ability, or social support system  Outcome: Progressing     Problem: Knowledge Deficit  Goal: Patient/family/caregiver demonstrates understanding of disease process, treatment plan, medications, and discharge instructions  Description: Complete learning assessment and assess knowledge base    Interventions:  - Provide teaching at level of understanding  - Provide teaching via preferred learning methods  Outcome: Progressing     Problem: NEUROSENSORY - ADULT  Goal: Achieves stable or improved neurological status  Description: INTERVENTIONS  - Monitor and report changes in neurological status  - Monitor vital signs such as temperature, blood pressure, glucose, and any other labs ordered   - Initiate measures to prevent increased intracranial pressure  - Monitor for seizure activity and implement precautions if appropriate      Outcome: Progressing     Problem: CARDIOVASCULAR - ADULT  Goal: Maintains optimal cardiac output and hemodynamic stability  Description: INTERVENTIONS:  - Monitor I/O, vital signs and rhythm  - Monitor for S/S and trends of decreased cardiac output  - Administer and titrate ordered vasoactive medications to optimize hemodynamic stability  - Assess quality of pulses, skin color and temperature  - Assess for signs of decreased coronary artery perfusion  - Instruct patient to report change in severity of symptoms  Outcome: Progressing    times a day  - Out of bed for toileting  - Record patient progress and toleration of activity level   Outcome: Progressing      - Apply yellow socks and bracelet for high fall risk patients  - Consider moving patient to room near nurses station  Outcome: Progressing

## 2022-09-15 NOTE — PLAN OF CARE
Problem: PHYSICAL THERAPY ADULT  Goal: Performs mobility at highest level of function for planned discharge setting  See evaluation for individualized goals  Description: Treatment/Interventions: ADL retraining, Functional transfer training, LE strengthening/ROM, Elevations, Therapeutic exercise, Endurance training, Patient/family training, Equipment eval/education, Gait training, Bed mobility, Spoke to nursing  Equipment Recommended: Marcie Raza       See flowsheet documentation for full assessment, interventions and recommendations  Note: Prognosis: Fair  Problem List: Decreased strength, Decreased endurance, Impaired balance, Decreased mobility, Decreased coordination  Assessment: Pt is a 64 y/o female admitted to [facility] on 9/14/22 with a primary diagnosis of stroke  Patient has a pmhx of hypercalcemia, HTN, and stroke all of which affect PT treatment  PT was consulted to evaluate pt's functional mobility and discharge needs  Upon evaluation, pt presents as Gary for transfers and ambulation and a ModA for stairs  Pt presents with the following impairments: decreased strength, decreased endurance, impaired balance, decreased mobility, and decreased coordination  The patient is considered a high complexity pt d/t Ongoing medical management for primary dx, Decreased activity tolerance compared to baseline, Fall risk, trending lab values, increased reliance of more restrictive AD than baseline, diagnostic imaging pending, increased assistance needed from caregiver at current time  The patient's AM-PAC Basic Mobility Inpatient Short Form Raw Score is 17  A Raw score of greater than 16 suggests the patient may benefit from discharge to home  Please also refer to the recommendation of the Physical Therapist for safe discharge planning  However, pt is currently functioning below her baseline and would benefit from rehab to address her impairments   The patient would benefit from skilled PT services in order to address the aforementioned impairments  At the end of tx, the patient was left seated on bedside chair with chair alarm on, call bell and all other personal needs within functional reach  D/c recommendations acute rehab  Barriers to Discharge: Decreased caregiver support     PT Discharge Recommendation: (S) Post acute rehabilitation services (PMR)    See flowsheet documentation for full assessment

## 2022-09-15 NOTE — ASSESSMENT & PLAN NOTE
Hold home losartan 50mg daily and metoprolol tartrate 25mg BID  Labetalol PRN for SBP >220 or DBP >110

## 2022-09-15 NOTE — UTILIZATION REVIEW
Initial Clinical Review    Admission: Date/Time/Statement:   Admission Orders (From admission, onward)     Ordered        09/14/22 1842  INPATIENT ADMISSION  Once                      Orders Placed This Encounter   Procedures    INPATIENT ADMISSION     Standing Status:   Standing     Number of Occurrences:   1     Order Specific Question:   Level of Care     Answer:   Med Surg [16]     Order Specific Question:   Estimated length of stay     Answer:   More than 2 Midnights     Order Specific Question:   Certification     Answer:   I certify that inpatient services are medically necessary for this patient for a duration of greater than two midnights  See H&P and MD Progress Notes for additional information about the patient's course of treatment  ED Arrival Information     Expected   -    Arrival   9/14/2022 16:33    Acuity   Urgent            Means of arrival   Walk-In    Escorted by   Self    Service   Hospitalist    Admission type   Urgent            Arrival complaint   Difficulty Walking, Hx Stoke            Chief Complaint   Patient presents with    Difficulty Walking     Pt reports difficulty walking today as well as L arm/leg heaviness; pt has hx TIA       Initial Presentation: 63 y o  female with PMH of CVA x2 in 2014 on Plavix 75mg, HTN, and HLD presented to the ED from work as a walk in with arm and leg heaviness w/ the leg worse than the arm and difficulty walking  Last known nornal was at 15:55 today  Pt reported feeling of arm and leg heaviness started while standing at her desk at work today at 16:00 and had difficulty walking  In the ED, /127  NIHSS 3  CT H  Left occipital stroke is old when compared to the prior MRI October 22, 2014  CTA  LVO  On exam, L leg drift, L leg ataxia and subjective L foot reduced sensation  Not a candidate for thrombolysis as symptoms improved  Plan: Inpatient admission for evaluation and treatment of stroke: Placed on stroke pathway  Echocardiogram pending  Telemetry  BP: Permissive hypertension for first 24 hours up to 220 SBP  Obtain lipids and A1c  atorvastatin 40mg qhs  - Maintain glucose <180, SSI for coverage if indicated  - Antiplatelet agents: ASA 81 mg, Plavix 75 mg and Load with ASA 325mg x1  TTE   MRI brain pending  DVT ppx and SCDs  Monitor on telemetry  PT/OT/Speech    Date: 09/15  Day 2:   Neurology Notes: Pt reported  some residual heaviness in L arm and L leg, but better than yesterday  Reports come frontal headache  MRI head showed no acute intracranial pathology (chronic infarct on L occipital lobe and lacunar infarct R caudate head)  BP: Permissive hypertension for first 24-48 hours up to 220 SBP then after 48hrs, BP goal: normotension  Cont statin  Will consider Brilinta since she failed both ASA and plavix  Cont to mon on telemetry  PT/OT/ST  TTE pending DVT ppx  On exam, aaox4, strength 5/5 b/l UE, 5/5 RLE, 4 LLE  Unsteady gait as pt feels lack of control over L upper thigh   Subtle movement of L hand with pronator drift, although unclear if this is due to weakness  ED Triage Vitals   Temperature Pulse Respirations Blood Pressure SpO2   09/14/22 1704 09/14/22 1658 09/14/22 1658 09/14/22 1700 09/14/22 1658   99 1 °F (37 3 °C) 87 18 (!) 218/127 98 %      Temp Source Heart Rate Source Patient Position - Orthostatic VS BP Location FiO2 (%)   09/14/22 1658 09/14/22 1658 09/14/22 1700 09/14/22 1700 --   Oral Monitor Sitting Right arm       Pain Score       09/14/22 1658       No Pain          Wt Readings from Last 1 Encounters:   09/15/22 87 5 kg (193 lb)     Additional Vital Signs:   Date/Time Temp Pulse Resp BP MAP (mmHg) SpO2 O2 Device Patient Position - Orthostatic VS   09/15/22 12:46:08 97 6 °F (36 4 °C) 79 18 142/89 107 93 % -- --   09/15/22 1018 -- 60 -- 167/99 -- -- -- --   09/15/22 06:19:25 -- 64 -- 167/99 122 95 % -- --   09/15/22 04:31:20 -- 64 -- 167/100 122 92 % -- --   09/15/22 03:10:57 -- 60 -- 168/100 123 91 % -- --   09/15/22 01:58:52 -- 62 -- 178/104 Abnormal  129 93 % -- --   09/15/22 0100 -- -- -- -- -- -- None (Room air) --   09/15/22 00:57:59 98 °F (36 7 °C) 64 -- 179/104 Abnormal  129 90 % -- Lying   09/14/22 2225 -- 83 18 198/90 Abnormal  -- 100 % None (Room air) Lying   09/14/22 2125 -- -- 18 187/90 Abnormal  -- 100 % None (Room air) Lying   09/14/22 2059 -- 80 -- 189/102 Abnormal  -- -- -- --   09/14/22 2025 -- 82 18 172/95 Abnormal  -- 100 % None (Room air) Lying   09/14/22 1925 -- 80 20 189/102 Abnormal  -- 100 % None (Room air) Lying   09/14/22 18:24:52 -- 83 20 205/104 Abnormal  -- 98 % None (Room air) Sitting   09/14/22 18:09:37 -- 84 20 205/104 Abnormal  -- 100 % None (Room air) Sitting   09/14/22 17:54:24 -- 86 18 197/106 Abnormal  -- 99 % None (Room air) Sitting   09/14/22 17:41:34 -- 102 20 223/117 Abnormal  -- 99 % None (Room air) Sitting   09/14/22 17:26:08 -- 104 18 171/114 Abnormal  -- 100 % -- --   09/14/22 17:14:25 -- 106 Abnormal  20 204/98 Abnormal  -- 99 % None (Room air) Sitting   09/14/22 1704 99 1 °F (37 3 °C) -- -- -- -- -- -- --   09/14/22 1700 -- 88 18 218/127 Abnormal  162 95 % None (Room air) Sitting       Pertinent Labs/Diagnostic Test Results:   MRI brain wo contrast   Final Result by Lurdes Paulson MD (09/15 0006)      No evidence of acute infarct, acute intracranial hemorrhage or mass  Workstation performed: [id number]         CT stroke alert brain   Final Result by Cathy Simon MD (09/14 1757)   Addendum 1 of 1 by Cathy Simon MD (09/14 1757)   ADDENDUM:      Left occipital stroke is old when compared to the prior MRI October 22, 2014  Final         1  No evidence of acute internal hemorrhage  2  Suspect left occipital lobe ischemia, possibly acute to subacute  As this lesion /area does not correspond to the patient's symptoms, follow-up contrast-enhanced brain MRI is recommended to exclude other etiology               I personally discussed this study with Dr Johanne Vasques on 9/14/2022 at 5:38 PM                 Workstation performed: [id number]         CTA stroke alert (head/neck)   Final Result by Adilene Sow MD (09/14 1749)      No evidence of hemodynamic significant stenosis, aneurysm or dissection  I personally discussed this study with Dr Von Hernadez on 9/14/2022 at 5:38 PM                         Workstation performed: [id number]           09/14 EKG result: Atrial fibrillation  Possible Septal infarct , age undetermined    09/15 ECHO:   Left Ventricle: Left ventricular cavity size is normal  Wall thickness is normal  There is no concentric hypertrophy  The left ventricular ejection fraction is 60%  Systolic function is normal  Wall motion is normal  Diastolic function is mildly abnormal, consistent with grade I (abnormal) relaxation        Results from last 7 days   Lab Units 09/14/22  1939   SARS-COV-2  Negative     Results from last 7 days   Lab Units 09/15/22  0527 09/14/22  1737   WBC Thousand/uL 8 92 9 76   HEMOGLOBIN g/dL 14 6 14 5   HEMATOCRIT % 44 6 44 4   PLATELETS Thousands/uL 328 295   NEUTROS ABS Thousands/µL 4 85  --          Results from last 7 days   Lab Units 09/15/22  0527 09/14/22  1737   SODIUM mmol/L 137 134*   POTASSIUM mmol/L 3 3* 3 7   CHLORIDE mmol/L 105 107   CO2 mmol/L 26 18*   ANION GAP mmol/L 6 9   BUN mg/dL 11 17   CREATININE mg/dL 0 74 0 79   EGFR ml/min/1 73sq m 88 81   CALCIUM mg/dL 8 4 7 6*         Results from last 7 days   Lab Units 09/14/22  1719   POC GLUCOSE mg/dl 101     Results from last 7 days   Lab Units 09/15/22  0527 09/14/22  1737   GLUCOSE RANDOM mg/dL 104 95         Results from last 7 days   Lab Units 09/15/22  0527   HEMOGLOBIN A1C % 5 9*   EAG mg/dl 123       Results from last 7 days   Lab Units 09/14/22  2105 09/14/22  1737   HS TNI 0HR ng/L  --  3   HS TNI 2HR ng/L 4  --    HSTNI D2 ng/L 1  --          Results from last 7 days   Lab Units 09/14/22  1835   PROTIME seconds 12 0   INR  0 87   PTT seconds 29     Results from last 7 days   Lab Units 09/14/22  2105   TSH 3RD GENERATON uIU/mL 1 770       Results from last 7 days   Lab Units 09/14/22  1939   INFLUENZA A PCR  Negative   INFLUENZA B PCR  Negative   RSV PCR  Negative     ED Treatment:   Medication Administration from 09/14/2022 1633 to 09/15/2022 0014       Date/Time Order Dose Route Action     09/14/2022 1724 iohexol (OMNIPAQUE) 350 MG/ML injection (MULTI-DOSE) 100 mL 85 mL Intravenous Given     09/14/2022 2059 atorvastatin (LIPITOR) tablet 40 mg 40 mg Oral Given     09/14/2022 2059 metoprolol tartrate (LOPRESSOR) tablet 25 mg 25 mg Oral Given        Past Medical History:   Diagnosis Date    Diverticulosis     Hemorrhoids     Hypercalcemia 5/4/2018    Hypertension     controlled    Stroke [facility])     Vitamin D deficiency 8/13/2020     Present on Admission:   Essential hypertension      Admitting Diagnosis: CVA (cerebral vascular accident) ([facility] [address] ) [I63 9]  Ambulatory dysfunction [R26 2]  Age/Sex: 63 y o  female  Admission Orders:  SCD  48 hr telemetry monitoring  PT/OT/ST  Baseline NIH stroke scale on admission, reassess Q24H x 2 D, Nursing dysphagia assessment prior to staring diet, neuro checks  Scheduled Medications:  aspirin, 81 mg, Oral, Daily  atorvastatin, 40 mg, Oral, QPM  cholecalciferol, 2,000 Units, Oral, Daily  clopidogrel, 75 mg, Oral, Daily  enoxaparin, 40 mg, Subcutaneous, R82F CATE  folic acid, 5,523 mcg, Oral, Daily  magnesium oxide, 400 mg, Oral, Daily  potassium chloride, 10 mEq, Oral, Daily      Continuous IV Infusions: none     PRN Meds:  acetaminophen, 650 mg, Oral, Q6H PRN  labetalol, 10 mg, Intravenous, Q6H PRN        IP CONSULT TO PHYSICAL MEDICINE REHAB  IP CONSULT TO CASE MANAGEMENT  IP CONSULT TO NUTRITION SERVICES    Network Utilization Review Department  ATTENTION: Please call with any questions or concerns to [phone] and carefully listen to the prompts so that you are directed to the right person   All voicemails are confidential   Anushka Nam all requests for admission clinical reviews, approved or denied determinations and any other requests to dedicated fax number below belonging to the campus where the patient is receiving treatment   List of dedicated fax numbers for the Facilities:  [address] DENIALS (Administrative/Medical Necessity) [phone]   [address] (Maternity/NICU/Pediatrics) [phone] [address]  [address] [facility] [place] [address] [address] [place] [address] Mandy Zuleyka Rizzo [address] [facility] [phone]

## 2022-09-15 NOTE — OCCUPATIONAL THERAPY NOTE
Occupational Therapy Evaluation     Patient Name: Alireza HILL Date: 9/15/2022  Problem List  Principal Problem:    Stroke [facility])  Active Problems:    Essential hypertension    Past Medical History  Past Medical History:   Diagnosis Date    Diverticulosis     Hemorrhoids     Hypercalcemia 5/4/2018    Hypertension     controlled    Stroke [facility])     Vitamin D deficiency 8/13/2020     Past Surgical History  Past Surgical History:   Procedure Laterality Date    COLONOSCOPY      ONSET 2015    NOSE SURGERY      VA EXPLORE PARATHYROID GLANDS Bilateral 6/18/2020    Procedure: PARATHYROIDECTOMY, MINIMALLY INVASIVE, 3 5 GLAND EXPLORATION, INTRAOPERATIVE PTH MONITORING;  Surgeon: Kika Lomeli MD;  Location: [facility];  Service: Surgical Oncology    SKIN BIOPSY      SKIN LESION EXCISION        09/15/22 0813   OT Last Visit   OT Visit Date 09/15/22   Note Type   Note type Evaluation   Restrictions/Precautions   Weight Bearing Precautions Per Order No   Other Precautions Telemetry;Multiple lines; Chair Alarm;Cognitive; Fall Risk   Pain Assessment   Pain Assessment Tool 0-10   Pain Score No Pain   Home Living   Type of [address] [place] [address] One level;Performs ADLs on one level; Able to live on main level with bedroom/bathroom   Bathroom Shower/Tub Walk-in shower   Bathroom Toilet Raised   Bathroom Equipment (No DME at baseline)   Bathroom Accessibility Accessible   Prior Function   Level of [place] Independent with ADLs and functional mobility   Lives With Spouse   Receives Help From Family   ADL Assistance Independent   IADLs Independent   Falls in the last 6 months 0   Vocational Full time employment   Lifestyle   Autonomy I with ADL's/IADL's, no AD with functional ambulation, +drives   Reciprocal Relationships supportive spouse works at [employer] full time [address] staying active   ADL   [address] [facility] 5  [address] [place] [address] Supervision/Setup   UB Bathing Assistance 5  Supervision/Setup   UB Bathing Deficit Increased time to complete   LB Bathing Deficit Left lower leg including foot; Increased time to complete   UB Dressing Assistance 4  Minimal Assistance   UB Dressing Deficit Increased time to complete   LB Dressing Assistance 4  Minimal Assistance   LB Dressing Deficit (able to don/doff socks with increased time and crossed over leg method, min a for dynamic balance)   Toileting Assistance  4  Minimal Assistance   Toileting Deficit Clothing management up;Clothing management down;Perineal hygiene  (S seated perineal hygiene, min a for CG clothing management)   Bed Mobility   Supine to Sit Unable to assess   Sit to Supine Unable to assess   Additional Comments pt remained in bedside chair with all needs met and alarm engaged with spouse present   Transfers   Sit to Stand 4  Minimal assistance   Additional items Assist x 1   Stand to Sit 4  Minimal assistance   Additional items Assist x 1   Additional Comments no AD   Functional Mobility   Functional Mobility 4  Minimal assistance   Additional Comments min a without AD min unsteady fatigued at end of mobility tasks left LE ataxia/weakness  Additional items (No Ad)   Balance   Static Sitting Fair +   Dynamic Sitting Fair   Static Standing Fair -   Dynamic Standing Poor +   Ambulatory Poor +   Activity Tolerance   Activity Tolerance Patient limited by fatigue   Medical Staff Made Aware PT Ying Lund, SPT Danie Coppola due to the patient's co-morbidities, clinically unstable presentation, and present impairments which are a regression from the patient's baseline      Nurse Made Aware RN cleared pt for therapy   RUE Assessment   RUE Assessment WFL   LUE Assessment   LUE Assessment X  (Shoulder 5/5, tricep 4/5grip strength 4-/5 issued therapy ball with HEP for  and pinch strengthening exercises)   Hand Function   Gross Motor Coordination Impaired  (left +dysmetria, ataxic movements)   Fine Motor Coordination Impaired  (slowed finger to thumb sequencing)   Sensation   Light Touch No apparent deficits   Vision-Basic Assessment   Current Vision Wears glasses all the time  (Per pt Visual field deficits on right at baseline)   Vision - Complex Assessment   Acuity Able to read clock/calendar on wall without difficulty   Perception   Inattention/Neglect Appears intact   Cognition   Overall Cognitive Status [facility]   Arousal/Participation Alert; Cooperative   Attention Within functional limits   Orientation Level Oriented X4   Memory Within functional limits   Following Commands Follows one step commands without difficulty   Comments pt motivated for therapy, mild decreased insight into deficits, good memory, direction following  Assessment   Limitation Decreased ADL status; Decreased UE ROM; Decreased UE strength;Decreased Safe judgement during ADL;Decreased endurance;Decreased fine motor control;Decreased self-care trans;Decreased high-level ADLs   Prognosis Good   Assessment Pt is a 63 y o  female who was [facility] to [facility] on 9/14/2022 with Initial presenting deficits were arm and leg heaviness (leg worse than arm) and difficulty walking and now here with  Stroke [facility]) (chronic infarct on L occipital lobe and lacunar infarct R caudate head, CT/CThead /MRI no new deficits, changes noted  Pt's problem list also includes PMH of  has a past medical history of Diverticulosis, Hemorrhoids, Hypercalcemia (5/4/2018), Hypertension, Stroke [facility]), and Vitamin D deficiency (8/13/2020)    At baseline pt was completing I with ADL's/iaDL's, no AD with functional ambulation +drives, works full time  Pt lives with spouse in a ranch style home with 2STE recreational room in basement  Currently pt requires  for overall ADLS and min a without Ad for functional mobility/transfers   Pt currently presents with impairments in the following categories -steps to enter environment, limited home support, difficulty performing ADLS and difficulty performing IADLS  endurance, standing balance/tolerance, sitting balance/tolerance, UE ROM, FMC and GMC  These impairments, as well as pt's fatigue, (L) UE dysmetria , (L) hemiparesis, decreased caregiver support and risk for falls  limit pt's ability to safely engage in all baseline areas of occupation, includingeating, grooming, bathing, dressing, toileting, functional mobility/transfers, community mobility, laundry , driving, house maintenance, medication management, meal prep, cleaning, work/volunteer work , social participation  and leisure activities  The patient's raw score on the AM-PAC Daily Activity inpatient short form is 18, standardized score is 38 66, less than 39 4  Patients at this level are likely to benefit from discharge to post-acute rehabilitation services  Please refer to the recommendation of the Occupational Therapist for safe discharge planning  From OT standpoint, recommend STR  upon D/C  OT will continue to follow to address the below stated goals  Goals   Patient Goals go home   LTG Time Frame 10-14   Long Term Goal #1 see goals below   Plan   Treatment Interventions ADL retraining;Functional transfer training;UE strengthening/ROM; Endurance training;Cognitive reorientation;Patient/family training;Equipment evaluation/education; Compensatory technique education; Fine motor coordination activities   Goal Expiration Date 09/29/22   OT Frequency 3-5x/wk   Recommendation   Recommendation Physiatry Consult   OT Discharge Recommendation Post acute rehabilitation services  (Pt may progress and increase Riley levels to discharge home with out patient OT for left UE strengthening/coordination   Equipment Recommended Bedside commode; Shower/Tub chair with back ($)   Commode Type Standard   AM-PAC Daily Activity Inpatient   Lower Body Dressing 3   Bathing 3   Toileting 3   Upper Body Dressing 3   Grooming 3   Eating 3   Daily Activity Raw Score 18   Daily Activity Standardized Score (Calc for Raw Score >=11) 38 66   AM-PAC Applied Cognition Inpatient   Following a Speech/Presentation 4   Understanding Ordinary Conversation 4   Taking Medications 4   Remembering Where Things Are Placed or Put Away 4   Remembering List of 4-5 Errands 4   Taking Care of Complicated Tasks 4   Applied Cognition Raw Score 24   Applied Cognition Standardized Score 62 21       Occupational Therapy Goals:    *Mod I with bed mobility to engage in functional tasks  *Mod I Adl's after setup with use of AE PRN  *Mod I toileting and clothing management   *Mod I functional mobility and transfers to/from all surfaces with Fair + dynamic balance and safety for participation in dynamic adls and iadl tasks   *Demonstrate good carryover with safe use of AD during functional tasks PRN  *Assess DME needs   *Increase activity tolerance to 25-30 minutes for participation in adls and enjoyable activities  *Mod I with Simulated IADL management task  *Demonstrate good carryover of pt/family education and training with good tolerance for increased safety and independence with ADL's/ADl's  *Pt will improve standing balance to 2-3 minutes with functional tasks to increase I with toileting/transfers  *Patient will demonstrate 100% carryover of energy conservation techniques t/o functional I/ADL/leisure tasks w/o cues s/p skilled education to increase endurance during functional tasks  *Pt will participate in fine/gross motor coordination/strenthening/dexterity exercises to Good in order to increase participation in functional activities     *Pt will tolerate mod manual NDT facilitation to left UE during functional ADL/leisure tasks with mod I to improve functional engagement  to increase neuroplastic recovery  Ligia CARROLL, OTR/L

## 2022-09-15 NOTE — SPEECH THERAPY NOTE
Speech/Language Pathology Progress Note    Patient Name: Trevor Landau KORCT'A Date: 9/15/2022     Consult received and chart reviewed  Per chart review and discussion with pt and RN, pt passed RN dysphagia assessment and is tolerating regular diet with thin liquids with no s/s of aspiration  Speech/Language deficits also denied  Formal speech/swallow evaluation does not appear to be indicated at this time  If new concerns arise, please reconsult  Thank you

## 2022-09-15 NOTE — ASSESSMENT & PLAN NOTE
Stroke alert on 9/14/2022  4:53 PM with initial NIHSS of 3 and LKW 15:55, initial Blood Pressure: (!) 218/127  Initial presenting deficits were arm and leg heaviness (leg worse than arm) and difficulty walking  As a result of dramatic improvement with symptoms and non disabling pt was determined to not be a candidate for thrombolysis (TNK)   Exam on 09/15/22:  L leg drift, L leg ataxia and subjective L foot reduced sensation  Current Blood Pressure: 142/89, BP over 24 hours: BP  Min: 142/89  Max: 223/117    Vascular risk factors: elevated cholesterol, elevated a1c and weight   Home meds: Plavix 75mg daily  Per patient she has never taken aspirin before  Per chart review she was put on  previously  Workup:    A1c 5 9, , P2Y12 154   CTH: Left occipital stroke is old when compared to the prior MRI October 22, 2014   CTA: No LVO   MRI: No evidence of acute infarct, acute intracranial hemorrhage or mass   Echocardiogram: LVEF 60%, G1DD  Normal atria   Telemetry: unremarkable    Pertinent scores:  - NIHSS: 3: L leg drift, L leg ataxia, subjective reduced L foot sensation  Stroke Modified Tess Score: 1 (No significant disability  Able to carry out all usual activities, despite some symptoms)    Impression: likely MRI negative stroke since MRI is negative and patient is still having subjective symptoms  Plan:  - Stroke pathway  - Discussed plan with neurology attending, Dr Rah Velez  - BP: Permissive hypertension for first 24-48 hours up to 220 SBP then after 48hrs, BP goal: normotension starting 09/16/2022  - atorvastatin 40mg qhs  - Maintain glucose <180, SSI for coverage if indicated  - Since she failed ASA and Plavix, continue ASA 81mg and Brilinta 90mg BID (CM helped check the price and patient can afford it)  ASA and Brilinta for 21 days (until 10/05/2022)   Starting 10/06/2022, monotherapy: Brilinta 90mg BID  - DVT ppx and SCDs  - Monitor on telemetry  - PT/OT/Speech  - Stroke education

## 2022-09-15 NOTE — UTILIZATION REVIEW
Inpatient Admission Authorization Request   NOTIFICATION OF INPATIENT ADMISSION/INPATIENT AUTHORIZATION REQUEST   SERVICING FACILITY:   [facility] [place]  Address: [address] [place] [address], [address] [place] [address]  Tax ID: [id number]  NPI: [id number]  Place of Service: Inpatient [address] [facility] Code: 24     ATTENDING PROVIDER:  Attending Name and NPI#: Saskai Ernandez Md [[id number]]  Address: [address] [place] [address], [address] [place] [address]  Phone: [phone]     UTILIZATION REVIEW CONTACT:  Boone Canavan, Utilization   Network Utilization Review Department  Phone: [phone]  Fax: [phone]  Email: [email or web address]     PHYSICIAN ADVISORY SERVICES:  FOR TZOI-LI-RPOW REVIEW - MEDICAL NECESSITY DENIAL  Phone: [phone]  Fax: [phone]  Email: [email or web address]     TYPE OF REQUEST:  Inpatient Status     ADMISSION INFORMATION:  ADMISSION DATE/TIME: 9/14/22  6:42 PM  PATIENT DIAGNOSIS CODE/DESCRIPTION:  CVA (cerebral vascular accident) ([facility] [address] ) [I63 9]  Ambulatory dysfunction [R26 2]  DISCHARGE DATE/TIME: No discharge date for patient encounter  IMPORTANT INFORMATION:  Please contact Boone Canavan directly with any questions or concerns regarding this request  Department voicemails are confidential     Send requests for admission clinical reviews, concurrent reviews, approvals, and administrative denials due to lack of clinical to fax [phone]

## 2022-09-15 NOTE — DISCHARGE SUMMARY
DISCHARGE SUMMARY     Name: Reid Day   Age & Sex: 63 y o  female   MRN: [id number]  Unit/Bed#: PPHP 723-01   Encounter: [id number]    Discharging Physician: [facility]z [facility]  Attending: Kang Dorado MD  PCP: Tere Iqbal DO  Admission Date: 9/14/2022  Discharge Date: 09/16/22    Reid Day will need follow up in in 4 weeks with neurovascular attending/resident or AP  She will not require outpatient neurological testing  ASSESSMENT & PLAN     Essential hypertension  Assessment & Plan  Hold home losartan 50mg daily and metoprolol tartrate 25mg BID  Labetalol PRN for SBP >220 or DBP >110    * Stroke [facility])  Assessment & Plan  Stroke alert on 9/14/2022  4:53 PM with initial NIHSS of 3 and LKW 15:55, initial Blood Pressure: (!) 218/127  Initial presenting deficits were arm and leg heaviness (leg worse than arm) and difficulty walking  As a result of dramatic improvement with symptoms and non disabling pt was determined to not be a candidate for thrombolysis (TNK)   Exam on 09/15/22:  L leg drift, L leg ataxia and subjective L foot reduced sensation  Current Blood Pressure: 142/89, BP over 24 hours: BP  Min: 142/89  Max: 223/117    Vascular risk factors: elevated cholesterol, elevated a1c and weight   Home meds: Plavix 75mg daily  Per patient she has never taken aspirin before  Per chart review she was put on  previously  Workup:    A1c 5 9, , P2Y12 154   CTH: Left occipital stroke is old when compared to the prior MRI October 22, 2014   CTA: No LVO   MRI: No evidence of acute infarct, acute intracranial hemorrhage or mass   Echocardiogram: LVEF 60%, G1DD  Normal atria   Telemetry: unremarkable    Pertinent scores:  - NIHSS: 3: L leg drift, L leg ataxia, subjective reduced L foot sensation  Stroke Modified Tess Score: 1 (No significant disability   Able to carry out all usual activities, despite some symptoms)    Impression: likely MRI negative stroke since MRI is negative and patient is still having subjective symptoms  Plan:  - Stroke pathway  - Discussed plan with neurology attending, Dr Kamaljit Deluna  - BP: Permissive hypertension for first 24-48 hours up to 220 SBP then after 48hrs, BP goal: normotension starting 09/16/2022  - atorvastatin 40mg qhs  - Maintain glucose <180, SSI for coverage if indicated  - Since she failed ASA and Plavix, continue ASA 81mg and Brilinta 90mg BID (CM helped check the price and patient can afford it)  ASA and Brilinta for 21 days (until 10/05/2022)  Starting 10/06/2022, monotherapy: Brilinta 90mg BID  - DVT ppx and SCDs  - Monitor on telemetry  - PT/OT/Speech  - Stroke education    Disposition:     Other: Home with outpatient PT/OT     Reason for Admission: left arm/leg heaviness and difficulty walking    Consultations During Hospital Stay:  · PT/OT, Speech, PMR, Case management,     Procedures Performed:     · None    Significant Findings / Test Results:     · None    Incidental Findings:   · None     Test Results Pending at Discharge (will require follow up): · None     Outpatient Tests Requested:  · None    Complications:  None    Hospital Course:     Kathrin Rai is a 63 y o  female patient who originally presented to the hospital on 9/14/2022 due to left arm/leg heaviness (leg worse than arm)  Stroke alert was initiated  NIHSS 3 (L leg drift, ataxia and subjective reduced L foot sensory)  TNK was not given due to fast resolution of her symptoms while in ED right after CT H and CTA were done  CTH, CTA, MRI showed no acute abnormality or LVO  Patient was loaded with ASA 325mg x1 then ASA 81mg daily and Plavix 75mg daily   Since patient has been on Plavix for the last 8 years and per chart review patient was on  daily before plavix, patient will be on dual antiplatelet therapy with ASA 81mg daily and Brilinta 90mg twice a day for 21 days (until 10/05/2022) then monotherapy with Brinlinta 90mg twice a day afterward  Condition at Discharge: good     Discharge Day Visit / Exam:     Subjective:  Patient sitting in chair  She reports feeling improved today  She feels that her strength is better and she has been able to ambulate without much difficulty  She is eager to be discharged today  Vitals: Blood Pressure: 164/97 (09/16/22 0636)  Pulse: 70 (09/16/22 0636)  Temperature: 97 7 °F (36 5 °C) (09/16/22 0636)  Temp Source: Oral (09/15/22 1517)  Respirations: 18 (09/15/22 2116)  Height: 5' 4" (162 6 cm) (09/15/22 1018)  Weight - Scale: 87 5 kg (193 lb) (09/15/22 1018)  SpO2: 94 % (09/16/22 0636)    Exam:       Physical Exam  Vitals and nursing note reviewed  Constitutional:       General: She is not in acute distress  Appearance: Normal appearance  She is not ill-appearing  HENT:      Head: Normocephalic  Mouth/Throat:      Mouth: Mucous membranes are moist       Pharynx: Oropharynx is clear  Eyes:      General: No scleral icterus  Right eye: No discharge  Left eye: No discharge  Extraocular Movements: Extraocular movements intact and EOM normal       Conjunctiva/sclera: Conjunctivae normal    Cardiovascular:      Rate and Rhythm: Normal rate  Pulmonary:      Effort: Pulmonary effort is normal  No respiratory distress  Musculoskeletal:         General: Normal range of motion  Cervical back: Normal range of motion  Skin:     General: Skin is warm and dry  Coloration: Skin is not jaundiced or pale  Neurological:      Mental Status: She is alert and oriented to person, place, and time  Coordination: Finger-Nose-Finger Test normal    Psychiatric:         Mood and Affect: Mood normal          Behavior: Behavior normal          Neurologic Exam     Mental Status   Oriented to person, place, and time  Level of consciousness: alert  Able to follow commands  No dysarthria       Cranial Nerves     CN II   Right visual field deficit: none  Left visual field deficit: none     CN III, IV, VI   Extraocular motions are normal      CN V   Facial sensation intact  CN VII   Facial expression full, symmetric  CN VIII   Hearing: intact    CN IX, X   Palate: symmetric    CN XI   CN XI normal      CN XII   CN XII normal      Motor Exam   Muscle bulk: normal  Right arm pronator drift: absent  Left arm pronator drift: absent  Right UE strength 5/5 deltoid, biceps, triceps, hand   Left UE strength 5-/5 deltoid, 5/5 biceps, 5-/5 triceps, 5/5 hand   Bilateral LE strength 5/5 hip flexion, knee flexion, knee extension, dorsiflexion, plantar flexion     Sensory Exam   Light touch normal      Gait, Coordination, and Reflexes     Coordination   Finger to nose coordination: normal    Tremor   Resting tremor: absent  Intention tremor: absent        Discharge instructions/Information to patient and family:   See after visit summary for information provided to patient and family  Provisions for Follow-Up Care:  See after visit summary for information related to follow-up care and any pertinent home health orders  Planned Readmission: No    Discharge Statement:  I spent 24 minutes discharging the patient  This time was spent on the day of discharge  I had direct contact with the patient on the day of discharge  Greater than 50% of the total time was spent examining patient, answering all patient questions, arranging and discussing plan of care with patient as well as directly providing post-discharge instructions  Additional time then spent on discharge activities  Discharge Medications:  See after visit summary for reconciled discharge medications provided to patient and family        ** Please Note: This note has been constructed using a voice recognition system **

## 2022-09-16 VITALS
WEIGHT: 193 LBS | BODY MASS INDEX: 32.95 KG/M2 | TEMPERATURE: 97.7 F | RESPIRATION RATE: 18 BRPM | DIASTOLIC BLOOD PRESSURE: 97 MMHG | OXYGEN SATURATION: 94 % | HEIGHT: 64 IN | HEART RATE: 70 BPM | SYSTOLIC BLOOD PRESSURE: 164 MMHG

## 2022-09-16 LAB
ANION GAP SERPL CALCULATED.3IONS-SCNC: 5 MMOL/L (ref 4–13)
BUN SERPL-MCNC: 13 MG/DL (ref 5–25)
CALCIUM SERPL-MCNC: 8.6 MG/DL (ref 8.3–10.1)
CHLORIDE SERPL-SCNC: 110 MMOL/L (ref 96–108)
CO2 SERPL-SCNC: 26 MMOL/L (ref 21–32)
CREAT SERPL-MCNC: 0.79 MG/DL (ref 0.6–1.3)
GFR SERPL CREATININE-BSD FRML MDRD: 81 ML/MIN/1.73SQ M
GLUCOSE SERPL-MCNC: 95 MG/DL (ref 65–140)
POTASSIUM SERPL-SCNC: 3.8 MMOL/L (ref 3.5–5.3)
SODIUM SERPL-SCNC: 141 MMOL/L (ref 135–147)

## 2022-09-16 PROCEDURE — 99254 IP/OBS CNSLTJ NEW/EST MOD 60: CPT | Performed by: NURSE PRACTITIONER

## 2022-09-16 PROCEDURE — 97116 GAIT TRAINING THERAPY: CPT

## 2022-09-16 PROCEDURE — 99238 HOSP IP/OBS DSCHRG MGMT 30/<: CPT | Performed by: PSYCHIATRY & NEUROLOGY

## 2022-09-16 PROCEDURE — 80048 BASIC METABOLIC PNL TOTAL CA: CPT

## 2022-09-16 RX ORDER — ASPIRIN 81 MG/1
81 TABLET, CHEWABLE ORAL DAILY
Qty: 19 TABLET | Refills: 0 | Status: SHIPPED | OUTPATIENT
Start: 2022-09-17 | End: 2022-10-21

## 2022-09-16 RX ADMIN — TICAGRELOR 90 MG: 90 TABLET ORAL at 08:24

## 2022-09-16 RX ADMIN — Medication 2000 UNITS: at 08:24

## 2022-09-16 RX ADMIN — POTASSIUM CHLORIDE 10 MEQ: 750 TABLET, EXTENDED RELEASE ORAL at 08:24

## 2022-09-16 RX ADMIN — ENOXAPARIN SODIUM 40 MG: 40 INJECTION SUBCUTANEOUS at 08:24

## 2022-09-16 RX ADMIN — ASPIRIN 81 MG CHEWABLE TABLET 81 MG: 81 TABLET CHEWABLE at 08:24

## 2022-09-16 RX ADMIN — FOLIC ACID 1000 MCG: 1 TABLET ORAL at 08:24

## 2022-09-16 RX ADMIN — MAGNESIUM OXIDE TAB 400 MG (241.3 MG ELEMENTAL MG) 400 MG: 400 (241.3 MG) TAB at 08:24

## 2022-09-16 NOTE — PLAN OF CARE
Problem: PHYSICAL THERAPY ADULT  Goal: Performs mobility at highest level of function for planned discharge setting  See evaluation for individualized goals  Description: Treatment/Interventions: ADL retraining, Functional transfer training, LE strengthening/ROM, Elevations, Therapeutic exercise, Endurance training, Patient/family training, Equipment eval/education, Gait training, Bed mobility, Spoke to nursing  Equipment Recommended: Vinita Pelaez       See flowsheet documentation for full assessment, interventions and recommendations  Outcome: Progressing  Note: Prognosis: Good  Problem List: Decreased strength, Decreased endurance, Impaired balance, Decreased mobility, Decreased coordination  Assessment: The patient is demonstrating significant improvement from yesterday's session  Her balance, strength, and motor has progressed to the level where she is ambulating without any assistance or a device  She does still have weakness in her left leg, and she has difficulty attaining neutral dorsiflexion  She does compensate with increased weight-shift to the right and slight circumduction on the LLE  She was able to safely manuever around obstacles with increased time as well as perform turns  The stairs she also completed with increased time today  She will benefit from outpatient therapy in order to facilitate her return to baseline, but she is appropriate to return home  Barriers to Discharge: None     PT Discharge Recommendation: Home with outpatient rehabilitation    See flowsheet documentation for full assessment

## 2022-09-16 NOTE — RESTORATIVE TECHNICIAN NOTE
Restorative Technician Note      Patient Name: Walter Hoffman     Note Type: Mobility  Patient Position Upon Consult: Supine  Activity Performed: Ambulated; [id number]; Stood  Assistive Device: Other (Comment) (none)  Education Provided: Yes  Patient Position at End of Consult: Bedside chair;  All needs within reach; Bed/Chair alarm activated    Elodia COURTNEY, Restorative Technician, [employer]

## 2022-09-16 NOTE — PHYSICAL THERAPY NOTE
Physical Therapy Progress Note     09/16/22 1230   PT Last Visit   PT Visit Date 09/16/22   Note Type   Note Type Treatment   Pain Assessment   Pain Assessment Tool 0-10   Pain Score No Pain   Subjective   Subjective The patient notes that she is doing much better, and that she feels confident returning home  Restorative specialist and nursing note that the patient is ambulating well without any assistance today  Bed Mobility   Supine to Sit 6  Modified independent   Additional items Increased time required   Sit to Supine 6  Modified independent   Additional items Increased time required   Transfers   Sit to Stand 6  Modified independent   Additional items Increased time required   Stand to Sit 6  Modified independent   Ambulation/Elevation   Gait pattern Decreased foot clearance; Inconsistent inna   Gait Assistance 5  Supervision   Additional items Verbal cues   Assistive Device None   Distance 260 feet  Stair Management Assistance 5  Supervision   Additional items Verbal cues; Increased time required   Stair Management Technique One rail R;Step to pattern; Alternating pattern   Number of Stairs 7   Balance   Static Sitting Good   Dynamic Sitting Good   Static Standing Fair +   Ambulatory Fair   Activity Tolerance   Activity Tolerance Patient tolerated treatment well   Nurse Kan Cortes RN  Assessment   Prognosis Good   Problem List Decreased strength;Decreased endurance; Impaired balance;Decreased mobility; Decreased coordination   Assessment The patient is demonstrating significant improvement from yesterday's session  Her balance, strength, and motor has progressed to the level where she is ambulating without any assistance or a device  She does still have weakness in her left leg, and she has difficulty attaining neutral dorsiflexion  She does compensate with increased weight-shift to the right and slight circumduction on the LLE   She was able to safely manuever around obstacles with increased time as well as perform turns  The stairs she also completed with increased time today  She will benefit from outpatient therapy in order to facilitate her return to baseline, but she is appropriate to return home  Barriers to Discharge None   Goals   Patient Goals To get back to normal    STG Expiration Date 09/29/22   PT Treatment Day 1   Plan   Treatment/Interventions Functional transfer training;LE strengthening/ROM; Elevations; Therapeutic exercise; Endurance training;Patient/family training;Bed mobility;Gait training   Progress Progressing toward goals   PT Frequency 3-5x/wk   Recommendation   PT Discharge Recommendation Home with outpatient rehabilitation   AM-PAC Basic Mobility Inpatient   Turning in Bed Without Bedrails 4   Lying on Back to Sitting on Edge of Flat Bed 4   Moving Bed to Chair 4   Standing Up From Chair 4   Walk in Room 3   Climb 3-5 Stairs 3   Basic Mobility Inpatient Raw Score 22   Basic Mobility Standardized Score 47 4   Highest Level Of Mobility   JH-HLM Goal 7: Walk 25 feet or more   JH-HLM Achieved 7: Walk 25 feet or more       An AM-PAC Basic Mobility standardized score less than 40 78 suggests the patient may benefit from discharge to post-acute rehab services      Melanie Goodell, PTA

## 2022-09-16 NOTE — CASE MANAGEMENT
Case Management Discharge Planning Note    Patient name Juanito Mcnulty  Location [address] 723/PPHP 969-86 MRN [id number]  : 1960 Date 2022       Current Admission Date: 2022  Current Admission Diagnosis:Stroke [facility])   Patient Active Problem List    Diagnosis Date Noted    Hypoparathyroidism ([facility] [address] ) 2020    Status post parathyroidectomy ([address] ) 2020    Hypocalcemia 2020    BMI 32 0-32 9,adult 2020    DDD (degenerative disc disease), lumbar 2020    Bilateral sciatica 2020    Lumbar disc herniation 2020    Spinal stenosis of lumbar region 2020    Lumbar spondylosis 2020    Lumbar radiculopathy 2020    History of lacunar cerebrovascular accident (CVA) 2019    Pain in both lower extremities 2019    Bilateral low back pain with sciatica 2019    Prediabetes 2017    Enlarged thyroid 2017    Essential hypertension 2015    Hyperlipidemia, unspecified 2015    Simple goiter 2015    Stroke ([facility] [address] ) 2015      LOS (days): 2  Geometric Mean LOS (GMLOS) (days):   Days to GMLOS:     OBJECTIVE:  Risk of Unplanned Readmission Score: 8 51         Current admission status: Inpatient   Preferred Pharmacy:   [address] [place], [place] [address] [facility] Ana Luisa [address] [facility]  Phone: [phone] Fax: [phone] [address] [place] [address]  [address]  [address] [place] [address]  Phone: [phone] Fax: [address]  [address] [address]  Phone: [phone] Fax: [phone]    Primary Care Provider: Amy Wong DO    Primary Insurance:   Secondary Insurance:     DISCHARGE DETAILS:    Discharge planning discussed with[de-identified] patient        Other Referral/Resources/Interventions Provided:  Referral Comments: PT eval and PM & R evaluation- Both agree patient only needs outpatient therapy  Family at bedside and to transport patient home        Treatment Team Recommendation: Home  Discharge Destination Plan[de-identified] Home   ETA of Transport (Date): 09/16/22  ETA of Transport (Time): 1600         Family notified[de-identified] family present in room

## 2022-09-16 NOTE — CONSULTS
PHYSICAL MEDICINE AND REHABILITATION CONSULT NOTE  Reid Day 63 y o  female MRN: [id number]  Unit/Bed#: [facility] 723-01 Encounter: [id number]    Requested by (Physician/Service): Kang Dorado MD  Reason for Consultation:  Assessment of rehabilitation needs    Assessment:  Rehabilitation Diagnosis:    Stroke   Left sided ataxia/weakness    Impaired mobility and self care    Recommendations:  Rehabilitation Plan:   Continue PT/OT while on acute care   The patient reports improvement in symptoms today  She states she has been able to dress herself and ambulate in her room as well as dress herself  She reports walking with therapy and that they recommended outpatient therapy  At therapy re-evaluations are pending however the patient has likely improved enough to d/c home with outpatient rehab   Covid-19 Testing: [facility] inpatient rehabilitation units require testing within 48 hours of all potential admissions at this time  *Re-testing is NOT required for patients recovering from COVID-19 infection if isolation has been discontinued per CDC criteria  Medical Co-morbidities Plan:  · Hypertension  · Hyperlipidemia   · CVA 2014  · DVT ppx: Lovenox and SCD    Thank you for this consultation  Do not hesitate to contact service with further questions  EMILIANO Ambriz  PM&R    History of Present Illness:  Reid Day is a 63 y o  female with a PMH of CVA 2014 on Plavix, HTN and HLD who presented to the [address] on 9/14/22 with left arm and left leg heaviness as well as difficulty ambulation  She was hypertensive in the ER with BP of 218/127  CT and CTA of the head was unremarkable  She was not a candidate for TNK  MRI showed no evidence of acute infarct or acute intracranial hemorrhage or mass  Per neurology likely MRI negative stroke  PM&R are consulted for rehabilitation recommendations  The patient was seen in her room   She reports that she is much improved today  She does continue with slight left sided weakness and incoordination  She report walking with therapy and that they discussed outpt therapy and gave her locations  She also was able to dress herself today  She denies other complaints  Review of Systems: 10 point ROS negative except for what is noted in HPI    Function:  Prior level of function and living situation: The patient lives in a ranch home with 2 MARISSA and was independent  She lives with her spouse and was independent  Current level of function:  Physical Therapy: Minimal assist for transfers and ambulation, moderate assist for stairs   Occupational Therapy: Supervision for eating, grooming, UB bathing, minimal assist for UB dressing/LB dressing/LB assistance and toileting  Physical Exam:  /97   Pulse 70   Temp 97 7 °F (36 5 °C)   Resp 18   Ht 5' 4" (1 626 m)   Wt 87 5 kg (193 lb)   SpO2 94%   BMI 33 13 kg/m²        Intake/Output Summary (Last 24 hours) at 9/16/2022 1006  Last data filed at 9/16/2022 0900  Gross per 24 hour   Intake 638 ml   Output --   Net 638 ml       Body mass index is 33 13 kg/m²  Physical Exam  HENT:      Head: Normocephalic and atraumatic  Right Ear: External ear normal       Left Ear: External ear normal       Nose: Nose normal    Eyes:      Extraocular Movements: Extraocular movements intact  Pulmonary:      Effort: Pulmonary effort is normal    Musculoskeletal:      Comments: LUE/LLE: 4+/5 throughout  RUE/RLE: 5/5 throughout    Skin:     General: Skin is warm and dry  Neurological:      Mental Status: She is alert and oriented to person, place, and time        Comments: RUE/RLE ataxia    Psychiatric:         Mood and Affect: Mood normal         Social History:    Social History     Socioeconomic History    Marital status: /Civil Union     Spouse name: None    Number of children: None    Years of education: None    Highest education level: None Occupational History    None   Tobacco Use    Smoking status: Never Smoker    Smokeless tobacco: Never Used   Vaping Use    Vaping Use: Never used   Substance and Sexual Activity    Alcohol use: Yes     Alcohol/week: 2 0 standard drinks     Types: 2 Cans of beer per week     Comment: SOCIAL    Drug use: No    Sexual activity: Yes     Partners: Male     Birth control/protection: None   Other Topics Concern    None   Social History Narrative    ALWAYS SEAT BELT    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     Social Determinants of Health     Financial Resource Strain: Not on file   Food Insecurity: No Food Insecurity    Worried About Running Out of Food in the Last Year: Never true    Alda of Food in the Last Year: Never true   Transportation Needs: No Transportation Needs    Lack of Transportation (Medical): No    Lack of Transportation (Non-Medical):  No   Physical Activity: Not on file   Stress: Not on file   Social Connections: Not on file   Intimate Partner Violence: Not on file   Housing Stability: Low Risk     Unable to Pay for Housing in the Last Year: No    Number of Places Lived in the Last Year: 1    Unstable Housing in the Last Year: No        Family History:    Family History   Problem Relation Age of Onset    No Known Problems Mother     Hypertension Father     Parkinsonism Father     Hypertension Sister     Stroke Family     Glaucoma Family     Hypertension Family          Medications:     Current Facility-Administered Medications:     acetaminophen (TYLENOL) tablet 650 mg, 650 mg, Oral, Q6H PRN, Michoacano Escoto MD    aspirin chewable tablet 81 mg, 81 mg, Oral, Daily, Michoacano Escoto MD, 81 mg at 09/16/22 0824    atorvastatin (LIPITOR) tablet 40 mg, 40 mg, Oral, QPM, Michoacano Escoto MD, 40 mg at 09/15/22 1612    cholecalciferol (VITAMIN D3) tablet 2,000 Units, 2,000 Units, Oral, Daily, Michoacano Escoto MD, 2,000 Units at 09/16/22 0824    enoxaparin (LOVENOX) subcutaneous injection 40 mg, 40 mg, Subcutaneous, Q12H [facility], Michoacano Escoto MD, 40 mg at 63/34/36 4929    folic acid (FOLVITE) tablet 1,000 mcg, 1,000 mcg, Oral, Daily, Michoacano Escoto MD, 1,000 mcg at 09/16/22 0824    labetalol (NORMODYNE) injection 10 mg, 10 mg, Intravenous, Q6H PRN, Michoacano Escoto MD    magnesium oxide (MAG-OX) tablet 400 mg, 400 mg, Oral, Daily, Michoacano Escoto MD, 400 mg at 09/16/22 3391    potassium chloride (K-DUR,KLOR-CON) CR tablet 10 mEq, 10 mEq, Oral, Daily, Michoacano Escoto MD, 10 mEq at 09/16/22 9768    ticagrelor (BRILINTA) tablet 90 mg, 90 mg, Oral, Q12H [facility], Michoacano Escoto MD, 90 mg at 09/16/22 5196    Past Medical History:     Past Medical History:   Diagnosis Date    Diverticulosis     Hemorrhoids     Hypercalcemia 5/4/2018    Hypertension     controlled    Stroke [facility])     Vitamin D deficiency 8/13/2020        Past Surgical History:     Past Surgical History:   Procedure Laterality Date    COLONOSCOPY      ONSET 2015    NOSE SURGERY      SD EXPLORE PARATHYROID GLANDS Bilateral 6/18/2020    Procedure: PARATHYROIDECTOMY, MINIMALLY INVASIVE, 3 5 GLAND EXPLORATION, INTRAOPERATIVE PTH MONITORING;  Surgeon: Justo Perez MD;  Location: [facility];  Service: Surgical Oncology    SKIN BIOPSY      SKIN LESION EXCISION           Allergies:      Allergies   Allergen Reactions    Codeine Chest Pain           LABORATORY RESULTS:      Lab Results   Component Value Date    HGB 14 6 09/15/2022    HGB 14 0 12/16/2015    HCT 44 6 09/15/2022    HCT 41 9 12/16/2015    WBC 8 92 09/15/2022    WBC 7 45 12/16/2015     Lab Results   Component Value Date    BUN 13 09/16/2022    BUN 10 12/16/2015     12/16/2015    K 3 8 09/16/2022    K 3 9 12/16/2015     (H) 09/16/2022     12/16/2015    GLUCOSE 94 12/16/2015    CREATININE 0 79 09/16/2022    CREATININE 0 64 12/16/2015     Lab Results   Component Value Date    PROTIME 12 0 09/14/2022    PROTIME 13 2 11/17/2015    INR 0 87 09/14/2022    INR 1 00 11/17/2015        DIAGNOSTIC STUDIES: Reviewed  MRI brain wo contrast    Result Date: 9/15/2022  Impression: No evidence of acute infarct, acute intracranial hemorrhage or mass  Workstation performed: [id number]     CT stroke alert brain    Addendum Date: 9/14/2022    ADDENDUM: Left occipital stroke is old when compared to the prior MRI October 22, 2014  Result Date: 9/14/2022  Impression: 1  No evidence of acute internal hemorrhage  2  Suspect left occipital lobe ischemia, possibly acute to subacute  As this lesion /area does not correspond to the patient's symptoms, follow-up contrast-enhanced brain MRI is recommended to exclude other etiology  I personally discussed this study with Dr Mac Sinha on 9/14/2022 at 5:38 PM   Workstation performed: [id number]     CTA stroke alert (head/neck)    Result Date: 9/14/2022  Impression: No evidence of hemodynamic significant stenosis, aneurysm or dissection   I personally discussed this study with Dr Mac Sinha on 9/14/2022 at 5:38 PM  Workstation performed: [id number]

## 2022-09-16 NOTE — PLAN OF CARE
Problem: Potential for Falls  Goal: Patient will remain free of falls  Description: INTERVENTIONS:  - Educate patient/family on patient safety including physical limitations  - Instruct patient to call for assistance with activity   - Consult OT/PT to assist with strengthening/mobility   - Keep Call bell within reach  - Keep bed low and locked with side rails adjusted as appropriate  - Keep care items and personal belongings within reach  - Initiate and maintain comfort rounds  - Make Fall Risk Sign visible to staff  - Offer Toileting every Hours, in advance of need  - Initiate/Maintain alarm  - Obtain necessary fall risk management equipment:   - Apply yellow socks and bracelet for high fall risk patients  - Consider moving patient to room near nurses station  Outcome: Progressing     Problem: MOBILITY - ADULT  Goal: Maintain or return to baseline ADL function  Description: INTERVENTIONS:  -  Assess patient's ability to carry out ADLs; assess patient's baseline for ADL function and identify physical deficits which impact ability to perform ADLs (bathing, care of mouth/teeth, toileting, grooming, dressing, etc )  - Assess/evaluate cause of self-care deficits   - Assess range of motion  - Assess patient's mobility; develop plan if impaired  - Assess patient's need for assistive devices and provide as appropriate  - Encourage maximum independence but intervene and supervise when necessary  - Involve family in performance of ADLs  - Assess for home care needs following discharge   - Consider OT consult to assist with ADL evaluation and planning for discharge  - Provide patient education as appropriate  Outcome: Progressing  Goal: Maintains/Returns to pre admission functional level  Description: INTERVENTIONS:  - Perform BMAT or MOVE assessment daily    - Set and communicate daily mobility goal to care team and patient/family/caregiver     - Collaborate with rehabilitation services on mobility goals if consulted  - Perform Range of Motion  times a day  - Reposition patient every  hours  - Dangle patient times a day  - Stand patient  times a day  - Ambulate patient  times a day  - Out of bed to chair  times a day   - Out of bed for meals times a day  - Out of bed for toileting  - Record patient progress and toleration of activity level   Outcome: Progressing     Problem: DISCHARGE PLANNING  Goal: Discharge to home or other facility with appropriate resources  Description: INTERVENTIONS:  - Identify barriers to discharge w/patient and caregiver  - Arrange for needed discharge resources and transportation as appropriate  - Identify discharge learning needs (meds, wound care, etc )  - Arrange for interpretive services to assist at discharge as needed  - Refer to Case Management Department for coordinating discharge planning if the patient needs post-hospital services based on physician/advanced practitioner order or complex needs related to functional status, cognitive ability, or social support system  Outcome: Progressing     Problem: Knowledge Deficit  Goal: Patient/family/caregiver demonstrates understanding of disease process, treatment plan, medications, and discharge instructions  Description: Complete learning assessment and assess knowledge base    Interventions:  - Provide teaching at level of understanding  - Provide teaching via preferred learning methods  Outcome: Progressing     Problem: NEUROSENSORY - ADULT  Goal: Achieves stable or improved neurological status  Description: INTERVENTIONS  - Monitor and report changes in neurological status  - Monitor vital signs such as temperature, blood pressure, glucose, and any other labs ordered   - Initiate measures to prevent increased intracranial pressure  - Monitor for seizure activity and implement precautions if appropriate      Outcome: Progressing     Problem: CARDIOVASCULAR - ADULT  Goal: Maintains optimal cardiac output and hemodynamic stability  Description: INTERVENTIONS:  - Monitor I/O, vital signs and rhythm  - Monitor for S/S and trends of decreased cardiac output  - Administer and titrate ordered vasoactive medications to optimize hemodynamic stability  - Assess quality of pulses, skin color and temperature  - Assess for signs of decreased coronary artery perfusion  - Instruct patient to report change in severity of symptoms  Outcome: Progressing

## 2022-09-19 ENCOUNTER — TRANSITIONAL CARE MANAGEMENT (OUTPATIENT)
Dept: FAMILY MEDICINE CLINIC | Facility: CLINIC | Age: 62
End: 2022-09-19

## 2022-09-19 NOTE — UTILIZATION REVIEW
Pt was registered in ER with incorrect insurance coverage  Rec'd updated info from family today  Unable to start auth in protal to do beginning date of service  Requesting your access to create auth for this admission        Inpatient Admission Authorization Request   NOTIFICATION OF INPATIENT ADMISSION/INPATIENT AUTHORIZATION REQUEST   SERVICING FACILITY:   [facility]  Address: [address] [place] [address], [address] [place] [address]  Tax ID: [id number]  NPI: [id number]  Place of Service: Inpatient [address] [facility] Code: 24     ATTENDING PROVIDER:  Attending Name and NPI#: Dudley Leroy Md [[id number]]  Address: [address] [place] [address], [address] [place] [address]  Phone: [phone]     UTILIZATION REVIEW CONTACT:  Rajeev Samson Utilization   Network Utilization Review Department  Phone: [phone]  Fax: [phone]  Email: [email or web address]     PHYSICIAN ADVISORY SERVICES:  FOR [id number] REVIEW - MEDICAL NECESSITY DENIAL  Phone: [phone]  Fax: [phone]  Email: [email or web address]     TYPE OF REQUEST:  Inpatient Status     ADMISSION INFORMATION:  ADMISSION DATE/TIME: 9/14/22  6:42 PM  PATIENT DIAGNOSIS CODE/DESCRIPTION:  CVA (cerebral vascular accident) ([facility] [address] ) [I63 9]  Ambulatory dysfunction [R26 2]  DISCHARGE DATE/TIME:    IMPORTANT INFORMATION:  Please contact Rajeev Samson directly with any questions or concerns regarding this request  Department voicemails are confidential     Send requests for admission clinical reviews, concurrent reviews, approvals, and administrative denials due to lack of clinical to fax [phone]

## 2022-09-20 NOTE — UTILIZATION REVIEW
Notification of Discharge   This is a Notification of Discharge from our facility [address]  Please be advised that this patient has been discharge from our facility  Below you will find the admission and discharge date and time including the patients disposition  UTILIZATION REVIEW CONTACT:  Sagrario Akins  Utilization   Network Utilization Review Department  Phone: [phone] x carefully listen to the prompts  All voicemails are confidential   Email: [email or web address]     PHYSICIAN ADVISORY SERVICES:  FOR [id number] REVIEW - MEDICAL NECESSITY DENIAL  Phone: [phone]  Fax: [phone]  Email: [email or web address]     PRESENTATION DATE: 9/14/2022  4:53 PM  OBERVATION ADMISSION DATE:   INPATIENT ADMISSION DATE: 9/14/22  6:42 PM   DISCHARGE DATE: 9/16/2022  3:55 PM  DISPOSITION: Home/Self Care Home/Self Care      IMPORTANT INFORMATION:  Send all requests for admission clinical reviews, approved or denied determinations and any other requests to dedicated fax number below belonging to the campus where the patient is receiving treatment   List of dedicated fax numbers:  [address] DENIALS (Administrative/Medical Necessity) [phone]   [address] (Maternity/NICU/Pediatrics) [phone]   [facility] [phone]   130 [facility] [phone]   [address] [place] [address] [phone]   2000 [facility] [address] [place] [address],[address] Floor [address] [place] [address] [phone]   [facility]  [phone]   2205 [facility]  2401 [facility] 1000 [facility] [phone]

## 2022-09-21 ENCOUNTER — TELEPHONE (OUTPATIENT)
Dept: NEUROLOGY | Facility: CLINIC | Age: 62
End: 2022-09-21

## 2022-09-21 NOTE — UTILIZATION REVIEW
Notification of Discharge   This is a Notification of Discharge from our facility [address]  Please be advised that this patient has been discharge from our facility  Below you will find the admission and discharge date and time including the patients disposition  UTILIZATION REVIEW CONTACT:  Elvie Valles  Utilization   Network Utilization Review Department  Phone: [phone] x carefully listen to the prompts  All voicemails are confidential   Email: [email or web address]     PHYSICIAN ADVISORY SERVICES:  FOR [id number] REVIEW - MEDICAL NECESSITY DENIAL  Phone: [phone]  Fax: [phone]  Email: [email or web address]     PRESENTATION DATE: 9/14/2022  4:53 PM  OBERVATION ADMISSION DATE:   INPATIENT ADMISSION DATE: 9/14/22  6:42 PM   DISCHARGE DATE: 9/16/2022  3:55 PM  DISPOSITION: Home/Self Care Home/Self Care      IMPORTANT INFORMATION:  Send all requests for admission clinical reviews, approved or denied determinations and any other requests to dedicated fax number below belonging to the campus where the patient is receiving treatment   List of dedicated fax numbers:  [address] DENIALS (Administrative/Medical Necessity) [phone]   [address] (Maternity/NICU/Pediatrics) [phone]   [facility] [phone]   [address] [facility] [address] [phone]   [address] [place] [address] [phone]   [address] [phone]   [facility]  [phone]   [address] [place] [address], [facility]  2401 [facility] 1000 [facility] [phone]

## 2022-09-21 NOTE — TELEPHONE ENCOUNTER
Patient returning call from a [facility]  Requesting call back [phone]

## 2022-09-21 NOTE — TELEPHONE ENCOUNTER
Neurovascular Discharge Follow Up  Hospitalization: 9/14/22-9/16/22    Called patient  Since discharge, she denies experiencing any new or worsening stroke-like symptoms  Patient reports she continues to have the following symptoms: left arm heaviness and difficulty walking  She claims symptoms are improving since discharge  She is ambulating independently as well as preforming her own ADLs  Patient manages her own medications, appointments, and affairs  Reviewed appointments - patient is scheduled to follow up with PCP on 10/7/22  Offered to schedule stroke hospital follow up appointment, patient is agreeable to this  Scheduled appointment for 10/20/22 at 7:30 am  Provided date/time/location of appointment  Reports that she will be evaluated by PT/OT on Monday 9/26/22  I reviewed medications with her  There have not been any medication changes since discharge from the hospital  Reports having 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 obtaining medications  Reports she is taking as prescribed with no medication side effects or signs of bleeding  She also verbalizes understanding of DAPT instructions/plan  During this call, we reviewed stroke type, symptoms, personal risk factors and management, medications, and resources  Patient verbalizes understanding  She admits to having the stroke education booklet at home  As for risk factors, patient reports monitoring her BP at home  Average BP is 150/90  Advised patient to contact her PCP for management of her high blood pressure  She is a non smoker  Encouraged for patient to follow a low salt / low cholesterol / diabetic friendly diet  Patient reports how she is going on a cruise on 10/27/22 to the [address]  She reports how she received her nvite covid vaccine in March 2021 and April 2021  Then she received the nvite booster on 12/18/2021  Due to her recent history/hospitlization, she inquired if she should get another booster   Advised patient I will discuss with a provider  I also recommended for her to discuss with her PCP as well  Patient agreed  I addressed all her questions  At the conclusion of the conversation, patient denies having any further questions or concerns

## 2022-09-21 NOTE — TELEPHONE ENCOUNTER
Called patient and notified her of the provider's response  Patient expressed understanding and was appreciative  No further questions or concerns

## 2022-09-26 ENCOUNTER — EVALUATION (OUTPATIENT)
Dept: PHYSICAL THERAPY | Facility: CLINIC | Age: 62
End: 2022-09-26
Payer: COMMERCIAL

## 2022-09-26 ENCOUNTER — EVALUATION (OUTPATIENT)
Dept: OCCUPATIONAL THERAPY | Facility: CLINIC | Age: 62
End: 2022-09-26
Payer: COMMERCIAL

## 2022-09-26 DIAGNOSIS — R26.2 AMBULATORY DYSFUNCTION: ICD-10-CM

## 2022-09-26 DIAGNOSIS — I63.9 CEREBROVASCULAR ACCIDENT (CVA), UNSPECIFIED MECHANISM (HCC): ICD-10-CM

## 2022-09-26 DIAGNOSIS — I63.9 CVA (CEREBRAL VASCULAR ACCIDENT) (HCC): Primary | ICD-10-CM

## 2022-09-26 PROCEDURE — 97166 OT EVAL MOD COMPLEX 45 MIN: CPT

## 2022-09-26 PROCEDURE — 97112 NEUROMUSCULAR REEDUCATION: CPT

## 2022-09-26 PROCEDURE — 97162 PT EVAL MOD COMPLEX 30 MIN: CPT

## 2022-09-26 PROCEDURE — 97110 THERAPEUTIC EXERCISES: CPT

## 2022-09-26 NOTE — PROGRESS NOTES
PT Evaluation     Today's date: 2022  Patient name: Aaron Damon  : 1960  MRN: [id number]  Referring provider: EMILIANO Alvarado Sa  Dx:   Encounter Diagnosis     ICD-10-CM    1  Cerebrovascular accident (CVA), unspecified mechanism ([facility] [address] )  I63 9 Ambulatory referral to Physical Therapy   2  Ambulatory dysfunction  R26 2 Ambulatory referral to Physical Therapy                  Assessment  Assessment details: Aaron Damon is a pleasant 63 y o  female who was referred to outpatient physical therapy following a CVA on 2022 with left sided deficits  She presents with the goal to improve her balance and walking in various footwear in preparation for her cruise  Functionally, she is able to complete all ADLs/IADLs but has to be cautious and takes her time  PT examination findings include: abnormal gait deviations such as intermittent L foot drag, decreased push off, and minor L lateral trunk lean; decreased L LE strength with asymmetrical weight distribution for 5x STS; impaired balance demonstrated by her FGA score; and limited endurance shown by her 6 MWT distance  None of above findings classify her as an increased falls risk, but are below age-matched controls data; however, they do interfere with her ability to complete her daily house and work related tasks  Time was spent educating her on gradually increasing her activity level at home and work as able to increase endurance and strength  Discussed goal of high intensity training as beneficial for recovery following CVA within BP bounds  She verbalized understanding of all recommendations  The patient would benefit from skilled physical therapy to provide exercises, neuromuscular reeducation, gait training, manual techniques, and modalities as deemed necessary in order to help her achieve her goals and help return her to her PLOF         Impairments: abnormal coordination, abnormal gait, activity intolerance, impaired balance, impaired physical strength and lacks appropriate home exercise program  Understanding of Dx/Px/POC: good   Prognosis: good    Goals  STGs in 4 weeks  1  Patient will be independent with HEP and increasing activity level  2  Patient will complete 5x STS with symmetrical weight distribution  3  Patient will report ability to ascend/descend stairs to basement or at work for increased activity  LTGs in 8 weeks  1  Patient will increase her 6 MWT to at least 1500 ft for improved endurance and community ambulation  2  Patient will improve her FGA score by at least 4 points for improved balance (MCID 4 pts)  3  Patient will report ability to ambulate in variety of foot wear without significant instability  Plan  Patient would benefit from: skilled physical therapy and PT eval  Planned therapy interventions: balance, neuromuscular re-education, patient education, strengthening, stretching, therapeutic activities, therapeutic exercise, gait training, graded activity and home exercise program  Frequency: 1-2x/week  Duration in weeks: 8  Plan of Care beginning date: 9/26/2022  Plan of Care expiration date: 11/28/2022  Treatment plan discussed with: patient        Subjective Evaluation    History of Present Illness  Mechanism of injury: Simona Watson states that she had a stroke on 9/14/2022 and was discharged on 9/16/2022  She notes that the stroke impacted her L side  She describes it as a heaviness, arm more so than leg she feels  She says that when she is walking in shoes she is not bad  She says that she is going on a cruise in 1 month and her goal is to be able to walk in flip flops and slides without issues  She describes that her balance is off a bit  She does not have to really do stairs as she lives in a ranch style home but does find it difficult stepping into the tub  She finds that she is more cautious in the yard than she used to be  She does feel more fatigued than she used to   She finds that the L side of her face also feels weak  She went back to work last Friday and she works in [employer], computer work, [employer]  Overall, she feels that she is about 80% back to her normal prior to this stroke  She said that she had a stroke in 2014 similarly impacting the L side again  This was also due to BP issues  She felt that she had recovered about 99% from that one     Pain  No pain reported  Progression: improved    Social Support  Steps to enter house: yes (2 MARISSA with no rail)  Stairs in house: no (does have basement)   Lives in: [address] [place]  Lives with: spouse    Employment status: working  Hand dominance: right    Treatments  Discharged from (in last 30 days): inpatient hospitalization  Patient Goals  Patient goals for therapy: increased strength and improved balance  Patient goal: walk better        Objective     Vitals start of session: /94 mmHg; HR 83 bpm; SpO2 99%--no symptoms of HA, blurry vision, dizziness, chest pain    Balance Test    6 Minute Walk Test (ft):  IE 9/26: 1250 ft   Functional Gait Assessment:  IE 9/26: score 23/30   Gait Speed (m/s):  IE 9/26: 10m/8 1s = 1 23 m/s   5x Sit To Stand (s):  IE 9/26: 10 5 (UEs crossed; L foot slightly fwd decreased WB)   TUG (s):  IE 9/26: 10 1      Coordination Left Right   Heel To Araujo  minor dysmetria--slow  WNL       Sensation Left Right   Light Touch  Grossly intact  Grossly intact       Manual Muscle Testing - Hip Left Right   Flexion 4 5   External Rotation 3+ 4+     Manual Muscle Testing - Knee Left Right   Flexion 4 5   Extension 4 5     Manual Muscle Testing - Ankle Left Right   Doriflexion 4 5         Gait Assessment:    Minor L lateral trunk lean during stance; intermittent L foot drag; decreased push off         Short Term Goal Expiration Date:(10/28/2022)  Long Term Goal Expiration Date: (11/28/2022)  POC Expiration Date: (11/28/2022)       Precautions HTN; history of CVA x2       Manuals 9/26               Pt edu Increasing activity at home; high intensity training                       Neuro Re-Ed         STS Stagger stance-L back x5       Step ups (fwd/lat)         Side stepping        HKM        Hurdles (fwd/lat)                        Ther Ex        TM        Stand hip abd        Stand hip ext        Stand march        HR/TR                                Ther Activity                        Gait Training                        Modalities

## 2022-09-26 NOTE — PROGRESS NOTES
OCCUPATIONAL THERAPY INITIAL EVALUATION:    2022  Jovan Thompson  1960  [id number]  Calin, Tara, CRNP  1  CVA (cerebral vascular accident) ([facility] [address] )        Subjective    "I don't feel like myself "    PATIENT GOAL: "To get back to normal"      Assessment/Plan    Skilled Analysis:  Pt is a 63 y o  female referred to Occupational Therapy s/p CVA (cerebral vascular accident) ([facility] [address] ) [I63 9]  Pt participated in skilled OT evaluation and following formalized testing, presents with the following areas of deficit: FMC/FMS, GMC/GMS, hand to target accuracy and in hand manipulation/dexterity impacting indep and completion of ADL/IADL, salient, and leisure tasks  Pt does demo the need for skilled Occupational Therapy services 1x/week for 4 weeks with focus on UE NMR, UE strengthening, UE endurance, FMC/GMC and FMS/GMS to address the goals as listed below  Pt in agreement with POC, POC to  w/in 90 days     Goals:     Motor Short Term Goals (4)WKS      Strength/Endurance    ·  Pt will increase L UE strength to 5/5  through the use of strengthening exercises and home program for eventual return to life and work roles and salient tasks    · Pt will demo with G tolerance to supine, seated, and in stance exercise x 50 minutes with minimal rest breaks required for increased engagement in life roles and weekly exercise regimen     · Pt will demo with G carryover of Home Exercise Program to improve functional progression towards goals in Plan of care and for improved functional use of RUE         FMC/GMC    · Pt will increase LUE rate of manipulation by 7% for all FM tests for improved functional performance with salient tasks     · Pt will increase Reaction time  to < 1 8 seconds with hand to target timed trials for improved reaction time and automaticity of coordination for improved functional performance with ADLs/IADLs and salient tasks    · Pt will increase LUE prehension patterns for improved utensil and container management  with <2% droppage     · Pt will demo with decreased LUE ataxia with functional reach for normalized movement pattern of  L UE and for improved hand to target accuracy x 30%    Treatment Interventions  Supine, seated, and in stance neuro re-ed  Fxnl Grasp and Release  FMC/prehension patterns  Fxnl Tool use (tweezers, tongs)  In hand manipulation  [employer] [facility])  Timed Trials to improve automaticity  Manual tx (IASTM if able, PROM/stretching)  Hand to target tasks  Sensory re-ed as needed (sensory bins)  Seated functional reach: crossing midline  Supine place and hold  Closed chain activities  Open chain activities          HISTORY OF PRESENT ILLNESS:     Pt is a 63 y o  female who was referred to Occupational Therapy s/p  CVA (cerebral vascular accident) ([facility] [address] ) [I63 9]  Pt admitted to hospital on 9/14/22 and placed on stroke alert  Initial symptoms were left leg and arm weakness  Pt was working when she attempted to stand up from a chair and she felt like her leg was going to give out  Pt reports she had a stroke in 2014  She states her BP is pretty high and though she is taking blood pressure medication, her physicians think the stroke was caused by high BP  She has an appointment in a few weeks to adjust her BP medication  Per pt's chart review, "due to left arm/leg heaviness (leg worse than arm)  Stroke alert was initiated  NIHSS 3 (L leg drift, ataxia and subjective reduced L foot sensory)  TNK was not given due to fast resolution of her symptoms while in ED right after CT H and CTA were done " Pt discharged home and is indep in all ADL/IADL, however she feels increased fatigue since the CVA  Pt is having a 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  Her left arm still feels heavy and weaker, and slightly less coordinated on her left side  Pt went back to work last Friday and reported she felt exhausted over the weekend   She is back to working full-time this week and plans to continue full time unless it becomes too difficult  She does not exercise regularly, but she goes to painting classes for leisure  PMH:   Past Medical History:   Diagnosis Date    Diverticulosis     Hemorrhoids     Hypercalcemia 2018    Hypertension     controlled    Stroke [facility])     Vitamin D deficiency 2020     Pain Levels:     Restin    With Activity:  0    Objective    Impairment Observations:    Upper Extremity       RUE LUE Comments                 UPPER EXTREMITY FXN Intact Impaired Dominant Hand: R                         /Pinch Strength         Dynamometer      - Gross Grasp 60 lbs 41 lbs low   Pinch Meter       - PINCER 7 5 lbs 6 lbs     - TRIPOD 12 lbs 9 lbs     - LATERAL 15 lbs  11 5 lbs              AROM (seated)   All WNL/WFL    Scapular Protraction      Scapular Retraction      Elevation/Depression        Shoulder Flexion      Shoulder Extension        Shoulder Abd        Shoulder Add        Horizontal Abd        Horizontal Add        Elbow Flexion        Elbow Extension        Pronation        Supination        Wrist Flexion        Wrist Extension        Digit Flexion        Digit Extension        Composite Grasp        Hook Grasp        Opposition        Subluxation                                     MMT         Elevation 5/5 5/5    Shoulder Flex/Ext 5/5 4/5    Shoulder Abd 5/5 4+/5    Shoulder ADd 5/5 4/5    Elbow Flex 5/5 4+/5    Elbow Ext 5/5 4+/5    Wrist Flex 5/5 4+/5    Wrist Ext 5/5 4+/5    Gross Grasp 5/5 4+/5          SENSATION      Myofilaments (3 61 Wnl)  2 83    Proprioception Intact Intact    Hot/Cold Temp Intact Intact          COORDINATION      9 Hole Peg Test 20 seconds 25 seconds low   Fxnl Dexterity Test 21 seconds 46 seconds low; demo pronation/supination in each trial   Rapid, Alternating Movement Test     Normal     Theraputty Exercises:  Therex: 15  Patient educated and instructed on HEP for FMS/FMC with use of medium/soft resist theraputty to inc indep with ADL fxn including container management, fastener management, and item retrieval  Pt provided with demonstration and verbal instruction and demo G understanding of task   Exercises instructed as followed:  · Putty Squeezes - 10 reps - 3 sets - 1x daily - 3x weekly  · Tip Pinch with Putty - 10 reps - 3 sets - 1x daily - 3x weekly  · Finger Lumbricals with Putty - 10 reps - 3 sets - 1x daily - 3x weekly  · 3-Point Pinch with Putty - 10 reps - 3 sets - 1x daily - 3x weekly  · Finger Key  with Putty - 10 reps - 3 sets - 1x daily - 3x weekly  · Thumb Opposition with Putty - 10 reps - 3 sets - 1x daily - 3x weekly      INTERVENTION COMMENTS:  Diagnosis: [facility] ) [I63 9]  Precautions: HTN  FOTO:   Insurance: Payor: BLUE CROSS / Plan: [facility] PLAN [id number] / Product Type: Blue Fee for Service /   1 of 4 visits, PN due 10/26/22

## 2022-10-03 ENCOUNTER — OFFICE VISIT (OUTPATIENT)
Dept: OCCUPATIONAL THERAPY | Facility: CLINIC | Age: 62
End: 2022-10-03
Payer: COMMERCIAL

## 2022-10-03 ENCOUNTER — OFFICE VISIT (OUTPATIENT)
Dept: PHYSICAL THERAPY | Facility: CLINIC | Age: 62
End: 2022-10-03
Payer: COMMERCIAL

## 2022-10-03 DIAGNOSIS — I63.9 CEREBROVASCULAR ACCIDENT (CVA), UNSPECIFIED MECHANISM (HCC): Primary | ICD-10-CM

## 2022-10-03 PROCEDURE — 97530 THERAPEUTIC ACTIVITIES: CPT

## 2022-10-03 PROCEDURE — 97110 THERAPEUTIC EXERCISES: CPT

## 2022-10-03 PROCEDURE — 97112 NEUROMUSCULAR REEDUCATION: CPT

## 2022-10-03 PROCEDURE — 97116 GAIT TRAINING THERAPY: CPT

## 2022-10-03 NOTE — PROGRESS NOTES
Daily Note     Today's date: 10/3/2022  Patient name: Mirtha Carrion  : 1960  MRN: [id number]  Referring provider: Tara Deal CRNP  Dx:   Encounter Diagnosis     ICD-10-CM    1  Cerebrovascular accident (CVA), unspecified mechanism ([facility] [address] )  I63 9        Start Time: 1547  Stop Time: 1700  Total time in clinic (min): 73 minutes    Subjective: Pt reports that she is pretty much able to do whatever she needs to do around the house, only has difficulty with certain balance tasks  Has not tried to walk on uneven surfaces yet since event  Overall, Sarah Lamb reports that she feels good with activities, but wants to be able to go on cruise in 1 month while being able to navigate uneven surfaces in unfamiliar terrain while wearing less support footwear such as flip flops  Objective: See treatment diary below      Assessment: Tolerated treatment well  Patient demonstrates good static balance, has challenges with higher level balance activities most notably in frontal plane secondary to decreased weight bearing + balance ability on R side  Had minor loss of balance throughout various activities but was able to self-correct when in the harness system  Patient's vitals stayed within range for session with BP rising to 168/88 post session  Based on patient's age (59) and MaxHR (160), HIGT would benefit in HR range of  bpm (60-85% HRmax)  Patient within HR range for majority of session, but continues to have intermittent L foot drag, decreased propulsion that is able to be corrected once verbally cued  Patient would benefit from continued PT to continue HIGT to improve neuroplasticity and release of BDNF to facilitate improved function, reduction of fall risk, and improved QOL  Plan: Continue per plan of care  Progress treatment as tolerated         Short Term Goal Expiration Date:(10/28/2022)  Long Term Goal Expiration Date: (2022)  POC Expiration Date: (2022)       Precautions HTN; history of CVA x2       Manuals 9/26 10/3              Pt edu Increasing activity at home; high intensity training Education on high intensity gait training  Proper blood pressure management during exercise, target HR during exercise                      Neuro Re-Ed         STS Stagger stance-L back x5 Staggered stance - L back  3x10  Last two sets with YMB slam  Post  bpm      Step ups (fwd/lat)         Side stepping  SOLO hurdles (7/low) #4 L AW - medium difficulty reported  3x laps       HKM        Hurdles (fwd/lat)  SOLO forward hurdles (7 low) 1x lap step to pattern, 2x reciprocal pattern     Lateral tall shilpa SOLO (1 shilpa) - step over pattern to tap then tap  2x10 ea      Uneven Surface Training   Forward/Lat/ backwards stepping on red mat with various AW underneath to simulate uneven surfaces  6x laps ea  SOLO      Lateral Step ups  6" step, performed 2x15 on L side to target glut med strength and recruitment  HR 101bpm post      Backwards amb  SOLO - 4x laps backwards 1/4 length       Ther Ex        TM        Stand hip abd        Stand hip ext        Stand march        HR/TR                                Ther Activity                        Gait Training  SOLO 2 0 mph warmup x6 mins, 0% incline    SOLO 2 3 mph, 2%, x4 mins    SOLO 2 0 mph, 0% incline x2 mins     SOLO 2 3 mph, 2% incline, x8 mins 115-132 bpm throughout this phase     Post-BP: 168/88 mmHg, 132   Bpm, 99% SPO2  (BUE support throughout)                            Modalities

## 2022-10-03 NOTE — PROGRESS NOTES
Occupational Therapy Daily Note:    Today's date: 10/3/2022  Patient name: Marlen Naylor  : 1960  MRN: [id number]  Referring provider: Tara Kumar CRNP  Dx:   Encounter Diagnosis   Name Primary?  Cerebrovascular accident (CVA), unspecified mechanism [facility] Yes       Subjective: "It's a lot harder going backwards "    Objective: Pt engaged in skilled OT treatment session with focus on UE NMR, UE strengthening, UE endurance, FMC/GMC and FMS/GMS to increase engagement, endurance, tolerance, and independence with daily ADL and IADL tasks  CPT Code Minutes                                           Task Details        Therapeutic Activity 50 Pt completed dynamic activity focusing on improving intrinsic strength, GMC/GMS, proprioception, proximal UE strength/endurance, hand to target accuracy  Pt donned 2# weight cuff to distal UE while retrieving barrel dowels from low mat with resistive tongs with black/red bands attached and placed to dowel board positioned on adjustable height table  Required to walk ~20 feet with sustained grasp on tool  Pt then used tongs to  and place dowels into barrel dowels  No droppage noted  With use of red resistive clip, pt picked up small foam blocks and placed on dowels utilizing tripod grasp  Pt then utilized tweezers to  small mushroom pegs and place upside down on small foam blocks  Droppage x6  Neuro Re-Ed               Therapeutic Exercise 10 For therapeutic exercise benefit, pt tolerated 5 min x2 prograde/retrograde UEB #2 0 resistance for neuro motor re-ed, GMC/GMS, proximal UE strength/endurance, & ROM/flexibility  Manual          Self Care           Assessment: Tolerated treatment well  Pt demo good abilities to sustain grasp and good use of functional tools  Pt demo ataxia with functional reaching with hand to target placement into dowel boards  Patient would benefit from continued skilled OT      Plan: Continued skilled OT per POC    INTERVENTION COMMENTS:  Diagnosis: CVA (cerebral vascular accident) ([facility] [address] ) [I63 9]  Precautions: HTN  FOTO:   Insurance: Payor: BLUE CROSS / Plan: [facility] PLAN [id number] / Product Type: Blue Fee for Service /   2 of 4 visits, PN due 10/26/22

## 2022-10-07 ENCOUNTER — OFFICE VISIT (OUTPATIENT)
Dept: FAMILY MEDICINE CLINIC | Facility: CLINIC | Age: 62
End: 2022-10-07
Payer: COMMERCIAL

## 2022-10-07 VITALS
BODY MASS INDEX: 31.24 KG/M2 | HEART RATE: 62 BPM | TEMPERATURE: 98.3 F | SYSTOLIC BLOOD PRESSURE: 174 MMHG | WEIGHT: 183 LBS | OXYGEN SATURATION: 98 % | HEIGHT: 64 IN | DIASTOLIC BLOOD PRESSURE: 100 MMHG

## 2022-10-07 DIAGNOSIS — Z86.79 HISTORY OF ATRIAL FIBRILLATION: ICD-10-CM

## 2022-10-07 DIAGNOSIS — I63.9 CVA (CEREBRAL VASCULAR ACCIDENT) (HCC): ICD-10-CM

## 2022-10-07 DIAGNOSIS — R94.31 ABNORMAL EKG: ICD-10-CM

## 2022-10-07 DIAGNOSIS — I10 MALIGNANT HYPERTENSION: ICD-10-CM

## 2022-10-07 DIAGNOSIS — Z09 HOSPITAL DISCHARGE FOLLOW-UP: Primary | ICD-10-CM

## 2022-10-07 DIAGNOSIS — Z23 FLU VACCINE NEED: ICD-10-CM

## 2022-10-07 DIAGNOSIS — Z71.89 EDUCATED ABOUT COVID-19 VIRUS INFECTION: ICD-10-CM
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
## 2022-10-07 PROCEDURE — 99215 OFFICE O/P EST HI 40 MIN: CPT | Performed by: FAMILY MEDICINE

## 2022-10-07 RX ORDER — METOPROLOL TARTRATE 50 MG/1
50 TABLET, FILM COATED ORAL EVERY 12 HOURS
Qty: 90 TABLET | Refills: 1 | Status: SHIPPED | OUTPATIENT
Start: 2022-10-07 | End: 2022-10-11 | Stop reason: ALTCHOICE

## 2022-10-07 NOTE — PROGRESS NOTES
Name: Christopher Serna      : 1960      MRN: [id number]  Encounter Provider: Yolis Sebastian DO  Encounter Date: 10/7/2022   Encounter department: [facility] AT [address] [place] [address]  Hospital discharge follow-up    2  CVA (cerebral vascular accident) ([facility] [address] )  Comments:  completed 21 days ASA plus Brilinta, now on Brilinta alone, symptoms improving  Orders:  -     ticagrelor (Brilinta) 90 MG; Take 1 tablet (90 mg total) by mouth every 12 (twelve) hours  -     Ambulatory Referral to Cardiology; Future    3  Malignant hypertension  Comments:  bp 174/100 here today; she has not had any other f/u prior to today since d/c; I increased dose of metoprolol; recheck here in 3 days  Orders:  -     Ambulatory Referral to Cardiology; Future    4  History of atrial fibrillation  Comments:  reading of EKG done in hospital indicated AFib, no prior hx of arrythmias and pt has not been symptomatic with respect to any cardiac c/o; cardiac exam today normal, echo done in hospital showed grade 1 diastolic dysfunction, otherwise was normal  Orders:  -     Ambulatory Referral to Cardiology; Future    5  Abnormal EKG  -     Ambulatory Referral to Cardiology; Future    6  Flu vaccine need  Comments:  pt will obtain both flu vaccine and bivalent COVID booster together at her local pharmacy    7  Educated about COVID-19 virus infection    8  BMI 31 0-31 9,adult    9   Tick bite, unspecified site, initial encounter   no sx, no signs of ECM rash or any redness at bite site- monitor      at end of visit, as I was advising that I would sign order for flu vaccine for her to get today, pt asked about COVID booster - whether she should get it- she is supposed to be going on a cruise 10/27 - already ok'd by the neurologist- -and doesn't know what to do  states has had 3 doses COVID vaccine so far - (I advised as above)  Also reports "the other thing is had a tick on me the other day" removed from left chest area Monday, states no redness or rash, was not attached very long per pt      Subjective      Chief Complaint   Patient presents with   • Follow-up     Hospital follow up  Discharged on 9/16, admitted due to stroke  Is feeling okay since discharge  Does have follow up with neurology scheduled  Hospital discharge follow-up- had CVA 09/14/2022- has been getting PT/OT as outpatient  HTN uncontrolled today, pt reports at home readings have been high as well - 160/90's  Has f/u scheduled with neuro in 2 weeks  Never a smoker  Pt's HR today is 62, so in trying to determine best approach to bp control, I search chart for EKG done in hospital and find was read as Afib with ventr rate 97 and atrial rate 750, possible septal infarct as below:  Pt denies having any palpitations, racing heartbeat, CP's, SOB sx at any time   Her previous EKG on record 2020 was normal  States her weakness is almost 90% better, went back to work last Monday   Admits was having "minor" headaches this week "didn't last long"  Denies any abd pain or stomach upset on the ASA or on the Brilinta  denies any epistaxis, bleeding gums, hematuria or blood in stool       09/14/22  6:03 PM  ECG 12 lead  Status: Final result  MUSE LINK   View MUSE Strips  Study Result  Narrative & Impression  Age and gender specific ECG analysis  Atrial fibrillation  Possible Septal infarct , age undetermined  Abnormal ECG  No previous ECGs available  Confirmed by Manoj Espinoza ([id number]) on 9/15/2022 8:28:12 AM      9/15/2022     Completed  Echo complete  Hypertension  HLD  CVA  Prediabetes  Interpretation Summary  •  Left Ventricle: Left ventricular cavity size is normal  Wall thickness is normal  There is no concentric hypertrophy  The left ventricular ejection fraction is 60%  Systolic function is normal  Wall motion is normal  Diastolic function is mildly abnormal, consistent with grade I (abnormal) relaxation    Right Ventricle Right ventricular cavity size is normal  Systolic function is normal  Wall thickness is normal   Left Atrium The atrium is normal in size  Right Atrium The atrium is normal in size  9/14/2022 - 9/16/2022 (2 days)  [facility] Course:   Lucio Cruz is a 63 y o  female patient who originally presented to the hospital on 9/14/2022 due to left arm/leg heaviness (leg worse than arm)  Stroke alert was initiated  NIHSS 3 (L leg drift, ataxia and subjective reduced L foot sensory)  TNK was not given due to fast resolution of her symptoms while in ED right after CT H and CTA were done  CTH, CTA, MRI showed no acute abnormality or LVO  Patient was loaded with ASA 325mg x1 then ASA 81mg daily and Plavix 75mg daily  Since patient has been on Plavix for the last 8 years and per chart review patient was on  daily before plavix, patient will be on dual antiplatelet therapy with ASA 81mg daily and Brilinta 90mg twice a day for 21 days (until 10/05/2022) then monotherapy with Brinlinta 90mg twice a day afterward  ASSESSMENT & PLAN  Essential hypertension  Assessment & Plan  Hold home losartan 50mg daily and metoprolol tartrate 25mg BID  Labetalol PRN for SBP >220 or DBP >110  * Stroke [facility])  Assessment & Plan  Stroke alert on 9/14/2022  4:53 PM with initial NIHSS of 3 and LKW 15:55, initial Blood Pressure: (!) 218/127  Initial presenting deficits were arm and leg heaviness (leg worse than arm) and difficulty walking  As a result of dramatic improvement with symptoms and non disabling pt was determined to not be a candidate for thrombolysis (TNK)  · Exam on 09/15/22:  L leg drift, L leg ataxia and subjective L foot reduced sensation  · Current Blood Pressure: 142/89, BP over 24 hours: BP  Min: 142/89  Max: 223/117   · Vascular risk factors: elevated cholesterol, elevated a1c and weight  · Home meds: Plavix 75mg daily  Per patient she has never taken aspirin before   Per chart review she was put on  previously  Workup:   · A1c 5 9, , P2Y12 154  · CTH: Left occipital stroke is old when compared to the prior MRI October 22, 2014  · CTA: No LVO  · MRI: No evidence of acute infarct, acute intracranial hemorrhage or mass  · Echocardiogram: LVEF 60%, G1DD  Normal atria  · Telemetry: unremarkable  Pertinent scores:  - NIHSS: 3: L leg drift, L leg ataxia, subjective reduced L foot sensation  Stroke Modified Tess Score: 1 (No significant disability  Able to carry out all usual activities, despite some symptoms)  Impression: likely MRI negative stroke since MRI is negative and patient is still having subjective symptoms  Plan:  - Stroke pathway  - Discussed plan with neurology attending, Dr Primo Betancur  - BP: Permissive hypertension for first 24-48 hours up to 220 SBP then after 48hrs, BP goal: normotension starting 09/16/2022  - atorvastatin 40mg qhs  - Maintain glucose <180, SSI for coverage if indicated  - Since she failed ASA and Plavix, continue ASA 81mg and Brilinta 90mg BID (CM helped check the price and patient can afford it)  ASA and Brilinta for 21 days (until 10/05/2022)  Starting 10/06/2022, monotherapy: Brilinta 90mg BID  - DVT ppx and SCDs  - Monitor on telemetry  - PT/OT/Speech  - Stroke education  Disposition:   Other: Home with outpatient PT/OT   Reason for Admission: left arm/leg heaviness and difficulty walking  Consultations During Hospital Stay:  · PT/OT, Speech, PMR, Case management,   Procedures Performed:   · None  Significant Findings / Test Results:   · None  Incidental Findings:   · None   Test Results Pending at Discharge (will require follow up):    · None  Outpatient Tests Requested:  · None  Complications:  None   Condition at Discharge: good            9/21/2022  [facility]    9/21/22 11:29 AM  Felicia Rock RN contacted Reny Novak RN    9/21/22 11:57 AM  Note  Neurovascular Discharge Follow Up  Hospitalization: 9/14/22-9/16/22  Called patient  Since discharge, she denies experiencing any new or worsening stroke-like symptoms  Patient reports she continues to have the following symptoms: left arm heaviness and difficulty walking  She claims symptoms are improving since discharge  She is ambulating independently as well as preforming her own ADLs  Patient manages her own medications, appointments, and affairs     Reviewed appointments - patient is scheduled to follow up with PCP on 10/7/22  Offered to schedule stroke hospital follow up appointment, patient is agreeable to this  Scheduled appointment for 10/20/22 at 7:30 am  Provided date/time/location of appointment  Reports that she will be evaluated by PT/OT on Monday 9/26/22  I reviewed medications with her  There have not been any medication changes since discharge from the hospital  Reports having 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 obtaining medications  Reports she is taking as prescribed with no medication side effects or signs of bleeding  She also verbalizes understanding of DAPT instructions/plan  During this call, we reviewed stroke type, symptoms, personal risk factors and management, medications, and resources  Patient verbalizes understanding  She admits to having the stroke education booklet at home  As for risk factors, patient reports monitoring her BP at home  Average BP is 150/90  Advised patient to contact her PCP for management of her high blood pressure  She is a non smoker  Encouraged for patient to follow a low salt / low cholesterol / diabetic friendly diet  Patient reports how she is going on a cruise on 10/27/22 to the [address]  She reports how she received her [employer] covid vaccine in March 2021 and April 2021  Then she received the [employer] booster on 12/18/2021  Due to her recent history/hospitlization, she inquired if she should get another booster  Advised patient I will discuss with a provider   I also recommended for her to discuss with her PCP as well  Patient agreed  I addressed all her questions  At the conclusion of the conversation, patient denies having any further questions or concerns  Antionette Ren RN  to E[place]to [place] PA-C      9/21/22 12:06 PM  Lg Smalls,   Is there any neurological contraindication to obtain COVID booster? Patient is going on a cruise on 10/27/22 and she requested for me to review with a neurology provider  She will also discuss with her PCP  [employer] COVID vaccine in March 2021 and April 2021  [employer] booster on 12/18/21  Thank you,   Veronique Schwarz PA-C  to Antionette Ren RN      9/21/22 12:50 PM  Note  There is no known neurologic contraindication to the pt getting the booster  Thanks    9/21/22 3:19 PM  Antionette Ren RN contacted Arlene Guevara RN   9/21/22 3:23 PM  Note  Called patient and notified her of the provider's response  Patient expressed understanding and was appreciative  No further questions or concerns  Review of Systems   HENT: Negative for mouth sores and nosebleeds  Respiratory: Negative  Cardiovascular: Negative  Gastrointestinal: Negative for blood in stool  Genitourinary: Negative for hematuria  Hematological: Negative          Current Outpatient Medications on File Prior to Visit   Medication Sig   • aspirin 81 mg chewable tablet Chew 1 tablet (81 mg total) daily for 19 doses (Patient not taking: Reported on 10/11/2022)   • calcium carbonate (OS-BRUNILDA) 600 MG tablet Take one tablet in AM and 2 tabs with dinner   • Cholecalciferol 50 MCG (2000 UT) TABS Take 1 tablet by mouth daily   • CO ENZYME Q-10 PO Take 1 tablet by mouth daily   • folic acid (FOLVITE) 1 mg tablet Take 1 tablet (1,000 mcg total) by mouth daily   • losartan (COZAAR) 50 mg tablet Take 1 tablet (50 mg total) by mouth daily   • magnesium oxide (MAG-OX) 400 mg Take 1 tablet (400 mg total) by mouth daily   • Potassium 99 MG TABS Take 1 tablet by mouth daily       Objective     BP (!) 174/100 (BP Location: Left arm, Patient Position: Sitting)   Pulse 62   Temp 98 3 °F (36 8 °C)   Ht 5' 4" (1 626 m)   Wt 83 kg (183 lb)   SpO2 98%   BMI 31 41 kg/m²     Physical Exam  Constitutional:       General: She is not in acute distress  Appearance: She is well-developed  She is not ill-appearing, toxic-appearing or diaphoretic  HENT:      Head: Normocephalic and atraumatic  Mouth/Throat:      Pharynx: Uvula midline  Neck:      Trachea: Phonation normal    Cardiovascular:      Rate and Rhythm: Normal rate and regular rhythm  Pulses: Normal pulses  Heart sounds: Normal heart sounds  Pulmonary:      Effort: Pulmonary effort is normal       Breath sounds: Normal breath sounds and air entry  Abdominal:      General: There is no distension  Palpations: Abdomen is soft  There is no hepatomegaly, splenomegaly or mass  Tenderness: There is no abdominal tenderness  Musculoskeletal:      Right lower leg: No edema  Left lower leg: No edema  Skin:     General: Skin is warm and dry  Coloration: Skin is not pale  Neurological:      Mental Status: She is alert and oriented to person, place, and time  Cranial Nerves: No dysarthria or facial asymmetry  Gait: Gait normal    Psychiatric:         Mood and Affect: Mood is anxious (mildly)  Behavior: Behavior normal  Behavior is cooperative  Eliza Felix DO  BMI Counseling: Body mass index is 31 41 kg/m²  The BMI is above normal  Nutrition recommendations include reducing portion sizes and decreasing overall calorie intake  Exercise recommendations include exercising 3-5 times per week

## 2022-10-10 ENCOUNTER — TELEPHONE (OUTPATIENT)
Dept: NEUROLOGY | Facility: CLINIC | Age: 62
End: 2022-10-10

## 2022-10-10 ENCOUNTER — OFFICE VISIT (OUTPATIENT)
Dept: FAMILY MEDICINE CLINIC | Facility: CLINIC | Age: 62
End: 2022-10-10
Payer: COMMERCIAL

## 2022-10-10 VITALS
HEIGHT: 64 IN | SYSTOLIC BLOOD PRESSURE: 132 MMHG | WEIGHT: 180 LBS | TEMPERATURE: 98.5 F | OXYGEN SATURATION: 98 % | HEART RATE: 54 BPM | BODY MASS INDEX: 30.73 KG/M2 | DIASTOLIC BLOOD PRESSURE: 86 MMHG

## 2022-10-10 DIAGNOSIS — Z86.79 HISTORY OF MALIGNANT HYPERTENSION: ICD-10-CM

## 2022-10-10 DIAGNOSIS — I10 ESSENTIAL HYPERTENSION: Primary | ICD-10-CM

## 2022-10-10 DIAGNOSIS — E78.5 HYPERLIPIDEMIA, UNSPECIFIED HYPERLIPIDEMIA TYPE: ICD-10-CM

## 2022-10-10 PROCEDURE — 99214 OFFICE O/P EST MOD 30 MIN: CPT | Performed by: FAMILY MEDICINE

## 2022-10-10 RX ORDER — ATORVASTATIN CALCIUM 40 MG/1
40 TABLET, FILM COATED ORAL DAILY
Qty: 90 TABLET | Refills: 1 | Status: SHIPPED | OUTPATIENT
Start: 2022-10-10

## 2022-10-10 NOTE — PROGRESS NOTES
Name: Lucio Cruz      : 1960      MRN: [id number]  Encounter Provider: Galo Castelan DO  Encounter Date: 10/10/2022   Encounter department: [address] [place] [address]     1  Essential hypertension  Comments:  much better control, cpm    2  History of malignant hypertension    3  Hyperlipidemia, unspecified hyperlipidemia type  -     atorvastatin (LIPITOR) 40 mg tablet;  Take 1 tablet (40 mg total) by mouth daily           Subjective      Chief Complaint   Patient presents with   • Follow-up     Follow up from last week       Short interval f/u due to malignant hypertension at her hosp d/c f/u here on Friday and meds adjusted  Now much better "and it's like this at home, too"  denies any HA's  Still denies any palpitations, racing heartbeat, CP, dizziness, or SOB - per previous note last week had Afib reading on EKG in ER/hospital- will be seeing cardiologist tomorrow  HR today is 54  Going once a week for PT + OT  Received flu vaccine + COVID bivalent booster at pharmacy    Review of Systems    Current Outpatient Medications on File Prior to Visit   Medication Sig   • calcium carbonate (OS-BRUNILDA) 600 MG tablet Take one tablet in AM and 2 tabs with dinner   • Cholecalciferol 50 MCG ( UT) TABS Take by mouth daily   • CO ENZYME Q-10 PO daily    • folic acid (FOLVITE) 1 mg tablet Take 1 tablet (1,000 mcg total) by mouth daily   • losartan (COZAAR) 50 mg tablet Take 1 tablet (50 mg total) by mouth daily   • magnesium oxide (MAG-OX) 400 mg Take 1 tablet (400 mg total) by mouth daily   • metoprolol tartrate (LOPRESSOR) 50 mg tablet Take 1 tablet (50 mg total) by mouth every 12 (twelve) hours   • Potassium 99 MG TABS Take 1 tablet by mouth daily   • ticagrelor (Brilinta) 90 MG Take 1 tablet (90 mg total) by mouth every 12 (twelve) hours   • [DISCONTINUED] atorvastatin (LIPITOR) 40 mg tablet Take 1 tablet (40 mg total) by mouth daily   • aspirin 81 mg chewable tablet Chew 1 tablet (81 mg total) daily for 19 doses       Objective     /86 (BP Location: Left arm, Patient Position: Sitting, Cuff Size: Standard)   Pulse (!) 54   Temp 98 5 °F (36 9 °C) (Tympanic)   Ht 5' 4" (1 626 m)   Wt 81 6 kg (180 lb)   SpO2 98%   BMI 30 90 kg/m²     Physical Exam  Constitutional:       General: She is not in acute distress  Appearance: She is well-developed  She is not ill-appearing, toxic-appearing or diaphoretic  HENT:      Head: Normocephalic and atraumatic  Mouth/Throat:      Pharynx: Uvula midline  Neck:      Trachea: Phonation normal    Cardiovascular:      Rate and Rhythm: Normal rate and regular rhythm  Pulses: Normal pulses  Heart sounds: S1 normal and S2 normal      No friction rub  No gallop  Pulmonary:      Effort: Pulmonary effort is normal       Breath sounds: Normal breath sounds and air entry  Musculoskeletal:      Right lower leg: No edema  Left lower leg: No edema  Skin:     Coloration: Skin is not pale  Neurological:      Mental Status: She is alert and oriented to person, place, and time  Gait: Gait normal    Psychiatric:         Mood and Affect: Mood normal          Behavior: Behavior normal  Behavior is cooperative         Galo Harden, DO

## 2022-10-10 NOTE — PROGRESS NOTES
Outpatient Consultation - General Cardiology   Hai Carr 63 y o  female   MRN: [id number]  Encounter: [id number]      PCP: Xenia Pemberton DO    History of Present Illness   Physician Requesting Consult: Consults   Reason for Consult / Principal Problem: ? ??AF / HTN    HPI: Hai Carr is a 63y o  year old female with history of HTN, Hypoparathyroidism, had a CVA x2 in 2014 (June and October with chronic right lacunar and left occipital infarcts on MRI) and a recent CVA vs  TIA (hospitalized on 9/14/22, no TPA given)  Patient state she was in her usual state of health before she started to experience left arm weakness and left leg heaviness stating this was similar to her prior CVA symptoms which prompted her to present to the ED  During her hospitalization, patient was noted to have severe hypertension with SBP of 200+ on multiple recordings  Imaging was unremarkable for acute infarcts  Patient had been on Plavix/ASA since her prior CVA in 2014, there was concern for Plavix and ASA failure and patient was subsequently transitioned to [facility] as per neurology  9/14/22: NSR  Normal axis  Normal intervals  Unchanged from prior documented EKGs  Since discharge, patient states that her stroke symptoms have progressively improved  Denies any CP, palpitations, lightheadedness, dizziness or any other associated complaints  Patient has returned back to work  She is a lifetime non-smokers  Consumes 2 beers per week  No recreation drug use    Review of Systems  Review of system was conducted and was negative except for as stated in the HPI        Historical Information   Past Medical History:   Diagnosis Date   • Diverticulosis    • Hemorrhoids    • Hypercalcemia 5/4/2018   • Hypertension     controlled   • Stroke [facility])    • Vitamin D deficiency 8/13/2020     Past Surgical History:   Procedure Laterality Date   • COLONOSCOPY      ONSET 2015   • NOSE SURGERY     • NJ EXPLORE PARATHYROID GLANDS Bilateral 6/18/2020    Procedure: PARATHYROIDECTOMY, MINIMALLY INVASIVE, 3 5 GLAND EXPLORATION, INTRAOPERATIVE PTH MONITORING;  Surgeon: Rafael Fleischer, MD;  Location: [facility];  Service: Surgical Oncology   • SKIN BIOPSY     • SKIN LESION EXCISION       Social History     Substance and Sexual Activity   Alcohol Use Yes   • Alcohol/week: 2 0 standard drinks   • Types: 2 Cans of beer per week    Comment: SOCIAL     Social History     Substance and Sexual Activity   Drug Use No     Social History     Tobacco Use   Smoking Status Never Smoker   Smokeless Tobacco Never Used     Family History:   Family History   Problem Relation Age of Onset   • No Known Problems Mother    • Hypertension Father    • Parkinsonism Father    • Hypertension Sister    • Stroke Family    • Glaucoma Family    • Hypertension Family        Meds/Allergies   Home Medications:   Current Outpatient Medications:   •  aspirin 81 mg chewable tablet, Chew 1 tablet (81 mg total) daily for 19 doses, Disp: 19 tablet, Rfl: 0  •  atorvastatin (LIPITOR) 40 mg tablet, Take 1 tablet (40 mg total) by mouth daily, Disp: 90 tablet, Rfl: 1  •  calcium carbonate (OS-BRUNILDA) 600 MG tablet, Take one tablet in AM and 2 tabs with dinner, Disp: , Rfl:   •  Cholecalciferol 50 MCG (2000 UT) TABS, Take by mouth daily, Disp: , Rfl:   •  CO ENZYME Q-10 PO, daily , Disp: , Rfl:   •  folic acid (FOLVITE) 1 mg tablet, Take 1 tablet (1,000 mcg total) by mouth daily, Disp: 90 tablet, Rfl: 1  •  losartan (COZAAR) 50 mg tablet, Take 1 tablet (50 mg total) by mouth daily, Disp: 90 tablet, Rfl: 1  •  magnesium oxide (MAG-OX) 400 mg, Take 1 tablet (400 mg total) by mouth daily, Disp: , Rfl:   •  metoprolol tartrate (LOPRESSOR) 50 mg tablet, Take 1 tablet (50 mg total) by mouth every 12 (twelve) hours, Disp: 90 tablet, Rfl: 1  •  Potassium 99 MG TABS, Take 1 tablet by mouth daily, Disp: , Rfl:   •  ticagrelor (Brilinta) 90 MG, Take 1 tablet (90 mg total) by mouth every 12 (twelve) hours, Disp: 180 tablet, Rfl: 1    Allergies   Allergen Reactions   • Codeine Chest Pain         Objective   Vitals: There were no vitals taken for this visit  Physical Exam  GEN: Tramaine Radford appears well, alert and oriented x 3, pleasant and cooperative   HEENT:  Normocephalic, atraumatic, anicteric, moist mucous membranes  NECK: No JVD or carotid bruits   HEART: reg rhythm, reg rate, normal S1 and S2, no murmurs, clicks, gallops or rubs   LUNGS: Clear to auscultation bilaterally; no wheezes, rales, or rhonchi; respiration nonlabored   ABDOMEN:  Normoactive bowel sounds, soft, no tenderness, no distention  EXTREMITIES: peripheral pulses palpable; no edema  NEURO: no gross focal findings; cranial nerves grossly intact   SKIN:  Dry, intact, warm to touch    Lab Results: I have personally reviewed pertinent lab results  Lab Results   Component Value Date    HSTNI0 3 09/14/2022    HSTNI2 4 09/14/2022     No results found for: NTBNP  Lab Results   Component Value Date    CHOL 107 08/01/2015    TRIG 136 09/15/2022    HDL 57 09/15/2022    LDLCALC 122 (H) 09/15/2022    LDLDIRECT 73 10/23/2014     Lab Results   Component Value Date     12/16/2015    K 3 8 09/16/2022    CO2 26 09/16/2022     (H) 09/16/2022    BUN 13 09/16/2022    CREATININE 0 79 09/16/2022    ALT 38 05/14/2022    AST 20 05/14/2022     Lab Results   Component Value Date    WBC 8 92 09/15/2022    HGB 14 6 09/15/2022    HCT 44 6 09/15/2022    MCV 88 09/15/2022     09/15/2022     Lab Results   Component Value Date    INR 0 87 09/14/2022    INR 1 00 11/17/2015    INR 0 96 10/22/2014         Imaging: I have personally reviewed pertinent reports  ECHO:  Results for orders placed during the hospital encounter of 09/14/22    Echo complete w/ contrast if indicated    Interpretation Summary  •  Left Ventricle: Left ventricular cavity size is normal  Wall thickness is normal  There is no concentric hypertrophy  The left ventricular ejection fraction is 60%  Systolic function is normal  Wall motion is normal  Diastolic function is mildly abnormal, consistent with grade I (abnormal) relaxation  ECG (10/11/22): Sinus Bradycardia        Assessment/Plan     Assessment:    1  Establish Care  --> TTE (9/15/22): EF 60%  G1DD  [address]  2  CVA x2 in 2014 (June and October with chronic right lacunar and left occipital infarcts on MRI) and recent CVA/TIA (hospitalized on 9/14/22)  --> EKG during time of admission was interpreted as Atrial Fibrillation however upon my read this was a NSR EKG  (see above in HPI)  --> [id number]: 4 (Sex, HTN and prior CVA)  --> CTA H/N (9/14/22): No evidence of focal stenosis, aneurysm or dissection  --> ECG in office visit today sinus bradycardia  3  Hypertension / Hyperlipidemia  --> Lipid Profile (9/15/22): C 203 /  / H 57 / L 122 / N-  --> BP in office 140/82  4  Hypoparathyroidism     Plan:  - c/w Lipitor 40mg PO QHS  - Switch Metoprolol Tart  to Coreg 12 5mg PO BID for more BP control  - c/w Losartan 50mg PO Daily  - Ticagrelor 90mg PO BID monotherapy as per neurology  --> Given concern for Plavix and ASA failure  - Will arrange for a MAGO with bubble study and to get better evaluation of aortic valve (?papillary fibroelastoma) and ascending aorta (?plaque)  - After thorough chart review, there is no documented evidence of Atrial Fibrillation on any recorded EKG in MUSE or the Media tab  However as Ms Irma Dumont has now had multiple CVAs, will obtain a 2 week Zio Patch (her insurance requries this first) and if unremarkable pursue a Loop Recorder for long term rhythm monitoring   Ultimately it is likely that her symptoms were a result of poorly controlled hypertension as opposed to an arrhythmia    --> For time being will defer [facility] despite elevated risk score until AF has been identified or there is evidence of a mobile aortic plaque on MAGO    Case discussed and reviewed with   Sma who agrees with my assessment and plan  Thank you for involving us in the care of your patient  Joey Mahoney MD  Cardiology Fellow  PGY-6      ==========================================================================================    Epic/ Allscripts/Care Everywhere records reviewed:     ** Please Note: Fluency DirectDictation voice to text software may have been used in the creation of this document   **

## 2022-10-10 NOTE — TELEPHONE ENCOUNTER
Neurovascular Discharge Follow Up  Hospitalization: 9/14/22-9/16/22    Called patient with no answer  Left a vm requesting for a call back  Provided the office's phone number

## 2022-10-11 ENCOUNTER — CONSULT (OUTPATIENT)
Dept: CARDIOLOGY CLINIC | Facility: CLINIC | Age: 62
End: 2022-10-11
Payer: COMMERCIAL

## 2022-10-11 ENCOUNTER — TELEPHONE (OUTPATIENT)
Dept: CARDIOLOGY CLINIC | Facility: CLINIC | Age: 62
End: 2022-10-11

## 2022-10-11 VITALS
HEART RATE: 57 BPM | DIASTOLIC BLOOD PRESSURE: 82 MMHG | SYSTOLIC BLOOD PRESSURE: 140 MMHG | OXYGEN SATURATION: 95 % | BODY MASS INDEX: 30.85 KG/M2 | WEIGHT: 180.7 LBS | HEIGHT: 64 IN

## 2022-10-11 DIAGNOSIS — I63.9 CVA (CEREBRAL VASCULAR ACCIDENT) (HCC): Primary | ICD-10-CM

## 2022-10-11 DIAGNOSIS — I10 MALIGNANT HYPERTENSION: ICD-10-CM

## 2022-10-11 DIAGNOSIS — Z86.79 HISTORY OF ATRIAL FIBRILLATION: ICD-10-CM

## 2022-10-11 DIAGNOSIS — R94.31 ABNORMAL EKG: ICD-10-CM

## 2022-10-11 PROCEDURE — 93000 ELECTROCARDIOGRAM COMPLETE: CPT | Performed by: STUDENT IN AN ORGANIZED HEALTH CARE EDUCATION/TRAINING PROGRAM

## 2022-10-11 PROCEDURE — 99202 OFFICE O/P NEW SF 15 MIN: CPT | Performed by: STUDENT IN AN ORGANIZED HEALTH CARE EDUCATION/TRAINING PROGRAM

## 2022-10-11 RX ORDER — CARVEDILOL 12.5 MG/1
12.5 TABLET ORAL 2 TIMES DAILY WITH MEALS
Qty: 60 TABLET | Refills: 3 | Status: SHIPPED | OUTPATIENT
Start: 2022-10-11

## 2022-10-11 NOTE — TELEPHONE ENCOUNTER
Hi does this pt require prior auth for [address] [facility] [address] Subscriber ID [id number]     Thank you

## 2022-10-11 NOTE — TELEPHONE ENCOUNTER
Jah Blount MA  You 1 hour ago (2:02 PM)     CW    Per [facility] online precert resource list Marija Santa is not required for a 2W [id number] or a 1W [facility]/88218    Per EPIC's RTE registration, this patient has outpatient benefits so this is a covered, valid and billable code       Message text

## 2022-10-12 ENCOUNTER — OFFICE VISIT (OUTPATIENT)
Dept: PHYSICAL THERAPY | Facility: CLINIC | Age: 62
End: 2022-10-12
Payer: COMMERCIAL

## 2022-10-12 ENCOUNTER — OFFICE VISIT (OUTPATIENT)
Dept: OCCUPATIONAL THERAPY | Facility: CLINIC | Age: 62
End: 2022-10-12
Payer: COMMERCIAL

## 2022-10-12 ENCOUNTER — TELEPHONE (OUTPATIENT)
Dept: NEUROLOGY | Facility: CLINIC | Age: 62
End: 2022-10-12

## 2022-10-12 DIAGNOSIS — R26.2 AMBULATORY DYSFUNCTION: ICD-10-CM

## 2022-10-12 DIAGNOSIS — I63.9 CEREBROVASCULAR ACCIDENT (CVA), UNSPECIFIED MECHANISM (HCC): Primary | ICD-10-CM

## 2022-10-12 PROCEDURE — 97530 THERAPEUTIC ACTIVITIES: CPT

## 2022-10-12 PROCEDURE — 97116 GAIT TRAINING THERAPY: CPT

## 2022-10-12 PROCEDURE — 97110 THERAPEUTIC EXERCISES: CPT

## 2022-10-12 PROCEDURE — 97112 NEUROMUSCULAR REEDUCATION: CPT

## 2022-10-12 NOTE — PROGRESS NOTES
Daily Note     Today's date: 10/12/2022  Patient name: Juanito Mcnulty  : 1960  MRN: [id number]  Referring provider: Tara Aaron CRNP  Dx:   Encounter Diagnosis     ICD-10-CM    1  Cerebrovascular accident (CVA), unspecified mechanism ([facility] [address] )  I63 9    2  Ambulatory dysfunction  R26 2                   Subjective: Parul Puckett states that she has been challenging herself at home, walking on various surfaces such as a john path and walking around the house in her sandals  She still finds it difficult to keep certain sandals on her feet  She has been focusing on walking with heel toe progression but will still have a L foot slap at times  Objective: See treatment diary below      Assessment: Parul Puckett tolerated treatment well with focus on dynamic balance and gait mechanics  Outdoor ambulation was used to challenge her on uneven and unknown surrounds  She was able to navigate inclines on side walk and grassy surface well with minor imbalance and decreased speed on grassy surface  She demonstrates intermittent L foot slap with ambulation that she was able to independently temporarily correct  She exhibited more regular circumduction around the taller hurdles with L foot compared to R foot with no LOB  Uneven surface was progress to foam beams with significantly increased difficulty and recruitment of ankle strategies with occasional stepping strategy needed  Heart rate was monitored throughout session with polar HR monitor and her HR remained in target HR range for majority of session with average HR for session 107 bpm  Encouraged her to bring in other foot wear to practice on uneven surfaces in preparation for her cruise  She would benefit from continued skilled PT to progress balance and confidence on various surfaces  Plan: Continue per plan of care  Progress treatment as tolerated         Short Term Goal Expiration Date:(10/28/2022)  Long Term Goal Expiration Date: (2022)  POC Expiration Date: (11/28/2022)       Precautions HTN; history of CVA x2    Based on patient's age (59) and MaxHR (160), HIGT would benefit in HR range of  bpm (60-85% HRmax)  Manuals 9/26 10/3 10/12             Pt edu Increasing activity at home; high intensity training Education on high intensity gait training  Proper blood pressure management during exercise, target HR during exercise Vitals start of session: /82                     Neuro Re-Ed         STS Stagger stance-L back x5 Staggered stance - L back  3x10  Last two sets with YMB slam  Post  bpm Feet on foam, no UEs x15     Step ups (fwd/lat)    Solo 6" step 4# L AW x15 ea fwd/lat     Side stepping  SOLO hurdles (7/low) #4 L AW - medium difficulty reported  3x laps       HKM   Solo 1/4 length, 4# L AW, hand to opposite knee x4 laps     Hurdles (fwd/lat)  SOLO forward hurdles (7 low) 1x lap step to pattern, 2x reciprocal pattern     Lateral tall shilpa SOLO (1 shilpa) - step over pattern to tap then tap  2x10 ea Solo alt 4" & 8" hurdles (3 ea) 4# L AW  Fwd x4 laps (reciprocal-vc for heel toe progression)  Lat x4 laps     Uneven Surface Training   Forward/Lat/ backwards stepping on red mat with various AW underneath to simulate uneven surfaces  6x laps ea  SOLO Solo foam beams, 4# L AW  x4 laps lateral  x4 laps fwd       Lateral Step ups  6" step, performed 2x15 on L side to target glut med strength and recruitment   HR 101bpm post      Backwards amb  SOLO - 4x laps backwards 1/4 length       Motor dual task   Solo 1/4 length fwd/bwd by length, passing ball between 2 cones x4 laps, 4# L AW             Ther Ex        TM        Stand hip abd        Stand hip ext        Stand march        HR/TR                                Ther Activity                        Gait Training  SOLO 2 0 mph warmup x6 mins, 0% incline    SOLO 2 3 mph, 2%, x4 mins    SOLO 2 0 mph, 0% incline x2 mins     SOLO 2 3 mph, 2% incline, x8 mins 115-132 bpm throughout this phase Post-BP: 168/88 mmHg, 132   Bpm, 99% SPO2  (BUE support throughout)       Outdoor ambulation, parking lot loop x2, side walk hill down, flat grass, up grass hill x15 mins                     Modalities

## 2022-10-12 NOTE — TELEPHONE ENCOUNTER
Per Maciej Nielsen patient and lmom informing there was a cancellation in schedule for tomorrow 10/13 with Emiliano España   Asked patient to return call to the office to schedule the appointment,

## 2022-10-12 NOTE — PROGRESS NOTES
Occupational Therapy Daily Note:    Today's date: 10/12/2022  Patient name: Frankey Ream  : 1960  MRN: [id number]  Referring provider: Tara Chen CRNP  Dx:   Encounter Diagnosis   Name Primary? • Cerebrovascular accident (CVA), unspecified mechanism ([facility] [address] ) Yes         Patient was treated by Janey Reed under the supervision of Mariah PALACIOS         Subjective: "I am tired today, I worked all day"     Objective: Pt engaged in skilled OT treatment session with focus on UE NMR, UE strengthening, UE endurance, FMC/GMC and FMS/GMS to increase engagement, endurance, tolerance, and independence with daily ADL and IADL tasks  CPT Code Minutes                                           Task Details        Therapeutic Activity 40 Pt completed dynamic activity focusing on GMC/GMS, proprioception, proximal UE strength/endurance and hand to target accuracy  Pt donned 2lb cuff weights, using resistive tongs w/ spring  Pt required to retrieved ball with tongs, sustaine  walking about 25ft reaching over head placing ball into basketball net placed on mirror board  No drops noted completing 2x  Activity upgraded to scooping ball up onto spoon from bowel and placing in the hoop completing 2x with about 5 total drops  10 Pt engaged in parquetry puzzle activity focusing on UE strength and endurance  Pt utilizing standard tongs to retreive up parquetry pieces placing on on target board positioned on mirror board  Pt donned 2lb cuff weight on left wrist with minimal drops noted  Neuro Re-Ed               Therapeutic Exercise 10 For UB exercise benefit and to increase overall cardiovascular health, proximal strength, gross grasp and endurance, pt engaged in UEB for 5 min prograde/5 min retrograde increasing resistance to L 2 2  Manual          Self Care           Assessment: Tolerated treatment well  Pt presented to therapy reporting she was tired due to working all day   Pt demo good sustained grasp with functional tool use during session  Patient would benefit from continued skilled OT      Plan: Continued skilled OT per POC    INTERVENTION COMMENTS:  Diagnosis: CVA (cerebral vascular accident) ([facility] [address] ) [I63 9]  Precautions: HTN  FOTO:   Insurance: Payor: BLUE CROSS / Plan: [facility] PLAN 361 / Product Type: Blue Fee for Service /   3 of 4 visits, PN due 10/26/22

## 2022-10-17 NOTE — TELEPHONE ENCOUNTER
Neurovascular Discharge Follow Up  Hospitalization: 9/14/22-9/16/22     Called patient with no answer  Left a vm requesting for a call back  Provided the office's phone number  Reminded patient of her upcoming appointment with date/time/location

## 2022-10-18 ENCOUNTER — TELEPHONE (OUTPATIENT)
Dept: NEUROLOGY | Facility: CLINIC | Age: 62
End: 2022-10-18

## 2022-10-18 NOTE — TELEPHONE ENCOUNTER
Provider will be out of the office on 10/20/22  Holding an appointment slot for 10/21/22 at 12:15 pm with EMILIANO Castano at [place]  Called patient to reschedule appointment  No answer  Left a vm requesting for a call back  Provided the office's phone number

## 2022-10-19 ENCOUNTER — OFFICE VISIT (OUTPATIENT)
Dept: OCCUPATIONAL THERAPY | Facility: CLINIC | Age: 62
End: 2022-10-19
Payer: COMMERCIAL

## 2022-10-19 ENCOUNTER — OFFICE VISIT (OUTPATIENT)
Dept: PHYSICAL THERAPY | Facility: CLINIC | Age: 62
End: 2022-10-19
Payer: COMMERCIAL

## 2022-10-19 DIAGNOSIS — R26.2 AMBULATORY DYSFUNCTION: ICD-10-CM

## 2022-10-19 DIAGNOSIS — I63.9 CEREBROVASCULAR ACCIDENT (CVA), UNSPECIFIED MECHANISM (HCC): Primary | ICD-10-CM

## 2022-10-19 PROCEDURE — 97116 GAIT TRAINING THERAPY: CPT

## 2022-10-19 PROCEDURE — 97150 GROUP THERAPEUTIC PROCEDURES: CPT

## 2022-10-19 PROCEDURE — 97530 THERAPEUTIC ACTIVITIES: CPT

## 2022-10-19 PROCEDURE — 97112 NEUROMUSCULAR REEDUCATION: CPT

## 2022-10-19 NOTE — PROGRESS NOTES
Daily Note     Today's date: 10/19/2022  Patient name: Beltran Sierra  : 1960  MRN: [id number]  Referring provider: Tara Ochoa CRNP  Dx:   Encounter Diagnosis     ICD-10-CM    1  Cerebrovascular accident (CVA), unspecified mechanism ([facility] )  I63 9    2  Ambulatory dysfunction  R26 2                   Subjective: Blank Chávez brought several pairs of sandals with her to practice  She has been wearing them at home  She finds her L foot will drag more when she is tired  Objective: See treatment diary below      Assessment: Blank Chávez tolerated treatment well with progression of incline and L AW on treadmill  Less L heel scuff was noted during treadmill compared to previous sessions  Practiced on uneven surfaces in a variety of footwear including flip flop and slides in preparation for her cruise  She demonstrated a slight decrease in gait velocity in her other foot wear with good foot clearance and stability observed  She had less instances of circumduction around the shilpa with her L LE, supporting improved LE strength and endurance as it did not appear to fatigue with repetition  She was challenged with increased height of step today, more so with R LE than L LE secondary to knee discomfort  Minimal L knee hyperextension was observed with step ups today  Plan to complete a progress note next session once she returns from her trip  Plan: Progress note during next visit  Short Term Goal Expiration Date:(10/28/2022)  Long Term Goal Expiration Date: (2022)  POC Expiration Date: (2022)       Precautions HTN; history of CVA x2    Based on patient's age (59) and MaxHR (160), HIGT would benefit in HR range of  bpm (60-85% HRmax)  Manuals 9/26 10/3 10/12 10/19            Pt edu Increasing activity at home; high intensity training Education on high intensity gait training   Proper blood pressure management during exercise, target HR during exercise Vitals start of session: /82 Neuro Re-Ed         STS Stagger stance-L back x5 Staggered stance - L back  3x10  Last two sets with YMB slam  Post  bpm Feet on foam, no UEs x15     Step ups (fwd/lat)    Solo 6" step 4# L AW x15 ea fwd/lat Solo 8" step 5# L AW x15 ea fwd/lat    Side stepping  SOLO hurdles (7/low) #4 L AW - medium difficulty reported  3x laps       HKM   Solo 1/4 length, 4# L AW, hand to opposite knee x4 laps Solo 1/4 length 5# L AW, hand to opposite knee x5 laps    Hurdles (fwd/lat)  SOLO forward hurdles (7 low) 1x lap step to pattern, 2x reciprocal pattern     Lateral tall shilpa SOLO (1 shilpa) - step over pattern to tap then tap  2x10 ea Solo alt 4" & 8" hurdles (3 ea) 4# L AW  Fwd x4 laps (reciprocal-vc for heel toe progression)  Lat x4 laps Solo 8" hurdles (5), 5# L AW  Fwd x5 laps (reciprocal)    Lat x5 laps    Uneven Surface Training   Forward/Lat/ backwards stepping on red mat with various AW underneath to simulate uneven surfaces  6x laps ea  SOLO Solo foam beams, 4# L AW  x4 laps lateral  x4 laps fwd   Solo red mat with various AW underneath    Fwd/lat x4 laps ea in flipflops and slides        Lateral Step ups  6" step, performed 2x15 on L side to target glut med strength and recruitment  HR 101bpm post      Backwards amb  SOLO - 4x laps backwards 1/4 length       Motor dual task   Solo 1/4 length fwd/bwd by length, passing ball between 2 cones x4 laps, 4# L AW             Ther Ex        TM        Stand hip abd        Stand hip ext        Stand march        HR/TR                                Ther Activity                        Gait Training  SOLO 2 0 mph warmup x6 mins, 0% incline    SOLO 2 3 mph, 2%, x4 mins    SOLO 2 0 mph, 0% incline x2 mins     SOLO 2 3 mph, 2% incline, x8 mins 115-132 bpm throughout this phase     Post-BP: 168/88 mmHg, 132   Bpm, 99% SPO2  (BUE support throughout)       Outdoor ambulation, parking lot loop x2, side walk hill down, flat grass, up grass hill x15 mins No SOLO BUE 5# L AW    2 0 mph x3 min  2 0 mph 3% incline x3 min  2 0 mph 6% incline x4 min                    Modalities

## 2022-10-19 NOTE — PROGRESS NOTES
Occupational Therapy Daily Note:    Today's date: 10/19/2022  Patient name: Manuel Aleman  : 1960  MRN: [id number]  Referring provider: Tara Sánchez CRNP  Dx:   Encounter Diagnosis   Name Primary? • Cerebrovascular accident (CVA), unspecified mechanism ([facility] [address] ) Yes       Time:     525-535 Group  535-6 unsupervised    Subjective: "I notice my arm isn't as tired as it used to be "    Objective: Pt engaged in skilled OT treatment session with focus on UE NMR, UE strengthening, UE endurance, FMC/GMC and FMS/GMS to increase engagement, endurance, tolerance, and independence with daily ADL and IADL tasks  CPT Code Minutes                                           Task Details        Therapeutic Activity 50 Pt completed alphabet in space activity with focus on bimanual coordination, UE strengthening & proximal stabilization  No rest breaks required during task  Pt completed dynamic activity focusing on GMC/GMS, proprioception, proximal UE strength/endurance and hand to target accuracy  Pt donned 2lb cuff weights, using resistive tongs w/ spring  Pt required to retrieve large foam blocks and place to cone target at table top with stacking required  Completed task with >25% droppage  Pt the picked up large foam blocks and placed to mirror top focusing on ataxia reduction, GMC/GMS and stabilization at shoulder  Pt completed task, then retrieved blocks from mirror top and placed to container  Task completed with long handled reacher  Droppage noted on retrieval from mirror top to container  Neuro Re-Ed               Therapeutic Exercise 10 For UB exercise benefit and to increase overall cardiovascular health, proximal strength, gross grasp and endurance, pt engaged in UEB for 5 min prograde/5 min retrograde increasing resistance to L 2 2  Manual          Self Care           Assessment: Tolerated treatment well   Pt reported needing to cancel next week's appointment as she is getting ready for her cruise  Follow up re-evaluation scheduled for two weeks following pt's cruise  Pt demo improved endurance/tolerance and reduced ataxia with functional reaching task  Patient would benefit from continued skilled OT      Plan: Continued skilled OT per POC    INTERVENTION COMMENTS:  Diagnosis: CVA (cerebral vascular accident) ([facility] [address] ) [I63 9]  Precautions: HTN  FOTO:   Insurance: Payor: [employer] CROSS / Plan: [facility] PLAN 361 / Product Type: Blue Fee for Service /   4 of 4 visits, PN due 10/26/22

## 2022-10-21 ENCOUNTER — OFFICE VISIT (OUTPATIENT)
Dept: NEUROLOGY | Facility: CLINIC | Age: 62
End: 2022-10-21

## 2022-10-21 VITALS
HEIGHT: 64 IN | WEIGHT: 181 LBS | DIASTOLIC BLOOD PRESSURE: 82 MMHG | OXYGEN SATURATION: 96 % | TEMPERATURE: 96.8 F | SYSTOLIC BLOOD PRESSURE: 134 MMHG | HEART RATE: 68 BPM | BODY MASS INDEX: 30.9 KG/M2

## 2022-10-21 DIAGNOSIS — E78.5 HYPERLIPIDEMIA, UNSPECIFIED HYPERLIPIDEMIA TYPE: Primary | ICD-10-CM

## 2022-10-21 DIAGNOSIS — Z86.73 HISTORY OF LACUNAR CEREBROVASCULAR ACCIDENT (CVA): ICD-10-CM

## 2022-10-21 DIAGNOSIS — R73.03 PREDIABETES: ICD-10-CM

## 2022-10-21 NOTE — PATIENT INSTRUCTIONS
- Continue with good blood pressure control; I would recommend monitoring at home at least 3 times per week; Goal of <130/80  - Continue with good cholesterol control; Goal LDL <70  - Continue with good blood sugar control; Goal HgbA1c <7 0  - Will defer monitoring of cholesterol and blood sugar and management of hypertensive medications to the primary care provider  - Stay well hydrated by drinking enough water   - Eat a healthy diet, high in lean meats fish, turkey, chicken  Low in fats, cholesterol, sugars and sodium  Avoid canned foods, get lots of fresh/frozen vegetables/fruits  - Get routine exercise/physical activity as much as able to tolerate; Goal of 150 minutes per week of vigorous physical activity  - Continue with physical therapy  - Keep follow ups with your other health care providers  - Continue Brilinta 90 mg twice daily and Lipitor 40 mg daily  I will plan for her to return to the office in 6 months time but would be happy to see her sooner if the need should arise  If she has any symptoms concerning for TIA or stroke including sudden painless loss of vision or double vision, difficulty speaking or swallowing, vertigo/room spinning that does not quickly resolve, or weakness/numbness affecting 1 side of the face or body she should proceed by ambulance to the nearest emergency room immediately

## 2022-10-21 NOTE — PROGRESS NOTES
Patient ID: Walter Hoffman is a 63 y o  female  Assessment/Plan:    Late entry 10/21/22: After patient's visit today in reviewing her prior records in more detail- I noticed her Factor V Leiden was + 11/14/2016 with a heterozygote mutation noted  It appears she was lost to follow up soon after  Will discuss with my collaborating physician if this result requires further follow up I e repeat or referral to hematology for consideration of treatment with [facility]  Will follow up with patient  History of lacunar cerebrovascular accident (CVA)  Walter Hoffman is a 63year old female with a PMH of hypoparathyroidism s/p parathyroidectomy, prior CVA x 3, HTN, Lumbar radiculopathy related to lumbar DDD and lumbar stenosis, HLD, Prediabetes, and obesity who presented with left arm and leg heaviness (leg worse than arm) and difficulty walking  MRI was negative but given her persistent symptoms it was thought she may have suffered a likely MRI negative stroke  She has failed ASA and Plavix so was placed on DAPT Brilinta 90 mg bid and ASA 81 mg daily x 3 weeks and then transitioned to Brilinta 90 mg bid thereafter  She is currently undergoing cardiac work up and is planning loop recorder placement for evaluation of occult Afib  We discussed her stroke risk factors and lifestyle modifications  She was provided stroke education and we reviewed stroke warning signs and symptoms  Her BP appears to be well controlled since her recent medication adjustments  Other than her LDL, her other vascular risk factors appear to be relatively well controlled  I encouraged her to follow a healthy diet and engage in routine vigorous physical activity of at least 150 minutes a week  Plan as outlined below      Plan:    - Continue with good blood pressure control; I would recommend monitoring at home at least 3 times per week; Goal of <130/80  - Continue with good cholesterol control; Goal LDL <70  - Continue with good blood sugar control; Goal HgbA1c <7 0  - Will defer monitoring of cholesterol and blood sugar and management of hypertensive medications to the primary care provider  - Stay well hydrated by drinking enough water   - Eat a healthy diet, high in lean meats fish, turkey, chicken  Low in fats, cholesterol, sugars and sodium  Avoid canned foods, get lots of fresh/frozen vegetables/fruits  - Get routine exercise/physical activity as much as able to tolerate; Goal of 150 minutes per week of vigorous physical activity  - Continue with physical therapy  - Keep follow ups with your other health care providers  - Continue Brilinta 90 mg twice daily and Lipitor 40 mg daily  I will plan for her to return to the office in 6 months time but would be happy to see her sooner if the need should arise  If she has any symptoms concerning for TIA or stroke including sudden painless loss of vision or double vision, difficulty speaking or swallowing, vertigo/room spinning that does not quickly resolve, or weakness/numbness affecting 1 side of the face or body she should proceed by ambulance to the nearest emergency room immediately  Prediabetes  Pre-diabetic   HgbA1c of 5 9  Discussed good diet and routine physical activity      Hyperlipidemia, unspecified  Lab Results   Component Value Date/Time    CHOLESTEROL 206 (H) 09/15/2022 05:27 AM     Lab Results   Component Value Date/Time    TRIG 136 09/15/2022 05:27 AM    TRIG 52 08/01/2015 10:25 AM     Lab Results   Component Value Date/Time    HDL 57 09/15/2022 05:27 AM    HDL 61 08/01/2015 10:25 AM     Lab Results   Component Value Date/Time    LDLCALC 122 (H) 09/15/2022 05:27 AM    LDLCALC 36 08/01/2015 10:25 AM     Plan:  Goal <70  Continue on Atorvastatin 40 mg daily  Healthy low fat diet  Routine physical activity       Diagnoses and all orders for this visit:    Hyperlipidemia, unspecified hyperlipidemia type    History of lacunar cerebrovascular accident (CVA)    Prediabetes       I have spent a total of 60 minutes in face to face time and chart review with this patient today  Subjective:    REGAN Mclean is a 63year old female with a PMH of hypoparathyroidism s/p parathyroidectomy, prior CVA x 3, HTN, Lumbar radiculopathy related to lumbar DDD and lumbar stenosis, HLD, Prediabetes, and obesity who presented as a Stroke alert on 9/14/2022  4:53 PM with initial NIHSS of 3 and LKW 15:55, initial Blood Pressure: 218/127  Initial presenting deficits were arm and leg heaviness (leg worse than arm) and difficulty walking  As a result of dramatic improvement with symptoms and non disabling pt was determined to not be a candidate for thrombolysis (TNK)  · Exam on 09/15/22:  L leg drift, L leg ataxia and subjective L foot reduced sensation  · Current Blood Pressure: 142/89, BP over 24 hours: BP  Min: 142/89  Max: 223/117   · Vascular risk factors: elevated cholesterol, elevated a1c and weight  · Home meds: Plavix 75mg daily  Per patient she has never taken aspirin before  Per chart review she was put on  previously  · A1c 5 9, , P2Y12 154  · CTH: Left occipital stroke is old when compared to the prior MRI October 22, 2014  · CTA: No LVO  · MRI: No evidence of acute infarct, acute intracranial hemorrhage or mass  · Echocardiogram: LVEF 60%, G1DD  Normal atria  · Telemetry: unremarkable    Impression: likely MRI negative stroke since MRI is negative and patient is still having subjective symptoms       Plan:  - Stroke pathway  - Discussed plan with neurology attending, Dr Jessica Olivera  - BP: Permissive hypertension for first 24-48 hours up to 220 SBP then after 48hrs, BP goal: normotension starting 09/16/2022  - atorvastatin 40mg qhs  - Maintain glucose <180, SSI for coverage if indicated  - Since she failed ASA and Plavix, continue ASA 81mg and Brilinta 90mg BID (CM helped check the price and patient can afford it)  ASA and Brilinta for 21 days (until 10/05/2022)   Starting 10/06/2022, monotherapy: Brilinta 90mg BID  - DVT ppx and SCDs  - Monitor on telemetry  - PT/OT/Speech  - Stroke education     Of note, her prior stroke events occurred in 2016  The first was a lacunar stroke in the right thalamic capsular region and that was followed a few months later by a left occipital region stroke  She also reportedly had at least one small old stroke seen on her initial MRI  She had been maintained on Plavix and statin since this stroke  She returns today in follow up and states she completed 3 weeks of DAPT and has continued on Brilinta 90 mg bid and Atorvastatin 40 mg daily  She denies any adverse effects or excessive bruising or bleeding  She is monitoring her BP every other day and states it is well controlled  She has returned to work as a banking   She continues with PT and OT once a week for her residual left sided "heaviness", however she feels 90% better  Although she does still note LE fatigue and weakness mostly first thing in the morning and after a long day  Since her admission she has followed up with Cardiology per the request of her PCP and is scheduled to undergo MAGO and Zio Patch, and if negative will undergo loop recorder placement  She denies any new or worsening symptoms concerning for recurrent TIA or Stroke        Lab Results   Component Value Date/Time    CHOLESTEROL 206 (H) 09/15/2022 05:27 AM     Lab Results   Component Value Date/Time    TRIG 136 09/15/2022 05:27 AM    TRIG 52 08/01/2015 10:25 AM     Lab Results   Component Value Date/Time    HDL 57 09/15/2022 05:27 AM    HDL 61 08/01/2015 10:25 AM     Lab Results   Component Value Date/Time    LDLCALC 122 (H) 09/15/2022 05:27 AM    LDLCALC 36 08/01/2015 10:25 AM       Lab Results   Component Value Date/Time    HGBA1C 5 9 (H) 09/15/2022 05:27 AM    HGBA1C 6 0 (H) 08/01/2015 10:25 AM     Lab Results   Component Value Date/Time     09/15/2022 05:27 AM     08/01/2015 10:25 AM The following portions of the patient's history were reviewed and updated as appropriate: allergies, current medications, past family history, past medical history, past social history and past surgical history  Objective:    Blood pressure 134/82, pulse 68, temperature (!) 96 8 °F (36 °C), height 5' 4" (1 626 m), weight 82 1 kg (181 lb), SpO2 96 %  Neurological Exam    On neurological examination patient is alert, awake, oriented and in no distress  Speech is fluent without dysarthria or aphasia  Cranial nerves 2-12 were symmetrically intact bilaterally  No evidence of any focal weakness or sensory loss in the upper or lower extremities  Motor testing reveals 5/5 strength of the bilateral upper and lower extremities  There was no pronator drift  No fasciculations present  No abnormal involuntary movements  Finger- to-nose reveals no tremor or ataxia and intact proprioceptive function, no dysmetria was noted, although left is subtly slower than right  Sensation was intact to vibration, light touch, and temperature in bilateral upper and lower extremities  Deep tendon reflexes were 2+ and symmetric in the bilateral upper and lower extremities  She is able to rise easily without assistance from a seated position  Casual gait is normal including stance, stride, and arm swing  Slight difficulty with tandem gait  Romberg is absent  ROS:    Review of Systems   Constitutional: Positive for fatigue  Negative for appetite change and fever  HENT: Negative  Negative for hearing loss, tinnitus, trouble swallowing and voice change  Eyes: Negative  Negative for photophobia, pain and visual disturbance  Respiratory: Negative  Negative for shortness of breath  Cardiovascular: Negative  Negative for palpitations  Gastrointestinal: Negative  Negative for nausea and vomiting  Endocrine: Negative  Negative for cold intolerance  Genitourinary: Negative    Negative for dysuria, frequency and urgency  Musculoskeletal: Negative  Negative for gait problem, myalgias and neck pain  Skin: Negative  Negative for rash  Allergic/Immunologic: Negative  Neurological: Positive for headaches  Negative for dizziness, tremors, seizures, syncope, facial asymmetry, speech difficulty, weakness, light-headedness and numbness  Hematological: Negative  Does not bruise/bleed easily  Psychiatric/Behavioral: Negative  Negative for confusion, hallucinations and sleep disturbance  All other systems reviewed and are negative  Reviewed ROS as entered by medical assistant

## 2022-10-21 NOTE — Clinical Note
After this patient's visit in reviewing her prior records in more details- I noticed her Factor V Leiden was + with a heterozygote mutation  I can't see where this was addressed and it appears she was lost to follow up  This was done 11/14/16  Does this need to be repeated? And would you recommend a hematology consult for consideration to transition to [facility]? Or can she remain on Brilinta?

## 2022-10-22 DIAGNOSIS — I10 ESSENTIAL HYPERTENSION: ICD-10-CM

## 2022-10-22 NOTE — ASSESSMENT & PLAN NOTE
Aaron Damon is a 63year old female with a PMH of hypoparathyroidism s/p parathyroidectomy, prior CVA x 3, HTN, Lumbar radiculopathy related to lumbar DDD and lumbar stenosis, HLD, Prediabetes, and obesity who presented with left arm and leg heaviness (leg worse than arm) and difficulty walking  MRI was negative but given her persistent symptoms it was thought she may have suffered a likely MRI negative stroke  She has failed ASA and Plavix so was placed on DAPT Brilinta 90 mg bid and ASA 81 mg daily x 3 weeks and then transitioned to Brilinta 90 mg bid thereafter  She is currently undergoing cardiac work up and is planning loop recorder placement for evaluation of occult Afib  We discussed her stroke risk factors and lifestyle modifications  She was provided stroke education and we reviewed stroke warning signs and symptoms  Her BP appears to be well controlled since her recent medication adjustments  Other than her LDL, her other vascular risk factors appear to be relatively well controlled  I encouraged her to follow a healthy diet and engage in routine vigorous physical activity of at least 150 minutes a week  Plan as outlined below  Plan:    - Continue with good blood pressure control; I would recommend monitoring at home at least 3 times per week; Goal of <130/80  - Continue with good cholesterol control; Goal LDL <70  - Continue with good blood sugar control; Goal HgbA1c <7 0  - Will defer monitoring of cholesterol and blood sugar and management of hypertensive medications to the primary care provider  - Stay well hydrated by drinking enough water   - Eat a healthy diet, high in lean meats fish, turkey, chicken  Low in fats, cholesterol, sugars and sodium  Avoid canned foods, get lots of fresh/frozen vegetables/fruits    - Get routine exercise/physical activity as much as able to tolerate; Goal of 150 minutes per week of vigorous physical activity  - Continue with physical therapy  - Keep follow ups with your other health care providers  - Continue Brilinta 90 mg twice daily and Lipitor 40 mg daily  I will plan for her to return to the office in 6 months time but would be happy to see her sooner if the need should arise  If she has any symptoms concerning for TIA or stroke including sudden painless loss of vision or double vision, difficulty speaking or swallowing, vertigo/room spinning that does not quickly resolve, or weakness/numbness affecting 1 side of the face or body she should proceed by ambulance to the nearest emergency room immediately

## 2022-10-22 NOTE — ASSESSMENT & PLAN NOTE
Lab Results   Component Value Date/Time    CHOLESTEROL 206 (H) 09/15/2022 05:27 AM     Lab Results   Component Value Date/Time    TRIG 136 09/15/2022 05:27 AM    TRIG 52 08/01/2015 10:25 AM     Lab Results   Component Value Date/Time    HDL 57 09/15/2022 05:27 AM    HDL 61 08/01/2015 10:25 AM     Lab Results   Component Value Date/Time    LDLCALC 122 (H) 09/15/2022 05:27 AM    LDLCALC 36 08/01/2015 10:25 AM     Plan:  Goal <70  Continue on Atorvastatin 40 mg daily  Healthy low fat diet  Routine physical activity

## 2022-10-24 RX ORDER — LOSARTAN POTASSIUM 50 MG/1
50 TABLET ORAL DAILY
Qty: 90 TABLET | Refills: 2 | Status: SHIPPED | OUTPATIENT
Start: 2022-10-24

## 2022-10-26 ENCOUNTER — APPOINTMENT (OUTPATIENT)
Dept: PHYSICAL THERAPY | Facility: CLINIC | Age: 62
End: 2022-10-26

## 2022-10-26 ENCOUNTER — APPOINTMENT (OUTPATIENT)
Dept: OCCUPATIONAL THERAPY | Facility: CLINIC | Age: 62
End: 2022-10-26

## 2022-11-09 ENCOUNTER — EVALUATION (OUTPATIENT)
Dept: OCCUPATIONAL THERAPY | Facility: CLINIC | Age: 62
End: 2022-11-09

## 2022-11-09 ENCOUNTER — EVALUATION (OUTPATIENT)
Dept: PHYSICAL THERAPY | Facility: CLINIC | Age: 62
End: 2022-11-09

## 2022-11-09 DIAGNOSIS — I63.9 CEREBROVASCULAR ACCIDENT (CVA), UNSPECIFIED MECHANISM (HCC): Primary | ICD-10-CM

## 2022-11-09 DIAGNOSIS — R26.2 AMBULATORY DYSFUNCTION: ICD-10-CM

## 2022-11-09 NOTE — PROGRESS NOTES
OCCUPATIONAL THERAPY RE-EVALUATION:    11/9/2022  Julia Peralta  1960  [id number]  Calin, An, CRNP  1  Cerebrovascular accident (CVA), unspecified mechanism ([address] )       Patient was treated by ERUM Caballero under the supervision of [address] [place] [address], OTR/L  Subjective     "I am ready to be done"    PATIENT GOAL: "To get back to normal"      Assessment/Plan    Skilled Analysis:  Pt is a 63 y o  female referred to Occupational Therapy s/p Cerebrovascular accident (CVA), unspecified mechanism ([address] ) [I63 9]  Pt participated in skilled OT re-evaluation and formalized testing, Pt reports improvements with LUE, with less heaviness and stiffness, decreased ataxia, and 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 with blow drying hair  Pt has reported she is no longer having difficulties with ADL/IADLs and is back to her baseline with her LUE  Pt reports completing her putty exercises  Pt reports she is no longer having hand shakiness and feels that her L hand is back to normal  Clinically pt has improved with FMC/FMS, GMC/GMS, and in hand manipulation based on improved score on FM testing  OTS is recommending OT d/c secondary to pt meeting all goals and pt back to baseline based on formalized testing  Pt is in agreement with d/c  Goals:     Motor Short Term Goals (4)WKS      Strength/Endurance    ·  Pt will increase L UE strength to 5/5  through the use of strengthening exercises and home program for eventual return to life and work roles and salient tasks = GOAL MET    · Pt will demo with G tolerance to supine, seated, and in stance exercise x 50 minutes with minimal rest breaks required for increased engagement in life roles and weekly exercise regimen = GOAL MET    · Pt will demo with G carryover of Home Exercise Program to improve functional progression towards goals in Plan of care and for improved functional use of RUE = GOAL MET        FMC/GMC    · Pt will increase LUE rate of manipulation by 7% for all FM tests for improved functional performance with salient tasks = GOAL MET    · Pt will increase Reaction time  to < 1 8 seconds with hand to target timed trials for improved reaction time and automaticity of coordination for improved functional performance with ADLs/IADLs and salient tasks = GOAL MET    · Pt will increase LUE prehension patterns for improved utensil and container management  with <2% droppage = GOAL MET    · Pt will demo with decreased LUE ataxia with functional reach for normalized movement pattern of  L UE and for improved hand to target accuracy x 30% = GOAL MET    Treatment Interventions  Supine, seated, and in stance neuro re-ed  Fxnl Grasp and Release  FMC/prehension patterns  Fxnl Tool use (tweezers, tongs)  In hand manipulation  [employer] [facility] [place])  Timed Trials to improve automaticity  Manual tx (IASTM if able, PROM/stretching)  Hand to target tasks  Sensory re-ed as needed (sensory bins)  Seated functional reach: crossing midline  Supine place and hold  Closed chain activities  Open chain activities          HISTORY OF PRESENT ILLNESS:     Pt is a 63 y o  female who was referred to Occupational Therapy s/p  Cerebrovascular accident (CVA), unspecified mechanism ([facility] ) [I63 9]  Pt admitted to hospital on 9/14/22 and placed on stroke alert  Initial symptoms were left leg and arm weakness  Pt was working when she attempted to stand up from a chair and she felt like her leg was going to give out  Pt reports she had a stroke in 2014  She states her BP is pretty high and though she is taking blood pressure medication, her physicians think the stroke was caused by high BP  She has an appointment in a few weeks to adjust her BP medication  Per pt's chart review, "due to left arm/leg heaviness (leg worse than arm)  Stroke alert was initiated  NIHSS 3 (L leg drift, ataxia and subjective reduced L foot sensory)   TNK was not given due to fast resolution of her symptoms while in ED right after CT H and CTA were done " Pt discharged home and is indep in all ADL/IADL, however she feels increased fatigue since the CVA  Pt is having a 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  Her left arm still feels heavy and weaker, and slightly less coordinated on her left side  Pt went back to work last Friday and reported she felt exhausted over the weekend  She is back to working full-time this week and plans to continue full time unless it becomes too difficult  She does not exercise regularly, but she goes to painting classes for leisure        PMH:   Past Medical History:   Diagnosis Date   • Diverticulosis    • Hemorrhoids    • Hypercalcemia 2018   • Hypertension     controlled   • Stroke [facility])    • Vitamin D deficiency 2020     Pain Levels:     Restin    With Activity:  0    Objective    Impairment Observations:    Upper Extremity       RUE LUE Comments                 UPPER EXTREMITY FXN Intact Impaired Dominant Hand: R                         /Pinch Strength         Dynamometer      - Gross Grasp 65 lbs 50 lbs normal   R increased 5 lbs  L increased 9 lbs   Pinch Meter       - PINCER 10lbs 9lbs R increased 3 5   L increased 3     - TRIPOD 15lbs 13 lbs R increased 3  L increased 4     - LATERAL  17 lbs 14 lbs R increased 2  L increased 2 5             AROM (seated)   All WNL/WFL    Scapular Protraction      Scapular Retraction      Elevation/Depression        Shoulder Flexion      Shoulder Extension        Shoulder Abd        Shoulder Add        Horizontal Abd        Horizontal Add        Elbow Flexion        Elbow Extension        Pronation        Supination        Wrist Flexion        Wrist Extension        Digit Flexion        Digit Extension        Composite Grasp        Hook Grasp        Opposition        Subluxation                                     MMT         Elevation 5/5 5/5    Shoulder Flex/Ext 5/5 5/5 Increased   Shoulder Abd 5/5 5/5 Increased   Shoulder ADd 5/5 5/5 Increased   Elbow Flex 5/5 5/5 Increased   Elbow Ext 5/5 5/5 Increased   Wrist Flex 5/5 5/5 Increased   Wrist Ext 5/5 5/5 Increased   Gross Grasp 5/5 5/5 Increased         SENSATION      Myofilaments (3 61 Wnl)  2 83    Proprioception Intact Intact    Hot/Cold Temp Intact Intact          COORDINATION      9 Hole Peg Test 17 seconds 21 seconds normal   R increased 3 seconds  L increased 4 seconds     Fxnl Dexterity Test 21 seconds 38 seconds  low;  R remained  L increased 8 seconds; 1 drop   Rapid, Alternating Movement Test     Normal           INTERVENTION COMMENTS:  Diagnosis: Cerebrovascular accident (CVA), unspecified mechanism ([facility] ) [I63 9]  Precautions: HTN  FOTO: Completed  Insurance: Payor: [employer] CROSS / Plan: [facility] PLAN 361 / Product Type: Blue Fee for Service /   5 of 12 visits, PN due 12/9/2022

## 2022-11-09 NOTE — PROGRESS NOTES
PT Progress Note     Today's date: 2022  Patient name: Barb Dunne  : 1960  MRN: [id number]  Referring provider: EMILIANO Allison  Dx:   Encounter Diagnosis     ICD-10-CM    1  Cerebrovascular accident (CVA), unspecified mechanism ([facility] [address] )  I63 9    2  Ambulatory dysfunction  R26 2                   Subjective: Alexis Vera notes that she had a nice trip  She reports walking on a wide variety of surfaces in various footwear with no issues  She feels that she is 98% back to normal  She said her boss has even noticed the difference in her walking  She does not notice much increase in foot drag even when tired  Pain  No pain reported      Objective: See treatment diary below     Balance Test     6 Minute Walk Test (ft):  IE : 1250 ft   RE : 1525 ft   Functional Gait Assessment:  IE : score 23/30   RE : score 27/30   Gait Speed (m/s):  IE : 10m/8 1s = 1 23 m/s   RE : 10m/7 8s = 1 28 m/s   5x Sit To Stand (s):  IE : 10 5 (UEs crossed; L foot slightly fwd decreased WB)   RE : 10 1 (UEs crossed; symmetrical WD)   TUG (s):  IE : 10 1            Assessment: Alexis Vera was initially referred to outpatient physical therapy following a CVA with L sided deficits  Progress note was performed today  She made significant improvements in all functional outcome measures  She improved her 6 MWT distance by close to 300 ft with no observed L toe drag  Her gait speed improved slightly and is within normal for her age group  Her balance significantly improved given 4 point FGA improvement since initial evaluation  Functionally, she has returned to previous activities including walking up/down the stairs at work and was able to have full participation on her cruise, navigating various surfaces in various footwear without significant instability  At this point, she has met all her goals for skilled PT and has a good understanding of activity level to continue and importance of staying active  She is a good candidate for discharge to independent HEP and is in agreement with this plan  All her questions were answered  Goals  STGs in 4 weeks  1  Patient will be independent with HEP and increasing activity level  - MET  2  Patient will complete 5x STS with symmetrical weight distribution  - MET  3  Patient will report ability to ascend/descend stairs to basement or at work for increased activity - MET    LTGs in 8 weeks  1  Patient will increase her 6 MWT to at least 1500 ft for improved endurance and community ambulation - MET  2  Patient will improve her FGA score by at least 4 points for improved balance (MCID 4 pts)  - MET  3  Patient will report ability to ambulate in variety of foot wear without significant instability  - MET      Plan: Discharge to independent HEP        Short Term Goal Expiration Date:(10/28/2022)  Long Term Goal Expiration Date: (11/28/2022)  POC Expiration Date: (11/28/2022)       Precautions HTN; history of CVA x2    Based on patient's age (59) and MaxHR (160), HIGT would benefit in HR range of  bpm (60-85% HRmax)  Manuals 9/26 10/3 10/12 10/19 11/9           Pt edu Increasing activity at home; high intensity training Education on high intensity gait training  Proper blood pressure management during exercise, target HR during exercise Vitals start of session: /82                     Neuro Re-Ed         STS Stagger stance-L back x5 Staggered stance - L back  3x10  Last two sets with YMB slam  Post  bpm Feet on foam, no UEs x15     Step ups (fwd/lat)    Solo 6" step 4# L AW x15 ea fwd/lat Solo 8" step 5# L AW x15 ea fwd/lat    Side stepping  SOLO hurdles (7/low) #4 L AW - medium difficulty reported   3x laps       HKM   Solo 1/4 length, 4# L AW, hand to opposite knee x4 laps Solo 1/4 length 5# L AW, hand to opposite knee x5 laps    Hurdles (fwd/lat)  SOLO forward hurdles (7 low) 1x lap step to pattern, 2x reciprocal pattern     Lateral tall shilpa SOLO (1 shilpa) - step over pattern to tap then tap  2x10 ea Solo alt 4" & 8" hurdles (3 ea) 4# L AW  Fwd x4 laps (reciprocal-vc for heel toe progression)  Lat x4 laps Solo 8" hurdles (5), 5# L AW  Fwd x5 laps (reciprocal)    Lat x5 laps    Uneven Surface Training   Forward/Lat/ backwards stepping on red mat with various AW underneath to simulate uneven surfaces  6x laps ea  SOLO Solo foam beams, 4# L AW  x4 laps lateral  x4 laps fwd   Solo red mat with various AW underneath    Fwd/lat x4 laps ea in flipflops and slides        Lateral Step ups  6" step, performed 2x15 on L side to target glut med strength and recruitment  HR 101bpm post      Backwards amb  SOLO - 4x laps backwards 1/4 length       Motor dual task   Solo 1/4 length fwd/bwd by length, passing ball between 2 cones x4 laps, 4# L AW             Ther Ex        TM        Stand hip abd        Stand hip ext        Stand march        HR/TR                                Ther Activity                        Gait Training  SOLO 2 0 mph warmup x6 mins, 0% incline    SOLO 2 3 mph, 2%, x4 mins    SOLO 2 0 mph, 0% incline x2 mins     SOLO 2 3 mph, 2% incline, x8 mins 115-132 bpm throughout this phase     Post-BP: 168/88 mmHg, 132   Bpm, 99% SPO2  (BUE support throughout)       Outdoor ambulation, parking lot loop x2, side walk hill down, flat grass, up grass hill x15 mins No SOLO BUE 5# L AW    2 0 mph x3 min  2 0 mph 3% incline x3 min  2 0 mph 6% incline x4 min                    Modalities

## 2022-11-23 ENCOUNTER — TELEPHONE (OUTPATIENT)
Dept: CARDIOLOGY CLINIC | Facility: CLINIC | Age: 62
End: 2022-11-23

## 2022-11-23 NOTE — TELEPHONE ENCOUNTER
Will send the Zio back on Friday  She is still wearing  She did not place it on until 11/11/22 due to vacation      Thank you

## 2022-11-28 ENCOUNTER — HOSPITAL ENCOUNTER (OUTPATIENT)
Dept: NON INVASIVE DIAGNOSTICS | Facility: HOSPITAL | Age: 62
Discharge: HOME | End: 2022-11-28

## 2022-11-28 ENCOUNTER — ANESTHESIA EVENT (OUTPATIENT)
Dept: NON INVASIVE DIAGNOSTICS | Facility: HOSPITAL | Age: 62
End: 2022-11-28

## 2022-11-28 ENCOUNTER — ANESTHESIA (OUTPATIENT)
Dept: NON INVASIVE DIAGNOSTICS | Facility: HOSPITAL | Age: 62
End: 2022-11-28

## 2022-11-28 VITALS
HEIGHT: 64 IN | BODY MASS INDEX: 30.9 KG/M2 | HEART RATE: 57 BPM | OXYGEN SATURATION: 96 % | DIASTOLIC BLOOD PRESSURE: 91 MMHG | SYSTOLIC BLOOD PRESSURE: 153 MMHG | WEIGHT: 181 LBS

## 2022-11-28 DIAGNOSIS — I63.9 CVA (CEREBRAL VASCULAR ACCIDENT) (HCC): ICD-10-CM

## 2022-11-28 LAB — SL CV LV EF: 65

## 2022-11-28 RX ORDER — PROPOFOL 10 MG/ML
INJECTION, EMULSION INTRAVENOUS AS NEEDED
Status: DISCONTINUED | OUTPATIENT
Start: 2022-11-28 | End: 2022-11-28

## 2022-11-28 RX ORDER — PROPOFOL 10 MG/ML
INJECTION, EMULSION INTRAVENOUS CONTINUOUS PRN
Status: DISCONTINUED | OUTPATIENT
Start: 2022-11-28 | End: 2022-11-28

## 2022-11-28 RX ORDER — SODIUM CHLORIDE 9 MG/ML
INJECTION, SOLUTION INTRAVENOUS CONTINUOUS PRN
Status: DISCONTINUED | OUTPATIENT
Start: 2022-11-28 | End: 2022-11-28

## 2022-11-28 RX ORDER — LIDOCAINE HYDROCHLORIDE 10 MG/ML
INJECTION, SOLUTION EPIDURAL; INFILTRATION; INTRACAUDAL; PERINEURAL AS NEEDED
Status: DISCONTINUED | OUTPATIENT
Start: 2022-11-28 | End: 2022-11-28

## 2022-11-28 RX ADMIN — PROPOFOL 30 MG: 10 INJECTION, EMULSION INTRAVENOUS at 10:46

## 2022-11-28 RX ADMIN — PROPOFOL 40 MG: 10 INJECTION, EMULSION INTRAVENOUS at 10:39

## 2022-11-28 RX ADMIN — LIDOCAINE HYDROCHLORIDE 100 MG: 10 INJECTION, SOLUTION EPIDURAL; INFILTRATION; INTRACAUDAL; PERINEURAL at 10:35

## 2022-11-28 RX ADMIN — PROPOFOL 60 MG: 10 INJECTION, EMULSION INTRAVENOUS at 10:36

## 2022-11-28 RX ADMIN — SODIUM CHLORIDE: 0.9 INJECTION, SOLUTION INTRAVENOUS at 10:32

## 2022-11-28 RX ADMIN — PROPOFOL 20 MG: 10 INJECTION, EMULSION INTRAVENOUS at 10:43

## 2022-11-28 RX ADMIN — PROPOFOL 60 MCG/KG/MIN: 10 INJECTION, EMULSION INTRAVENOUS at 10:36

## 2022-11-28 NOTE — ANESTHESIA PREPROCEDURE EVALUATION
Procedure:  MAGO    Relevant Problems   CARDIO   (+) Essential hypertension   (+) Hyperlipidemia, unspecified      ENDO   (+) Hypoparathyroidism (HCC)      MUSCULOSKELETAL   (+) Bilateral low back pain with sciatica   (+) Bilateral sciatica   (+) DDD (degenerative disc disease), lumbar   (+) Lumbar spondylosis      NEURO/PSYCH   (+) CVA (cerebral vascular accident) ([address] )   (+) History of lacunar cerebrovascular accident (CVA)   (+) History of malignant hypertension        Physical Exam    Airway    Mallampati score: II  TM Distance: >3 FB  Neck ROM: full     Dental   No notable dental hx     Cardiovascular      Pulmonary      Other Findings        Anesthesia Plan  ASA Score- 3     Anesthesia Type- IV sedation with anesthesia with ASA Monitors  Additional Monitors:   Airway Plan:           Plan Factors-Exercise tolerance (METS): >4 METS  Chart reviewed  Patient summary reviewed  Patient is not a current smoker  Obstructive sleep apnea risk education given perioperatively  Induction- intravenous  Postoperative Plan-     Informed Consent- Anesthetic plan and risks discussed with patient  I personally reviewed this patient with the CRNA  Discussed and agreed on the Anesthesia Plan with the CRNA  Yisel Khalil

## 2022-11-28 NOTE — ANESTHESIA POSTPROCEDURE EVALUATION
Post-Op Assessment Note    CV Status:  Stable  Pain Score: 0    Pain management: adequate     Mental Status:  Alert and awake   Hydration Status:  Stable   PONV Controlled:  None   Airway Patency:  Patent      Post Op Vitals Reviewed: Yes      Staff: CRNA         No notable events documented      BP   133/80   Temp      Pulse  80   Resp  24    SpO2   99

## 2022-11-29 ENCOUNTER — TELEPHONE (OUTPATIENT)
Dept: NON INVASIVE DIAGNOSTICS | Facility: HOSPITAL | Age: 62
End: 2022-11-29

## 2022-12-02 ENCOUNTER — CLINICAL SUPPORT (OUTPATIENT)
Dept: CARDIOLOGY CLINIC | Facility: CLINIC | Age: 62
End: 2022-12-02

## 2022-12-02 DIAGNOSIS — R94.31 ABNORMAL EKG: ICD-10-CM

## 2022-12-02 DIAGNOSIS — I63.9 CEREBROVASCULAR ACCIDENT (CVA), UNSPECIFIED MECHANISM (HCC): ICD-10-CM

## 2022-12-02 DIAGNOSIS — I63.9 CVA (CEREBRAL VASCULAR ACCIDENT) (HCC): ICD-10-CM

## 2023-01-09 NOTE — PROGRESS NOTES
Outpatient Progress Note- General Cardiology   Manuel Aleman 63 y o  female   MRN: [id number]  Encounter: [id number]    Interval History:  No new complaints since our last encounter  Has been in her usual state of health and remains active  States she had an appointment with Neurology in April 2023 but this was cancelled so she has to make another appointment  Cardiac History: Manuel Aleman is a 63y o  year old female with history of HTN, Hypoparathyroidism, had a CVA x2 in 2014 (June and October with chronic right lacunar and left occipital infarcts on MRI) and a recent CVA vs  TIA (hospitalized on 9/14/22, no TPA given)  Patient state she was in her usual state of health before she started to experience left arm weakness and left leg heaviness stating this was similar to her prior CVA symptoms which prompted her to present to the ED  During her hospitalization, patient was noted to have severe hypertension with SBP of 200+ on multiple recordings  Imaging was unremarkable for acute infarcts  Patient had been on Plavix/ASA since her prior CVA in 2014, there was concern for Plavix and ASA failure and patient was subsequently transitioned to [place] as per neurology  9/14/22: NSR  Normal axis  Normal intervals  Unchanged from prior documented EKGs  Since discharge, patient states that her stroke symptoms have progressively improved  Denies any CP, palpitations, lightheadedness, dizziness or any other associated complaints  Patient has returned back to work  She is a lifetime non-smokers  Consumes 2 beers per week  No recreation drug use    Review of Systems  Review of system was conducted and was negative except for as stated in the HPI        Historical Information   Past Medical History:   Diagnosis Date   • Diverticulosis    • Hemorrhoids    • Hypercalcemia 5/4/2018   • Hypertension     controlled   • Stroke [facility])    • Vitamin D deficiency 8/13/2020     Past Surgical History:   Procedure Laterality Date   • COLONOSCOPY      ONSET 2015   • NOSE SURGERY     • IA EXPLORE PARATHYROID GLANDS Bilateral 6/18/2020    Procedure: PARATHYROIDECTOMY, MINIMALLY INVASIVE, 3 5 GLAND EXPLORATION, INTRAOPERATIVE PTH MONITORING;  Surgeon: Bertha Dillon MD;  Location: [facility];  Service: Surgical Oncology   • SKIN BIOPSY     • SKIN LESION EXCISION       Social History     Substance and Sexual Activity   Alcohol Use Not Currently   • Alcohol/week: 2 0 standard drinks   • Types: 2 Cans of beer per week    Comment: socially     Social History     Substance and Sexual Activity   Drug Use No     Social History     Tobacco Use   Smoking Status Never   Smokeless Tobacco Never     Family History:   Family History   Problem Relation Age of Onset   • No Known Problems Mother    • Hypertension Father    • Parkinsonism Father    • Hypertension Sister    • Stroke Family    • Glaucoma Family    • Hypertension Family        Meds/Allergies   Home Medications:   Current Outpatient Medications:   •  atorvastatin (LIPITOR) 40 mg tablet, Take 1 tablet (40 mg total) by mouth daily, Disp: 90 tablet, Rfl: 1  •  calcium carbonate (OS-BRUNILDA) 600 MG tablet, Take one tablet in AM and 2 tabs with dinner, Disp: , Rfl:   •  carvedilol (COREG) 12 5 mg tablet, Take 1 tablet (12 5 mg total) by mouth 2 (two) times a day with meals, Disp: 60 tablet, Rfl: 3  •  Cholecalciferol 50 MCG (2000 UT) TABS, Take 1 tablet by mouth daily, Disp: , Rfl:   •  CO ENZYME Q-10 PO, Take 1 tablet by mouth daily, Disp: , Rfl:   •  folic acid (FOLVITE) 1 mg tablet, Take 1 tablet (1,000 mcg total) by mouth daily, Disp: 90 tablet, Rfl: 1  •  losartan (COZAAR) 50 mg tablet, Take 1 tablet (50 mg total) by mouth daily, Disp: 90 tablet, Rfl: 2  •  magnesium oxide (MAG-OX) 400 mg, Take 1 tablet (400 mg total) by mouth daily, Disp: , Rfl:   •  Potassium 99 MG TABS, Take 1 tablet by mouth daily, Disp: , Rfl:   •  ticagrelor (Brilinta) 90 MG, Take 1 tablet (90 mg total) by mouth every 12 (twelve) hours, Disp: 180 tablet, Rfl: 1    Allergies   Allergen Reactions   • Codeine Chest Pain         Objective   Vitals: There were no vitals taken for this visit  Physical Exam  GEN: Aaron Damon appears well, alert and oriented x 3, pleasant and cooperative   HEENT:  Normocephalic, atraumatic, anicteric, moist mucous membranes  NECK: No JVD or carotid bruits   HEART: reg rhythm, reg rate, normal S1 and S2, no murmurs, clicks, gallops or rubs   LUNGS: Clear to auscultation bilaterally; no wheezes, rales, or rhonchi; respiration nonlabored   ABDOMEN:  Normoactive bowel sounds, soft, no tenderness, no distention  EXTREMITIES: peripheral pulses palpable; no edema  NEURO: no gross focal findings; cranial nerves grossly intact   SKIN:  Dry, intact, warm to touch    Lab Results: I have personally reviewed pertinent lab results  Lab Results   Component Value Date    HSTNI0 3 09/14/2022    HSTNI2 4 09/14/2022     No results found for: NTBNP  Lab Results   Component Value Date    CHOL 107 08/01/2015    TRIG 136 09/15/2022    HDL 57 09/15/2022    LDLCALC 122 (H) 09/15/2022    LDLDIRECT 73 10/23/2014     Lab Results   Component Value Date     12/16/2015    K 3 8 09/16/2022    CO2 26 09/16/2022     (H) 09/16/2022    BUN 13 09/16/2022    CREATININE 0 79 09/16/2022    ALT 38 05/14/2022    AST 20 05/14/2022     Lab Results   Component Value Date    WBC 8 92 09/15/2022    HGB 14 6 09/15/2022    HCT 44 6 09/15/2022    MCV 88 09/15/2022     09/15/2022     Lab Results   Component Value Date    INR 0 87 09/14/2022    INR 1 00 11/17/2015    INR 0 96 10/22/2014         Imaging: I have personally reviewed pertinent reports        ECHO:  Results for orders placed during the hospital encounter of 09/14/22    Echo complete w/ contrast if indicated    Interpretation Summary  •  Left Ventricle: Left ventricular cavity size is normal  Wall thickness is normal  There is no concentric hypertrophy  The left ventricular ejection fraction is 60%  Systolic function is normal  Wall motion is normal  Diastolic function is mildly abnormal, consistent with grade I (abnormal) relaxation  ECG (10/11/22): Sinus Bradycardia        Assessment/Plan     Assessment:    1  Follow Up  --> TTE (9/15/22): EF 60%  G1DD  [address]  2  CVA x2 in 2014 (June and October with chronic right lacunar and left occipital infarcts on MRI) and recent CVA/TIA (hospitalized on 9/14/22)  Total of 3 documented CVAs   --> EKG during time of admission was interpreted as Atrial Fibrillation however upon my read this was a NSR EKG  (see above in HPI)  --> [id number]: 4 (Sex, HTN and prior CVA)  --> CTA H/N (9/14/22): No evidence of focal stenosis, aneurysm or dissection  --> MAGO (11/28/22): No cardioembolic source of stroke identified  --> Zio Patch (12/2/22): No evidence of AF  Short runs of SVT x2, likely Atrial Tach  3  Hypertension / Hyperlipidemia  --> Lipid Profile (9/15/22): C 203 /  / H 57 / L 122 / N-  --> BP in office 138/80  4  Hypoparathyroidism     Plan:  - c/w Lipitor 40mg PO QHS  - c/w Coreg 12 5mg PO BID  - c/w Losartan 50mg PO Daily  - Ticagrelor 90mg PO BID monotherapy as per neurology  --> Given concern for Plavix failure  - Will plan for Loop Recorder as no Afib has yet to be documented  --> Office will contact patient with time and date, tentatively 1/28/23 it can be done    Case discussed and reviewed with Dr Bill Leon who agrees with my assessment and plan  Thank you for involving us in the care of your patient  Annemarie Mckoy MD  Cardiology Fellow  PGY-6      ==========================================================================================    Epic/ Allscripts/Care Everywhere records reviewed:     ** Please Note: Fluency DirectDictation voice to text software may have been used in the creation of this document   **

## 2023-01-10 ENCOUNTER — TELEPHONE (OUTPATIENT)
Dept: CARDIOLOGY CLINIC | Facility: CLINIC | Age: 63
End: 2023-01-10

## 2023-01-10 ENCOUNTER — OFFICE VISIT (OUTPATIENT)
Dept: CARDIOLOGY CLINIC | Facility: CLINIC | Age: 63
End: 2023-01-10

## 2023-01-10 VITALS
DIASTOLIC BLOOD PRESSURE: 80 MMHG | SYSTOLIC BLOOD PRESSURE: 138 MMHG | HEIGHT: 64 IN | WEIGHT: 185 LBS | OXYGEN SATURATION: 98 % | HEART RATE: 65 BPM | BODY MASS INDEX: 31.58 KG/M2

## 2023-01-10 DIAGNOSIS — I63.9 CVA (CEREBRAL VASCULAR ACCIDENT) (HCC): Primary | ICD-10-CM

## 2023-01-10 NOTE — TELEPHONE ENCOUNTER
----- Message from Brittanie Potts MD sent at 1/10/2023  1:34 PM EST -----  Can we arrange for a loop recorder placement for Ms Benjamin  Thank you

## 2023-01-10 NOTE — TELEPHONE ENCOUNTER
Left voicemail on machine informing patient to call and schedule a LOOP Recorder procedure       Ruth Coronel

## 2023-01-12 NOTE — TELEPHONE ENCOUNTER
Left voicemail on machine informing patient to call and schedule a LOOP Recorder procedure  Sent Rtuh Hightower

## 2023-01-16 ENCOUNTER — OFFICE VISIT (OUTPATIENT)
Dept: FAMILY MEDICINE CLINIC | Facility: CLINIC | Age: 63
End: 2023-01-16

## 2023-01-16 VITALS
DIASTOLIC BLOOD PRESSURE: 85 MMHG | OXYGEN SATURATION: 97 % | SYSTOLIC BLOOD PRESSURE: 138 MMHG | HEART RATE: 60 BPM | WEIGHT: 183 LBS | TEMPERATURE: 97.9 F | BODY MASS INDEX: 31.24 KG/M2 | HEIGHT: 64 IN

## 2023-01-16 DIAGNOSIS — Z23 IMMUNIZATION DUE: ICD-10-CM

## 2023-01-16 DIAGNOSIS — R73.03 PREDIABETES: ICD-10-CM

## 2023-01-16 DIAGNOSIS — Z86.39 HISTORY OF PRIMARY HYPERPARATHYROIDISM: ICD-10-CM

## 2023-01-16 DIAGNOSIS — E21.0 PRIMARY HYPERPARATHYROIDISM (HCC): ICD-10-CM

## 2023-01-16 DIAGNOSIS — I10 ESSENTIAL HYPERTENSION: Primary | ICD-10-CM

## 2023-01-16 RX ORDER — LOSARTAN POTASSIUM 50 MG/1
50 TABLET ORAL DAILY
Qty: 90 TABLET | Refills: 1 | Status: SHIPPED | OUTPATIENT
Start: 2023-01-16

## 2023-01-16 NOTE — PROGRESS NOTES
Name: Hayley Garcia      : 1960      MRN: [id number]  Encounter Provider: Raina Nicholas DO  Encounter Date: 2023   Encounter department: FAMILY PRACTICE AT [address] [facility]     1  Essential hypertension  Comments:  reading today a bit higher than her usual- will monitor- cpm  Orders:  -     losartan (COZAAR) 50 mg tablet; Take 1 tablet (50 mg total) by mouth daily    2  History of primary hyperparathyroidism    3  Primary hyperparathyroidism ([address] )  Comments:  HISTORY OF; with history of 3 gland parathyroidectomy, postsurgical hypoparathyroidism and hypocalcemia; managed by endo    4  Prediabetes  Comments:  controlled, cpm    5  Immunization due  -     Pneumococcal Conjugate Vaccine 20-valent (Pcv20)        Depression Screening and Follow-up Plan: Patient was screened for depression during today's encounter  They screened negative with a PHQ-2 score of 0  Subjective      Chief Complaint   Patient presents with   • Follow-up     No questions or concerns    Has orders for mammo and gyn        Scheduled f/u visit   doing well  Just saw her cardiol last week and bp was 138/80    Review of Systems    Current Outpatient Medications on File Prior to Visit   Medication Sig   • atorvastatin (LIPITOR) 40 mg tablet Take 1 tablet (40 mg total) by mouth daily   • calcium carbonate (OS-BRUNILDA) 600 MG tablet Take one tablet in AM and 2 tabs with dinner   • carvedilol (COREG) 12 5 mg tablet Take 1 tablet (12 5 mg total) by mouth 2 (two) times a day with meals   • Cholecalciferol 50 MCG ( UT) TABS Take 1 tablet by mouth daily   • CO ENZYME Q-10 PO Take 1 tablet by mouth daily   • folic acid (FOLVITE) 1 mg tablet Take 1 tablet (1,000 mcg total) by mouth daily   • magnesium oxide (MAG-OX) 400 mg Take 1 tablet (400 mg total) by mouth daily   • Potassium 99 MG TABS Take 1 tablet by mouth daily   • ticagrelor (Brilinta) 90 MG Take 1 tablet (90 mg total) by mouth every 12 (twelve) hours Objective     /85   Pulse 60   Temp 97 9 °F (36 6 °C)   Ht 5' 4" (1 626 m)   Wt 83 kg (183 lb)   SpO2 97%   BMI 31 41 kg/m²     Physical Exam  Vitals and nursing note reviewed  Constitutional:       General: She is not in acute distress  Appearance: She is well-developed  She is not ill-appearing, toxic-appearing or diaphoretic  HENT:      Head: Normocephalic and atraumatic  Mouth/Throat:      Pharynx: Uvula midline  Neck:      Trachea: Phonation normal    Cardiovascular:      Rate and Rhythm: Normal rate and regular rhythm  Heart sounds: S1 normal and S2 normal      No friction rub  No gallop  Pulmonary:      Effort: Pulmonary effort is normal       Breath sounds: Normal breath sounds and air entry  Musculoskeletal:      Right lower leg: No edema  Left lower leg: No edema  Skin:     Coloration: Skin is not pale  Neurological:      Mental Status: She is alert and oriented to person, place, and time  Psychiatric:         Mood and Affect: Mood normal          Behavior: Behavior normal  Behavior is cooperative         Mert Guzman DO

## 2023-02-14 DIAGNOSIS — I10 MALIGNANT HYPERTENSION: ICD-10-CM

## 2023-02-14 RX ORDER — CARVEDILOL 12.5 MG/1
12.5 TABLET ORAL 2 TIMES DAILY WITH MEALS
Qty: 60 TABLET | Refills: 3 | Status: SHIPPED | OUTPATIENT
Start: 2023-02-14

## 2023-02-17 ENCOUNTER — OFFICE VISIT (OUTPATIENT)
Dept: OBGYN CLINIC | Facility: MEDICAL CENTER | Age: 63
End: 2023-02-17

## 2023-02-17 VITALS
SYSTOLIC BLOOD PRESSURE: 120 MMHG | WEIGHT: 190 LBS | DIASTOLIC BLOOD PRESSURE: 74 MMHG | HEIGHT: 64 IN | BODY MASS INDEX: 32.44 KG/M2

## 2023-02-17 DIAGNOSIS — B96.89 BV (BACTERIAL VAGINOSIS): Primary | ICD-10-CM

## 2023-02-17 DIAGNOSIS — N76.0 BV (BACTERIAL VAGINOSIS): Primary | ICD-10-CM

## 2023-02-17 DIAGNOSIS — N76.2 VULVAR CELLULITIS: ICD-10-CM

## 2023-02-17 LAB
BV WHIFF TEST VAG QL: ABNORMAL
CLUE CELLS SPEC QL WET PREP: ABNORMAL
SL AMB POCT WET MOUNT: ABNORMAL
T VAGINALIS VAG QL WET PREP: ABNORMAL
YEAST VAG QL WET PREP: ABNORMAL

## 2023-02-17 RX ORDER — CLINDAMYCIN PHOSPHATE 10 MG/G
GEL TOPICAL 2 TIMES DAILY
Qty: 60 G | Refills: 0 | Status: SHIPPED | OUTPATIENT
Start: 2023-02-17

## 2023-02-17 RX ORDER — METRONIDAZOLE 500 MG/1
500 TABLET ORAL EVERY 12 HOURS SCHEDULED
Qty: 14 TABLET | Refills: 0 | Status: SHIPPED | OUTPATIENT
Start: 2023-02-17 | End: 2023-02-24

## 2023-02-17 RX ORDER — CLINDAMYCIN HYDROCHLORIDE 300 MG/1
300 CAPSULE ORAL 4 TIMES DAILY
Qty: 40 CAPSULE | Refills: 0 | Status: SHIPPED | OUTPATIENT
Start: 2023-02-17 | End: 2023-02-27

## 2023-02-17 NOTE — PROGRESS NOTES
[facility] [address] [place] [address]    Assessment/Plan:  No problem-specific Assessment & Plan notes found for this encounter  Diagnoses and all orders for this visit:    BV (bacterial vaginosis)  -     metroNIDAZOLE (FLAGYL) 500 mg tablet; Take 1 tablet (500 mg total) by mouth every 12 (twelve) hours for 7 days Start after clindamycin done  -     Genital Comprehensive Culture    Vulvar cellulitis  -     clindamycin (CLEOCIN) 300 MG capsule; Take 1 capsule (300 mg total) by mouth 4 (four) times a day for 10 days  -     clindamycin (CLINDAGEL) 1 % gel; Apply topically 2 (two) times a day  -     Genital Comprehensive Culture          Subjective:   Renzo Sosa is a 63 y o  Jesica Thompsons female  CC: rash, vaginal discharge    HPI: HPI  Patient presents with complaints of vaginal discharge and a rashe between her thighs  She states her symptoms started Sunday and have been progressively worsened  She tried using a topical medication and Monistat with minimal relief  She denies vaginal bleeding  She is not currently sexually active  ROS: Review of Systems   Constitutional: Negative  Respiratory: Negative  Cardiovascular: Negative  Gastrointestinal: Negative  Genitourinary: Positive for vaginal discharge  Musculoskeletal: Negative  All other systems reviewed and are negative  PFSH: The following portions of the patient's history were reviewed and updated as appropriate: allergies, current medications, past family history, past medical history, obstetric history, gynecologic history, past social history, past surgical history and problem list        Objective:  /74 (BP Location: Left arm)   Ht 5' 4" (1 626 m)   Wt 86 2 kg (190 lb)   BMI 32 61 kg/m²    Physical Exam  Vitals reviewed  Constitutional:       Appearance: Normal appearance  Cardiovascular:      Rate and Rhythm: Normal rate     Pulmonary:      Effort: Pulmonary effort is normal  No respiratory distress  Genitourinary:     Exam position: Lithotomy position  Labia:         Right: Rash and lesion present  Left: Rash and lesion present  Vagina: No vaginal discharge  Cervix: No erythema  Uterus: Normal            Comments: Cellulitis noted on labia majora to thighs, extensive chaffing noted  Neurological:      Mental Status: She is alert     Psychiatric:         Mood and Affect: Mood normal          Behavior: Behavior normal

## 2023-02-19 LAB — BACTERIA GENITAL AEROBE CULT: ABNORMAL

## 2023-02-20 LAB
BACTERIA GENITAL AEROBE CULT: ABNORMAL
BACTERIA GENITAL AEROBE CULT: ABNORMAL

## 2023-02-21 ENCOUNTER — TELEPHONE (OUTPATIENT)
Dept: OBGYN CLINIC | Facility: MEDICAL CENTER | Age: 63
End: 2023-02-21

## 2023-03-13 ENCOUNTER — OFFICE VISIT (OUTPATIENT)
Dept: OBGYN CLINIC | Facility: MEDICAL CENTER | Age: 63
End: 2023-03-13

## 2023-03-13 VITALS
SYSTOLIC BLOOD PRESSURE: 128 MMHG | BODY MASS INDEX: 32.44 KG/M2 | DIASTOLIC BLOOD PRESSURE: 80 MMHG | WEIGHT: 190 LBS | HEIGHT: 64 IN

## 2023-03-13 DIAGNOSIS — B37.9 CANDIDIASIS: Primary | ICD-10-CM

## 2023-03-13 RX ORDER — FLUCONAZOLE 150 MG/1
150 TABLET ORAL ONCE
Qty: 1 TABLET | Refills: 0 | Status: SHIPPED | OUTPATIENT
Start: 2023-03-13 | End: 2023-03-13

## 2023-03-13 RX ORDER — NYSTATIN AND TRIAMCINOLONE ACETONIDE 100000; 1 [USP'U]/G; MG/G
OINTMENT TOPICAL 2 TIMES DAILY
Qty: 30 G | Refills: 0 | Status: SHIPPED | OUTPATIENT
Start: 2023-03-13

## 2023-03-13 NOTE — PROGRESS NOTES
[facility] [address] [place] [address]    Assessment/Plan:  No problem-specific Assessment & Plan notes found for this encounter  Diagnoses and all orders for this visit:    Candidiasis  -     fluconazole (DIFLUCAN) 150 mg tablet; Take 1 tablet (150 mg total) by mouth once for 1 dose  -     nystatin-triamcinolone (MYCOLOG-II) ointment; Apply topically 2 (two) times a day          Subjective:   Levar Mondragon is a 63 y o   female  CC: follow, external itching    HPI: HPI  Patient presents for follow up of vaginitis/vulvitis episode  She was treated with Clinda and Flagyl  She reports improvement in her symptoms, however, states she has external itching since her treatment  ROS: Review of Systems   Constitutional: Negative  Respiratory: Negative  Cardiovascular: Negative  Gastrointestinal: Negative  Genitourinary:        Vulvar itching   Musculoskeletal: Negative  All other systems reviewed and are negative  PFSH: The following portions of the patient's history were reviewed and updated as appropriate: allergies, current medications, past family history, past medical history, obstetric history, gynecologic history, past social history, past surgical history and problem list        Objective:  /80 (BP Location: Left arm)   Ht 5' 4" (1 626 m)   Wt 86 2 kg (190 lb)   BMI 32 61 kg/m²    Physical Exam  Vitals reviewed  Constitutional:       Appearance: Normal appearance  Cardiovascular:      Rate and Rhythm: Normal rate  Pulmonary:      Effort: Pulmonary effort is normal  No respiratory distress  Genitourinary:     General: Normal vulva  Labia:         Right: No tenderness or lesion  Left: No tenderness or lesion  Vagina: Normal       Cervix: Normal       Uterus: Normal     Neurological:      Mental Status: She is alert     Psychiatric:         Mood and Affect: Mood normal          Behavior: Behavior normal

## 2023-03-21 ENCOUNTER — TELEPHONE (OUTPATIENT)
Dept: OBGYN CLINIC | Facility: MEDICAL CENTER | Age: 63
End: 2023-03-21

## 2023-03-21 DIAGNOSIS — B37.9 CANDIDIASIS: Primary | ICD-10-CM

## 2023-03-21 RX ORDER — FLUCONAZOLE 150 MG/1
150 TABLET ORAL ONCE
Qty: 1 TABLET | Refills: 2 | Status: SHIPPED | OUTPATIENT
Start: 2023-03-21 | End: 2023-03-21

## 2023-03-21 NOTE — TELEPHONE ENCOUNTER
PT requesting refill of fluconazole (DIFLUCAN)  Because she is very uncomfortable and going on vacation Thursday  Please advise  Thank you

## 2023-05-08 DIAGNOSIS — E78.5 HYPERLIPIDEMIA, UNSPECIFIED HYPERLIPIDEMIA TYPE: ICD-10-CM

## 2023-05-08 RX ORDER — ATORVASTATIN CALCIUM 40 MG/1
40 TABLET, FILM COATED ORAL DAILY
Qty: 90 TABLET | Refills: 1 | Status: SHIPPED | OUTPATIENT
Start: 2023-05-08

## 2023-06-13 DIAGNOSIS — I10 MALIGNANT HYPERTENSION: ICD-10-CM

## 2023-06-13 RX ORDER — CARVEDILOL 12.5 MG/1
12.5 TABLET ORAL 2 TIMES DAILY WITH MEALS
Qty: 60 TABLET | Refills: 3 | Status: SHIPPED | OUTPATIENT
Start: 2023-06-13

## 2023-06-19 ENCOUNTER — OFFICE VISIT (OUTPATIENT)
Dept: NEUROLOGY | Facility: CLINIC | Age: 63
End: 2023-06-19
Payer: COMMERCIAL

## 2023-06-19 VITALS
DIASTOLIC BLOOD PRESSURE: 92 MMHG | BODY MASS INDEX: 33.16 KG/M2 | HEIGHT: 64 IN | TEMPERATURE: 97.5 F | SYSTOLIC BLOOD PRESSURE: 170 MMHG | WEIGHT: 194.2 LBS

## 2023-06-19 DIAGNOSIS — E78.5 HYPERLIPIDEMIA, UNSPECIFIED HYPERLIPIDEMIA TYPE: Primary | ICD-10-CM

## 2023-06-19 DIAGNOSIS — Z86.73 HISTORY OF LACUNAR CEREBROVASCULAR ACCIDENT (CVA): ICD-10-CM

## 2023-06-19 PROCEDURE — 99215 OFFICE O/P EST HI 40 MIN: CPT

## 2023-06-19 RX ORDER — FLUCONAZOLE 150 MG/1
TABLET ORAL
COMMUNITY
Start: 2023-04-04

## 2023-06-19 NOTE — PATIENT INSTRUCTIONS
- Continue with good blood pressure control; I would recommend monitoring at home at least 3 times per week; Goal of <130/80  - Continue with good cholesterol control; Goal LDL <70; not at goal- repeat lipid panel now then consider increase of atorvastatin vs rosuvastatin   - Continue with good blood sugar control; Goal HgbA1c <7 0; at goal  - Will defer monitoring of cholesterol and blood sugar and management of hypertensive medications to the primary care provider  - Stay well hydrated by drinking enough water   - Eat a healthy diet, high in lean meats fish, turkey, chicken  Low in fats, cholesterol, sugars and sodium  Avoid canned foods, get lots of fresh/frozen vegetables/fruits  - Get routine exercise/physical activity as much as able to tolerate; Goal of 150 minutes per week of vigorous physical activity  - Continue with physical therapy  - Keep follow ups with your other health care providers  - Continue Brilinta 90 mg twice daily and Lipitor 40 mg daily   - Proceed with loop recorder placement     I will plan for her to return to the office in 6 months time but would be happy to see her sooner if the need should arise  If she has any symptoms concerning for TIA or stroke including sudden painless loss of vision or double vision, difficulty speaking or swallowing, vertigo/room spinning that does not quickly resolve, or weakness/numbness affecting 1 side of the face or body she should proceed by ambulance to the nearest emergency room immediately

## 2023-06-19 NOTE — PROGRESS NOTES
Patient ID: Brianna Barth is a 66 y o  female  Assessment/Plan:    History of lacunar cerebrovascular accident (CVA)  Brianna Barth is a 66year old female who is known to the practice for her hx of multiple strokes in the past  She continues on Brillinta 90 mg bid and Atorvastatin 40 mg daily for secondary stroke prevention  She denies any new or worsening neurologic complaints concerning for recurrent TIA or Stroke  She is having hand stiffness and occasional numbness in no particular dermatome and with no radiation  This may be arthritic in nature and is following up with her PCP this week regarding this  Her vascular risk factors are well controlled overall however she is need of a repeat lipid panel to assess for LDL improvement  I have also encouraged her to proceed with loop recorder placement that is still pending given the rest of her cardiac work up has been unrevealing  We reviewed her stroke risk factors and management of the same  She was provided stroke education and we reviewed stroke warning signs/symptoms and reasons to return by ambulance to the ER  Plan as outlined below  Plan:    - Continue with good blood pressure control; I would recommend monitoring at home at least 3 times per week; Goal of <130/80  - Continue with good cholesterol control; Goal LDL <70; not at goal- repeat lipid panel now then consider increase of atorvastatin vs rosuvastatin   - Continue with good blood sugar control; Goal HgbA1c <7 0; at goal  - Will defer monitoring of cholesterol and blood sugar and management of hypertensive medications to the primary care provider  - Stay well hydrated by drinking enough water   - Eat a healthy diet, high in lean meats fish, turkey, chicken  Low in fats, cholesterol, sugars and sodium  Avoid canned foods, get lots of fresh/frozen vegetables/fruits    - Get routine exercise/physical activity as much as able to tolerate; Goal of 150 minutes per week of vigorous physical activity  - Continue with physical therapy  - Keep follow ups with your other health care providers  - Continue Brilinta 90 mg twice daily and Lipitor 40 mg daily   - Proceed with loop recorder placement     I will plan for her to return to the office in 6 months time but would be happy to see her sooner if the need should arise  If she has any symptoms concerning for TIA or stroke including sudden painless loss of vision or double vision, difficulty speaking or swallowing, vertigo/room spinning that does not quickly resolve, or weakness/numbness affecting 1 side of the face or body she should proceed by ambulance to the nearest emergency room immediately  Hyperlipidemia, unspecified  Component      Latest Ref Rng & Units 9/15/2022   Cholesterol      See Comment mg/dL 206 (H)   Triglycerides      See Comment mg/dL 136   HDL      >=50 mg/dL 57   LDL Calculated      0 - 100 mg/dL 122 (H)     On Atorvastatin 40 mg daily  Reports compliance  Goal LDL <70  Will order repeat Lipid panel  Pending results will consider increase in Atorvastatin vs change to Rosuvastatin          Diagnoses and all orders for this visit:    Hyperlipidemia, unspecified hyperlipidemia type  -     Lipid Panel with Direct LDL reflex; Future    History of lacunar cerebrovascular accident (CVA)    Other orders  -     fluconazole (DIFLUCAN) 150 mg tablet;  (Patient not taking: Reported on 6/19/2023)         I have spent a total time of 45 minutes on 06/19/23 in caring for this patient including Diagnostic results, Prognosis, Risks and benefits of tx options, Instructions for management, Patient and family education, Importance of tx compliance, Risk factor reductions, Impressions, Counseling / Coordination of care, Documenting in the medical record, Reviewing / ordering tests, medicine, procedures   and Obtaining or reviewing history              Subjective:    REGAN Tinajero is a 66year old female with a PMH of hypoparathyroidism s/p parathyroidectomy, prior CVA x 3, HTN, Lumbar radiculopathy related to lumbar DDD and lumbar stenosis, HLD, Prediabetes, and obesity who presents to the office to follow up on her hx of stroke       She denies any new or worsening symptoms concerning for recurrent TIA or Stroke      Secondary Stroke Prevention  Atorvastatin 40 mg qd and Brillinta 90 mg bid  Compliant with her medications, no issues affording or obtaining medications  Denies excessive bruising or bleeding     Deficits  Has chronic right hand numbness for years- not a new sx- explains tightness with no specific distribution of sx  Has PCP appt this week to discuss   Denies numbness, tingling, weakness, dizziness, headaches, vision changes or any new or worsening stroke like symptoms  Denies difficulty with speaking or swallowing    Monitoring  BP- monitored at home once a week; reports readings are lower than 140's/83  9/15/22 HgbA1c 5 9%  Component      Latest Ref Rng & Units 9/15/2022   Cholesterol      See Comment mg/dL 206 (H)   Triglycerides      See Comment mg/dL 136   HDL      >=50 mg/dL 57   LDL Calculated      0 - 100 mg/dL 122 (H)   CTA H/N 9/14/22- no aneurysm, LVO, critical stenosis  Is considering loop recorder placement   MAGO 11/28/22- •  Left Ventricle: Left ventricular cavity size is normal  Wall thickness is normal  The left ventricular ejection fraction is 65%  Systolic function is normal  Wall motion is normal   •  Atrial Septum: No patent foramen ovale detected using color flow Doppler and saline contrast injection at rest or with abdominal compression  •  Left Atrial Appendage: There is normal function  There is no thrombus  There is no mass  There is no spontaneous echo contrast   •  Aortic Valve: There is trace regurgitation  There is aortic sclerosis  •  Mitral Valve: There is mild regurgitation  •  Tricuspid Valve: There is mild regurgitation  No cardioembolic source of stroke identified  "     Safety  Independent of all ADLs  No falls at home  Managing her own medications and finances  assistive devices- none  Driving- no concerns  Memory- no concerns    Substance use    Smoking- never  Etoh- none  Drugs- none  Caffeine- reports iced tea \"all day\" but is trying to drink more water      Sleep  No RYAN  No concerns going to sleep or staying asleep     Diet  Needs to work on diet  Eating out a lot  High carbs    Exercise  Sedentary lifestyle     Mood  Denies anxiety or depression      Past Medical History:   Diagnosis Date   • CVA (cerebral vascular accident) ([facility] [address] ) 1/7/2015   • Diverticulosis    • Hemorrhoids    • Hypercalcemia 5/4/2018   • Hypertension     controlled   • Stroke [facility])    • Vitamin D deficiency 8/13/2020       Social History     Socioeconomic History   • Marital status: /Civil Union     Spouse name: None   • Number of children: None   • Years of education: None   • Highest education level: None   Occupational History   • None   Tobacco Use   • Smoking status: Never   • Smokeless tobacco: Never   Vaping Use   • Vaping Use: Never used   Substance and Sexual Activity   • Alcohol use: Not Currently     Comment: socially   • Drug use: Never   • Sexual activity: Yes     Partners: Male     Birth control/protection: Post-menopausal, None   Other Topics Concern   • None   Social History Narrative    ALWAYS SEAT BELT    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     Social Determinants of Health     Financial Resource Strain: Not on file   Food Insecurity: No Food Insecurity (9/15/2022)    Hunger Vital Sign    • Worried About Running Out of Food in the Last Year: Never true    • Ran Out of Food in the Last Year: Never true   Transportation Needs: No Transportation Needs (9/15/2022)    PRAPARE - Transportation    • Lack of Transportation (Medical): No    • Lack of Transportation (Non-Medical):  No   Physical Activity: Inactive (11/18/2019)    Exercise Vital Sign    • Days of Exercise per " Week: 0 days    • Minutes of Exercise per Session: 0 min   Stress: Not on file   Social Connections: Not on file   Intimate Partner Violence: Not on file   Housing Stability: Low Risk  (9/15/2022)    Housing Stability Vital Sign    • Unable to Pay for Housing in the Last Year: No    • Number of Places Lived in the Last Year: 1    • Unstable Housing in the Last Year: No         Current Outpatient Medications:   •  atorvastatin (LIPITOR) 40 mg tablet, Take 1 tablet (40 mg total) by mouth daily, Disp: 90 tablet, Rfl: 1  •  calcium carbonate (OS-BRUNILDA) 600 MG tablet, Take one tablet in AM and 2 tabs with dinner, Disp: , Rfl:   •  carvedilol (COREG) 12 5 mg tablet, Take 1 tablet (12 5 mg total) by mouth 2 (two) times a day with meals, Disp: 60 tablet, Rfl: 3  •  Cholecalciferol 50 MCG (2000 UT) TABS, Take 1 tablet by mouth daily, Disp: , Rfl:   •  CO ENZYME Q-10 PO, Take 1 tablet by mouth daily, Disp: , Rfl:   •  folic acid (FOLVITE) 1 mg tablet, Take 1 tablet (1,000 mcg total) by mouth daily, Disp: 90 tablet, Rfl: 1  •  losartan (COZAAR) 50 mg tablet, Take 1 tablet (50 mg total) by mouth daily, Disp: 90 tablet, Rfl: 1  •  magnesium oxide (MAG-OX) 400 mg, Take 1 tablet (400 mg total) by mouth daily, Disp: , Rfl:   •  Potassium 99 MG TABS, Take 1 tablet by mouth daily, Disp: , Rfl:   •  ticagrelor (Brilinta) 90 MG, Take 1 tablet (90 mg total) by mouth every 12 (twelve) hours, Disp: 180 tablet, Rfl: 1  •  clindamycin (CLINDAGEL) 1 % gel, Apply topically 2 (two) times a day (Patient not taking: Reported on 6/19/2023), Disp: 60 g, Rfl: 0  •  fluconazole (DIFLUCAN) 150 mg tablet, , Disp: , Rfl:   •  nystatin-triamcinolone (MYCOLOG-II) ointment, Apply topically 2 (two) times a day (Patient not taking: Reported on 6/21/2023), Disp: 30 g, Rfl: 0    Allergies   Allergen Reactions   • Codeine Chest Pain        The following portions of the patient's history were reviewed and updated as appropriate: allergies, current medications, "past family history, past medical history, past social history and past surgical history  Objective:    Blood pressure 170/92, temperature 97 5 °F (36 4 °C), temperature source Temporal, height 5' 4\" (1 626 m), weight 88 1 kg (194 lb 3 2 oz)  Neurological Exam    On neurological examination patient is alert, awake, oriented and in no distress  Speech is fluent without dysarthria or aphasia  Cranial nerves 2-12 were symmetrically intact bilaterally  No evidence of any focal weakness or sensory loss in the upper or lower extremities  Motor testing reveals 5/5 strength of the bilateral upper and lower extremities  There was no pronator drift  No fasciculations present  No abnormal involuntary movements  Finger- to-nose reveals no tremor or ataxia and intact proprioceptive function, no dysmetria was noted  Rapid alternating movement normal  Sensation was intact to vibration, light touch, and temperature in bilateral upper and lower extremities  Deep tendon reflexes were 2+ and symmetric in the bilateral upper and lower extremities  She is able to rise easily without assistance from a seated position  Casual gait is normal including stance, stride, and arm swing  Normal tandem gait  Romberg is absent  ROS:    Review of Systems   Constitutional: Negative  Negative for appetite change and fever  HENT: Negative  Negative for hearing loss, tinnitus, trouble swallowing and voice change  Eyes: Negative  Negative for photophobia, pain and visual disturbance  Respiratory: Negative  Negative for shortness of breath  Cardiovascular: Negative  Negative for palpitations  Gastrointestinal: Negative  Negative for nausea and vomiting  Endocrine: Negative  Negative for cold intolerance  Genitourinary: Negative  Negative for dysuria, frequency and urgency  Musculoskeletal: Negative  Negative for gait problem, myalgias and neck pain  Skin: Negative  Negative for rash     Allergic/Immunologic: " Negative  Neurological: Positive for numbness (Right hand worsening)  Negative for dizziness, tremors, seizures, syncope, facial asymmetry, speech difficulty, weakness, light-headedness and headaches  Hematological: Negative  Does not bruise/bleed easily  Psychiatric/Behavioral: Negative  Negative for confusion, hallucinations and sleep disturbance  Reviewed ROS as entered by medical assistant

## 2023-06-21 ENCOUNTER — OFFICE VISIT (OUTPATIENT)
Dept: FAMILY MEDICINE CLINIC | Facility: CLINIC | Age: 63
End: 2023-06-21
Payer: COMMERCIAL

## 2023-06-21 ENCOUNTER — APPOINTMENT (OUTPATIENT)
Dept: LAB | Facility: CLINIC | Age: 63
End: 2023-06-21
Payer: COMMERCIAL

## 2023-06-21 ENCOUNTER — TELEPHONE (OUTPATIENT)
Dept: OBGYN CLINIC | Facility: HOSPITAL | Age: 63
End: 2023-06-21

## 2023-06-21 VITALS
DIASTOLIC BLOOD PRESSURE: 86 MMHG | TEMPERATURE: 98.8 F | HEIGHT: 64 IN | BODY MASS INDEX: 32.74 KG/M2 | OXYGEN SATURATION: 96 % | WEIGHT: 191.8 LBS | SYSTOLIC BLOOD PRESSURE: 132 MMHG | HEART RATE: 69 BPM

## 2023-06-21 DIAGNOSIS — Z13.0 SCREENING FOR DEFICIENCY ANEMIA: ICD-10-CM

## 2023-06-21 DIAGNOSIS — E20.8 OTHER HYPOPARATHYROIDISM (HCC): ICD-10-CM

## 2023-06-21 DIAGNOSIS — E04.0 SIMPLE GOITER: ICD-10-CM

## 2023-06-21 DIAGNOSIS — R20.2 NUMBNESS AND TINGLING IN RIGHT HAND: ICD-10-CM

## 2023-06-21 DIAGNOSIS — E89.2 STATUS POST PARATHYROIDECTOMY (HCC): ICD-10-CM

## 2023-06-21 DIAGNOSIS — Z13.220 LIPID SCREENING: ICD-10-CM

## 2023-06-21 DIAGNOSIS — Z12.31 ENCOUNTER FOR SCREENING MAMMOGRAM FOR MALIGNANT NEOPLASM OF BREAST: Primary | ICD-10-CM

## 2023-06-21 DIAGNOSIS — R73.03 PREDIABETES: ICD-10-CM

## 2023-06-21 DIAGNOSIS — I10 ESSENTIAL HYPERTENSION: ICD-10-CM

## 2023-06-21 DIAGNOSIS — R20.0 NUMBNESS AND TINGLING IN RIGHT HAND: ICD-10-CM

## 2023-06-21 DIAGNOSIS — Z12.4 CERVICAL CANCER SCREENING: ICD-10-CM

## 2023-06-21 DIAGNOSIS — Z86.73 HISTORY OF LACUNAR CEREBROVASCULAR ACCIDENT (CVA): ICD-10-CM

## 2023-06-21 DIAGNOSIS — E78.5 HYPERLIPIDEMIA, UNSPECIFIED HYPERLIPIDEMIA TYPE: ICD-10-CM

## 2023-06-21 DIAGNOSIS — Z00.00 ANNUAL PHYSICAL EXAM: ICD-10-CM

## 2023-06-21 LAB
25(OH)D3 SERPL-MCNC: 40.6 NG/ML (ref 30–100)
ALBUMIN SERPL BCP-MCNC: 4 G/DL (ref 3.5–5)
ALP SERPL-CCNC: 96 U/L (ref 46–116)
ALT SERPL W P-5'-P-CCNC: 28 U/L (ref 12–78)
ANION GAP SERPL CALCULATED.3IONS-SCNC: 0 MMOL/L
AST SERPL W P-5'-P-CCNC: 16 U/L (ref 5–45)
BASOPHILS # BLD AUTO: 0.04 THOUSANDS/ÂΜL (ref 0–0.1)
BASOPHILS NFR BLD AUTO: 1 % (ref 0–1)
BILIRUB SERPL-MCNC: 0.65 MG/DL (ref 0.2–1)
BUN SERPL-MCNC: 11 MG/DL (ref 5–25)
CALCIUM SERPL-MCNC: 8.5 MG/DL (ref 8.3–10.1)
CHLORIDE SERPL-SCNC: 110 MMOL/L (ref 96–108)
CHOLEST SERPL-MCNC: 141 MG/DL
CO2 SERPL-SCNC: 28 MMOL/L (ref 21–32)
CREAT SERPL-MCNC: 0.88 MG/DL (ref 0.6–1.3)
CREAT UR-MCNC: 67 MG/DL
EOSINOPHIL # BLD AUTO: 0.19 THOUSAND/ÂΜL (ref 0–0.61)
EOSINOPHIL NFR BLD AUTO: 3 % (ref 0–6)
ERYTHROCYTE [DISTWIDTH] IN BLOOD BY AUTOMATED COUNT: 12.9 % (ref 11.6–15.1)
EST. AVERAGE GLUCOSE BLD GHB EST-MCNC: 126 MG/DL
GFR SERPL CREATININE-BSD FRML MDRD: 71 ML/MIN/1.73SQ M
GLUCOSE P FAST SERPL-MCNC: 106 MG/DL (ref 65–99)
HBA1C MFR BLD: 6 %
HCT VFR BLD AUTO: 46 % (ref 34.8–46.1)
HDLC SERPL-MCNC: 65 MG/DL
HGB BLD-MCNC: 14.9 G/DL (ref 11.5–15.4)
IMM GRANULOCYTES # BLD AUTO: 0.02 THOUSAND/UL (ref 0–0.2)
IMM GRANULOCYTES NFR BLD AUTO: 0 % (ref 0–2)
LDLC SERPL CALC-MCNC: 52 MG/DL (ref 0–100)
LYMPHOCYTES # BLD AUTO: 2.01 THOUSANDS/ÂΜL (ref 0.6–4.47)
LYMPHOCYTES NFR BLD AUTO: 28 % (ref 14–44)
MCH RBC QN AUTO: 28.7 PG (ref 26.8–34.3)
MCHC RBC AUTO-ENTMCNC: 32.4 G/DL (ref 31.4–37.4)
MCV RBC AUTO: 89 FL (ref 82–98)
MICROALBUMIN UR-MCNC: 6.8 MG/L (ref 0–20)
MICROALBUMIN/CREAT 24H UR: 10 MG/G CREATININE (ref 0–30)
MONOCYTES # BLD AUTO: 0.43 THOUSAND/ÂΜL (ref 0.17–1.22)
MONOCYTES NFR BLD AUTO: 6 % (ref 4–12)
NEUTROPHILS # BLD AUTO: 4.6 THOUSANDS/ÂΜL (ref 1.85–7.62)
NEUTS SEG NFR BLD AUTO: 62 % (ref 43–75)
NRBC BLD AUTO-RTO: 0 /100 WBCS
PLATELET # BLD AUTO: 327 THOUSANDS/UL (ref 149–390)
PMV BLD AUTO: 9.8 FL (ref 8.9–12.7)
POTASSIUM SERPL-SCNC: 4.2 MMOL/L (ref 3.5–5.3)
PROT SERPL-MCNC: 7.8 G/DL (ref 6.4–8.4)
PTH-INTACT SERPL-MCNC: 35 PG/ML (ref 12–88)
RBC # BLD AUTO: 5.19 MILLION/UL (ref 3.81–5.12)
SODIUM SERPL-SCNC: 138 MMOL/L (ref 135–147)
TRIGL SERPL-MCNC: 121 MG/DL
TSH SERPL DL<=0.05 MIU/L-ACNC: 2.49 UIU/ML (ref 0.45–4.5)
WBC # BLD AUTO: 7.29 THOUSAND/UL (ref 4.31–10.16)

## 2023-06-21 PROCEDURE — 36415 COLL VENOUS BLD VENIPUNCTURE: CPT

## 2023-06-21 PROCEDURE — 99214 OFFICE O/P EST MOD 30 MIN: CPT | Performed by: FAMILY MEDICINE

## 2023-06-21 PROCEDURE — 82570 ASSAY OF URINE CREATININE: CPT | Performed by: FAMILY MEDICINE

## 2023-06-21 PROCEDURE — 80053 COMPREHEN METABOLIC PANEL: CPT

## 2023-06-21 PROCEDURE — 85025 COMPLETE CBC W/AUTO DIFF WBC: CPT

## 2023-06-21 PROCEDURE — 83970 ASSAY OF PARATHORMONE: CPT

## 2023-06-21 PROCEDURE — 82306 VITAMIN D 25 HYDROXY: CPT

## 2023-06-21 PROCEDURE — 82043 UR ALBUMIN QUANTITATIVE: CPT | Performed by: FAMILY MEDICINE

## 2023-06-21 PROCEDURE — 83036 HEMOGLOBIN GLYCOSYLATED A1C: CPT

## 2023-06-21 PROCEDURE — 80061 LIPID PANEL: CPT

## 2023-06-21 PROCEDURE — 99396 PREV VISIT EST AGE 40-64: CPT | Performed by: FAMILY MEDICINE

## 2023-06-21 PROCEDURE — 84443 ASSAY THYROID STIM HORMONE: CPT

## 2023-06-21 NOTE — PATIENT INSTRUCTIONS
Check insurance coverage of shingles vaccine      Wellness Visit for Adults   AMBULATORY CARE:   A wellness visit  is when you see your healthcare provider to get screened for health problems  Your healthcare provider will also give you advice on how to stay healthy  Write down your questions so you remember to ask them  Ask your healthcare provider how often you should have a wellness visit  What happens at a wellness visit:  Your healthcare provider will ask about your health, and your family history of health problems  This includes high blood pressure, heart disease, and cancer  He or she will ask if you have symptoms that concern you, if you smoke, and about your mood  You may also be asked about your intake of medicines, supplements, food, and alcohol  Any of the following may be done: Your weight  will be checked  Your height may also be checked so your body mass index (BMI) can be calculated  Your BMI shows if you are at a healthy weight  Your blood pressure  and heart rate will be checked  Your temperature may also be checked  Blood and urine tests  may be done  Blood tests may be done to check your cholesterol levels  Abnormal cholesterol levels increase your risk for heart disease and stroke  You may also need a blood or urine test to check for diabetes if you are at increased risk  Urine tests may be done to look for signs of an infection or kidney disease  A physical exam  includes checking your heartbeat and lungs with a stethoscope  Your healthcare provider may also check your skin to look for sun damage  Screening tests  may be recommended  A screening test is done to check for diseases that may not cause symptoms  The screening tests you may need depend on your age, gender, family history, and lifestyle habits  For example, colorectal screening may be recommended if you are 48years old or older      Screening tests you need if you are a woman:   A Pap smear  is used to screen for cervical cancer  Pap smears are usually done every 3 to 5 years depending on your age  You may need them more often if you have had abnormal Pap smear test results in the past  Ask your healthcare provider how often you should have a Pap smear  A mammogram  is an x-ray of your breasts to screen for breast cancer  Experts recommend mammograms every 2 years starting at age 48 years  You may need a mammogram at age 52 years or younger if you have an increased risk for breast cancer  Talk to your healthcare provider about when you should start having mammograms and how often you need them  Vaccines you may need:   Get an influenza vaccine  every year  The influenza vaccine protects you from the flu  Several types of viruses cause the flu  The viruses change over time, so new vaccines are made each year  Get a tetanus-diphtheria (Td) booster vaccine  every 10 years  This vaccine protects you against tetanus and diphtheria  Tetanus is a severe infection that may cause painful muscle spasms and lockjaw  Diphtheria is a severe bacterial infection that causes a thick covering in the back of your mouth and throat  Get a human papillomavirus (HPV) vaccine  if you are female and aged 23 to 32 or male 23 to 24 and never received it  This vaccine protects you from HPV infection  HPV is the most common infection spread by sexual contact  HPV may also cause vaginal, penile, and anal cancers  Get a pneumococcal vaccine  if you are aged 72 years or older  The pneumococcal vaccine is an injection given to protect you from pneumococcal disease  Pneumococcal disease is an infection caused by pneumococcal bacteria  The infection may cause pneumonia, meningitis, or an ear infection  Get a shingles vaccine  if you are 60 or older, even if you have had shingles before  The shingles vaccine is an injection to protect you from the varicella-zoster virus  This is the same virus that causes chickenpox   Shingles is a painful rash that develops in people who had chickenpox or have been exposed to the virus  How to eat healthy:  My Plate is a model for planning healthy meals  It shows the types and amounts of foods that should go on your plate  Fruits and vegetables make up about half of your plate, and grains and protein make up the other half  A serving of dairy is included on the side of your plate  The amount of calories and serving sizes you need depends on your age, gender, weight, and height  Examples of healthy foods are listed below:  Eat a variety of vegetables  such as dark green, red, and orange vegetables  You can also include canned vegetables low in sodium (salt) and frozen vegetables without added butter or sauces  Eat a variety of fresh fruits , canned fruit in 100% juice, frozen fruit, and dried fruit  Include whole grains  At least half of the grains you eat should be whole grains  Examples include whole-wheat bread, wheat pasta, brown rice, and whole-grain cereals such as oatmeal     Eat a variety of protein foods such as seafood (fish and shellfish), lean meat, and poultry without skin (turkey and chicken)  Examples of lean meats include pork leg, shoulder, or tenderloin, and beef round, sirloin, tenderloin, and extra lean ground beef  Other protein foods include eggs and egg substitutes, beans, peas, soy products, nuts, and seeds  Choose low-fat dairy products such as skim or 1% milk or low-fat yogurt, cheese, and cottage cheese  Limit unhealthy fats  such as butter, hard margarine, and shortening  Exercise:  Exercise at least 30 minutes per day on most days of the week  Some examples of exercise include walking, biking, dancing, and swimming  You can also fit in more physical activity by taking the stairs instead of the elevator or parking farther away from stores  Include muscle strengthening activities 2 days each week  Regular exercise provides many health benefits   It helps you manage your weight, and decreases your risk for type 2 diabetes, heart disease, stroke, and high blood pressure  Exercise can also help improve your mood  Ask your healthcare provider about the best exercise plan for you  General health and safety guidelines:   Do not smoke  Nicotine and other chemicals in cigarettes and cigars can cause lung damage  Ask your healthcare provider for information if you currently smoke and need help to quit  E-cigarettes or smokeless tobacco still contain nicotine  Talk to your healthcare provider before you use these products  Limit alcohol  A drink of alcohol is 12 ounces of beer, 5 ounces of wine, or 1½ ounces of liquor  Lose weight, if needed  Being overweight increases your risk of certain health conditions  These include heart disease, high blood pressure, type 2 diabetes, and certain types of cancer  Protect your skin  Do not sunbathe or use tanning beds  Use sunscreen with a SPF 15 or higher  Apply sunscreen at least 15 minutes before you go outside  Reapply sunscreen every 2 hours  Wear protective clothing, hats, and sunglasses when you are outside  Drive safely  Always wear your seatbelt  Make sure everyone in your car wears a seatbelt  A seatbelt can save your life if you are in an accident  Do not use your cell phone when you are driving  This could distract you and cause an accident  Pull over if you need to make a call or send a text message  Practice safe sex  Use latex condoms if are sexually active and have more than one partner  Your healthcare provider may recommend screening tests for sexually transmitted infections (STIs)  Wear helmets, lifejackets, and protective gear  Always wear a helmet when you ride a bike or motorcycle, go skiing, or play sports that could cause a head injury  Wear protective equipment when you play sports  Wear a lifejacket when you are on a boat or doing water sports      © Copyright [employer]ative 2022 Information is for End User's use only and may not be sold, redistributed or otherwise used for commercial purposes  The above information is an  only  It is not intended as medical advice for individual conditions or treatments  Talk to your doctor, nurse or pharmacist before following any medical regimen to see if it is safe and effective for you

## 2023-06-21 NOTE — TELEPHONE ENCOUNTER
Patient is being referred to a orthopedics. Please schedule accordingly.     [address] [facility]   [phone]

## 2023-06-21 NOTE — PROGRESS NOTES
[address]    NAME: Leena Lomas  AGE: 66 y o  SEX: female  : 1960     DATE: 2023     Assessment and Plan:     Problem List Items Addressed This Visit        Endocrine    Simple goiter    Relevant Orders    TSH, 3rd generation with Free T4 reflex    Hypoparathyroidism ([facility] [address] )    Relevant Orders    PTH, intact    Vitamin D 25 hydroxy       Cardiovascular and Mediastinum    Essential hypertension    Relevant Orders    Albumin / creatinine urine ratio       Other    Status post parathyroidectomy ([facility] )    Relevant Orders    PTH, intact    TSH, 3rd generation with Free T4 reflex    Vitamin D 25 hydroxy    Prediabetes    Relevant Orders    Comprehensive metabolic panel    HEMOGLOBIN A1C W/ EAG ESTIMATION    History of lacunar cerebrovascular accident (CVA)    Relevant Orders    Lipid panel    CBC and differential    Vitamin D 25 hydroxy    BMI 32 0-32 9,adult    Relevant Orders    TSH, 3rd generation with Free T4 reflex   Other Visit Diagnoses     Encounter for screening mammogram for malignant neoplasm of breast    -  Primary    Relevant Orders    Mammo screening bilateral w cad    Annual physical exam        Cervical cancer screening        Relevant Orders    Ambulatory Referral to Gynecology    Screening for deficiency anemia        Relevant Orders    CBC and differential    Lipid screening        Relevant Orders    Lipid panel    Numbness and tingling in right hand        Relevant Orders    Ambulatory Referral to Hand Surgery        Advised to call her endo for overdue f/u appt  Immunizations and preventive care screenings were discussed with patient today  Appropriate education was printed on patient's after visit summary  Counseling:  · Exercise: the importance of regular exercise/physical activity was discussed  Recommend exercise 3-5 times per week for at least 30 minutes  BMI Counseling:  Body mass index is "32 92 kg/m²  The BMI is above normal  Nutrition recommendations include reducing portion sizes  Exercise recommendations include exercising 3-5 times per week  Return for December , Next scheduled follow up  Chief Complaint:     Chief Complaint   Patient presents with   • Physical Exam     Last physical 7/14/2021      History of Present Illness:     Adult Annual Physical   Patient here for a comprehensive physical exam and due for f/u chronic conditions  The patient reports: \"Want to talk to you about the cardiologist who's here in this building- want to change\" from the one she has been seeing - \"just went to my neurologist and they want me to put a loop recorder\" - chart review shows that her cardiologist had ordered the loop recorder in January- pt did not yet schedule  Last saw her endo 02/2022- 6 mo f/u was advised, no parathyr/etc labs since 2021 despite orders from endo  Also states, \"Numb hand (right) - it's been for a long time\"- since her stroke - \"just the last few years feels tight, sometimes more than others- first thought it was carpal tunnel, but don't have any pain in the elbow or shoulder down, I did tell the neurologist about it on Thursday, she said talk to you\"  Pt is right handed, no weakness/dropping objects, no pain in the hand or wrist per pt       Depression Screening  PHQ-2/9 Depression Screening         General Health  · Sleep: 8 hours on average  · Hearing: normal - bilateral   · Vision: goes for regular eye exams and wears glasses  · Dental: regular dental visits         /GYN Health  · Patient is: postmenopausal  · Overdue for GYN exam - order placed today  · Has never had a mammo despite orders being given- I discussed with pt/reiterated today importance of screening mammo, order placed again today     Review of Systems:     Review of Systems   Past Medical History:     Past Medical History:   Diagnosis Date   • CVA (cerebral vascular accident) ([facility] [address] ) 1/7/2015   • " Diverticulosis    • Hemorrhoids    • Hypercalcemia 5/4/2018   • Hypertension     controlled   • Stroke [facility])    • Vitamin D deficiency 8/13/2020      Past Surgical History:     Past Surgical History:   Procedure Laterality Date   • COLONOSCOPY      ONSET 2015   • NOSE SURGERY     • ND PARATHYROIDECTOMY/EXPLORATION PARATHYROIDS Bilateral 6/18/2020    Procedure: PARATHYROIDECTOMY, MINIMALLY INVASIVE, 3 5 GLAND EXPLORATION, INTRAOPERATIVE PTH MONITORING;  Surgeon: Crystal Hawkins MD;  Location: [facility];  Service: Surgical Oncology   • SKIN BIOPSY     • SKIN LESION EXCISION        Social History:     Social History     Socioeconomic History   • Marital status: /Civil Union     Spouse name: None   • Number of children: None   • Years of education: None   • Highest education level: None   Occupational History   • None   Tobacco Use   • Smoking status: Never   • Smokeless tobacco: Never   Vaping Use   • Vaping Use: Never used   Substance and Sexual Activity   • Alcohol use: Not Currently     Comment: socially   • Drug use: Never   • Sexual activity: Yes     Partners: Male     Birth control/protection: Post-menopausal, None   Other Topics Concern   • None   Social History Narrative    ALWAYS SEAT BELT    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     Social Determinants of Health     Financial Resource Strain: Not on file   Food Insecurity: No Food Insecurity (9/15/2022)    Hunger Vital Sign    • Worried About Running Out of Food in the Last Year: Never true    • Ran Out of Food in the Last Year: Never true   Transportation Needs: No Transportation Needs (9/15/2022)    PRAPARE - Transportation    • Lack of Transportation (Medical): No    • Lack of Transportation (Non-Medical):  No   Physical Activity: Inactive (11/18/2019)    Exercise Vital Sign    • Days of Exercise per Week: 0 days    • Minutes of Exercise per Session: 0 min   Stress: Not on file   Social Connections: Not on file   Intimate Partner Violence: Not on file   Housing Stability: Low Risk  (9/15/2022)    Housing Stability Vital Sign    • Unable to Pay for Housing in the Last Year: No    • Number of Places Lived in the Last Year: 1    • Unstable Housing in the Last Year: No      Family History:     Family History   Problem Relation Age of Onset   • No Known Problems Mother    • Hypertension Father    • Parkinsonism Father    • Hypertension Sister    • Migraines Sister    • Stroke Family    • Glaucoma Family    • Hypertension Family       Current Medications:     Current Outpatient Medications   Medication Sig Dispense Refill   • atorvastatin (LIPITOR) 40 mg tablet Take 1 tablet (40 mg total) by mouth daily 90 tablet 1   • calcium carbonate (OS-BRUNILDA) 600 MG tablet Take one tablet in AM and 2 tabs with dinner     • carvedilol (COREG) 12 5 mg tablet Take 1 tablet (12 5 mg total) by mouth 2 (two) times a day with meals 60 tablet 3   • Cholecalciferol 50 MCG (2000 UT) TABS Take 1 tablet by mouth daily     • CO ENZYME Q-10 PO Take 1 tablet by mouth daily     • folic acid (FOLVITE) 1 mg tablet Take 1 tablet (1,000 mcg total) by mouth daily 90 tablet 1   • losartan (COZAAR) 50 mg tablet Take 1 tablet (50 mg total) by mouth daily 90 tablet 1   • magnesium oxide (MAG-OX) 400 mg Take 1 tablet (400 mg total) by mouth daily     • Potassium 99 MG TABS Take 1 tablet by mouth daily     • ticagrelor (Brilinta) 90 MG Take 1 tablet (90 mg total) by mouth every 12 (twelve) hours 180 tablet 1   • clindamycin (CLINDAGEL) 1 % gel Apply topically 2 (two) times a day (Patient not taking: Reported on 6/19/2023) 60 g 0   • fluconazole (DIFLUCAN) 150 mg tablet  (Patient not taking: Reported on 6/19/2023)     • nystatin-triamcinolone (MYCOLOG-II) ointment Apply topically 2 (two) times a day (Patient not taking: Reported on 6/21/2023) 30 g 0     No current facility-administered medications for this visit  Allergies:      Allergies   Allergen Reactions   • Codeine Chest Pain "Physical Exam:     /86 (BP Location: Left arm, Patient Position: Sitting, Cuff Size: Standard)   Pulse 69   Temp 98 8 °F (37 1 °C) (Tympanic)   Ht 5' 4\" (1 626 m)   Wt 87 kg (191 lb 12 8 oz)   SpO2 96%   BMI 32 92 kg/m²     Physical Exam  Vitals and nursing note reviewed  Constitutional:       General: She is not in acute distress  Appearance: She is well-developed  She is not ill-appearing, toxic-appearing or diaphoretic  HENT:      Head: Normocephalic and atraumatic  Right Ear: Tympanic membrane, ear canal and external ear normal       Left Ear: Tympanic membrane, ear canal and external ear normal       Nose: Nose normal       Mouth/Throat:      Lips: Pink  Mouth: Mucous membranes are moist       Pharynx: Oropharynx is clear  Uvula midline  Tonsils: 0 on the right  0 on the left  Eyes:      General: Lids are normal       Extraocular Movements: Extraocular movements intact  Conjunctiva/sclera: Conjunctivae normal       Pupils: Pupils are equal, round, and reactive to light  Neck:      Thyroid: No thyroid mass, thyromegaly or thyroid tenderness  Vascular: No JVD  Trachea: Trachea and phonation normal    Cardiovascular:      Rate and Rhythm: Normal rate and regular rhythm  Pulses: Normal pulses  Heart sounds: Normal heart sounds  Pulmonary:      Effort: Pulmonary effort is normal       Breath sounds: Normal breath sounds and air entry  Abdominal:      General: Bowel sounds are normal  There is no distension or abdominal bruit  Palpations: Abdomen is soft  There is no hepatomegaly, splenomegaly or mass  Tenderness: There is no abdominal tenderness  Hernia: There is no hernia in the ventral area  Musculoskeletal:      Right wrist: No swelling, deformity, effusion, tenderness, bony tenderness, snuff box tenderness or crepitus  Normal range of motion  Normal pulse  Left wrist: No swelling or deformity        Right hand: No swelling, " deformity, tenderness or bony tenderness  Normal range of motion  Normal strength  Normal sensation  Normal capillary refill  Normal pulse  Cervical back: Neck supple  Right lower leg: No edema  Left lower leg: No edema  Comments: Negative Tinnel's, negative Ivana's   Lymphadenopathy:      Cervical: No cervical adenopathy  Skin:     General: Skin is warm and dry  Capillary Refill: Capillary refill takes less than 2 seconds  Coloration: Skin is not pale  Neurological:      Mental Status: She is alert and oriented to person, place, and time  Cranial Nerves: Cranial nerves 2-12 are intact  Sensory: Sensation is intact  Motor: Motor function is intact  Coordination: Coordination is intact  Gait: Gait normal       Deep Tendon Reflexes: Reflexes are normal and symmetric  Psychiatric:         Mood and Affect: Mood normal          Behavior: Behavior normal  Behavior is cooperative            Latonya Hernandez, [id number]  [facility]

## 2023-06-22 NOTE — ASSESSMENT & PLAN NOTE
Luis Ballard is a 66year old female who is known to the practice for her hx of multiple strokes in the past  She continues on Brillinta 90 mg bid and Atorvastatin 40 mg daily for secondary stroke prevention  She denies any new or worsening neurologic complaints concerning for recurrent TIA or Stroke  She is having hand stiffness and occasional numbness in no particular dermatome and with no radiation  This may be arthritic in nature and is following up with her PCP this week regarding this  Her vascular risk factors are well controlled overall however she is need of a repeat lipid panel to assess for LDL improvement  I have also encouraged her to proceed with loop recorder placement that is still pending given the rest of her cardiac work up has been unrevealing  We reviewed her stroke risk factors and management of the same  She was provided stroke education and we reviewed stroke warning signs/symptoms and reasons to return by ambulance to the ER  Plan as outlined below  Plan:    - Continue with good blood pressure control; I would recommend monitoring at home at least 3 times per week; Goal of <130/80  - Continue with good cholesterol control; Goal LDL <70; not at goal- repeat lipid panel now then consider increase of atorvastatin vs rosuvastatin   - Continue with good blood sugar control; Goal HgbA1c <7 0; at goal  - Will defer monitoring of cholesterol and blood sugar and management of hypertensive medications to the primary care provider  - Stay well hydrated by drinking enough water   - Eat a healthy diet, high in lean meats fish, turkey, chicken  Low in fats, cholesterol, sugars and sodium  Avoid canned foods, get lots of fresh/frozen vegetables/fruits    - Get routine exercise/physical activity as much as able to tolerate; Goal of 150 minutes per week of vigorous physical activity  - Continue with physical therapy  - Keep follow ups with your other health care providers  - Continue Brilinta 90 mg twice daily and Lipitor 40 mg daily   - Proceed with loop recorder placement     I will plan for her to return to the office in 6 months time but would be happy to see her sooner if the need should arise  If she has any symptoms concerning for TIA or stroke including sudden painless loss of vision or double vision, difficulty speaking or swallowing, vertigo/room spinning that does not quickly resolve, or weakness/numbness affecting 1 side of the face or body she should proceed by ambulance to the nearest emergency room immediately

## 2023-06-22 NOTE — ASSESSMENT & PLAN NOTE
Component      Latest Ref Rng & Units 9/15/2022   Cholesterol      See Comment mg/dL 206 (H)   Triglycerides      See Comment mg/dL 136   HDL      >=50 mg/dL 57   LDL Calculated      0 - 100 mg/dL 122 (H)     On Atorvastatin 40 mg daily  Reports compliance  Goal LDL <70  Will order repeat Lipid panel  Pending results will consider increase in Atorvastatin vs change to Rosuvastatin

## 2023-07-07 DIAGNOSIS — I10 ESSENTIAL HYPERTENSION: ICD-10-CM

## 2023-07-07 RX ORDER — LOSARTAN POTASSIUM 50 MG/1
50 TABLET ORAL DAILY
Qty: 90 TABLET | Refills: 1 | Status: SHIPPED | OUTPATIENT
Start: 2023-07-07

## 2023-09-28 ENCOUNTER — HOSPITAL ENCOUNTER (OUTPATIENT)
Dept: SURGERY | Facility: HOSPITAL | Age: 63
Discharge: HOME | End: 2023-09-28
Attending: INTERNAL MEDICINE
Payer: COMMERCIAL

## 2023-09-28 VITALS
SYSTOLIC BLOOD PRESSURE: 189 MMHG | TEMPERATURE: 96.8 F | DIASTOLIC BLOOD PRESSURE: 104 MMHG | HEART RATE: 70 BPM | OXYGEN SATURATION: 98 % | RESPIRATION RATE: 18 BRPM

## 2023-09-28 PROCEDURE — C1764 EVENT RECORDER, CARDIAC: HCPCS

## 2023-09-28 PROCEDURE — 33285 INSJ SUBQ CAR RHYTHM MNTR: CPT | Performed by: INTERNAL MEDICINE

## 2023-09-28 RX ORDER — LIDOCAINE HYDROCHLORIDE 10 MG/ML
INJECTION, SOLUTION EPIDURAL; INFILTRATION; INTRACAUDAL; PERINEURAL
Status: COMPLETED
Start: 2023-09-28 | End: 2023-09-28

## 2023-09-28 RX ORDER — LIDOCAINE HYDROCHLORIDE 10 MG/ML
5 INJECTION, SOLUTION EPIDURAL; INFILTRATION; INTRACAUDAL; PERINEURAL ONCE
Status: COMPLETED | OUTPATIENT
Start: 2023-09-28 | End: 2023-09-28

## 2023-09-28 RX ADMIN — LIDOCAINE HYDROCHLORIDE 5 ML: 10 INJECTION, SOLUTION EPIDURAL; INFILTRATION; INTRACAUDAL; PERINEURAL at 10:32

## 2023-09-28 NOTE — PROCEDURES
INVASIVE CARDIOLOGY- LOOP RECORDER IMPLANTATION    PRE-OP DIAGNOSIS:   Cryptogenic stroke     POST-OP DIAGNOSIS:  Same     :  Renettaon    Device Details: Medtronic [id number]  Serial [id number]      ANESTHESIA:  Local anesthesia with 1% lidocaine    COMPLICATIONS:  None. The nature of the procedure, risks and alternatives were discussed with the patient who gave informed consent. OPERATIVE TECHNIQUE:  The patient draped in a sterile fashion. The 4th intercostal space 2 cm from the left edge of the sternum was identified. Local anesthesia was performed with 1% Lidocaine . An incision was made with the push blade. The insertion tool was placed through the incision and rotated 180 degrees. The plunger was inserted and the device was deployed. The skin was closed with dermabond. Steri-strips were added. Follow up wound and device f/u arranged.

## 2023-10-09 ENCOUNTER — OFFICE VISIT (OUTPATIENT)
Dept: CARDIOLOGY CLINIC | Facility: CLINIC | Age: 63
End: 2023-10-09
Payer: COMMERCIAL

## 2023-10-09 DIAGNOSIS — I63.9 CEREBROVASCULAR ACCIDENT (CVA), UNSPECIFIED MECHANISM (HCC): Primary | ICD-10-CM

## 2023-10-09 PROCEDURE — 99211 OFF/OP EST MAY X REQ PHY/QHP: CPT | Performed by: NURSE PRACTITIONER

## 2023-10-09 NOTE — PROGRESS NOTES
Warren Molina is seen today for a wound check s/p ILR implant    She denies any fever or chills. No drainage or bleeding from the incision. Left chest incision is well-healed without signs of infection.     We will arrange for continued cardiac care in our office at patient's request.

## 2023-10-10 DIAGNOSIS — I63.9 CVA (CEREBRAL VASCULAR ACCIDENT) (HCC): ICD-10-CM

## 2023-10-10 DIAGNOSIS — I10 MALIGNANT HYPERTENSION: ICD-10-CM

## 2023-10-10 RX ORDER — CARVEDILOL 12.5 MG/1
12.5 TABLET ORAL 2 TIMES DAILY WITH MEALS
Qty: 60 TABLET | Refills: 3 | Status: SHIPPED | OUTPATIENT
Start: 2023-10-10

## 2023-10-12 NOTE — TELEPHONE ENCOUNTER
Requested medication(s) are due for refill today: Yes  Patient has already received a courtesy refill: No  Other reason request has been forwarded to provider: failed refill protocol, medication says it has ended

## 2023-10-12 NOTE — TELEPHONE ENCOUNTER
Patient called into the office this morning after reaching out to the after hours on call service regarding this medication. Can we have a new script placed or will she need to be seen prior? Please advise.

## 2023-10-13 ENCOUNTER — TELEPHONE (OUTPATIENT)
Dept: FAMILY MEDICINE CLINIC | Facility: CLINIC | Age: 63
End: 2023-10-13

## 2023-10-13 NOTE — TELEPHONE ENCOUNTER
Pt called into the office asking when her Brilinta was going to be refilled. Pt stated she only has enough medication to last her until tomorrow. Please advise.

## 2023-11-01 NOTE — TELEPHONE ENCOUNTER
----- Message from Latisha Steve MD sent at 2/12/2020  3:46 PM EST -----  Please call the patient regarding her results, her vitamin-D is normal   Continue current dose of vitamin-D supplementation  24 hour urine calcium result is normal   Her parathyroid hormone is still elevated, She should follow-up with Dr Gerson Santos as suggested  She should follow-up with us as suggested 
Spoke to pt who understood results
yes

## 2023-11-02 NOTE — RESULT NOTES
Verified Results  (1) WOUND CULTURE [id number] 01:25PM Braydon Lovelace    Order Number: [id number]  TW Order Number: [id number]     Test Name Result Flag Reference   CLINICAL REPORT (Report)     Test:        Wound culture  Specimen Type:    Wound  Specimen Date:   7/28/2016 1:25 PM  Result Date:    7/30/2016 11:10 AM  Result Status:   Final result  Resulting Lab:   [facility] [address] [place] [address]            Tel: [phone]      CULTURE                                       ------------------                                   Culture too young- will reincubate    3+ Growth of Mixed Skin Radha      STAIN                                        ------------------                                   No polys seen    Rare Gram positive cocci in pairs Niacinamide Pregnancy And Lactation Text: These medications are considered safe during pregnancy.

## 2023-11-14 DIAGNOSIS — E78.5 HYPERLIPIDEMIA, UNSPECIFIED HYPERLIPIDEMIA TYPE: ICD-10-CM

## 2023-11-14 RX ORDER — ATORVASTATIN CALCIUM 40 MG/1
40 TABLET, FILM COATED ORAL DAILY
Qty: 90 TABLET | Refills: 1 | Status: SHIPPED | OUTPATIENT
Start: 2023-11-14

## 2023-12-07 ENCOUNTER — OFFICE VISIT (OUTPATIENT)
Dept: NEUROLOGY | Facility: CLINIC | Age: 63
End: 2023-12-07
Payer: COMMERCIAL

## 2023-12-07 ENCOUNTER — OFFICE VISIT (OUTPATIENT)
Dept: FAMILY MEDICINE CLINIC | Facility: CLINIC | Age: 63
End: 2023-12-07
Payer: COMMERCIAL

## 2023-12-07 VITALS
HEART RATE: 61 BPM | TEMPERATURE: 97.9 F | HEIGHT: 64 IN | OXYGEN SATURATION: 98 % | DIASTOLIC BLOOD PRESSURE: 88 MMHG | BODY MASS INDEX: 34.11 KG/M2 | WEIGHT: 199.8 LBS | SYSTOLIC BLOOD PRESSURE: 160 MMHG

## 2023-12-07 VITALS
HEART RATE: 76 BPM | OXYGEN SATURATION: 98 % | BODY MASS INDEX: 33.97 KG/M2 | SYSTOLIC BLOOD PRESSURE: 160 MMHG | HEIGHT: 64 IN | WEIGHT: 199 LBS | TEMPERATURE: 98.1 F | DIASTOLIC BLOOD PRESSURE: 98 MMHG

## 2023-12-07 DIAGNOSIS — Z86.73 HISTORY OF LACUNAR CEREBROVASCULAR ACCIDENT (CVA): ICD-10-CM

## 2023-12-07 DIAGNOSIS — E78.5 HYPERLIPIDEMIA, UNSPECIFIED HYPERLIPIDEMIA TYPE: ICD-10-CM

## 2023-12-07 DIAGNOSIS — I10 ESSENTIAL HYPERTENSION: ICD-10-CM

## 2023-12-07 DIAGNOSIS — R20.0 NUMBNESS AND TINGLING IN BOTH HANDS: Primary | ICD-10-CM

## 2023-12-07 DIAGNOSIS — I10 ESSENTIAL HYPERTENSION: Primary | ICD-10-CM

## 2023-12-07 DIAGNOSIS — R20.2 NUMBNESS AND TINGLING IN BOTH HANDS: Primary | ICD-10-CM

## 2023-12-07 DIAGNOSIS — I63.9 CVA (CEREBRAL VASCULAR ACCIDENT) (HCC): ICD-10-CM

## 2023-12-07 DIAGNOSIS — R73.03 PREDIABETES: ICD-10-CM

## 2023-12-07 PROBLEM — E83.51 HYPOCALCEMIA: Status: RESOLVED | Noted: 2020-06-24 | Resolved: 2023-12-07

## 2023-12-07 PROCEDURE — 99214 OFFICE O/P EST MOD 30 MIN: CPT | Performed by: FAMILY MEDICINE

## 2023-12-07 PROCEDURE — 99214 OFFICE O/P EST MOD 30 MIN: CPT

## 2023-12-07 RX ORDER — LOSARTAN POTASSIUM 100 MG/1
100 TABLET ORAL DAILY
Qty: 90 TABLET | Refills: 1 | Status: SHIPPED | OUTPATIENT
Start: 2023-12-07

## 2023-12-07 NOTE — PROGRESS NOTES
Patient ID: Bebeto Blanco is a 66 y.o. female. Assessment/Plan:    Numbness and tingling in both hands  Numbness, tingling and pain in both hands, R>L  Admits to typing and other repetitive motions daily   Positive Phalen's maneuver with median nerve distribution of tingling and pain. History and exam are concerning for carpal tunnel syndrome. Will evaluate further with a bilateral upper extremity EMG  Pending results can consider referral to hand surgery  Otherwise she may continue to use conservative bracing and nsaids as needed     History of lacunar cerebrovascular accident (CVA)  Bebeto Blanco is a 66year old female who is known to the practice for her hx of multiple strokes in the past. She continues on Brillinta 90 mg bid and Atorvastatin 40 mg daily for secondary stroke prevention. She denies any new or worsening neurologic complaints concerning for recurrent TIA or Stroke. Her vascular risk factors are well controlled. I have encouraged her to work on healthy diet and increasing her physical activity. She now has a loop recorder in place. We reviewed her stroke risk factors and management of the same. She was provided stroke education and we reviewed stroke warning signs/symptoms and reasons to return by ambulance to the ER. Plan as outlined below. Plan:    - Continue with good blood pressure control; I would recommend monitoring at home at least 3 times per week; Goal of <130/80; at goal  - Continue with good cholesterol control; Goal LDL <70; at goal   - Continue with good blood sugar control; Goal HgbA1c <7.0; at goal  - Will defer monitoring of cholesterol and blood sugar and management of hypertensive medications to the primary care provider  - Stay well hydrated by drinking enough water   - Eat a healthy diet, high in lean meats fish, turkey, chicken. Low in fats, cholesterol, sugars and sodium. Avoid canned foods, get lots of fresh/frozen vegetables/fruits.   - Get routine exercise/physical activity as much as able to tolerate; Goal of 150 minutes per week of vigorous physical activity  - Keep follow ups with your other health care providers  - Continue Brilinta 90 mg twice daily and Lipitor 40 mg daily. - Complete EMG to evaluate for CTS     I will plan for her to return to the office in 6 months time but would be happy to see her sooner if the need should arise. If she has any symptoms concerning for TIA or stroke including sudden painless loss of vision or double vision, difficulty speaking or swallowing, vertigo/room spinning that does not quickly resolve, or weakness/numbness affecting 1 side of the face or body she should proceed by ambulance to the nearest emergency room immediately. Diagnoses and all orders for this visit:    Numbness and tingling in both hands  -     EMG 2 Limb Upper Extremity; Future    History of lacunar cerebrovascular accident (CVA)         I have spent a total time of 30 minutes on 12/07/23 in caring for this patient including Diagnostic results, Prognosis, Risks and benefits of tx options, Instructions for management, Patient and family education, Importance of tx compliance, Risk factor reductions, Impressions, Documenting in the medical record, Reviewing / ordering tests, medicine, procedures  , and Obtaining or reviewing history  . Subjective:    REGAN Lai is a 66year old female with a PMH of hypoparathyroidism s/p parathyroidectomy, prior CVA x 3, HTN, Lumbar radiculopathy related to lumbar DDD and lumbar stenosis, HLD, Prediabetes, and obesity who presents to the office to follow up on her hx of stroke. She was last seen 6/19/2023. She denies any new or worsening symptoms concerning for recurrent TIA or Stroke. Secondary Stroke Prevention  She continues on Atorvastatin 40 mg qd and Brillinta 90 mg bid  Compliant with her medications, no issues affording or obtaining medications.    Denies excessive bruising or bleeding     Deficits  States she continues with R>L hand numbness- was evaluated by PCP who believes she may have CTS. Denies other numbness, tingling, weakness, dizziness, headaches, vision changes or any new or worsening stroke like symptoms  Denies difficulty with speaking or swallowing     Monitoring  BP- monitored at home once a week; reports readings are lower than 120-135/80-83  BP today is 160/88  6/21/23 HgbA1c 6.0  Component      Latest Ref Rng 6/21/2023   Cholesterol      See Comment mg/dL 141    Triglycerides      See Comment mg/dL 121    HDL      >=50 mg/dL 65    LDL Calculated      0 - 100 mg/dL 52      CTA H/N 9/14/22- no aneurysm, LVO, critical stenosis  Is considering loop recorder placement     MAGO 11/28/22-   Left Ventricle: Left ventricular cavity size is normal. Wall thickness is normal. The left ventricular ejection fraction is 65%. Systolic function is normal. Wall motion is normal.    Atrial Septum: No patent foramen ovale detected using color flow Doppler and saline contrast injection at rest or with abdominal compression. Left Atrial Appendage: There is normal function. There is no thrombus. There is no mass. There is no spontaneous echo contrast.    Aortic Valve: There is trace regurgitation. There is aortic sclerosis. Mitral Valve: There is mild regurgitation. Tricuspid Valve: There is mild regurgitation. No cardioembolic source of stroke identified. Loop recorder placed 9/28/23     Safety  Independent of all ADLs  No falls at home  Managing her own medications and finances  assistive devices- none  Driving- no concerns  Memory- no concerns     Substance use    Smoking- never  Etoh- none  Drugs- none  Caffeine- reports drinking less iced tea and drinking more water     Sleep  Denies hx of RYAN  No concerns going to sleep or staying asleep     Diet  With it being around the holidays she had not made many diet changes.   Needs to work on diet  Eating out a lot  High carbs Exercise  Sedentary lifestyle     Mood  Denies anxiety or depression    Work  Works at [employer] full time     Other Symptoms (not related to CVA)  Reports numbness and tingling in both hands, Right>left  Occurs during the day   She is typing and doing other repettiveve motions all day - works at the [employer] is from the elbow down; pain is from the shoulder down   PCP evaluated this and suspects CTS  She is doing conservative bracing overnight with brace specific for CTS    The following portions of the patient's history were reviewed and updated as appropriate: allergies, current medications, past family history, past medical history, past social history, past surgical history, and problem list.     Objective:    Blood pressure 160/88, pulse 61, temperature 97.9 °F (36.6 °C), temperature source Temporal, height 5' 4" (1.626 m), weight 90.6 kg (199 lb 12.8 oz), SpO2 98%. Neurological Exam    On neurological examination patient is alert, awake, oriented and in no distress. Speech is fluent without dysarthria or aphasia. Cranial nerves 2-12 were symmetrically intact bilaterally. No evidence of any focal weakness or sensory loss in the upper or lower extremities. Deep tendon reflexes were 2+ and symmetric in the bilateral upper and lower extremities. She is able to rise easily without assistance from a seated position. Casual gait is normal including stance, stride, and arm swing. Negative Tinel's sign. Positive Phalen's maneuver in the right hand with tingling in the thumb and index finger. ROS:    Review of Systems   Constitutional:  Negative for appetite change, fatigue and fever. HENT: Negative. Negative for hearing loss, tinnitus, trouble swallowing and voice change. Eyes: Negative. Negative for photophobia, pain and visual disturbance. Respiratory: Negative. Negative for shortness of breath. Cardiovascular: Negative. Negative for palpitations. Gastrointestinal: Negative. Negative for nausea and vomiting. Endocrine: Negative. Negative for cold intolerance. Genitourinary: Negative. Negative for dysuria, frequency and urgency. Musculoskeletal:  Negative for back pain, gait problem, myalgias and neck pain. Skin: Negative. Negative for rash. Allergic/Immunologic: Negative. Neurological:  Positive for numbness. Negative for dizziness, tremors, seizures, syncope, facial asymmetry, speech difficulty, weakness, light-headedness and headaches. Hematological: Negative. Does not bruise/bleed easily. Psychiatric/Behavioral: Negative. Negative for confusion, hallucinations and sleep disturbance. All other systems reviewed and are negative. Reviewed ROS as entered by medical assistant.

## 2023-12-07 NOTE — PROGRESS NOTES
Name: Yuliya Berry      : 1960      MRN: [id number]  Encounter Provider: Tonny Motley DO  Encounter Date: 2023   Encounter department: [facility] AT [address] [place] [address]     1. Essential hypertension  -     losartan (COZAAR) 100 MG tablet; Take 1 tablet (100 mg total) by mouth daily    2. Prediabetes    3. History of lacunar cerebrovascular accident (CVA)    4. Hyperlipidemia, unspecified hyperlipidemia type    5. CVA (cerebral vascular accident) ([address])  Comments:  hx CVA x3; completed 21 days ASA plus Brilinta, now on Brilinta alone; on Atorvastatin 40 mg QD for secondary stroke prevention; follows with neurologist    6. Essential hypertension  Comments:  on coreg + losartan  Orders:  -     losartan (COZAAR) 100 MG tablet; Take 1 tablet (100 mg total) by mouth daily    HTN not controlled- I am increasing her cozaar to 100mg QD; continue coreg unchanged; she has a f/u appt with her cardiol next month- bp will be rechecked then, continue home bp checks as well    Depression Screening and Follow-up Plan: Patient was screened for depression during today's encounter. They screened negative with a PHQ-2 score of 0.         Subjective      Chief Complaint   Patient presents with    Follow-up     6 mo f/u       Scheduled f/u  Bp elevated on rooming 160/98  Pt states, "I was just at the neuro and it was 160/88"  Home 120's- sometimes 145/ bottom 70-85  Currently on cozaar and coreg   Not eating salty foods recently  Sees cardiol routinely- next appt is in January   Denies any sx - specif denies HA, dizziness, CP, SOB, focal neuro sx  I recommend check insurance for shingles vaccine and tetanus booster  Wears glasses and goes regularly for her eye exams      Review of Systems    Current Outpatient Medications on File Prior to Visit   Medication Sig    atorvastatin (LIPITOR) 40 mg tablet Take 1 tablet (40 mg total) by mouth daily    calcium carbonate (OS-BRUNILDA) 600 MG tablet Take one tablet in AM and 2 tabs with dinner    carvedilol (COREG) 12.5 mg tablet Take 1 tablet (12.5 mg total) by mouth 2 (two) times a day with meals    Cholecalciferol 50 MCG (2000 UT) TABS Take 1 tablet by mouth daily    CO ENZYME Q-10 PO Take 1 tablet by mouth daily    folic acid (FOLVITE) 1 mg tablet Take 1 tablet (1,000 mcg total) by mouth daily    magnesium oxide (MAG-OX) 400 mg Take 1 tablet (400 mg total) by mouth daily    Potassium 99 MG TABS Take 1 tablet by mouth daily    ticagrelor (Brilinta) 90 MG Take 1 tablet (90 mg total) by mouth every 12 (twelve) hours       Objective     /98 (BP Location: Left arm, Patient Position: Sitting, Cuff Size: Standard)   Pulse 76   Temp 98.1 °F (36.7 °C) (Tympanic)   Ht 5' 4" (1.626 m)   Wt 90.3 kg (199 lb)   SpO2 98%   BMI 34.16 kg/m²     Physical Exam  Vitals and nursing note reviewed. Constitutional:       General: She is not in acute distress. Appearance: She is well-groomed. She is obese. She is not ill-appearing, toxic-appearing or diaphoretic. HENT:      Head: Normocephalic and atraumatic. Mouth/Throat:      Pharynx: Uvula midline. Neck:      Trachea: Phonation normal.   Cardiovascular:      Rate and Rhythm: Normal rate and regular rhythm. Heart sounds: Normal heart sounds. Pulmonary:      Effort: Pulmonary effort is normal.      Breath sounds: Normal breath sounds and air entry. Musculoskeletal:      Right lower leg: No edema. Left lower leg: No edema. Skin:     Coloration: Skin is not pale. Neurological:      General: No focal deficit present. Mental Status: She is alert and oriented to person, place, and time. Gait: Gait normal.   Psychiatric:         Mood and Affect: Mood normal.         Behavior: Behavior normal. Behavior is cooperative.        Avril Kaur DO

## 2023-12-07 NOTE — PATIENT INSTRUCTIONS
Continue with good blood pressure control; I would recommend monitoring at home at least 3 times per week; Goal of <130/80; at goal  - Continue with good cholesterol control; Goal LDL <70; at goal   - Continue with good blood sugar control; Goal HgbA1c <7.0; at goal  - Will defer monitoring of cholesterol and blood sugar and management of hypertensive medications to the primary care provider  - Stay well hydrated by drinking enough water   - Eat a healthy diet, high in lean meats fish, turkey, chicken. Low in fats, cholesterol, sugars and sodium. Avoid canned foods, get lots of fresh/frozen vegetables/fruits. - Get routine exercise/physical activity as much as able to tolerate; Goal of 150 minutes per week of vigorous physical activity  - Keep follow ups with your other health care providers  - Continue Brilinta 90 mg twice daily and Lipitor 40 mg daily. - Complete EMG to evaluate for CTS     I will plan for her to return to the office in 6 months time but would be happy to see her sooner if the need should arise. If she has any symptoms concerning for TIA or stroke including sudden painless loss of vision or double vision, difficulty speaking or swallowing, vertigo/room spinning that does not quickly resolve, or weakness/numbness affecting 1 side of the face or body she should proceed by ambulance to the nearest emergency room immediately.

## 2023-12-13 PROBLEM — R20.0 NUMBNESS AND TINGLING IN BOTH HANDS: Status: ACTIVE | Noted: 2023-12-13

## 2023-12-13 PROBLEM — R20.2 NUMBNESS AND TINGLING IN BOTH HANDS: Status: ACTIVE | Noted: 2023-12-13

## 2023-12-13 NOTE — ASSESSMENT & PLAN NOTE
Numbness, tingling and pain in both hands, R>L  Admits to typing and other repetitive motions daily   Positive Phalen's maneuver with median nerve distribution of tingling and pain. History and exam are concerning for carpal tunnel syndrome.    Will evaluate further with a bilateral upper extremity EMG  Pending results can consider referral to hand surgery  Otherwise she may continue to use conservative bracing and nsaids as needed

## 2023-12-13 NOTE — ASSESSMENT & PLAN NOTE
Dee Hernandez is a 66year old female who is known to the practice for her hx of multiple strokes in the past. She continues on Brillinta 90 mg bid and Atorvastatin 40 mg daily for secondary stroke prevention. She denies any new or worsening neurologic complaints concerning for recurrent TIA or Stroke. Her vascular risk factors are well controlled. I have encouraged her to work on healthy diet and increasing her physical activity. She now has a loop recorder in place. We reviewed her stroke risk factors and management of the same. She was provided stroke education and we reviewed stroke warning signs/symptoms and reasons to return by ambulance to the ER. Plan as outlined below. Plan:    - Continue with good blood pressure control; I would recommend monitoring at home at least 3 times per week; Goal of <130/80; at goal  - Continue with good cholesterol control; Goal LDL <70; at goal   - Continue with good blood sugar control; Goal HgbA1c <7.0; at goal  - Will defer monitoring of cholesterol and blood sugar and management of hypertensive medications to the primary care provider  - Stay well hydrated by drinking enough water   - Eat a healthy diet, high in lean meats fish, turkey, chicken. Low in fats, cholesterol, sugars and sodium. Avoid canned foods, get lots of fresh/frozen vegetables/fruits. - Get routine exercise/physical activity as much as able to tolerate; Goal of 150 minutes per week of vigorous physical activity  - Keep follow ups with your other health care providers  - Continue Brilinta 90 mg twice daily and Lipitor 40 mg daily. - Complete EMG to evaluate for CTS     I will plan for her to return to the office in 6 months time but would be happy to see her sooner if the need should arise.   If she has any symptoms concerning for TIA or stroke including sudden painless loss of vision or double vision, difficulty speaking or swallowing, vertigo/room spinning that does not quickly resolve, or weakness/numbness affecting 1 side of the face or body she should proceed by ambulance to the nearest emergency room immediately.

## 2024-01-10 ENCOUNTER — DOCUMENTATION (OUTPATIENT)
Dept: CARDIOLOGY CLINIC | Facility: CLINIC | Age: 64
End: 2024-01-10

## 2024-01-10 ENCOUNTER — OFFICE VISIT (OUTPATIENT)
Dept: CARDIOLOGY CLINIC | Facility: CLINIC | Age: 64
End: 2024-01-10
Payer: COMMERCIAL

## 2024-01-10 VITALS
DIASTOLIC BLOOD PRESSURE: 100 MMHG | BODY MASS INDEX: 34.15 KG/M2 | SYSTOLIC BLOOD PRESSURE: 144 MMHG | HEIGHT: 64 IN | HEART RATE: 62 BPM | WEIGHT: 200 LBS

## 2024-01-10 DIAGNOSIS — Z86.73 HISTORY OF LACUNAR CEREBROVASCULAR ACCIDENT (CVA): ICD-10-CM

## 2024-01-10 DIAGNOSIS — I10 ESSENTIAL HYPERTENSION: Primary | ICD-10-CM

## 2024-01-10 PROCEDURE — 93000 ELECTROCARDIOGRAM COMPLETE: CPT | Performed by: INTERNAL MEDICINE

## 2024-01-10 PROCEDURE — 99213 OFFICE O/P EST LOW 20 MIN: CPT | Performed by: INTERNAL MEDICINE

## 2024-01-10 RX ORDER — AMLODIPINE BESYLATE 5 MG/1
5 TABLET ORAL DAILY
Qty: 90 TABLET | Refills: 5 | Status: SHIPPED | OUTPATIENT
Start: 2024-01-10

## 2024-01-10 NOTE — PROGRESS NOTES
" Patient ID: Neelam Benjamin is a 63 y.o. female.        Plan:      Essential hypertension  Inadequately controlled.  Will try adding amlodipine.    History of lacunar cerebrovascular accident (CVA)  Loop recorder in place but I do not see any transmissions.  Will troubleshoot later this morning.  For now patient remains on Brilinta.     Follow up Plan/Other summary comments:  If blood pressure becomes better controlled at home on amlodipine then follow-up with me can be as needed until such time as loop recorder needs to be removed.  Of course surveillance will continue after troubleshooting.    HPI: Patient is seen in follow-up today regarding the above.  Since loop recorder was placed there have been no new issues.  Left-sided weakness has largely resolved after her lacunar stroke.      Results for orders placed or performed in visit on 01/10/24   POCT ECG    Impression    Normal sinus rhythm.  Normal.         Most recent or relevant cardiac/vascular testing:    MAGO 11/28/2023: Normal.  No PFO.  No thrombus.      Past Surgical History:   Procedure Laterality Date    COLONOSCOPY      ONSET 2015    NOSE SURGERY      ID PARATHYROIDECTOMY/EXPLORATION PARATHYROIDS Bilateral 6/18/2020    Procedure: PARATHYROIDECTOMY, MINIMALLY INVASIVE, 3.5 GLAND EXPLORATION, INTRAOPERATIVE PTH MONITORING;  Surgeon: Rubin Martinez MD;  Location: [facility];  Service: Surgical Oncology    SKIN BIOPSY      SKIN LESION EXCISION         Lipid Profile:   Lab Results   Component Value Date    CHOL 107 08/01/2015    TRIG 121 06/21/2023    TRIG 52 08/01/2015    HDL 65 06/21/2023    HDL 61 08/01/2015         Review of Systems   10  point ROS  was otherwise non pertinent or negative except as per HPI or as below.   Gait: Normal.        Objective:     /100   Pulse 62   Ht 5' 4\" (1.626 m)   Wt 90.7 kg (200 lb)   BMI 34.33 kg/m²     PHYSICAL EXAM:    General:  Normal appearance in no distress.  Eyes:  Anicteric.  Oral mucosa:  " Moist.  Neck:  No JVD. Carotid upstrokes are brisk without bruits.  No masses.  Chest:  Clear to auscultation.  Cardiac:  No palpable PMI.  Normal S1 and S2.  No murmur gallop or rub.  Abdomen:  Soft and nontender. No palpable organomegaly or aortic enlargement.  Extremities:  No peripheral edema.  Musculoskeletal:  Symmetric.   Vascular:  Femoral pulses are brisk without bruits.  Popliteal pulses are intact bilaterally.   Pedal pulses are intact.  Neuro:  Grossly symmetric.  Psych:  Alert and oriented x3.        Current Outpatient Medications:     amLODIPine (NORVASC) 5 mg tablet, Take 1 tablet (5 mg total) by mouth daily, Disp: 90 tablet, Rfl: 5    atorvastatin (LIPITOR) 40 mg tablet, Take 1 tablet (40 mg total) by mouth daily, Disp: 90 tablet, Rfl: 1    calcium carbonate (OS-BRUNILDA) 600 MG tablet, Take one tablet in AM and 2 tabs with dinner, Disp: , Rfl:     carvedilol (COREG) 12.5 mg tablet, Take 1 tablet (12.5 mg total) by mouth 2 (two) times a day with meals, Disp: 60 tablet, Rfl: 3    Cholecalciferol 50 MCG (2000 UT) TABS, Take 1 tablet by mouth daily, Disp: , Rfl:     CO ENZYME Q-10 PO, Take 1 tablet by mouth daily, Disp: , Rfl:     folic acid (FOLVITE) 1 mg tablet, Take 1 tablet (1,000 mcg total) by mouth daily, Disp: 90 tablet, Rfl: 1    losartan (COZAAR) 100 MG tablet, Take 1 tablet (100 mg total) by mouth daily, Disp: 90 tablet, Rfl: 1    magnesium oxide (MAG-OX) 400 mg, Take 1 tablet (400 mg total) by mouth daily, Disp: , Rfl:     Potassium 99 MG TABS, Take 1 tablet by mouth daily, Disp: , Rfl:     ticagrelor (Brilinta) 90 MG, Take 1 tablet (90 mg total) by mouth every 12 (twelve) hours, Disp: 180 tablet, Rfl: 1  Allergies   Allergen Reactions    Codeine Chest Pain     Past Medical History:   Diagnosis Date    CVA (cerebral vascular accident) (HCC) 01/07/2015    Diverticulosis     Hemorrhoids     Hypercalcemia 05/04/2018    Hypertension     controlled    Hypocalcemia 06/24/2020    Stroke (HCC)     Vitamin  D deficiency 08/13/2020           Social History     Tobacco Use   Smoking Status Never   Smokeless Tobacco Never

## 2024-01-10 NOTE — PROGRESS NOTES
[facility] remote check loop recorder.  No events since implant.  Normal battery function.        Current Outpatient Medications:   •  amLODIPine (NORVASC) 5 mg tablet, Take 1 tablet (5 mg total) by mouth daily, Disp: 90 tablet, Rfl: 5  •  atorvastatin (LIPITOR) 40 mg tablet, Take 1 tablet (40 mg total) by mouth daily, Disp: 90 tablet, Rfl: 1  •  calcium carbonate (OS-BRUNILDA) 600 MG tablet, Take one tablet in AM and 2 tabs with dinner, Disp: , Rfl:   •  carvedilol (COREG) 12.5 mg tablet, Take 1 tablet (12.5 mg total) by mouth 2 (two) times a day with meals, Disp: 60 tablet, Rfl: 3  •  Cholecalciferol 50 MCG (2000 UT) TABS, Take 1 tablet by mouth daily, Disp: , Rfl:   •  CO ENZYME Q-10 PO, Take 1 tablet by mouth daily, Disp: , Rfl:   •  folic acid (FOLVITE) 1 mg tablet, Take 1 tablet (1,000 mcg total) by mouth daily, Disp: 90 tablet, Rfl: 1  •  losartan (COZAAR) 100 MG tablet, Take 1 tablet (100 mg total) by mouth daily, Disp: 90 tablet, Rfl: 1  •  magnesium oxide (MAG-OX) 400 mg, Take 1 tablet (400 mg total) by mouth daily, Disp: , Rfl:   •  Potassium 99 MG TABS, Take 1 tablet by mouth daily, Disp: , Rfl:   •  ticagrelor (Brilinta) 90 MG, Take 1 tablet (90 mg total) by mouth every 12 (twelve) hours, Disp: 180 tablet, Rfl: 1

## 2024-01-10 NOTE — ASSESSMENT & PLAN NOTE
Loop recorder in place but I do not see any transmissions.  Will troubleshoot later this morning.  For now patient remains on Brilinta.

## 2024-02-04 DIAGNOSIS — I10 MALIGNANT HYPERTENSION: ICD-10-CM

## 2024-02-04 RX ORDER — CARVEDILOL 12.5 MG/1
12.5 TABLET ORAL 2 TIMES DAILY WITH MEALS
Qty: 60 TABLET | Refills: 0 | Status: SHIPPED | OUTPATIENT
Start: 2024-02-04

## 2024-03-12 DIAGNOSIS — I10 MALIGNANT HYPERTENSION: ICD-10-CM

## 2024-03-13 RX ORDER — CARVEDILOL 12.5 MG/1
12.5 TABLET ORAL 2 TIMES DAILY WITH MEALS
Qty: 60 TABLET | Refills: 0 | Status: SHIPPED | OUTPATIENT
Start: 2024-03-13

## 2024-03-27 DIAGNOSIS — I10 ESSENTIAL HYPERTENSION: ICD-10-CM

## 2024-03-28 ENCOUNTER — REMOTE DEVICE CLINIC VISIT (OUTPATIENT)
Dept: CARDIOLOGY CLINIC | Facility: CLINIC | Age: 64
End: 2024-03-28
Payer: COMMERCIAL

## 2024-03-28 DIAGNOSIS — Z95.818 PRESENCE OF OTHER CARDIAC IMPLANTS AND GRAFTS: Primary | ICD-10-CM

## 2024-03-28 PROCEDURE — 93298 REM INTERROG DEV EVAL SCRMS: CPT | Performed by: INTERNAL MEDICINE

## 2024-03-28 RX ORDER — LOSARTAN POTASSIUM 100 MG/1
100 TABLET ORAL DAILY
Qty: 90 TABLET | Refills: 1 | Status: SHIPPED | OUTPATIENT
Start: 2024-03-28

## 2024-04-10 DIAGNOSIS — I10 ESSENTIAL HYPERTENSION: ICD-10-CM

## 2024-04-10 RX ORDER — AMLODIPINE BESYLATE 5 MG/1
5 TABLET ORAL DAILY
Qty: 90 TABLET | Refills: 1 | Status: SHIPPED | OUTPATIENT
Start: 2024-04-10

## 2024-04-11 DIAGNOSIS — I63.9 CVA (CEREBRAL VASCULAR ACCIDENT) (HCC): ICD-10-CM

## 2024-04-11 NOTE — TELEPHONE ENCOUNTER
Received fax from [facility] pharmacy requesting a refill for Brilinta 90MG Daily- Last refilled 10/13/2023. Will send to the provider to further advise refill request.

## 2024-05-16 DIAGNOSIS — E78.5 HYPERLIPIDEMIA, UNSPECIFIED HYPERLIPIDEMIA TYPE: ICD-10-CM

## 2024-05-16 NOTE — TELEPHONE ENCOUNTER
Medication Refill Request     Name atorvastatin (LIPITOR) 40 mg tablet  Dose/Frequency  Take 1 tablet (40 mg total) by mouth daily   Quantity 90 tablets  Verified pharmacy   [x]  Verified ordering Provider   [x]  Does patient have enough for the next 3 days? Yes [x] No []

## 2024-05-17 RX ORDER — ATORVASTATIN CALCIUM 40 MG/1
40 TABLET, FILM COATED ORAL DAILY
Qty: 90 TABLET | Refills: 0 | Status: SHIPPED | OUTPATIENT
Start: 2024-05-17

## 2024-05-20 ENCOUNTER — TELEPHONE (OUTPATIENT)
Dept: NEUROLOGY | Facility: CLINIC | Age: 64
End: 2024-05-20

## 2024-05-20 NOTE — TELEPHONE ENCOUNTER
Placed call to confirm patients upcoming appointment, Informed patient of the date/time and location. Advised to call office if they need to reschedule their visit.

## 2024-06-11 ENCOUNTER — TELEPHONE (OUTPATIENT)
Age: 64
End: 2024-06-11

## 2024-06-11 DIAGNOSIS — Z13.29 THYROID DISORDER SCREEN: ICD-10-CM

## 2024-06-11 DIAGNOSIS — Z79.899 ENCOUNTER FOR LONG-TERM (CURRENT) USE OF MEDICATIONS: ICD-10-CM

## 2024-06-11 DIAGNOSIS — R73.03 PREDIABETES: ICD-10-CM

## 2024-06-11 DIAGNOSIS — Z90.89 STATUS POST PARATHYROIDECTOMY: ICD-10-CM

## 2024-06-11 DIAGNOSIS — E78.5 HYPERLIPIDEMIA, UNSPECIFIED HYPERLIPIDEMIA TYPE: ICD-10-CM

## 2024-06-11 DIAGNOSIS — I10 ESSENTIAL HYPERTENSION: Primary | ICD-10-CM

## 2024-06-11 DIAGNOSIS — E20.9 HYPOPARATHYROIDISM, UNSPECIFIED HYPOPARATHYROIDISM TYPE (HCC): ICD-10-CM

## 2024-06-11 DIAGNOSIS — Z98.890 STATUS POST PARATHYROIDECTOMY: ICD-10-CM

## 2024-06-11 DIAGNOSIS — Z86.39 HISTORY OF PRIMARY HYPERPARATHYROIDISM: ICD-10-CM

## 2024-06-11 DIAGNOSIS — Z13.0 SCREENING FOR DEFICIENCY ANEMIA: ICD-10-CM

## 2024-06-11 DIAGNOSIS — E04.0 SIMPLE GOITER: ICD-10-CM

## 2024-06-11 NOTE — TELEPHONE ENCOUNTER
Pt called back, she wants to get her labs done on Thursday, because she will be away Friday. Labs are going to [facility], and the fax number is [phone], please advise once faxed [phone] thank you.

## 2024-06-11 NOTE — TELEPHONE ENCOUNTER
Patient called, request lab orders for upcoming PHY, patient request for lab orders to be printed out, for . Patient states she may call back with a fax number to lab that insurance will cover.Upon chart review/labs, confirmed no active lab orders on file. Please advise Patient at [phone], if and when lab orders have been issued, and if any further questions.

## 2024-06-14 LAB
25(OH)D3+25(OH)D2 SERPL-MCNC: 71 NG/ML (ref 30–100)
ALBUMIN SERPL-MCNC: 4.4 G/DL (ref 3.5–5.7)
ALBUMIN/CREAT UR: ABNORMAL
ALP SERPL-CCNC: 79 U/L (ref 35–120)
ALT SERPL-CCNC: 15 U/L
ANION GAP SERPL CALCULATED.3IONS-SCNC: 11 MMOL/L (ref 3–11)
AST SERPL-CCNC: 15 U/L
BASOPHILS # BLD AUTO: 0 THOU/CMM (ref 0–0.1)
BASOPHILS NFR BLD AUTO: 0 %
BILIRUB SERPL-MCNC: 0.6 MG/DL (ref 0.2–1)
BUN SERPL-MCNC: 13 MG/DL (ref 7–25)
CALCIUM SERPL-MCNC: 8.8 MG/DL (ref 8.5–10.1)
CHLORIDE SERPL-SCNC: 103 MMOL/L (ref 100–109)
CHOLEST SERPL-MCNC: 110 MG/DL
CHOLEST/HDLC SERPL: 2 {RATIO}
CO2 SERPL-SCNC: 27 MMOL/L (ref 21–31)
CREAT SERPL-MCNC: 0.78 MG/DL (ref 0.4–1.1)
CREAT UR-MCNC: 31 MG/DL (ref 50–200)
CYTOLOGY CMNT CVX/VAG CYTO-IMP: NORMAL
DIFFERENTIAL METHOD BLD: NORMAL
EOSINOPHIL # BLD AUTO: 0.2 THOU/CMM (ref 0–0.5)
EOSINOPHIL NFR BLD AUTO: 3 %
ERYTHROCYTE [DISTWIDTH] IN BLOOD BY AUTOMATED COUNT: 13.6 % (ref 12–16)
EST. AVERAGE GLUCOSE BLD GHB EST-MCNC: 123 MG/DL
GFR/BSA.PRED SERPLBLD CYS-BASED-ARV: 86 ML/MIN/{1.73_M2}
GLUCOSE SERPL-MCNC: 99 MG/DL (ref 65–99)
HBA1C MFR BLD: 5.9 %
HCT VFR BLD AUTO: 41.4 % (ref 35–43)
HDLC SERPL-MCNC: 54 MG/DL (ref 23–92)
HGB BLD-MCNC: 13.7 G/DL (ref 11.5–14.5)
LDLC SERPL CALC-MCNC: 45 MG/DL
LYMPHOCYTES # BLD AUTO: 1.6 THOU/CMM (ref 1–3)
LYMPHOCYTES NFR BLD AUTO: 26 %
MCH RBC QN AUTO: 29.3 PG (ref 26–34)
MCHC RBC AUTO-ENTMCNC: 33.2 G/DL (ref 32–37)
MCV RBC AUTO: 88 FL (ref 80–100)
MICROALBUMIN UR-MCNC: <0.7 MG/DL
MONOCYTES # BLD AUTO: 0.4 THOU/CMM (ref 0.3–1)
MONOCYTES NFR BLD AUTO: 7 %
NEUTROPHILS # BLD AUTO: 3.9 THOU/CMM (ref 1.8–7.8)
NEUTROPHILS NFR BLD AUTO: 64 %
NONHDLC SERPL-MCNC: 56 MG/DL
PLATELET # BLD AUTO: 320 THOU/CMM (ref 140–350)
PMV BLD REES-ECKER: 8 FL (ref 7.5–11.3)
POTASSIUM SERPL-SCNC: 4.4 MMOL/L (ref 3.5–5.2)
PROT SERPL-MCNC: 6.8 G/DL (ref 6.3–8.3)
RBC # BLD AUTO: 4.69 MILL/CMM (ref 3.7–4.7)
SODIUM SERPL-SCNC: 141 MMOL/L (ref 135–145)
TRIGL SERPL-MCNC: 57 MG/DL
TSH SERPL-ACNC: 2.21 UIU/ML (ref 0.45–5.33)
WBC # BLD AUTO: 6.1 THOU/CMM (ref 4–10)

## 2024-06-15 LAB — PTH-INTACT SERPL-MCNC: 26.5 PG/ML (ref 12–88)

## 2024-06-17 ENCOUNTER — TELEPHONE (OUTPATIENT)
Dept: NEUROLOGY | Facility: CLINIC | Age: 64
End: 2024-06-17

## 2024-06-17 ENCOUNTER — RA CDI HCC (OUTPATIENT)
Dept: OTHER | Facility: HOSPITAL | Age: 64
End: 2024-06-17

## 2024-06-24 ENCOUNTER — OFFICE VISIT (OUTPATIENT)
Dept: FAMILY MEDICINE CLINIC | Facility: CLINIC | Age: 64
End: 2024-06-24
Payer: COMMERCIAL

## 2024-06-24 VITALS
HEART RATE: 78 BPM | WEIGHT: 189 LBS | HEIGHT: 64 IN | BODY MASS INDEX: 32.27 KG/M2 | DIASTOLIC BLOOD PRESSURE: 81 MMHG | TEMPERATURE: 96.7 F | OXYGEN SATURATION: 98 % | SYSTOLIC BLOOD PRESSURE: 127 MMHG

## 2024-06-24 DIAGNOSIS — E04.0 SIMPLE GOITER: ICD-10-CM

## 2024-06-24 DIAGNOSIS — E78.5 HYPERLIPIDEMIA, UNSPECIFIED HYPERLIPIDEMIA TYPE: ICD-10-CM

## 2024-06-24 DIAGNOSIS — I10 ESSENTIAL HYPERTENSION: ICD-10-CM

## 2024-06-24 DIAGNOSIS — Z86.73 HISTORY OF LACUNAR CEREBROVASCULAR ACCIDENT (CVA): ICD-10-CM

## 2024-06-24 DIAGNOSIS — R22.32 ARM MASS, LEFT: ICD-10-CM

## 2024-06-24 DIAGNOSIS — Z12.31 BREAST CANCER SCREENING BY MAMMOGRAM: ICD-10-CM

## 2024-06-24 DIAGNOSIS — Z78.0 POSTMENOPAUSAL: ICD-10-CM

## 2024-06-24 DIAGNOSIS — E20.9 HYPOPARATHYROIDISM, UNSPECIFIED HYPOPARATHYROIDISM TYPE (HCC): ICD-10-CM

## 2024-06-24 DIAGNOSIS — R73.03 PREDIABETES: ICD-10-CM

## 2024-06-24 DIAGNOSIS — Z00.00 ANNUAL PHYSICAL EXAM: Primary | ICD-10-CM

## 2024-06-24 DIAGNOSIS — D17.22 LIPOMA OF LEFT UPPER EXTREMITY: ICD-10-CM

## 2024-06-24 PROCEDURE — 99396 PREV VISIT EST AGE 40-64: CPT | Performed by: FAMILY MEDICINE

## 2024-06-24 PROCEDURE — 99214 OFFICE O/P EST MOD 30 MIN: CPT | Performed by: FAMILY MEDICINE

## 2024-06-24 NOTE — PROGRESS NOTES
Adult Annual Physical  Name: Neelam Benjamin      : 1960      MRN: [id number]  Encounter Provider: Jenna Ivan DO  Encounter Date: 2024   Encounter department: [facility] [place]    Assessment & Plan   1. Annual physical exam  2. Essential hypertension  3. History of lacunar cerebrovascular accident (CVA)  4. Hyperlipidemia, unspecified hyperlipidemia type  5. Prediabetes  6. Arm mass, left  -     US extremity soft tissue; Future; Expected date: 2024  7. Lipoma of left upper extremity  -     US extremity soft tissue; Future; Expected date: 2024  8. Simple goiter  9. Hypoparathyroidism, unspecified hypoparathyroidism type (HCC)  Comments:  last saw her endo , PTH levels  and this month (2024) normal  10. Postmenopausal  -     DXA bone density spine hip and pelvis; Future; Expected date: 2024  11. Breast cancer screening by mammogram  -     Mammo screening bilateral w 3d & cad; Future    Immunizations and preventive care screenings were discussed with patient today. Appropriate education was printed on patient's after visit summary.  Pt recalls getting a tetanus booster within approx past 10 years  Will check her insurance for coverage of shingles vaccine and RSV vaccine    Counseling:  Exercise: the importance of regular exercise/physical activity was discussed. Recommend exercise 3-5 times per week for at least 30 minutes.          History of Present Illness     Adult Annual Physical:  Patient presents for annual physical. Annual Physical plus problem-focused f/u of her chronic conditions  Also would like evaluated left antecub fossa lump past 5-6 months - a little increase in size since, no pain or tenderness assoc  And larger soft lump more medial to the first left antecub fossa lump that just popped up about a week ago which does bother her a little- has lipoma upper left arm after I advise this is likely lipoma.     Depression Screening:  - PHQ-2  "Score: 0    General Health:    - Hearing: normal hearing bilateral ears.  - Vision: wears glasses and goes for regular eye exams.  - Dental: regular dental visits.    /GYN Health:  - Follows with GYN: yes.   - Menopause: postmenopausal.     Advanced Care Planning:  - Has an advanced directive?: no    - Has a durable medical POA?: no    - ACP document given to patient?: yes      Review of Systems      Objective     /81 (BP Location: Left arm, Patient Position: Sitting, Cuff Size: Adult)   Pulse 78   Temp (!) 96.7 °F (35.9 °C)   Ht 5' 4\" (1.626 m)   Wt 85.7 kg (189 lb)   SpO2 98%   BMI 32.44 kg/m²     Physical Exam  Vitals and nursing note reviewed.   Constitutional:       General: She is not in acute distress.     Appearance: She is well-developed. She is not ill-appearing, toxic-appearing or diaphoretic.   HENT:      Head: Normocephalic and atraumatic.      Right Ear: Tympanic membrane, ear canal and external ear normal.      Left Ear: Tympanic membrane, ear canal and external ear normal.      Nose: Nose normal.      Mouth/Throat:      Lips: Pink.      Mouth: Mucous membranes are moist.      Pharynx: Oropharynx is clear. Uvula midline.      Tonsils: 0 on the right. 0 on the left.   Eyes:      General: Lids are normal.      Extraocular Movements: Extraocular movements intact.      Conjunctiva/sclera: Conjunctivae normal.      Pupils: Pupils are equal, round, and reactive to light.   Neck:      Thyroid: No thyroid mass, thyromegaly or thyroid tenderness.      Vascular: No JVD.      Trachea: Trachea and phonation normal.   Cardiovascular:      Rate and Rhythm: Normal rate and regular rhythm.      Pulses: Normal pulses.      Heart sounds: Normal heart sounds.   Pulmonary:      Effort: Pulmonary effort is normal.      Breath sounds: Normal breath sounds and air entry.   Abdominal:      General: Bowel sounds are normal. There is no distension or abdominal bruit.      Palpations: Abdomen is soft. There is no " hepatomegaly, splenomegaly or mass.      Tenderness: There is no abdominal tenderness.      Hernia: There is no hernia in the ventral area.   Musculoskeletal:        Arms:       Cervical back: Neck supple.      Right lower leg: No edema.      Left lower leg: No edema.   Lymphadenopathy:      Cervical: No cervical adenopathy.   Skin:     General: Skin is warm and dry.      Capillary Refill: Capillary refill takes less than 2 seconds.      Coloration: Skin is not pale.   Neurological:      Mental Status: She is alert and oriented to person, place, and time.      Cranial Nerves: Cranial nerves 2-12 are intact.      Sensory: Sensation is intact.      Motor: Motor function is intact.      Coordination: Coordination is intact.      Gait: Gait normal.      Deep Tendon Reflexes: Reflexes are normal and symmetric.   Psychiatric:         Mood and Affect: Mood normal.         Behavior: Behavior normal. Behavior is cooperative.       Administrative Statements

## 2024-06-25 ENCOUNTER — REMOTE DEVICE CLINIC VISIT (OUTPATIENT)
Dept: CARDIOLOGY CLINIC | Facility: CLINIC | Age: 64
End: 2024-06-25
Payer: COMMERCIAL

## 2024-06-25 DIAGNOSIS — I63.9 CRYPTOGENIC STROKE (HCC): Primary | ICD-10-CM

## 2024-06-25 PROCEDURE — 93298 REM INTERROG DEV EVAL SCRMS: CPT | Performed by: INTERNAL MEDICINE

## 2024-06-25 NOTE — PROGRESS NOTES
"CARELINK TRANSMISSION: LOOP RECORDER. PRESENTING RHYTHM SB @ 54 BPM. BATTERY STATUS \"OK.\" NO PATIENT OR DEVICE ACTIVATED EPISODES. NORMAL DEVICE FUNCTION. DL   "

## 2024-07-01 ENCOUNTER — OFFICE VISIT (OUTPATIENT)
Dept: NEUROLOGY | Facility: CLINIC | Age: 64
End: 2024-07-01
Payer: COMMERCIAL

## 2024-07-01 VITALS
SYSTOLIC BLOOD PRESSURE: 114 MMHG | WEIGHT: 183 LBS | DIASTOLIC BLOOD PRESSURE: 82 MMHG | OXYGEN SATURATION: 97 % | TEMPERATURE: 98 F | BODY MASS INDEX: 31.41 KG/M2 | HEART RATE: 63 BPM

## 2024-07-01 DIAGNOSIS — R73.03 PREDIABETES: ICD-10-CM

## 2024-07-01 DIAGNOSIS — Z86.73 HISTORY OF LACUNAR CEREBROVASCULAR ACCIDENT (CVA): ICD-10-CM

## 2024-07-01 DIAGNOSIS — I10 ESSENTIAL HYPERTENSION: ICD-10-CM

## 2024-07-01 DIAGNOSIS — E78.5 HYPERLIPIDEMIA, UNSPECIFIED HYPERLIPIDEMIA TYPE: Primary | ICD-10-CM

## 2024-07-01 PROCEDURE — 99214 OFFICE O/P EST MOD 30 MIN: CPT

## 2024-07-01 NOTE — ASSESSMENT & PLAN NOTE
Neelam Benjamin is a 64 year old female who is known to the practice for her hx of multiple strokes in the past. She continues on Brillinta 90 mg bid and Atorvastatin 40 mg daily for secondary stroke prevention. She denies any new or worsening neurologic complaints concerning for recurrent TIA or Stroke. Her vascular risk factors are well controlled. I have encouraged her to work on healthy diet and increasing her physical activity. She now has a loop recorder in place, with normal device function and no episodes of Afib detected to date. We reviewed her stroke risk factors and management of the same. She was provided stroke education and we reviewed stroke warning signs/symptoms and reasons to return by ambulance to the ER. Plan as outlined below.      Plan:  - Continue with good blood pressure control; I would recommend monitoring at home at least 3 times per week; Goal of <130/80; at goal  - Continue with good cholesterol control; Goal LDL <70; at goal   - Continue with good blood sugar control; Goal HgbA1c <7.0; at goal  - Will defer monitoring of cholesterol and blood sugar and management of hypertensive medications to the primary care provider  - Stay well hydrated by drinking enough water   - Eat a healthy diet, high in lean meats fish, turkey, chicken. Low in fats, cholesterol, sugars and sodium. Avoid canned foods, get lots of fresh/frozen vegetables/fruits.  - Get routine exercise/physical activity as much as able to tolerate; Goal of 150 minutes per week of vigorous physical activity  - Keep follow ups with your other health care providers  - Continue Brilinta 90 mg twice daily and Lipitor 40 mg daily.     I will plan for her to return to the office in 8 months time but would be happy to see her sooner if the need should arise.  If she has any symptoms concerning for TIA or stroke including sudden painless loss of vision or double vision, difficulty speaking or swallowing, vertigo/room spinning that  does not quickly resolve, or weakness/numbness affecting 1 side of the face or body she should proceed by ambulance to the nearest emergency room immediately.

## 2024-07-01 NOTE — ASSESSMENT & PLAN NOTE
BP is much more controlled with the addition of Amlodipine  BP today 114/82  Reports home readings are generally <120/80  Continue management as per PCP

## 2024-07-01 NOTE — PATIENT INSTRUCTIONS
- Continue with good blood pressure control; I would recommend monitoring at home at least 3 times per week; Goal of <130/80; at goal  - Continue with good cholesterol control; Goal LDL <70; at goal   - Continue with good blood sugar control; Goal HgbA1c <7.0; at goal  - Will defer monitoring of cholesterol and blood sugar and management of hypertensive medications to the primary care provider  - Stay well hydrated by drinking enough water   - Eat a healthy diet, high in lean meats fish, turkey, chicken. Low in fats, cholesterol, sugars and sodium. Avoid canned foods, get lots of fresh/frozen vegetables/fruits.  - Get routine exercise/physical activity as much as able to tolerate; Goal of 150 minutes per week of vigorous physical activity  - Keep follow ups with your other health care providers  - Continue Brilinta 90 mg twice daily and Lipitor 40 mg daily.     I will plan for her to return to the office in 8 months time but would be happy to see her sooner if the need should arise.  If she has any symptoms concerning for TIA or stroke including sudden painless loss of vision or double vision, difficulty speaking or swallowing, vertigo/room spinning that does not quickly resolve, or weakness/numbness affecting 1 side of the face or body she should proceed by ambulance to the nearest emergency room immediately.

## 2024-07-01 NOTE — ASSESSMENT & PLAN NOTE
Component      Latest Ref Rng 6/14/2024   Cholesterol      <200 mg/dL 110    Triglycerides      <150 mg/dL 57    LDL Calculated      <130 mg/dL 45    HDL CHOLESTEROL LIPOPROTEIN      23 - 92 mg/dL 54    NON HDL CHOL. (LDL+VLDL)      <160 mg/dL 56    Chol/HDL Ratio 2.0      On Atorvastatin 40 mg daily  Reports compliance  Goal LDL <70  Continue management as per PCP

## 2024-07-01 NOTE — PROGRESS NOTES
Patient ID: Neelam Benjamni is a 64 y.o. female.    Assessment/Plan:    Essential hypertension  BP is much more controlled with the addition of Amlodipine  BP today 114/82  Reports home readings are generally <120/80  Continue management as per PCP     Prediabetes  Pre-diabetic   HgbA1c of 5.9  Discussed good diet and routine physical activity.    Hyperlipidemia, unspecified  Component      Latest Ref Rng 6/14/2024   Cholesterol      <200 mg/dL 110    Triglycerides      <150 mg/dL 57    LDL Calculated      <130 mg/dL 45    HDL CHOLESTEROL LIPOPROTEIN      23 - 92 mg/dL 54    NON HDL CHOL. (LDL+VLDL)      <160 mg/dL 56    Chol/HDL Ratio 2.0      On Atorvastatin 40 mg daily  Reports compliance  Goal LDL <70  Continue management as per PCP      History of lacunar cerebrovascular accident (CVA)  Neelam Benjamin is a 64 year old female who is known to the practice for her hx of multiple strokes in the past. She continues on Brillinta 90 mg bid and Atorvastatin 40 mg daily for secondary stroke prevention. She denies any new or worsening neurologic complaints concerning for recurrent TIA or Stroke. Her vascular risk factors are well controlled. I have encouraged her to work on healthy diet and increasing her physical activity. She now has a loop recorder in place, with normal device function and no episodes of Afib detected to date. We reviewed her stroke risk factors and management of the same. She was provided stroke education and we reviewed stroke warning signs/symptoms and reasons to return by ambulance to the ER. Plan as outlined below.      Plan:  - Continue with good blood pressure control; I would recommend monitoring at home at least 3 times per week; Goal of <130/80; at goal  - Continue with good cholesterol control; Goal LDL <70; at goal   - Continue with good blood sugar control; Goal HgbA1c <7.0; at goal  - Will defer monitoring of cholesterol and blood sugar and management of hypertensive  medications to the primary care provider  - Stay well hydrated by drinking enough water   - Eat a healthy diet, high in lean meats fish, turkey, chicken. Low in fats, cholesterol, sugars and sodium. Avoid canned foods, get lots of fresh/frozen vegetables/fruits.  - Get routine exercise/physical activity as much as able to tolerate; Goal of 150 minutes per week of vigorous physical activity  - Keep follow ups with your other health care providers  - Continue Brilinta 90 mg twice daily and Lipitor 40 mg daily.     I will plan for her to return to the office in 8 months time but would be happy to see her sooner if the need should arise.  If she has any symptoms concerning for TIA or stroke including sudden painless loss of vision or double vision, difficulty speaking or swallowing, vertigo/room spinning that does not quickly resolve, or weakness/numbness affecting 1 side of the face or body she should proceed by ambulance to the nearest emergency room immediately.       Diagnoses and all orders for this visit:    Hyperlipidemia, unspecified hyperlipidemia type    Prediabetes    History of lacunar cerebrovascular accident (CVA)    Essential hypertension         I have spent a total time of 20 minutes in caring for this patient on the day of the visit/encounter including Diagnostic results, Prognosis, Risks and benefits of tx options, Instructions for management, Patient and family education, Importance of tx compliance, Risk factor reductions, Impressions, Documenting in the medical record, Reviewing / ordering tests, medicine, procedures  , and Obtaining or reviewing history  .      Subjective:    REGAN Neelam Benjamin is a 64 year old female with a PMH of hypoparathyroidism s/p parathyroidectomy, prior CVA x 3, HTN, Lumbar radiculopathy related to lumbar DDD and lumbar stenosis, HLD, Prediabetes, and obesity who presents to the office to follow up on her hx of stroke. She was last seen 12/7/2023     She denies any  new or worsening symptoms concerning for recurrent TIA or Stroke.     Secondary Stroke Prevention  She continues on Atorvastatin 40 mg qd and Brillinta 90 mg bid  Compliant with her medications, no issues affording or obtaining medications.   Denies excessive bruising or bleeding     Deficits  States she continues with R>L hand numbness- was evaluated by PCP who believes she may have CTS- EMG ordered at last visit scheduled for 12/2/2024  Reports numbness and tingling in both hands, Right>left is improved since last visit   Still conservative bracing overnight with brace specific for CTS  Worse with typing and doing other repettiveve motions all day - works at the [employer]  Denies other numbness, tingling, weakness, dizziness, headaches, vision changes or any new or worsening stroke like symptoms  Denies difficulty with speaking or swallowing     Monitoring  BP- monitored at home once a week; reports readings are lower than 117/75-80  BP today is 114/82  6/14/24 HgbA1c 5.9    Component      Latest Ref Rng 6/14/2024   Cholesterol      <200 mg/dL 110    Triglycerides      <150 mg/dL 57    LDL Calculated      <130 mg/dL 45    HDL CHOLESTEROL LIPOPROTEIN      23 - 92 mg/dL 54    NON HDL CHOL. (LDL+VLDL)      <160 mg/dL 56    Chol/HDL Ratio 2.0        CTA H/N 9/14/22- no aneurysm, LVO, critical stenosis  Is considering loop recorder placement      MAGO 11/28/22-   Left Ventricle: Left ventricular cavity size is normal. Wall thickness is normal. The left ventricular ejection fraction is 65%. Systolic function is normal. Wall motion is normal.    Atrial Septum: No patent foramen ovale detected using color flow Doppler and saline contrast injection at rest or with abdominal compression.    Left Atrial Appendage: There is normal function. There is no thrombus. There is no mass. There is no spontaneous echo contrast.    Aortic Valve: There is trace regurgitation. There is aortic sclerosis.    Mitral Valve: There is mild  regurgitation.    Tricuspid Valve: There is mild regurgitation.  No cardioembolic source of stroke identified.      Loop recorder placed 9/28/23, last EP report 6/25/2024- no episodes, normal function.     Safety  Independent of all ADLs  No falls at home  Managing her own medications and finances  assistive devices- none  Driving- no concerns  Memory- no concerns     Substance use    Smoking- never  Etoh- none  Drugs- none  Caffeine- reports drinking less iced tea and drinking more water     Sleep  Denies hx of RYAN  No concerns going to sleep or staying asleep     Diet  Eating more protein, less red meats, more chicken/turkey  Cut back on carbs  BMI 31.41- has lost weight intentionally recently through diet and exercise      Exercise  Is walking daily for at least 30 minutes     Mood  Denies anxiety or depression     Work  Works at [employer] full time       The following portions of the patient's history were reviewed and updated as appropriate: allergies, current medications, past family history, past medical history, past social history, past surgical history, and problem list.     Objective:    Blood pressure 114/82, pulse 63, temperature 98 °F (36.7 °C), temperature source Temporal, weight 83 kg (183 lb), SpO2 97%.    Neurological Exam    On neurological examination patient is alert, awake, oriented and in no distress. Speech is fluent without dysarthria or aphasia. Cranial nerves 2-12 were symmetrically intact bilaterally. No evidence of any focal weakness or sensory loss in the upper or lower extremities. Motor testing reveals 5/5 strength of the bilateral upper and lower extremities.There was no pronator drift.  No fasciculations present. No abnormal involuntary movements. Finger- to-nose reveals no tremor or ataxia and intact proprioceptive function, no dysmetria was noted. Rapid alternating movement normal. Sensation was intact to vibration, light touch, and temperature in bilateral upper and lower  extremities. She is able to rise easily without assistance from a seated position. Casual gait is normal including stance, stride, and arm swing.        ROS:    Review of Systems   Constitutional:  Negative for chills and fever.   HENT:  Negative for ear pain and sore throat.    Eyes:  Negative for pain and visual disturbance.   Respiratory:  Negative for cough and shortness of breath.    Cardiovascular:  Negative for chest pain and palpitations.   Gastrointestinal:  Negative for abdominal pain and vomiting.   Genitourinary:  Negative for dysuria and hematuria.   Musculoskeletal:  Negative for arthralgias and back pain.   Skin:  Negative for color change and rash.   Neurological:  Negative for seizures and syncope.   All other systems reviewed and are negative.    Reviewed ROS as entered by medical assistant.

## 2024-07-10 DIAGNOSIS — I10 MALIGNANT HYPERTENSION: ICD-10-CM

## 2024-07-10 DIAGNOSIS — I63.9 CVA (CEREBRAL VASCULAR ACCIDENT) (HCC): ICD-10-CM

## 2024-07-10 DIAGNOSIS — I10 ESSENTIAL HYPERTENSION: ICD-10-CM

## 2024-07-11 RX ORDER — CARVEDILOL 12.5 MG/1
12.5 TABLET ORAL 2 TIMES DAILY WITH MEALS
Qty: 180 TABLET | Refills: 0 | OUTPATIENT
Start: 2024-07-11

## 2024-07-11 RX ORDER — AMLODIPINE BESYLATE 5 MG/1
5 TABLET ORAL DAILY
Qty: 90 TABLET | Refills: 0 | Status: SHIPPED | OUTPATIENT
Start: 2024-07-11

## 2024-07-11 NOTE — TELEPHONE ENCOUNTER
ticagrelor (Brilinta) 90 MG          Sig: Take 1 tablet (90 mg total) by mouth every 12 (twelve) hours    Disp: 180 tablet    Refills: 0    Start: 7/11/2024    Class: Normal    Non-formulary For: CVA (cerebral vascular accident) (HCC)    Last ordered: 3 months ago (4/11/2024) by Jenna Ivan DO    Hematology: Antiplatelets - ticagrelor Chanwa70/10/2024 08:19 PM   Protocol Details This refill cannot be delegated    HCT in normal range and within 180 days    HGB in normal range and within 180 days    PLT in normal range and within 180 days    eGFR is 60 or above and within 180 days    Valid encounter within last 6 months      To be filled at: [facility] - [place], [place] - [address] [address] [address]

## 2024-08-14 DIAGNOSIS — E78.5 HYPERLIPIDEMIA, UNSPECIFIED HYPERLIPIDEMIA TYPE: ICD-10-CM

## 2024-08-14 RX ORDER — ATORVASTATIN CALCIUM 40 MG/1
40 TABLET, FILM COATED ORAL DAILY
Qty: 90 TABLET | Refills: 1 | Status: SHIPPED | OUTPATIENT
Start: 2024-08-14

## 2024-09-06 ENCOUNTER — REMOTE DEVICE CLINIC VISIT (OUTPATIENT)
Dept: CARDIOLOGY CLINIC | Facility: CLINIC | Age: 64
End: 2024-09-06
Payer: COMMERCIAL

## 2024-09-06 DIAGNOSIS — I63.9 CRYPTOGENIC STROKE (HCC): Primary | ICD-10-CM

## 2024-09-06 PROCEDURE — 93298 REM INTERROG DEV EVAL SCRMS: CPT | Performed by: INTERNAL MEDICINE

## 2024-09-06 NOTE — PROGRESS NOTES
"CARELINK TRANSMISSION: LOOP RECORDER. PRESENTING RHYTHM NSR @ 67 BPM. BATTERY STATUS \"OK.\" NO PATIENT OR DEVICE ACTIVATED EPISODES. NORMAL DEVICE FUNCTION. DL   "

## 2024-09-24 DIAGNOSIS — I10 ESSENTIAL HYPERTENSION: ICD-10-CM

## 2024-09-24 RX ORDER — LOSARTAN POTASSIUM 100 MG/1
100 TABLET ORAL DAILY
Qty: 90 TABLET | Refills: 1 | Status: SHIPPED | OUTPATIENT
Start: 2024-09-24

## 2024-09-24 NOTE — TELEPHONE ENCOUNTER
Reason for call:   [x] Refill   [] Prior Auth  [] Other:     Office:   [x] PCP/Provider -   [] Specialty/Provider -     Medication:     losartan (COZAAR) 100 MG tablet       Dose/Frequency: Take 1 tablet (100 mg total) by mouth daily,     Quantity:  90 tablet     Pharmacy: [facility] [place] - [place], [place] - [address] [phone]     Does the patient have enough for 3 days?   [x] Yes   [] No - Send as HP to POD

## 2024-10-08 DIAGNOSIS — I10 ESSENTIAL HYPERTENSION: ICD-10-CM

## 2024-10-08 DIAGNOSIS — I10 MALIGNANT HYPERTENSION: ICD-10-CM

## 2024-10-08 DIAGNOSIS — I63.9 CVA (CEREBRAL VASCULAR ACCIDENT) (HCC): ICD-10-CM

## 2024-10-08 RX ORDER — CARVEDILOL 12.5 MG/1
12.5 TABLET ORAL 2 TIMES DAILY WITH MEALS
Qty: 180 TABLET | Refills: 1 | Status: SHIPPED | OUTPATIENT
Start: 2024-10-08

## 2024-10-08 NOTE — TELEPHONE ENCOUNTER
Reason for call:   [x] Refill   [] Prior Auth  [] Other:     Office:   [x] PCP/Provider - Jenna Ivan DO -[facility] [place]   [] Specialty/Provider -     Medication:     carvedilol (COREG) 12.5 mg tablet       Dose/Frequency: Take 1 tablet (12.5 mg total) by mouth 2 (two) times a day with meals     Medication: amLODIPine (NORVASC) 5 mg tablet   (Patient stated medication was previously prescribed by Cardio-Needs to be review and prescribed by PCP)    Dose/Frequency: Take 1 tablet (5 mg total) by mouth daily     Medication: ticagrelor (Brilinta) 90 MG     Dose/Frequency:     Take 1 tablet (90 mg total) by mouth every 12 (twelve) hours       Pharmacy: [facility]     Does the patient have enough for 3 days?   [] Yes   [x] No - Send as HP to POD

## 2024-10-09 RX ORDER — AMLODIPINE BESYLATE 5 MG/1
5 TABLET ORAL DAILY
Qty: 90 TABLET | Refills: 1 | Status: SHIPPED | OUTPATIENT
Start: 2024-10-09

## 2024-12-02 ENCOUNTER — HOSPITAL ENCOUNTER (OUTPATIENT)
Dept: NEUROLOGY | Facility: CLINIC | Age: 64
Discharge: HOME | End: 2024-12-02
Payer: COMMERCIAL

## 2024-12-02 DIAGNOSIS — R20.0 NUMBNESS AND TINGLING IN BOTH HANDS: ICD-10-CM

## 2024-12-02 DIAGNOSIS — R20.2 NUMBNESS AND TINGLING IN BOTH HANDS: ICD-10-CM

## 2024-12-02 PROBLEM — G56.01 RIGHT CARPAL TUNNEL SYNDROME: Status: ACTIVE | Noted: 2024-12-02

## 2024-12-02 PROCEDURE — 95886 MUSC TEST DONE W/N TEST COMP: CPT | Performed by: PSYCHIATRY & NEUROLOGY

## 2024-12-02 PROCEDURE — 95913 NRV CNDJ TEST 13/> STUDIES: CPT | Performed by: PSYCHIATRY & NEUROLOGY

## 2024-12-03 ENCOUNTER — RESULTS FOLLOW-UP (OUTPATIENT)
Age: 64
End: 2024-12-03

## 2025-01-23 ENCOUNTER — RESULTS FOLLOW-UP (OUTPATIENT)
Dept: CARDIOLOGY CLINIC | Facility: CLINIC | Age: 65
End: 2025-01-23

## 2025-01-23 ENCOUNTER — REMOTE DEVICE CLINIC VISIT (OUTPATIENT)
Dept: CARDIOLOGY CLINIC | Facility: CLINIC | Age: 65
End: 2025-01-23
Payer: COMMERCIAL

## 2025-01-23 DIAGNOSIS — I63.9 CRYPTOGENIC STROKE (HCC): Primary | ICD-10-CM

## 2025-01-23 PROCEDURE — 93298 REM INTERROG DEV EVAL SCRMS: CPT | Performed by: INTERNAL MEDICINE

## 2025-02-19 ENCOUNTER — OFFICE VISIT (OUTPATIENT)
Dept: FAMILY MEDICINE CLINIC | Facility: CLINIC | Age: 65
End: 2025-02-19
Payer: COMMERCIAL

## 2025-02-19 VITALS
HEIGHT: 64 IN | BODY MASS INDEX: 31.79 KG/M2 | HEART RATE: 75 BPM | RESPIRATION RATE: 18 BRPM | OXYGEN SATURATION: 97 % | TEMPERATURE: 98.2 F | DIASTOLIC BLOOD PRESSURE: 76 MMHG | WEIGHT: 186.2 LBS | SYSTOLIC BLOOD PRESSURE: 120 MMHG

## 2025-02-19 DIAGNOSIS — Z86.73 HISTORY OF CVA (CEREBROVASCULAR ACCIDENT): ICD-10-CM

## 2025-02-19 DIAGNOSIS — Z13.29 THYROID DISORDER SCREEN: ICD-10-CM

## 2025-02-19 DIAGNOSIS — Z90.89 HISTORY OF PARATHYROIDECTOMY: ICD-10-CM

## 2025-02-19 DIAGNOSIS — Z23 ENCOUNTER FOR IMMUNIZATION: ICD-10-CM

## 2025-02-19 DIAGNOSIS — E04.0 SIMPLE GOITER: ICD-10-CM

## 2025-02-19 DIAGNOSIS — Z79.899 ENCOUNTER FOR LONG-TERM (CURRENT) USE OF MEDICATIONS: ICD-10-CM

## 2025-02-19 DIAGNOSIS — R73.03 PREDIABETES: ICD-10-CM

## 2025-02-19 DIAGNOSIS — Z86.39 HISTORY OF PRIMARY HYPERPARATHYROIDISM: ICD-10-CM

## 2025-02-19 DIAGNOSIS — Z13.0 SCREENING FOR DEFICIENCY ANEMIA: ICD-10-CM

## 2025-02-19 DIAGNOSIS — Z98.890 HISTORY OF PARATHYROIDECTOMY: ICD-10-CM

## 2025-02-19 DIAGNOSIS — Z12.4 CERVICAL CANCER SCREENING: ICD-10-CM

## 2025-02-19 DIAGNOSIS — I10 HYPERTENSION, UNSPECIFIED TYPE: ICD-10-CM

## 2025-02-19 DIAGNOSIS — I10 ESSENTIAL HYPERTENSION: Primary | ICD-10-CM

## 2025-02-19 DIAGNOSIS — I10 ESSENTIAL HYPERTENSION: ICD-10-CM

## 2025-02-19 DIAGNOSIS — E78.5 HYPERLIPIDEMIA, UNSPECIFIED HYPERLIPIDEMIA TYPE: ICD-10-CM

## 2025-02-19 PROCEDURE — 90750 HZV VACC RECOMBINANT IM: CPT

## 2025-02-19 PROCEDURE — 99214 OFFICE O/P EST MOD 30 MIN: CPT | Performed by: FAMILY MEDICINE

## 2025-02-19 PROCEDURE — 90471 IMMUNIZATION ADMIN: CPT

## 2025-02-19 RX ORDER — AMLODIPINE BESYLATE 5 MG/1
5 TABLET ORAL DAILY
Qty: 90 TABLET | Refills: 1 | Status: SHIPPED | OUTPATIENT
Start: 2025-02-19

## 2025-02-19 RX ORDER — LOSARTAN POTASSIUM 100 MG/1
100 TABLET ORAL DAILY
Qty: 90 TABLET | Refills: 1 | Status: SHIPPED | OUTPATIENT
Start: 2025-02-19

## 2025-02-19 RX ORDER — CARVEDILOL 12.5 MG/1
12.5 TABLET ORAL 2 TIMES DAILY WITH MEALS
Qty: 180 TABLET | Refills: 1 | Status: SHIPPED | OUTPATIENT
Start: 2025-02-19

## 2025-02-19 RX ORDER — ATORVASTATIN CALCIUM 40 MG/1
40 TABLET, FILM COATED ORAL DAILY
Qty: 90 TABLET | Refills: 1 | Status: SHIPPED | OUTPATIENT
Start: 2025-02-19

## 2025-02-19 NOTE — ASSESSMENT & PLAN NOTE
Orders:    atorvastatin (LIPITOR) 40 mg tablet; Take 1 tablet (40 mg total) by mouth daily    Lipid Panel with Direct LDL reflex; Future    Comprehensive metabolic panel; Future

## 2025-02-19 NOTE — PROGRESS NOTES
Name: Neelam Benjamin      : 1960      MRN: [id number]  Encounter Provider: Jenna Ivan DO  Encounter Date: 2025   Encounter department: [facility] AT [place]  :  Assessment & Plan  Essential hypertension    Orders:    amLODIPine (NORVASC) 5 mg tablet; Take 1 tablet (5 mg total) by mouth daily    losartan (COZAAR) 100 MG tablet; Take 1 tablet (100 mg total) by mouth daily    Comprehensive metabolic panel; Future    Albumin / creatinine urine ratio; Future    Prediabetes    Orders:    Comprehensive metabolic panel; Future    Hemoglobin A1C; Future    Hyperlipidemia, unspecified hyperlipidemia type    Orders:    atorvastatin (LIPITOR) 40 mg tablet; Take 1 tablet (40 mg total) by mouth daily    Lipid Panel with Direct LDL reflex; Future    Comprehensive metabolic panel; Future    Essential hypertension    Orders:    amLODIPine (NORVASC) 5 mg tablet; Take 1 tablet (5 mg total) by mouth daily    losartan (COZAAR) 100 MG tablet; Take 1 tablet (100 mg total) by mouth daily    Comprehensive metabolic panel; Future    Albumin / creatinine urine ratio; Future    Hypertension, unspecified type    Orders:    carvedilol (COREG) 12.5 mg tablet; Take 1 tablet (12.5 mg total) by mouth 2 (two) times a day with meals    History of CVA (cerebrovascular accident)    Orders:    ticagrelor (Brilinta) 90 MG; Take 1 tablet (90 mg total) by mouth every 12 (twelve) hours    History of parathyroidectomy    Orders:    PTH, intact; Future    Cervical cancer screening    Orders:    Ambulatory Referral to Obstetrics / Gynecology; Future    Encounter for immunization    Orders:    Zoster Vaccine Recombinant IM    Thyroid disorder screen    Orders:    TSH, 3rd generation with Free T4 reflex; Future    Screening for deficiency anemia    Orders:    CBC and differential; Future    Simple goiter    Orders:    TSH, 3rd generation with Free T4 reflex; Future    History of primary hyperparathyroidism    Orders:    PTH,  "intact; Future    Encounter for long-term (current) use of medications    Orders:    TSH, 3rd generation with Free T4 reflex; Future    CBC and differential; Future           Chief Complaint   Patient presents with    Follow-up       History of Present Illness   HPI  Review of Systems    Objective   /76 (BP Location: Left arm, Patient Position: Sitting, Cuff Size: Large)   Pulse 75   Temp 98.2 °F (36.8 °C) (Tympanic)   Resp 18   Ht 5' 4\" (1.626 m)   Wt 84.5 kg (186 lb 3.2 oz)   SpO2 97%   BMI 31.96 kg/m²      Physical Exam    "

## 2025-02-19 NOTE — ASSESSMENT & PLAN NOTE
Orders:    amLODIPine (NORVASC) 5 mg tablet; Take 1 tablet (5 mg total) by mouth daily    losartan (COZAAR) 100 MG tablet; Take 1 tablet (100 mg total) by mouth daily    Comprehensive metabolic panel; Future    Albumin / creatinine urine ratio; Future

## 2025-02-27 ENCOUNTER — VBI (OUTPATIENT)
Dept: ADMINISTRATIVE | Facility: OTHER | Age: 65
End: 2025-02-27

## 2025-02-27 NOTE — TELEPHONE ENCOUNTER
02/27/25 11:32 AM     Chart reviewed for   Cervical Cancer Screening    ; nothing is submitted to the patient's insurance at this time.     STEVE CARMONA MA   [facility]

## 2025-03-03 ENCOUNTER — OFFICE VISIT (OUTPATIENT)
Dept: NEUROLOGY | Facility: CLINIC | Age: 65
End: 2025-03-03
Payer: COMMERCIAL

## 2025-03-03 VITALS
HEIGHT: 64 IN | OXYGEN SATURATION: 98 % | TEMPERATURE: 98.3 F | BODY MASS INDEX: 32.21 KG/M2 | DIASTOLIC BLOOD PRESSURE: 60 MMHG | HEART RATE: 69 BPM | WEIGHT: 188.7 LBS | SYSTOLIC BLOOD PRESSURE: 100 MMHG

## 2025-03-03 DIAGNOSIS — R20.0 NUMBNESS AND TINGLING: ICD-10-CM

## 2025-03-03 DIAGNOSIS — R73.03 PREDIABETES: ICD-10-CM

## 2025-03-03 DIAGNOSIS — Z86.73 HISTORY OF LACUNAR CEREBROVASCULAR ACCIDENT (CVA): ICD-10-CM

## 2025-03-03 DIAGNOSIS — R29.2 HYPER REFLEXIA: ICD-10-CM

## 2025-03-03 DIAGNOSIS — G56.01 RIGHT CARPAL TUNNEL SYNDROME: ICD-10-CM

## 2025-03-03 DIAGNOSIS — E78.5 HYPERLIPIDEMIA, UNSPECIFIED HYPERLIPIDEMIA TYPE: ICD-10-CM

## 2025-03-03 DIAGNOSIS — I10 ESSENTIAL HYPERTENSION: ICD-10-CM

## 2025-03-03 DIAGNOSIS — R20.2 NUMBNESS AND TINGLING: ICD-10-CM

## 2025-03-03 PROCEDURE — 99214 OFFICE O/P EST MOD 30 MIN: CPT

## 2025-03-03 NOTE — PROGRESS NOTES
Name: Neelam Benjamin      : 1960      MRN: [id number]  Encounter Provider: EMILIANO Shannon  Encounter Date: 3/3/2025   Encounter department: [facility] [place]  :  Assessment & Plan  BMI 32.0-32.9,adult  BMI 32.39  Encouraged healthy diet and routine physical activity        History of lacunar cerebrovascular accident (CVA)  Neelam Benjamin is a 64 year old female who is known to the practice for her hx of multiple strokes in the past. She continues on Brillinta 90 mg bid and Atorvastatin 40 mg daily for secondary stroke prevention. She denies any new or worsening neurologic complaints concerning for recurrent TIA or Stroke. Her vascular risk factors are well controlled. I have encouraged her to work on healthy diet and increasing her physical activity. She has a loop recorder in place, with normal device function and no episodes of Afib detected to date. We reviewed her stroke risk factors and management of the same. She was provided stroke education and we reviewed stroke warning signs/symptoms and reasons to return by ambulance to the ER. She will follow up in 1 year; sooner if needed.       Hyperlipidemia, unspecified hyperlipidemia type  Component      Latest Ref Rng 2024   Cholesterol      <200 mg/dL 110    Triglycerides      <150 mg/dL 57    LDL Calculated      <130 mg/dL 45    HDL CHOLESTEROL LIPOPROTEIN      23 - 92 mg/dL 54    NON HDL CHOL. (LDL+VLDL)      <160 mg/dL 56    Chol/HDL Ratio 2.0      On Atorvastatin 40 mg daily  Reports compliance; pending repeat lipid panel ordered in chart by PCP  Goal LDL <70  Continue management as per PCP       Prediabetes  Pre-diabetic   HgbA1c of 5.9  Discussed good diet and routine physical activity.       Essential hypertension  BP remains controlled  Reports home readings are generally <120/80  Continue management as per PCP        Right carpal tunnel syndrome  Reports numbness and tingling in both hands,  Right>left   Still conservative bracing overnight with brace specific for CTS  Worse with typing and doing other repettiveve motions all day - works at the [employer]  EMG completed 12/2/2024 did confirm right CTS  Will continue to monitor; can consider referral to hand surgeon       Hyper reflexia  Hyperreflexic 2++ in LUE/LLE  Not previously noted on prior neuro exams  Exam strength is 5/5 and sensation is intact  Will evaluate further with MRI Cervical spine wo  Pending results may need to consider updated MRI Brain to r/o ischemia  She denies any stroke like symptoms    Orders:    MRI cervical spine wo contrast; Future    Numbness and tingling  She describes more left upper extremity numbness x 2-3 weeks intermittently and stating it is positional. Also c/o associated neck pain. Denies injury.  Exam strength is 5/5 and sensation is intact however she is hyperreflexic 2++ in LUE/LLE  Will evaluate further with MRI Cervical spine wo    Orders:    MRI cervical spine wo contrast; Future      Patient Instructions   - Continue with good blood pressure control; I would recommend monitoring at home at least 3 times per week; Goal of <130/80; at goal  - Continue with good cholesterol control; Goal LDL <70; at goal   - Continue with good blood sugar control; Goal HgbA1c <7.0; at goal  - Will defer monitoring of cholesterol and blood sugar and management of hypertensive medications to the primary care provider  - Stay well hydrated by drinking enough water   - Eat a healthy diet, high in lean meats fish, turkey, chicken. Low in fats, cholesterol, sugars and sodium. Avoid canned foods, get lots of fresh/frozen vegetables/fruits.  - Get routine exercise/physical activity as much as able to tolerate; Goal of 150 minutes per week of vigorous physical activity  - Keep follow ups with your other health care providers  - Continue Brilinta 90 mg twice daily and Lipitor 40 mg daily.  - Complete MRI Cervical Spine wwo     I will plan for  her to return to the office in 12 months time but would be happy to see her sooner if the need should arise.  If she has any symptoms concerning for TIA or stroke including sudden painless loss of vision or double vision, difficulty speaking or swallowing, vertigo/room spinning that does not quickly resolve, or weakness/numbness affecting 1 side of the face or body she should proceed by ambulance to the nearest emergency room immediately.     History of Present Illness     HPI  Neelam Benjamin is a 64 year old female with a PMH of hypoparathyroidism s/p parathyroidectomy, prior CVA x 3, HTN, Lumbar radiculopathy related to lumbar DDD and lumbar stenosis, HLD, Prediabetes, and obesity who presents to the office to follow up on her hx of stroke. She was last seen in follow up 7/1/2024.     She denies any new or worsening symptoms concerning for recurrent TIA or Stroke.     Secondary Stroke Prevention  She continues on Atorvastatin 40 mg qd and Brillinta 90 mg bid  Compliant with her medications, no issues affording or obtaining medications.   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 Also c/o associated neck pain. Denies injury.  Denies other numbness, tingling, weakness, dizziness, headaches, vision changes or any new or worsening stroke like symptoms  Denies difficulty with speaking or swallowing     Monitoring  BP- monitored at home once a week; reports readings are lower than 120/75-80  BP today is 100/60  6/14/24 HgbA1c 5.9     Component      Latest Ref Rng 6/14/2024   Cholesterol      <200 mg/dL 110    Triglycerides      <150 mg/dL 57    LDL Calculated      <130  mg/dL 45    HDL CHOLESTEROL LIPOPROTEIN      23 - 92 mg/dL 54    NON HDL CHOL. (LDL+VLDL)      <160 mg/dL 56    Chol/HDL Ratio 2.0        CTA H/N 9/14/22- no aneurysm, LVO, critical stenosis    MAGO 11/28/22-   Left Ventricle: Left ventricular cavity size is normal. Wall thickness is normal. The left ventricular ejection fraction is 65%. Systolic function is normal. Wall motion is normal.    Atrial Septum: No patent foramen ovale detected using color flow Doppler and saline contrast injection at rest or with abdominal compression.    Left Atrial Appendage: There is normal function. There is no thrombus. There is no mass. There is no spontaneous echo contrast.    Aortic Valve: There is trace regurgitation. There is aortic sclerosis.    Mitral Valve: There is mild regurgitation.    Tricuspid Valve: There is mild regurgitation.  No cardioembolic source of stroke identified.      Loop recorder placed 9/28/23, last EP report 1/23/25- no episodes, normal function.     Safety  Independent of all ADLs  No falls at home  Managing her own medications and finances  assistive devices- none  Driving- no concerns  Memory- no concerns     Substance use    Smoking- never  Etoh- none  Drugs- none  Caffeine- reports drinking less iced tea and drinking more water     Sleep  Denies hx of RYAN  No concerns going to sleep or staying asleep     Diet  Eating more protein, less red meats, more chicken/turkey  Cut back on carbs  Has been a little more lax with diet over the winter  BMI 32.39; has gained some weight     Exercise  She is not walking much due to the weather     Mood  Denies anxiety or depression     Work  Works at [employer] full time      Review of Systems   Constitutional:  Negative for appetite change, fatigue and fever.   HENT: Negative.  Negative for hearing loss, tinnitus, trouble swallowing and voice change.    Eyes: Negative.  Negative for photophobia, pain and visual disturbance.   Respiratory: Negative.  Negative for  shortness of breath.    Cardiovascular: Negative.  Negative for palpitations.   Gastrointestinal: Negative.  Negative for nausea and vomiting.   Endocrine: Negative.  Negative for cold intolerance.   Genitourinary: Negative.  Negative for dysuria, frequency and urgency.   Musculoskeletal:  Negative for back pain, gait problem, myalgias, neck pain and neck stiffness.   Skin: Negative.  Negative for rash.   Allergic/Immunologic: Negative.    Neurological: Negative.  Negative for dizziness, tremors, seizures, syncope, facial asymmetry, speech difficulty, weakness, light-headedness, numbness and headaches.   Hematological: Negative.  Does not bruise/bleed easily.   Psychiatric/Behavioral: Negative.  Negative for confusion, hallucinations and sleep disturbance.    All other systems reviewed and are negative.  I have personally reviewed the MA's review of systems and made changes as necessary.    Current Outpatient Medications on File Prior to Visit   Medication Sig Dispense Refill    amLODIPine (NORVASC) 5 mg tablet Take 1 tablet (5 mg total) by mouth daily 90 tablet 1    atorvastatin (LIPITOR) 40 mg tablet Take 1 tablet (40 mg total) by mouth daily 90 tablet 1    calcium carbonate (OS-BRUNILDA) 600 MG tablet Take one tablet in AM and 2 tabs with dinner      carvedilol (COREG) 12.5 mg tablet Take 1 tablet (12.5 mg total) by mouth 2 (two) times a day with meals 180 tablet 1    Cholecalciferol 50 MCG (2000 UT) TABS Take 1 tablet by mouth daily      CO ENZYME Q-10 PO Take 1 tablet by mouth daily      folic acid (FOLVITE) 1 mg tablet Take 1 tablet (1,000 mcg total) by mouth daily 90 tablet 1    losartan (COZAAR) 100 MG tablet Take 1 tablet (100 mg total) by mouth daily 90 tablet 1    magnesium oxide (MAG-OX) 400 mg Take 1 tablet (400 mg total) by mouth daily      Potassium 99 MG TABS Take 1 tablet by mouth daily      ticagrelor (Brilinta) 90 MG Take 1 tablet (90 mg total) by mouth every 12 (twelve) hours 180 tablet 1     No  "current facility-administered medications on file prior to visit.         Objective   /60 (BP Location: Right arm, Patient Position: Sitting, Cuff Size: Standard)   Pulse 69   Temp 98.3 °F (36.8 °C) (Temporal)   Ht 5' 4\" (1.626 m)   Wt 85.6 kg (188 lb 11.2 oz)   SpO2 98%   BMI 32.39 kg/m²     Neurological Exam  On neurological examination patient is alert, awake, oriented and in no distress. Speech is fluent without dysarthria or aphasia. Cranial nerves 2-12 were symmetrically intact bilaterally. No evidence of any focal weakness or sensory loss in the upper or lower extremities. Motor testing reveals 5/5 strength of the bilateral upper and lower extremities.There was no pronator drift.  No fasciculations present. No abnormal involuntary movements. Finger- to-nose reveals no tremor or ataxia and intact proprioceptive function, no dysmetria was noted. Rapid alternating movement normal. Sensation was intact to vibration, light touch, and temperature in bilateral upper and lower extremities. Deep tendon reflexes were 2+ and symmetric in the right upper and lower extremities, she is hyperreflexic 2++ in the LUE/LLE. She is able to rise easily without assistance from a seated position. Casual gait is normal including stance, stride, and arm swing. difficulty with tandem gait. Romberg is absent.     Administrative Statements   I have spent a total time of 30 minutes in caring for this patient on the day of the visit/encounter including Diagnostic results, Prognosis, Risks and benefits of tx options, Instructions for management, Patient and family education, Importance of tx compliance, Risk factor reductions, Impressions, Counseling / Coordination of care, Documenting in the medical record, Reviewing/placing orders in the medical record (including tests, medications, and/or procedures), and Obtaining or reviewing history  .  "

## 2025-03-03 NOTE — ASSESSMENT & PLAN NOTE
Neelam Benjamin is a 64 year old female who is known to the practice for her hx of multiple strokes in the past. She continues on Brillinta 90 mg bid and Atorvastatin 40 mg daily for secondary stroke prevention. She denies any new or worsening neurologic complaints concerning for recurrent TIA or Stroke. Her vascular risk factors are well controlled. I have encouraged her to work on healthy diet and increasing her physical activity. She has a loop recorder in place, with normal device function and no episodes of Afib detected to date. We reviewed her stroke risk factors and management of the same. She was provided stroke education and we reviewed stroke warning signs/symptoms and reasons to return by ambulance to the ER. She will follow up in 1 year; sooner if needed.

## 2025-03-03 NOTE — ASSESSMENT & PLAN NOTE
BP remains controlled  Reports home readings are generally <120/80  Continue management as per PCP

## 2025-03-03 NOTE — ASSESSMENT & PLAN NOTE
Component      Latest Ref Rng 6/14/2024   Cholesterol      <200 mg/dL 110    Triglycerides      <150 mg/dL 57    LDL Calculated      <130 mg/dL 45    HDL CHOLESTEROL LIPOPROTEIN      23 - 92 mg/dL 54    NON HDL CHOL. (LDL+VLDL)      <160 mg/dL 56    Chol/HDL Ratio 2.0      On Atorvastatin 40 mg daily  Reports compliance; pending repeat lipid panel ordered in chart by PCP  Goal LDL <70  Continue management as per PCP

## 2025-03-03 NOTE — PROGRESS NOTES
Name: Neelam Benjamin      : 1960      MRN: [id number]  Encounter Provider: EMILIANO Shannon  Encounter Date: 3/3/2025   Encounter department: [facility] [place]  :  Assessment & Plan      {Ambulatory Patient Instructions (Optional):02501}    History of Present Illness {?Quick Links Encounters * My Last Note * Last Note in Specialty * Snapshot * Since Last Visit * History :30382}  HPI   Review of Systems   Constitutional:  Negative for appetite change, fatigue and fever.   HENT: Negative.  Negative for hearing loss, tinnitus, trouble swallowing and voice change.    Eyes: Negative.  Negative for photophobia, pain and visual disturbance.   Respiratory: Negative.  Negative for shortness of breath.    Cardiovascular: Negative.  Negative for palpitations.   Gastrointestinal: Negative.  Negative for nausea and vomiting.   Endocrine: Negative.  Negative for cold intolerance.   Genitourinary: Negative.  Negative for dysuria, frequency and urgency.   Musculoskeletal:  Negative for back pain, gait problem, myalgias, neck pain and neck stiffness.   Skin: Negative.  Negative for rash.   Allergic/Immunologic: Negative.    Neurological: Negative.  Negative for dizziness, tremors, seizures, syncope, facial asymmetry, speech difficulty, weakness, light-headedness, numbness and headaches.   Hematological: Negative.  Does not bruise/bleed easily.   Psychiatric/Behavioral: Negative.  Negative for confusion, hallucinations and sleep disturbance.    All other systems reviewed and are negative.   I have personally reviewed the MA's review of systems and made changes as necessary.    {Select to insert medical history sections (Optional):76126}     Objective {?Quick Links Trend Vitals * Enter New Vitals * Results Review * Timeline (Adult) * Labs * Imaging * Cardiology * Procedures * Lung Cancer Screening * Surgical eConsent :48710}  There were no vitals taken for this visit.    Physical  Exam  Neurological Exam    {Radiology Results Review (Optional):24511}    {Administrative / Billing Section (Optional):24637}

## 2025-03-03 NOTE — ASSESSMENT & PLAN NOTE
Reports numbness and tingling in both hands, Right>left   Still conservative bracing overnight with brace specific for CTS  Worse with typing and doing other repettiveve motions all day - works at the [employer]  EMG completed 12/2/2024 did confirm right CTS  Will continue to monitor; can consider referral to hand surgeon

## 2025-03-03 NOTE — PATIENT INSTRUCTIONS
- Continue with good blood pressure control; I would recommend monitoring at home at least 3 times per week; Goal of <130/80; at goal  - Continue with good cholesterol control; Goal LDL <70; at goal   - Continue with good blood sugar control; Goal HgbA1c <7.0; at goal  - Will defer monitoring of cholesterol and blood sugar and management of hypertensive medications to the primary care provider  - Stay well hydrated by drinking enough water   - Eat a healthy diet, high in lean meats fish, turkey, chicken. Low in fats, cholesterol, sugars and sodium. Avoid canned foods, get lots of fresh/frozen vegetables/fruits.  - Get routine exercise/physical activity as much as able to tolerate; Goal of 150 minutes per week of vigorous physical activity  - Keep follow ups with your other health care providers  - Continue Brilinta 90 mg twice daily and Lipitor 40 mg daily.  - Complete MRI Cervical Spine wwo     I will plan for her to return to the office in 12 months time but would be happy to see her sooner if the need should arise.  If she has any symptoms concerning for TIA or stroke including sudden painless loss of vision or double vision, difficulty speaking or swallowing, vertigo/room spinning that does not quickly resolve, or weakness/numbness affecting 1 side of the face or body she should proceed by ambulance to the nearest emergency room immediately.

## 2025-03-10 ENCOUNTER — HOSPITAL ENCOUNTER (OUTPATIENT)
Dept: MRI IMAGING | Facility: HOSPITAL | Age: 65
Discharge: HOME | End: 2025-03-10
Payer: COMMERCIAL

## 2025-03-10 DIAGNOSIS — R20.2 NUMBNESS AND TINGLING: ICD-10-CM

## 2025-03-10 DIAGNOSIS — R20.0 NUMBNESS AND TINGLING: ICD-10-CM

## 2025-03-10 DIAGNOSIS — R29.2 HYPER REFLEXIA: ICD-10-CM

## 2025-03-10 PROCEDURE — 72141 MRI NECK SPINE W/O DYE: CPT

## 2025-03-12 ENCOUNTER — RESULTS FOLLOW-UP (OUTPATIENT)
Dept: NEUROLOGY | Facility: CLINIC | Age: 65
End: 2025-03-12

## 2025-03-30 DIAGNOSIS — I10 ESSENTIAL HYPERTENSION: ICD-10-CM

## 2025-03-30 DIAGNOSIS — I10 HYPERTENSION, UNSPECIFIED TYPE: ICD-10-CM

## 2025-03-30 DIAGNOSIS — Z86.73 HISTORY OF CVA (CEREBROVASCULAR ACCIDENT): ICD-10-CM

## 2025-03-30 RX ORDER — AMLODIPINE BESYLATE 5 MG/1
5 TABLET ORAL DAILY
Qty: 90 TABLET | Refills: 0 | Status: CANCELLED | OUTPATIENT
Start: 2025-03-30

## 2025-03-30 RX ORDER — CARVEDILOL 12.5 MG/1
12.5 TABLET ORAL 2 TIMES DAILY WITH MEALS
Qty: 180 TABLET | Refills: 0 | Status: CANCELLED | OUTPATIENT
Start: 2025-03-30

## 2025-03-30 RX ORDER — LOSARTAN POTASSIUM 100 MG/1
100 TABLET ORAL DAILY
Qty: 90 TABLET | Refills: 0 | Status: CANCELLED | OUTPATIENT
Start: 2025-03-30

## 2025-04-02 ENCOUNTER — REMOTE DEVICE CLINIC VISIT (OUTPATIENT)
Dept: CARDIOLOGY CLINIC | Facility: CLINIC | Age: 65
End: 2025-04-02
Payer: COMMERCIAL

## 2025-04-02 ENCOUNTER — RESULTS FOLLOW-UP (OUTPATIENT)
Dept: CARDIOLOGY CLINIC | Facility: CLINIC | Age: 65
End: 2025-04-02

## 2025-04-02 DIAGNOSIS — I63.9 CRYPTOGENIC STROKE (HCC): Primary | ICD-10-CM

## 2025-04-02 PROCEDURE — 93298 REM INTERROG DEV EVAL SCRMS: CPT | Performed by: INTERNAL MEDICINE

## 2025-04-02 NOTE — TELEPHONE ENCOUNTER
Please remind pt to obtain labs that were ordered at visit in February - this is a courtesy refill - labs required for any future refills

## 2025-04-29 DIAGNOSIS — E78.5 HYPERLIPIDEMIA, UNSPECIFIED HYPERLIPIDEMIA TYPE: ICD-10-CM

## 2025-04-29 RX ORDER — ATORVASTATIN CALCIUM 40 MG/1
40 TABLET, FILM COATED ORAL DAILY
Qty: 90 TABLET | Refills: 1 | Status: SHIPPED | OUTPATIENT
Start: 2025-04-29

## 2025-04-29 NOTE — TELEPHONE ENCOUNTER
Patient called in and stated that she can't get a refill on her atorvastatin. When she calls to refill the atorvastatin at the [facility] pharmacy it tells her it's to soon. Patient is out of the atorvastatin. Patient stated that she is also only taking it 1 time daily. Patient also stated that she never got the refill in February when all the other meds were sent over.    Patient didn't know if a new script could be sent over to the [facility] on file.     Please call patient back and advise .     Thank you

## 2025-05-29 ENCOUNTER — VBI (OUTPATIENT)
Dept: ADMINISTRATIVE | Facility: OTHER | Age: 65
End: 2025-05-29

## 2025-05-29 NOTE — TELEPHONE ENCOUNTER
05/29/25 12:13 PM         Chart reviewed for Mammogram ; nothing is submitted to the patient's insurance at this time.     Rose Marie Connolly MA   [facility]    Rose Marie Connolly MA   [facility]

## 2025-07-01 LAB
ALBUMIN SERPL-MCNC: 4.4 G/DL (ref 3.5–5.7)
ALBUMIN/CREAT UR: NORMAL
ALP SERPL-CCNC: 81 U/L (ref 35–120)
ALT SERPL-CCNC: 15 U/L
ANION GAP SERPL CALCULATED.3IONS-SCNC: 9 MMOL/L (ref 3–11)
AST SERPL-CCNC: 16 U/L
BASOPHILS # BLD AUTO: 0 THOU/CMM (ref 0–0.1)
BASOPHILS NFR BLD AUTO: 1 %
BILIRUB SERPL-MCNC: 0.6 MG/DL (ref 0.2–1)
BUN SERPL-MCNC: 15 MG/DL (ref 7–25)
CALCIUM SERPL-MCNC: 8.5 MG/DL (ref 8.5–10.5)
CHLORIDE SERPL-SCNC: 101 MMOL/L (ref 100–109)
CHOLEST SERPL-MCNC: 134 MG/DL
CHOLEST/HDLC SERPL: 2.3 {RATIO}
CO2 SERPL-SCNC: 28 MMOL/L (ref 21–31)
CREAT SERPL-MCNC: 0.77 MG/DL (ref 0.4–1.1)
CREAT UR-MCNC: 56.3 MG/DL (ref 50–200)
CYTOLOGY CMNT CVX/VAG CYTO-IMP: ABNORMAL
DIFFERENTIAL METHOD BLD: ABNORMAL
EOSINOPHIL # BLD AUTO: 0.2 THOU/CMM (ref 0–0.5)
EOSINOPHIL NFR BLD AUTO: 4 %
ERYTHROCYTE [DISTWIDTH] IN BLOOD BY AUTOMATED COUNT: 13.3 % (ref 12–16)
EST. AVERAGE GLUCOSE BLD GHB EST-MCNC: 128 MG/DL
GFR/BSA.PRED SERPLBLD CYS-BASED-ARV: 87 ML/MIN/{1.73_M2}
GLUCOSE SERPL-MCNC: 100 MG/DL (ref 65–99)
HBA1C MFR BLD: 6.1 %
HCT VFR BLD AUTO: 41.1 % (ref 35–43)
HDLC SERPL-MCNC: 58 MG/DL (ref 23–92)
HGB BLD-MCNC: 13.9 G/DL (ref 11.5–14.5)
LDLC SERPL CALC-MCNC: 57 MG/DL
LYMPHOCYTES # BLD AUTO: 1.9 THOU/CMM (ref 1–3)
LYMPHOCYTES NFR BLD AUTO: 29 %
MCH RBC QN AUTO: 29.2 PG (ref 26–34)
MCHC RBC AUTO-ENTMCNC: 33.8 G/DL (ref 32–37)
MCV RBC AUTO: 86 FL (ref 80–100)
MICROALBUMIN UR-MCNC: <0.7 MG/DL
MONOCYTES # BLD AUTO: 0.5 THOU/CMM (ref 0.3–1)
MONOCYTES NFR BLD AUTO: 8 %
NEUTROPHILS # BLD AUTO: 3.7 THOU/CMM (ref 1.8–7.8)
NEUTROPHILS NFR BLD AUTO: 58 %
NONHDLC SERPL-MCNC: 76 MG/DL
PLATELET # BLD AUTO: 319 THOU/CMM (ref 140–350)
PMV BLD REES-ECKER: 7.7 FL (ref 7.5–11.3)
POTASSIUM SERPL-SCNC: 4.1 MMOL/L (ref 3.5–5.2)
PROT SERPL-MCNC: 7 G/DL (ref 6.3–8.3)
PTH-INTACT SERPL-MCNC: 28.2 PG/ML (ref 12–88)
RBC # BLD AUTO: 4.77 MILL/CMM (ref 3.7–4.7)
SODIUM SERPL-SCNC: 138 MMOL/L (ref 135–145)
TRIGL SERPL-MCNC: 93 MG/DL
TSH SERPL-ACNC: 2.85 UIU/ML (ref 0.45–5.33)
WBC # BLD AUTO: 6.4 THOU/CMM (ref 4–10)

## 2025-07-03 ENCOUNTER — RA CDI HCC (OUTPATIENT)
Dept: OTHER | Facility: HOSPITAL | Age: 65
End: 2025-07-03

## 2025-07-07 DIAGNOSIS — I10 ESSENTIAL HYPERTENSION: ICD-10-CM

## 2025-07-07 DIAGNOSIS — I10 HYPERTENSION, UNSPECIFIED TYPE: ICD-10-CM

## 2025-07-09 RX ORDER — CARVEDILOL 12.5 MG/1
12.5 TABLET ORAL 2 TIMES DAILY WITH MEALS
Qty: 180 TABLET | Refills: 0 | Status: SHIPPED | OUTPATIENT
Start: 2025-07-09

## 2025-07-09 RX ORDER — AMLODIPINE BESYLATE 5 MG/1
5 TABLET ORAL DAILY
Qty: 90 TABLET | Refills: 0 | Status: SHIPPED | OUTPATIENT
Start: 2025-07-09

## 2025-07-09 RX ORDER — LOSARTAN POTASSIUM 100 MG/1
100 TABLET ORAL DAILY
Qty: 90 TABLET | Refills: 0 | Status: SHIPPED | OUTPATIENT
Start: 2025-07-09

## 2025-07-10 ENCOUNTER — OFFICE VISIT (OUTPATIENT)
Dept: FAMILY MEDICINE CLINIC | Facility: CLINIC | Age: 65
End: 2025-07-10
Payer: COMMERCIAL

## 2025-07-10 VITALS
HEIGHT: 64 IN | SYSTOLIC BLOOD PRESSURE: 106 MMHG | TEMPERATURE: 98.2 F | OXYGEN SATURATION: 99 % | WEIGHT: 189.2 LBS | BODY MASS INDEX: 32.3 KG/M2 | HEART RATE: 67 BPM | RESPIRATION RATE: 18 BRPM | DIASTOLIC BLOOD PRESSURE: 72 MMHG

## 2025-07-10 DIAGNOSIS — Z12.31 BREAST CANCER SCREENING BY MAMMOGRAM: ICD-10-CM

## 2025-07-10 DIAGNOSIS — Z78.0 POSTMENOPAUSAL: ICD-10-CM

## 2025-07-10 DIAGNOSIS — E20.9 HYPOPARATHYROIDISM, UNSPECIFIED HYPOPARATHYROIDISM TYPE (HCC): ICD-10-CM

## 2025-07-10 DIAGNOSIS — Z00.00 ANNUAL PHYSICAL EXAM: Primary | ICD-10-CM

## 2025-07-10 DIAGNOSIS — E78.5 HYPERLIPIDEMIA, UNSPECIFIED HYPERLIPIDEMIA TYPE: ICD-10-CM

## 2025-07-10 DIAGNOSIS — I10 ESSENTIAL HYPERTENSION: ICD-10-CM

## 2025-07-10 DIAGNOSIS — R73.03 PREDIABETES: ICD-10-CM

## 2025-07-10 DIAGNOSIS — Z23 ENCOUNTER FOR IMMUNIZATION: ICD-10-CM

## 2025-07-10 DIAGNOSIS — Z12.4 CERVICAL CANCER SCREENING: ICD-10-CM

## 2025-07-10 DIAGNOSIS — Z86.73 HISTORY OF CVA (CEREBROVASCULAR ACCIDENT): ICD-10-CM

## 2025-07-10 PROCEDURE — 99214 OFFICE O/P EST MOD 30 MIN: CPT | Performed by: FAMILY MEDICINE

## 2025-07-10 PROCEDURE — 90750 HZV VACC RECOMBINANT IM: CPT

## 2025-07-10 PROCEDURE — 90471 IMMUNIZATION ADMIN: CPT

## 2025-07-10 PROCEDURE — 99396 PREV VISIT EST AGE 40-64: CPT | Performed by: FAMILY MEDICINE

## 2025-07-10 RX ORDER — TICAGRELOR 90 MG/1
90 TABLET, FILM COATED ORAL EVERY 12 HOURS SCHEDULED
Qty: 180 TABLET | Refills: 1 | Status: SHIPPED | OUTPATIENT
Start: 2025-07-10

## 2025-07-10 NOTE — PROGRESS NOTES
Adult Annual Physical  Name: Neelam Benjamin      : 1960      MRN: [id number]  Encounter Provider: Jenna Ivan DO  Encounter Date: 7/10/2025   Encounter department: [facility]    :  Assessment & Plan  Annual physical exam         Essential hypertension  Ideally controlled, cpm       Hyperlipidemia, unspecified hyperlipidemia type  Continue statin       Prediabetes  Stable, cpm       History of CVA (cerebrovascular accident)  Continue statin + Brillinta  Orders:  •  ticagrelor (Brilinta) 90 MG; Take 1 tablet (90 mg total) by mouth every 12 (twelve) hours    Hypoparathyroidism, unspecified hypoparathyroidism type (HCC)  Recent PTH and last year's result normal       Cervical cancer screening    Orders:  •  Ambulatory Referral to Obstetrics / Gynecology; Future    Postmenopausal    Orders:  •  DXA bone density spine hip and pelvis; Future    Breast cancer screening by mammogram    Orders:  •  Mammo screening bilateral w 3d and cad; Future        Preventive Screenings:  - Diabetes Screening: screening up-to-date  - Cholesterol Screening: screening not indicated, has hyperlipidemia and screening up-to-date   - Hepatitis C screening: screening up-to-date   - Cervical cancer screening: risks/benefits discussed and orders placed   - Breast cancer screening: risks/benefits discussed and orders placed   - Colon cancer screening: screening up-to-date   - Lung cancer screening: screening not indicated     Immunizations:  - Immunizations due: Tdap  - Risks/benefits immunizations discussed      Counseling/Anticipatory Guidance:      Pt will check insurance coverage of tetanus booster       Depression Screening and Follow-up Plan: Patient was screened for depression during today's encounter. They screened negative with a PHQ-2 score of 0.        History of Present Illness     Adult Annual Physical:  Patient presents for annual physical. Plus problem-focused f/u visit.     Diet and Physical  "Activity:  - Diet/Nutrition: no special diet.  - Exercise: no formal exercise.    Depression Screening:  - PHQ-2 Score: 0    General Health:  - Sleep: sleeps well and > 8 hours of sleep on average.  - Hearing: normal hearing bilateral ears.  - Vision: wears glasses and most recent eye exam < 1 year ago.  - Dental: regular dental visits, brushes teeth twice daily and does not floss.    /GYN Health:  - Follows with GYN: no.   - Menopause: postmenopausal.   - History of STDs: no  - Contraception: menopause.      Advanced Care Planning:  - Has an advanced directive?: no    - Has a durable medical POA?: no    - ACP document given to patient?: yes      Review of Systems   All other systems reviewed and are negative.        Objective   /72 (BP Location: Left arm, Patient Position: Sitting, Cuff Size: Large)   Pulse 67   Temp 98.2 °F (36.8 °C) (Tympanic)   Resp 18   Ht 5' 4\" (1.626 m)   Wt 85.8 kg (189 lb 3.2 oz)   SpO2 99%   BMI 32.48 kg/m²     Physical Exam  Vitals and nursing note reviewed.   Constitutional:       General: She is not in acute distress.     Appearance: She is well-groomed. She is not ill-appearing, toxic-appearing or diaphoretic.   HENT:      Head: Normocephalic and atraumatic.      Right Ear: Tympanic membrane, ear canal and external ear normal.      Left Ear: Tympanic membrane, ear canal and external ear normal.      Nose: Nose normal.      Mouth/Throat:      Lips: Pink.      Mouth: Mucous membranes are moist.      Pharynx: Oropharynx is clear. Uvula midline.      Tonsils: 0 on the right. 0 on the left.     Eyes:      General: Lids are normal.      Extraocular Movements: Extraocular movements intact.      Conjunctiva/sclera: Conjunctivae normal.      Pupils: Pupils are equal, round, and reactive to light.     Neck:      Thyroid: No thyroid mass, thyromegaly or thyroid tenderness.      Vascular: No JVD.      Trachea: Trachea and phonation normal.     Cardiovascular:      Rate and Rhythm: " Normal rate and regular rhythm.      Pulses: Normal pulses.      Heart sounds: Normal heart sounds.   Pulmonary:      Effort: Pulmonary effort is normal.      Breath sounds: Normal breath sounds and air entry.   Abdominal:      General: Bowel sounds are normal. There is no distension or abdominal bruit.      Palpations: Abdomen is soft. There is no hepatomegaly, splenomegaly or mass.      Tenderness: There is no abdominal tenderness.      Hernia: There is no hernia in the ventral area.     Musculoskeletal:      Cervical back: Neck supple.      Right lower leg: No edema.      Left lower leg: No edema.   Lymphadenopathy:      Cervical: No cervical adenopathy.     Skin:     General: Skin is warm and dry.      Capillary Refill: Capillary refill takes less than 2 seconds.      Coloration: Skin is not pale.     Neurological:      Mental Status: She is alert and oriented to person, place, and time.      Cranial Nerves: Cranial nerves 2-12 are intact.      Sensory: Sensation is intact.      Motor: Motor function is intact.      Coordination: Coordination is intact.      Gait: Gait normal.      Deep Tendon Reflexes: Reflexes are normal and symmetric.     Psychiatric:         Mood and Affect: Mood normal.         Behavior: Behavior normal. Behavior is cooperative.

## 2025-07-10 NOTE — PATIENT INSTRUCTIONS
"Patient Education     Routine physical for adults   The Basics   Written by the doctors and editors at [facility]   What is a physical? -- A physical is a routine visit, or \"check-up,\" with your doctor. You might also hear it called a \"wellness visit\" or \"preventive visit.\"  During each visit, the doctor will:   Ask about your physical and mental health   Ask about your habits, behaviors, and lifestyle   Do an exam   Give you vaccines if needed   Talk to you about any medicines you take   Give advice about your health   Answer your questions  Getting regular check-ups is an important part of taking care of your health. It can help your doctor find and treat any problems you have. But it's also important for preventing health problems.  A routine physical is different from a \"sick visit.\" A sick visit is when you see a doctor because of a health concern or problem. Since physicals are scheduled ahead of time, you can think about what you want to ask the doctor.  How often should I get a physical? -- It depends on your age and health. In general, for people age 21 years and older:   If you are younger than 50 years, you might be able to get a physical every 3 years.   If you are 50 years or older, your doctor might recommend a physical every year.  If you have an ongoing health condition, like diabetes or high blood pressure, your doctor will probably want to see you more often.  What happens during a physical? -- In general, each visit will include:   Physical exam - The doctor or nurse will check your height, weight, heart rate, and blood pressure. They will also look at your eyes and ears. They will ask about how you are feeling and whether you have any symptoms that bother you.   Medicines - It's a good idea to bring a list of all the medicines you take to each doctor visit. Your doctor will talk to you about your medicines and answer any questions. Tell them if you are having any side effects that bother you. You " "should also tell them if you are having trouble paying for any of your medicines.   Habits and behaviors - This includes:   Your diet   Your exercise habits   Whether you smoke, drink alcohol, or use drugs   Whether you are sexually active   Whether you feel safe at home  Your doctor will talk to you about things you can do to improve your health and lower your risk of health problems. They will also offer help and support. For example, if you want to quit smoking, they can give you advice and might prescribe medicines. If you want to improve your diet or get more physical activity, they can help you with this, too.   Lab tests, if needed - The tests you get will depend on your age and situation. For example, your doctor might want to check your:   Cholesterol   Blood sugar   Iron level   Vaccines - The recommended vaccines will depend on your age, health, and what vaccines you already had. Vaccines are very important because they can prevent certain serious or deadly infections.   Discussion of screening - \"Screening\" means checking for diseases or other health problems before they cause symptoms. Your doctor can recommend screening based on your age, risk, and preferences. This might include tests to check for:   Cancer, such as breast, prostate, cervical, ovarian, colorectal, prostate, lung, or skin cancer   Sexually transmitted infections, such as chlamydia and gonorrhea   Mental health conditions like depression and anxiety  Your doctor will talk to you about the different types of screening tests. They can help you decide which screenings to have. They can also explain what the results might mean.   Answering questions - The physical is a good time to ask the doctor or nurse questions about your health. If needed, they can refer you to other doctors or specialists, too.  Adults older than 65 years often need other care, too. As you get older, your doctor will talk to you about:   How to prevent falling at " home   Hearing or vision tests   Memory testing   How to take your medicines safely   Making sure that you have the help and support you need at home  All topics are updated as new evidence becomes available and our peer review process is complete.  This topic retrieved from [employer] on: May 02, 2024.  Topic 133207 Version 1.0  Release: 32.4.3 - C32.122  © 2024 UpToDate, Inc. and/or its affiliates. All rights reserved.  Consumer Information Use and Disclaimer   Disclaimer: This generalized information is a limited summary of diagnosis, treatment, and/or medication information. It is not meant to be comprehensive and should be used as a tool to help the user understand and/or assess potential diagnostic and treatment options. It does NOT include all information about conditions, treatments, medications, side effects, or risks that may apply to a specific patient. It is not intended to be medical advice or a substitute for the medical advice, diagnosis, or treatment of a health care provider based on the health care provider's examination and assessment of a patient's specific and unique circumstances. Patients must speak with a health care provider for complete information about their health, medical questions, and treatment options, including any risks or benefits regarding use of medications. This information does not endorse any treatments or medications as safe, effective, or approved for treating a specific patient. UpToDate, Inc. and its affiliates disclaim any warranty or liability relating to this information or the use thereof.The use of this information is governed by the Terms of Use, available at https://www.woltersACelluwer.com/en/know/clinical-effectiveness-terms. 2024© UpToDate, Inc. and its affiliates and/or licensors. All rights reserved.  Copyright   © 2024 UpToDate, Inc. and/or its affiliates. All rights reserved.    Patient Education     Routine physical for adults   The Basics   Written by the  "doctors and editors at [facility]   What is a physical? -- A physical is a routine visit, or \"check-up,\" with your doctor. You might also hear it called a \"wellness visit\" or \"preventive visit.\"  During each visit, the doctor will:   Ask about your physical and mental health   Ask about your habits, behaviors, and lifestyle   Do an exam   Give you vaccines if needed   Talk to you about any medicines you take   Give advice about your health   Answer your questions  Getting regular check-ups is an important part of taking care of your health. It can help your doctor find and treat any problems you have. But it's also important for preventing health problems.  A routine physical is different from a \"sick visit.\" A sick visit is when you see a doctor because of a health concern or problem. Since physicals are scheduled ahead of time, you can think about what you want to ask the doctor.  How often should I get a physical? -- It depends on your age and health. In general, for people age 21 years and older:   If you are younger than 50 years, you might be able to get a physical every 3 years.   If you are 50 years or older, your doctor might recommend a physical every year.  If you have an ongoing health condition, like diabetes or high blood pressure, your doctor will probably want to see you more often.  What happens during a physical? -- In general, each visit will include:   Physical exam - The doctor or nurse will check your height, weight, heart rate, and blood pressure. They will also look at your eyes and ears. They will ask about how you are feeling and whether you have any symptoms that bother you.   Medicines - It's a good idea to bring a list of all the medicines you take to each doctor visit. Your doctor will talk to you about your medicines and answer any questions. Tell them if you are having any side effects that bother you. You should also tell them if you are having trouble paying for any of your " "medicines.   Habits and behaviors - This includes:   Your diet   Your exercise habits   Whether you smoke, drink alcohol, or use drugs   Whether you are sexually active   Whether you feel safe at home  Your doctor will talk to you about things you can do to improve your health and lower your risk of health problems. They will also offer help and support. For example, if you want to quit smoking, they can give you advice and might prescribe medicines. If you want to improve your diet or get more physical activity, they can help you with this, too.   Lab tests, if needed - The tests you get will depend on your age and situation. For example, your doctor might want to check your:   Cholesterol   Blood sugar   Iron level   Vaccines - The recommended vaccines will depend on your age, health, and what vaccines you already had. Vaccines are very important because they can prevent certain serious or deadly infections.   Discussion of screening - \"Screening\" means checking for diseases or other health problems before they cause symptoms. Your doctor can recommend screening based on your age, risk, and preferences. This might include tests to check for:   Cancer, such as breast, prostate, cervical, ovarian, colorectal, prostate, lung, or skin cancer   Sexually transmitted infections, such as chlamydia and gonorrhea   Mental health conditions like depression and anxiety  Your doctor will talk to you about the different types of screening tests. They can help you decide which screenings to have. They can also explain what the results might mean.   Answering questions - The physical is a good time to ask the doctor or nurse questions about your health. If needed, they can refer you to other doctors or specialists, too.  Adults older than 65 years often need other care, too. As you get older, your doctor will talk to you about:   How to prevent falling at home   Hearing or vision tests   Memory testing   How to take your " medicines safely   Making sure that you have the help and support you need at home  All topics are updated as new evidence becomes available and our peer review process is complete.  This topic retrieved from [employer] on: May 02, 2024.  Topic 520459 Version 1.0  Release: 32.4.3 - C32.122  © 2024 UpToDate, Inc. and/or its affiliates. All rights reserved.  Consumer Information Use and Disclaimer   Disclaimer: This generalized information is a limited summary of diagnosis, treatment, and/or medication information. It is not meant to be comprehensive and should be used as a tool to help the user understand and/or assess potential diagnostic and treatment options. It does NOT include all information about conditions, treatments, medications, side effects, or risks that may apply to a specific patient. It is not intended to be medical advice or a substitute for the medical advice, diagnosis, or treatment of a health care provider based on the health care provider's examination and assessment of a patient's specific and unique circumstances. Patients must speak with a health care provider for complete information about their health, medical questions, and treatment options, including any risks or benefits regarding use of medications. This information does not endorse any treatments or medications as safe, effective, or approved for treating a specific patient. UpToDate, Inc. and its affiliates disclaim any warranty or liability relating to this information or the use thereof.The use of this information is governed by the Terms of Use, available at https://www.woltersConSentry Networksuwer.com/en/know/clinical-effectiveness-terms. 2024© UpToDate, Inc. and its affiliates and/or licensors. All rights reserved.  Copyright   © 2024 UpToDate, Inc. and/or its affiliates. All rights reserved.

## 2025-07-10 NOTE — PROGRESS NOTES
"Adult Annual Physical  Name: Neelam Benjamin      : 1960      MRN: [id number]  Encounter Provider: Jenna Ivan DO  Encounter Date: 7/10/2025   Encounter department: [facility] [place]    :  Assessment & Plan  Annual physical exam               Annual Physical Plan       History of Present Illness   {?Quick Links Encounters * My Last Note * Last Note in Specialty * Snapshot * Since Last Visit * History :46212}  Adult Annual Physical  Review of Systems  {Select to Display PMH (Optional):88827}    Objective {?Quick Links Trend Vitals * Enter New Vitals * Results Review * Timeline (Adult) * Labs * Imaging * Cardiology * Procedures * Lung Cancer Screening * Surgical eConsent :07418}  /72 (BP Location: Left arm, Patient Position: Sitting, Cuff Size: Large)   Pulse 67   Temp 98.2 °F (36.8 °C) (Tympanic)   Resp 18   Ht 5' 4\" (1.626 m)   Wt 85.8 kg (189 lb 3.2 oz)   SpO2 99%   BMI 32.48 kg/m²     Physical Exam  {Administrative / Billing Section (Optional):63839}  "

## 2025-07-24 ENCOUNTER — REMOTE DEVICE CLINIC VISIT (OUTPATIENT)
Dept: CARDIOLOGY CLINIC | Facility: CLINIC | Age: 65
End: 2025-07-24
Payer: COMMERCIAL

## 2025-07-24 DIAGNOSIS — I63.9 CRYPTOGENIC STROKE (HCC): Primary | ICD-10-CM

## 2025-07-24 PROCEDURE — 93298 REM INTERROG DEV EVAL SCRMS: CPT | Performed by: INTERNAL MEDICINE

## (undated) DEVICE — SUT MONOCRYL 5-0 P-3 18 IN Y493G

## (undated) DEVICE — PLUMEPEN PRO 10FT

## (undated) DEVICE — MINOR PROCEDURE DRAPE: Brand: CONVERTORS

## (undated) DEVICE — GLOVE SRG BIOGEL ECLIPSE 7

## (undated) DEVICE — SUT VICRYL 3-0 SH 27 IN J416H

## (undated) DEVICE — 3M™ STERI-STRIP™ COMPOUND BENZOIN TINCTURE 40 BAGS/CARTON 4 CARTONS/CASE C1544: Brand: 3M™ STERI-STRIP™

## (undated) DEVICE — SUT VICRYL 4-0 PS-2 27 IN J426H

## (undated) DEVICE — PACK UNIVERSAL NECK

## (undated) DEVICE — X-RAY DETECTABLE SPONGES,16 PLY: Brand: VISTEC

## (undated) DEVICE — NEEDLE 25G X 1 1/2

## (undated) DEVICE — 3000CC GUARDIAN II: Brand: GUARDIAN

## (undated) DEVICE — SCD SEQUENTIAL COMPRESSION COMFORT SLEEVE MEDIUM KNEE LENGTH: Brand: KENDALL SCD

## (undated) DEVICE — SURGICEL 4 X 8

## (undated) DEVICE — ACE WRAP 2 IN STERILE

## (undated) DEVICE — INTENDED FOR TISSUE SEPARATION, AND OTHER PROCEDURES THAT REQUIRE A SHARP SURGICAL BLADE TO PUNCTURE OR CUT.: Brand: BARD-PARKER ® CARBON RIB-BACK BLADES

## (undated) DEVICE — SUT SILK 3-0 18 IN A184H

## (undated) DEVICE — CHLORAPREP HI-LITE 10.5ML ORANGE

## (undated) DEVICE — DRAPE SURGIKIT SADDLE BAG

## (undated) DEVICE — GLOVE INDICATOR PI UNDERGLOVE SZ 7 BLUE

## (undated) DEVICE — SUT SILK 2-0 SH 30 IN K833H

## (undated) DEVICE — ELECTRODE BLADE MOD E-Z CLEAN 2.5IN 6.4CM -0012M

## (undated) DEVICE — LIGACLIP MCA MULTIPLE CLIP APPLIERS, 20 SMALL CLIPS: Brand: LIGACLIP

## (undated) DEVICE — INTENDED FOR TISSUE SEPARATION, AND OTHER PROCEDURES THAT REQUIRE A SHARP SURGICAL BLADE TO PUNCTURE OR CUT.: Brand: BARD-PARKER SAFETY BLADES SIZE 15, STERILE